# Patient Record
Sex: FEMALE | Race: WHITE | NOT HISPANIC OR LATINO | Employment: OTHER | URBAN - METROPOLITAN AREA
[De-identification: names, ages, dates, MRNs, and addresses within clinical notes are randomized per-mention and may not be internally consistent; named-entity substitution may affect disease eponyms.]

---

## 2017-02-16 RX ORDER — RANOLAZINE 500 MG/1
TABLET, FILM COATED, EXTENDED RELEASE ORAL
Qty: 180 TABLET | Refills: 3 | Status: SHIPPED | OUTPATIENT
Start: 2017-02-16 | End: 2017-07-12

## 2017-04-25 ENCOUNTER — APPOINTMENT (OUTPATIENT)
Dept: WOMENS IMAGING | Facility: HOSPITAL | Age: 77
End: 2017-04-25

## 2017-04-25 PROCEDURE — 77063 BREAST TOMOSYNTHESIS BI: CPT | Performed by: RADIOLOGY

## 2017-04-25 PROCEDURE — G0202 SCR MAMMO BI INCL CAD: HCPCS | Performed by: RADIOLOGY

## 2017-07-03 ENCOUNTER — HOSPITAL ENCOUNTER (EMERGENCY)
Facility: HOSPITAL | Age: 77
Discharge: HOME | End: 2017-07-03
Attending: EMERGENCY MEDICINE | Admitting: EMERGENCY MEDICINE

## 2017-07-03 VITALS
WEIGHT: 192 LBS | TEMPERATURE: 98.7 F | HEART RATE: 77 BPM | HEIGHT: 65 IN | SYSTOLIC BLOOD PRESSURE: 115 MMHG | OXYGEN SATURATION: 93 % | BODY MASS INDEX: 31.99 KG/M2 | DIASTOLIC BLOOD PRESSURE: 95 MMHG | RESPIRATION RATE: 16 BRPM

## 2017-07-03 DIAGNOSIS — K11.20 SIALOADENITIS: Primary | ICD-10-CM

## 2017-07-03 LAB
ALBUMIN SERPL-MCNC: 4 G/DL (ref 3.5–5.2)
ALBUMIN/GLOB SERPL: 1.2 G/DL
ALP SERPL-CCNC: 51 U/L (ref 39–117)
ALT SERPL W P-5'-P-CCNC: 25 U/L (ref 1–33)
ANION GAP SERPL CALCULATED.3IONS-SCNC: 14 MMOL/L
AST SERPL-CCNC: 22 U/L (ref 1–32)
BASOPHILS # BLD AUTO: 0.01 10*3/MM3 (ref 0–0.2)
BASOPHILS NFR BLD AUTO: 0.1 % (ref 0–1.5)
BILIRUB SERPL-MCNC: 0.7 MG/DL (ref 0.1–1.2)
BUN BLD-MCNC: 8 MG/DL (ref 8–23)
BUN/CREAT SERPL: 10.7 (ref 7–25)
CALCIUM SPEC-SCNC: 9.8 MG/DL (ref 8.6–10.5)
CHLORIDE SERPL-SCNC: 101 MMOL/L (ref 98–107)
CO2 SERPL-SCNC: 25 MMOL/L (ref 22–29)
CREAT BLD-MCNC: 0.75 MG/DL (ref 0.57–1)
DEPRECATED RDW RBC AUTO: 51.8 FL (ref 37–54)
EOSINOPHIL # BLD AUTO: 0.03 10*3/MM3 (ref 0–0.7)
EOSINOPHIL NFR BLD AUTO: 0.4 % (ref 0.3–6.2)
ERYTHROCYTE [DISTWIDTH] IN BLOOD BY AUTOMATED COUNT: 14.5 % (ref 11.7–13)
GFR SERPL CREATININE-BSD FRML MDRD: 75 ML/MIN/1.73
GLOBULIN UR ELPH-MCNC: 3.4 GM/DL
GLUCOSE BLD-MCNC: 100 MG/DL (ref 65–99)
HCT VFR BLD AUTO: 41.6 % (ref 35.6–45.5)
HGB BLD-MCNC: 14.1 G/DL (ref 11.9–15.5)
IMM GRANULOCYTES # BLD: 0 10*3/MM3 (ref 0–0.03)
IMM GRANULOCYTES NFR BLD: 0 % (ref 0–0.5)
LYMPHOCYTES # BLD AUTO: 2.5 10*3/MM3 (ref 0.9–4.8)
LYMPHOCYTES NFR BLD AUTO: 34.2 % (ref 19.6–45.3)
MCH RBC QN AUTO: 33 PG (ref 26.9–32)
MCHC RBC AUTO-ENTMCNC: 33.9 G/DL (ref 32.4–36.3)
MCV RBC AUTO: 97.4 FL (ref 80.5–98.2)
MONOCYTES # BLD AUTO: 0.97 10*3/MM3 (ref 0.2–1.2)
MONOCYTES NFR BLD AUTO: 13.3 % (ref 5–12)
NEUTROPHILS # BLD AUTO: 3.8 10*3/MM3 (ref 1.9–8.1)
NEUTROPHILS NFR BLD AUTO: 52 % (ref 42.7–76)
PLATELET # BLD AUTO: 167 10*3/MM3 (ref 140–500)
PMV BLD AUTO: 10.4 FL (ref 6–12)
POTASSIUM BLD-SCNC: 4.3 MMOL/L (ref 3.5–5.2)
PROT SERPL-MCNC: 7.4 G/DL (ref 6–8.5)
RBC # BLD AUTO: 4.27 10*6/MM3 (ref 3.9–5.2)
SODIUM BLD-SCNC: 140 MMOL/L (ref 136–145)
WBC NRBC COR # BLD: 7.31 10*3/MM3 (ref 4.5–10.7)

## 2017-07-03 PROCEDURE — 99283 EMERGENCY DEPT VISIT LOW MDM: CPT

## 2017-07-03 PROCEDURE — 80053 COMPREHEN METABOLIC PANEL: CPT | Performed by: PHYSICIAN ASSISTANT

## 2017-07-03 PROCEDURE — 96365 THER/PROPH/DIAG IV INF INIT: CPT

## 2017-07-03 PROCEDURE — 85025 COMPLETE CBC W/AUTO DIFF WBC: CPT | Performed by: PHYSICIAN ASSISTANT

## 2017-07-03 RX ORDER — CLINDAMYCIN HYDROCHLORIDE 300 MG/1
300 CAPSULE ORAL 4 TIMES DAILY
Qty: 28 CAPSULE | Refills: 0 | Status: SHIPPED | OUTPATIENT
Start: 2017-07-03 | End: 2017-07-12

## 2017-07-03 RX ORDER — CLINDAMYCIN PHOSPHATE 600 MG/50ML
600 INJECTION INTRAVENOUS ONCE
Status: COMPLETED | OUTPATIENT
Start: 2017-07-03 | End: 2017-07-03

## 2017-07-03 RX ORDER — SODIUM CHLORIDE 0.9 % (FLUSH) 0.9 %
10 SYRINGE (ML) INJECTION AS NEEDED
Status: DISCONTINUED | OUTPATIENT
Start: 2017-07-03 | End: 2017-07-03 | Stop reason: HOSPADM

## 2017-07-03 RX ADMIN — CLINDAMYCIN PHOSPHATE 600 MG: 12 INJECTION, SOLUTION INTRAMUSCULAR; INTRAVENOUS at 15:14

## 2017-07-03 NOTE — ED PROVIDER NOTES
Pt presents complaining of swelling to right side of the jaw. Pt previously had a swollen salivary gland. On exam, the pt has swelling of the right face anterior to the right ear, no facial erythema, neck supple, heart is RRR, and lungs are clear. Plan to discharge the pt on antibiotics and follow up with ENT doctor as scheduled.    I supervised care provided by the midlevel provider.    We have discussed this patient's history, physical exam, and treatment plan.   I have reviewed the note and personally saw and examined the patient and agree with the plan of care.    Documentation assistance provided by kellie Jose for Dr. Tran.  Information recorded by the scribe was done at my direction and has been verified and validated by me.       Danny Jose  07/03/17 1612       Murphy Tran MD  07/03/17 3817

## 2017-07-03 NOTE — ED NOTES
Pt reports swelling and tenderness on her right neck and jaw line since Friday. Pt reports being seen at  PCP today about this and was recommended to come to the ER. Pt reports could not get in with her ENT MD (Dr. Rodas).NAD noted. No trouble breathing or swallowing. Afebrile.      Leela Chinchilla RN  07/03/17 0888

## 2017-07-03 NOTE — ED PROVIDER NOTES
EMERGENCY DEPARTMENT ENCOUNTER    CHIEF COMPLAINT  Chief Complaint: facial sweling  History given by: patient, family  History limited by: nothing  Room Number: 08/08  PMD: Tho Mar MD  ENT - Dr. Garcia    HPI:  Pt is a 76 y.o. female who presents complaining of swelling to R side of jaw that radiates to R ear onset 3 days ago that has improved since onset. She spoke w/ her ENT office but was unable to get an appointment until Friday. Pt also c/o low-grade fever, warmth to R side of jaw but denies chills, dental pain. Pt has hx of blocked salivary gland on R side for which she has previously seen ENT.    Duration:  3 days  Onset: gradual  Timing: constant  Location: R side of jaw  Radiation: R ear  Quality: swelling, warmth  Intensity/Severity: moderate  Progression: improved  Associated Symptoms: fever, warmth to R side of aw  Aggravating Factors: opening mouth  Alleviating Factors: none  Previous Episodes: hx of blocked salivary gland on R  Treatment before arrival: none    PAST MEDICAL HISTORY  Active Ambulatory Problems     Diagnosis Date Noted   • No Active Ambulatory Problems     Resolved Ambulatory Problems     Diagnosis Date Noted   • No Resolved Ambulatory Problems     Past Medical History:   Diagnosis Date   • CAD (coronary artery disease)    • Hyperlipidemia    • Hypertension    • Myocardial infarction    • Obesity    • Sinus bradycardia        PAST SURGICAL HISTORY  Past Surgical History:   Procedure Laterality Date   • CARDIAC CATHETERIZATION     • CORONARY ANGIOPLASTY WITH STENT PLACEMENT         FAMILY HISTORY  History reviewed. No pertinent family history.    SOCIAL HISTORY  Social History     Social History   • Marital status:      Spouse name: N/A   • Number of children: N/A   • Years of education: N/A     Occupational History   • Not on file.     Social History Main Topics   • Smoking status: Never Smoker   • Smokeless tobacco: Not on file   • Alcohol use No   • Drug use: Not on  file   • Sexual activity: Not on file     Other Topics Concern   • Not on file     Social History Narrative       ALLERGIES  Ambien [zolpidem tartrate]; Penicillins; and Tylenol with codeine #3 [acetaminophen-codeine]    REVIEW OF SYSTEMS  Review of Systems   Constitutional: Positive for fever. Negative for chills.   HENT: Positive for facial swelling (R side of jaw w/ warmth). Negative for congestion and sore throat.    Eyes: Negative.    Respiratory: Negative for cough and shortness of breath.    Cardiovascular: Negative for chest pain and leg swelling.   Gastrointestinal: Negative for abdominal pain, diarrhea and vomiting.   Genitourinary: Negative for difficulty urinating and dysuria.   Musculoskeletal: Negative for back pain and neck pain.   Skin: Negative for rash and wound.   Allergic/Immunologic: Negative.    Neurological: Negative for dizziness, weakness, numbness and headaches.   Psychiatric/Behavioral: Negative.    All other systems reviewed and are negative.      PHYSICAL EXAM  ED Triage Vitals   Temp Heart Rate Resp BP SpO2   07/03/17 1219 07/03/17 1219 07/03/17 1219 07/03/17 1232 07/03/17 1219   98 °F (36.7 °C) 85 18 138/91 93 %      Temp src Heart Rate Source Patient Position BP Location FiO2 (%)   07/03/17 1219 07/03/17 1219 -- -- --   Tympanic Monitor          Physical Exam   Constitutional: She is oriented to person, place, and time and well-developed, well-nourished, and in no distress. No distress.   HENT:   Head: Normocephalic and atraumatic.   No dental caries. Ears normal. R side of face has swollen mass to R submandibular-parotid area that is tender.   Eyes: EOM are normal. Pupils are equal, round, and reactive to light.   Neck: Normal range of motion. Neck supple.   Cardiovascular: Normal rate, regular rhythm and normal heart sounds.    Pulmonary/Chest: Effort normal and breath sounds normal. No respiratory distress.   Abdominal: Soft. There is no tenderness. There is no rebound and no  guarding.   Musculoskeletal: Normal range of motion. She exhibits no edema.   Neurological: She is alert and oriented to person, place, and time. She has normal sensation and normal strength.   Skin: Skin is warm and dry. No rash noted.   Psychiatric: Mood and affect normal.   Nursing note and vitals reviewed.      LAB RESULTS  Lab Results (last 24 hours)     Procedure Component Value Units Date/Time    CBC & Differential [[id number]] Collected:  07/03/17 1512    Specimen:  Blood Updated:  07/03/17 1542    Narrative:       The following orders were created for panel order CBC & Differential.  Procedure                               Abnormality         Status                     ---------                               -----------         ------                     CBC Auto Differential[[id number]]        Abnormal            Final result                 Please view results for these tests on the individual orders.    Comprehensive Metabolic Panel [[id number]]  (Abnormal) Collected:  07/03/17 1512    Specimen:  Blood Updated:  07/03/17 1551     Glucose 100 (H) mg/dL      BUN 8 mg/dL      Creatinine 0.75 mg/dL      Sodium 140 mmol/L      Potassium 4.3 mmol/L      Chloride 101 mmol/L      CO2 25.0 mmol/L      Calcium 9.8 mg/dL      Total Protein 7.4 g/dL      Albumin 4.00 g/dL      ALT (SGPT) 25 U/L      AST (SGOT) 22 U/L      Alkaline Phosphatase 51 U/L      Total Bilirubin 0.7 mg/dL      eGFR Non African Amer 75 mL/min/1.73      Globulin 3.4 gm/dL      A/G Ratio 1.2 g/dL      BUN/Creatinine Ratio 10.7     Anion Gap 14.0 mmol/L     Narrative:       The MDRD GFR formula is only valid for adults with stable renal function between ages 18 and 70.    CBC Auto Differential [[id number]]  (Abnormal) Collected:  07/03/17 1512    Specimen:  Blood Updated:  07/03/17 1542     WBC 7.31 10*3/mm3      RBC 4.27 10*6/mm3      Hemoglobin 14.1 g/dL      Hematocrit 41.6 %      MCV 97.4 fL      MCH 33.0 (H) pg      MCHC 33.9 g/dL      RDW 14.5  (H) %      RDW-SD 51.8 fl      MPV 10.4 fL      Platelets 167 10*3/mm3      Neutrophil % 52.0 %      Lymphocyte % 34.2 %      Monocyte % 13.3 (H) %      Eosinophil % 0.4 %      Basophil % 0.1 %      Immature Grans % 0.0 %      Neutrophils, Absolute 3.80 10*3/mm3      Lymphocytes, Absolute 2.50 10*3/mm3      Monocytes, Absolute 0.97 10*3/mm3      Eosinophils, Absolute 0.03 10*3/mm3      Basophils, Absolute 0.01 10*3/mm3      Immature Grans, Absolute 0.00 10*3/mm3         I ordered the above labs and reviewed the results      RADIOLOGY  No orders to display     I ordered the above noted radiological studies. Interpreted by radiologist. Reviewed by me in PACS.       PROCEDURES  Procedures      PROGRESS AND CONSULTS  ED Course     1448  Ordered blood work for further evaluation. Ordered clindamycin for infection.    1544  Rechecked pt who is resting comfortably. D/w pt and family plan to d/c pt w/ ENT f/u. Recommended sucking on lemon drops and gave RTER warnings. Pt and family understand and agree with the plan. All questions answered.    1555  Discussed pt w/ Dr. Tran who, after bedside evaluation, agrees w/ course of care.      MEDICAL DECISION MAKING  Results were reviewed/discussed with the patient and they were also made aware of online access. Pt also made aware that some labs, such as cultures, will not be resulted during ER visit and follow up with PMD is necessary.     MDM  Number of Diagnoses or Management Options  Sialoadenitis:   Diagnosis management comments: No evidence of sepsis.  HPI and exam consistent with prior sialoadenitis.         Amount and/or Complexity of Data Reviewed  Clinical lab tests: ordered and reviewed (Lab work is unremarkable.)  Decide to obtain previous medical records or to obtain history from someone other than the patient: yes  Obtain history from someone other than the patient: yes  Review and summarize past medical records: yes (6/17/14 pt had CT face that showed  Sialoadenitis with possible stone.)    Patient Progress  Patient progress: stable         DIAGNOSIS  Final diagnoses:   Sialoadenitis       DISPOSITION  DISCHARGE    Patient discharged in stable condition.    Reviewed implications of results, diagnosis, meds, responsibility to follow up, warning signs and symptoms of possible worsening, potential complications and reasons to return to ER, including worsening facial swelling.    Patient/Family voiced understanding of above instructions.    Discussed plan for discharge, as there is no emergent indication for admission.  Pt/family is agreeable and understands need for follow up and repeat testing.  Pt is aware that discharge does not mean that nothing is wrong but it indicates no emergency is present that requires admission and they must continue care with follow-up as given below or physician of their choice.     FOLLOW-UP  Andrea Garcia MD  [address] [address]  [phone]    Go in 4 days  As scheduled, For follow-up         Medication List      Notice     No changes were made to your prescriptions during this visit.        Latest Documented Vital Signs:  As of 3:58 PM  BP- 161/98 HR- 77 Temp- 98.4 °F (36.9 °C) (Tympanic) O2 sat- 93%    --  Documentation assistance provided by kellie Nguyễn for MARQUITA Werner.  Information recorded by the scribe was done at my direction and has been verified and validated by me.     Shyam Nguyễn  07/03/17 7619       MARQUITA Moise  07/03/17 0567

## 2017-07-12 ENCOUNTER — OFFICE VISIT (OUTPATIENT)
Dept: CARDIOLOGY | Facility: CLINIC | Age: 77
End: 2017-07-12

## 2017-07-12 VITALS
OXYGEN SATURATION: 100 % | BODY MASS INDEX: 32.32 KG/M2 | HEART RATE: 78 BPM | SYSTOLIC BLOOD PRESSURE: 120 MMHG | WEIGHT: 194 LBS | HEIGHT: 65 IN | DIASTOLIC BLOOD PRESSURE: 86 MMHG

## 2017-07-12 DIAGNOSIS — I25.10 CORONARY ARTERY DISEASE INVOLVING NATIVE CORONARY ARTERY OF NATIVE HEART WITHOUT ANGINA PECTORIS: Primary | ICD-10-CM

## 2017-07-12 DIAGNOSIS — I42.9 CARDIOMYOPATHY (HCC): ICD-10-CM

## 2017-07-12 DIAGNOSIS — Z95.5 HISTORY OF CORONARY ARTERY STENT PLACEMENT: ICD-10-CM

## 2017-07-12 DIAGNOSIS — I34.0 MITRAL VALVE INSUFFICIENCY, UNSPECIFIED ETIOLOGY: ICD-10-CM

## 2017-07-12 PROCEDURE — 93000 ELECTROCARDIOGRAM COMPLETE: CPT | Performed by: PHYSICIAN ASSISTANT

## 2017-07-12 PROCEDURE — 99214 OFFICE O/P EST MOD 30 MIN: CPT | Performed by: PHYSICIAN ASSISTANT

## 2017-07-12 RX ORDER — GUAIFENESIN 600 MG/1
1200 TABLET, EXTENDED RELEASE ORAL 2 TIMES DAILY
COMMUNITY
End: 2019-07-19 | Stop reason: ALTCHOICE

## 2017-07-12 NOTE — PROGRESS NOTES
Date of Office Visit: 2017  Encounter Provider: MARQUITA Bethea  Place of Service: [facility] CARDIOLOGY  Patient Name: Alice Mabry  :1940    Chief Complaint   Patient presents with   • Coronary Artery Disease     1 year follow up   :     HPI: Alice Mabry is a 76 y.o. female, new to me, who presents today for follow-up.  Old records have been obtained and reviewed by me.  She is a patient of Dr. Horner's with a past medical history significant for hypertension, hyperlipidemia, sinus bradycardia, and coronary artery disease.  Her last cardiac catheterization was in 2014.  This showed a 10% luminal irregularities in the left main, widely patent proximal LAD stent, 2 small diagonals with 80% ostial lesions that were covered by the stent and not amenable to PCI, 20% in-stent restenosis in the circumflex stent, and a dominant RCA with a 90% ostial lesion and 30% mid disease.  There was a distal stent that was widely patent.  She underwent successful angioplasty and drug-eluting stent placement to the ostial RCA lesion.  She was also noted to have ischemic cardiomyopathy with an overall ejection fraction of around 30% at the time of the cardiac catheterization.  She did have an echocardiogram in 2014 which showed improvement of her EF to 57%, moderately dilated left atrium, moderate mitral regurgitation, and normal LV function.  Her last nuclear stress test was on 2015 and showed an EF of about 45%, inferior wall hypokinesis, and a moderate area of infarct in the inferior wall with no reperfusable ischemia.  She was last in our office to see Dr. Horner on 2016.  At that visit, he felt that she was stable.  He mentioned that we were treating her as if she had chronic stable angina, however mention that it was difficult to determine the etiology of some of her symptoms.  She is here today for yearly follow-up.   In 2016 she was  diagnosed and hospitalized with double pneumonia.  She was treated with antibiotics and subsequently developed C. difficile.  During that hospitalization, she did have an echocardiogram performed on 2/27/2016.  This showed normal LV function with an EF of 60%, moderate mitral regurgitation, and normal RVSP at 28 mmHg.  she states that now she is finally starting to recover and feel better.  It was a really rough December and January for her.  She has palpitations at times and is constantly short of breath on exertion, however this is unchanged for her.  She also has some lightheadedness and fatigue.  Despite these symptoms, she states that she feels the best that she has this whole year.  She is not having any chest pain.  Miraculously, her daughter who had stage IV lung cancer with metastasis to the brain last year is still alive.  She actually is in remission, which is truly remarkable.      Past Medical History:   Diagnosis Date   • CAD (coronary artery disease)    • Dizziness    • Hyperlipidemia    • Hypertension    • Myocardial infarction    • Obesity    • Pneumonia    • Sinus bradycardia    • Syncope        Past Surgical History:   Procedure Laterality Date   • CARDIAC CATHETERIZATION     • CORONARY ANGIOPLASTY WITH STENT PLACEMENT         Social History     Social History   • Marital status:      Spouse name: N/A   • Number of children: N/A   • Years of education: N/A     Occupational History   • Not on file.     Social History Main Topics   • Smoking status: Never Smoker   • Smokeless tobacco: Not on file   • Alcohol use No   • Drug use: No   • Sexual activity: Defer     Other Topics Concern   • Not on file     Social History Narrative       Family History   Problem Relation Age of Onset   • No Known Problems Mother    • No Known Problems Father    • No Known Problems Maternal Grandmother    • No Known Problems Maternal Grandfather    • No Known Problems Paternal Grandmother    • No Known Problems  Paternal Grandfather        Review of Systems   Constitution: Negative for chills, fever, malaise/fatigue, weight gain and weight loss.   HENT: Negative for ear pain, headaches, hearing loss, nosebleeds and sore throat.    Eyes: Negative for double vision, pain and visual disturbance.   Cardiovascular: Positive for dyspnea on exertion and palpitations. Negative for chest pain, irregular heartbeat, leg swelling, near-syncope, orthopnea, paroxysmal nocturnal dyspnea and syncope.   Respiratory: Negative for cough, shortness of breath, sleep disturbances due to breathing, snoring and wheezing.    Endocrine: Negative for cold intolerance, heat intolerance and polyuria.   Skin: Negative for itching and rash.   Musculoskeletal: Negative for joint pain, joint swelling and myalgias.   Gastrointestinal: Negative for abdominal pain, diarrhea, melena, nausea and vomiting.   Genitourinary: Negative for frequency, hematuria and hesitancy.   Neurological: Positive for light-headedness. Negative for excessive daytime sleepiness, numbness, paresthesias and seizures.   Psychiatric/Behavioral: Negative for altered mental status and depression.   Allergic/Immunologic: Negative.    All other systems reviewed and are negative.      Allergies   Allergen Reactions   • Ambien [Zolpidem Tartrate]    • Penicillins    • Tylenol With Codeine #3 [Acetaminophen-Codeine]    • Zolpidem          Current Outpatient Prescriptions:   •  aspirin 81 MG chewable tablet, Chew 81 mg daily., Disp: , Rfl:   •  atorvastatin (LIPITOR) 80 MG tablet, TAKE 1 TABLET DAILY, Disp: 90 tablet, Rfl: 3  •  FLUoxetine (PROzac) 20 MG capsule, Take 20 mg by mouth daily., Disp: , Rfl:   •  guaiFENesin (MUCINEX) 600 MG 12 hr tablet, Take 1,200 mg by mouth 2 (Two) Times a Day., Disp: , Rfl:   •  isosorbide mononitrate (IMDUR) 30 MG 24 hr tablet, Take 90 mg by mouth Daily. 3 tabs=90mg, Disp: , Rfl:   •  LORazepam (ATIVAN) 1 MG tablet, Take 1 mg by mouth every 8 (eight) hours as  "needed for anxiety., Disp: , Rfl:   •  nitroglycerin (NITROSTAT) 0.4 MG SL tablet, Place 1 tablet under the tongue every 5 (five) minutes as needed for chest pain. Take no more than 3 doses in 15 minutes., Disp: 100 tablet, Rfl: 3  •  Probiotic Product (PROBIOTIC ADVANCED PO), Take 1 tablet by mouth Daily., Disp: , Rfl:   •  RANEXA 500 MG 12 hr tablet, TAKE 1 TABLET EVERY 12     HOURS, Disp: 180 tablet, Rfl: 3  •  vitamin D (ERGOCALCIFEROL) 47803 UNITS capsule capsule, Take 50,000 Units by mouth 1 (one) time per week., Disp: , Rfl:      Objective:     Vitals:    07/12/17 1101 07/12/17 1126   BP: 126/92 120/86   BP Location: Right arm Left arm   Pulse: 78    SpO2: 100%    Weight: 194 lb (88 kg)    Height: 65\" (165.1 cm)      Body mass index is 32.28 kg/(m^2).    PHYSICAL EXAM:    Physical Exam   Constitutional: She is oriented to person, place, and time. She appears well-developed and well-nourished. No distress.   HENT:   Head: Normocephalic and atraumatic.   Eyes: Pupils are equal, round, and reactive to light.   Neck: No JVD present. No thyromegaly present.   Cardiovascular: Normal rate, regular rhythm, normal heart sounds and intact distal pulses.    No murmur heard.  Pulmonary/Chest: Effort normal and breath sounds normal. No respiratory distress.   Abdominal: Soft. Bowel sounds are normal. She exhibits no distension. There is no splenomegaly or hepatomegaly. There is no tenderness.   Musculoskeletal: Normal range of motion. She exhibits no edema.   Neurological: She is alert and oriented to person, place, and time.   Skin: Skin is warm and dry. She is not diaphoretic. No erythema.   Psychiatric: She has a normal mood and affect. Her behavior is normal. Judgment normal.         ECG 12 Lead  Date/Time: 7/12/2017 11:41 AM  Performed by: REMBERTO CROOKS.  Authorized by: REMBERTO CROOKS.   Comparison: compared with previous ECG from 7/11/2016  Similar to previous ECG  Rhythm: sinus rhythm  BPM: 78  Q waves: III and " V1  Clinical impression: abnormal ECG  Comments: Indication: Coronary artery disease, status post stent placement.              Assessment:       Diagnosis Plan   1. Coronary artery disease involving native coronary artery of native heart without angina pectoris  ECG 12 Lead   2. History of coronary artery stent placement  ECG 12 Lead   3. Cardiomyopathy     4. Mitral valve insufficiency, unspecified etiology       Orders Placed This Encounter   Procedures   • ECG 12 Lead     This order was created via procedure documentation          Plan:       1.  Coronary Artery Disease  Assessment  • The patient has no angina    Subjective - Objective  • There is a history of past MI  • There has been a previous stent procedure using RAJ  • Current antiplatelet therapy includes aspirin 81 mg  • Overall she is doing well.  She has no complaints of angina or heart failure at this time.  We did give her some samples of Ranexa today.  I'm not going to make any changes to her medical regimen today.    2.  History of cardiomyopathy.  This was ischemic at the time of her coronary disease and subsequent stent placement.  Her last echocardiogram in December 2016 showed a normal EF and normal LV function.  Continue current medical regimen.    3.  Mitral regurgitation.  Her last echocardiogram about 6 months ago showed no worsening of her mitral valve.  Continue current medical regimen.  Next    She will follow-up with Dr. Horner in 1 year or sooner if needed.    As always, it has been a pleasure to participate in your patient's care.      Sincerely,         Brandie Altamirano PA-C

## 2017-08-28 ENCOUNTER — OFFICE VISIT (OUTPATIENT)
Dept: SURGERY | Facility: CLINIC | Age: 77
End: 2017-08-28

## 2017-08-28 VITALS — HEIGHT: 65 IN | OXYGEN SATURATION: 95 % | HEART RATE: 94 BPM | WEIGHT: 195.4 LBS | BODY MASS INDEX: 32.55 KG/M2

## 2017-08-28 DIAGNOSIS — L72.3 SEBACEOUS CYST: Primary | ICD-10-CM

## 2017-08-28 PROCEDURE — 99202 OFFICE O/P NEW SF 15 MIN: CPT | Performed by: SURGERY

## 2017-08-28 RX ORDER — LORAZEPAM 1 MG/1
1 TABLET ORAL 2 TIMES DAILY PRN
COMMUNITY
Start: 2017-08-14 | End: 2017-08-28 | Stop reason: SDUPTHER

## 2017-08-28 RX ORDER — ESOMEPRAZOLE MAGNESIUM 40 MG/1
40 CAPSULE, DELAYED RELEASE ORAL
COMMUNITY
End: 2022-10-20

## 2017-08-28 RX ORDER — HYDROCODONE BITARTRATE AND ACETAMINOPHEN 5; 325 MG/1; MG/1
1 TABLET ORAL EVERY 6 HOURS PRN
COMMUNITY
Start: 2017-08-14 | End: 2018-07-17 | Stop reason: ALTCHOICE

## 2017-08-28 RX ORDER — CLINDAMYCIN HYDROCHLORIDE 300 MG/1
300 CAPSULE ORAL 3 TIMES DAILY
COMMUNITY
Start: 2017-08-14 | End: 2017-08-28

## 2017-08-28 RX ORDER — HYDROXYZINE 50 MG/1
50 TABLET, FILM COATED ORAL DAILY PRN
COMMUNITY
Start: 2017-08-15 | End: 2018-07-17 | Stop reason: ALTCHOICE

## 2017-08-28 NOTE — PROGRESS NOTES
Chief complaint: Subcutaneous lesion between the breasts    History presenting illness:  This is a nice 76-year-old lady who says that around a month ago she noticed a small knot, between her breasts.  There is an overlying pore but there is been no drainage.  It got fairly large and she was placed on some antibiotics and it improves somewhat, but is still present and is uncomfortable.    Review of systems:  Constitutional: Negative for change in weight or appetite, no fever  Cardiovascular: Patient denies chest pain and leg swelling or palpitations at this time    Physical exam:  Gen.: No acute distress, alert and oriented ×3  Gastrointestinal: Abdomen is soft and benign, no hernia or hepatosplenomegaly  Skin exam: Between the breasts at the inferior aspect just above the brow line there is an approximately 1-1/2 cm subcutaneous skin lesion with an overlying pore.  There is no drainage.  There is no significant erythema.  It is mildly tender.  This likely represents a sebaceous cyst.    Assessment and plan:  Symptomatic inflamed sebaceous cyst on the anterior chest wall between the breasts.  I recommended the patient finish her course of antibiotics and then we plan for excision of this subcutaneous lesion.  The risks and benefits were discussed with the patient and she agrees to proceed.    Don Woo MD  General and Endoscopic Surgery  [facility]    [address], [address]  [place], [place], [address]  P: [phone]  F: [phone]

## 2017-09-11 ENCOUNTER — PROCEDURE VISIT (OUTPATIENT)
Dept: SURGERY | Facility: CLINIC | Age: 77
End: 2017-09-11

## 2017-09-11 VITALS — HEART RATE: 80 BPM | OXYGEN SATURATION: 99 %

## 2017-09-11 DIAGNOSIS — L72.3 SEBACEOUS CYST: Primary | ICD-10-CM

## 2017-09-11 PROCEDURE — 11402 EXC TR-EXT B9+MARG 1.1-2 CM: CPT | Performed by: SURGERY

## 2017-09-11 PROCEDURE — 88304 TISSUE EXAM BY PATHOLOGIST: CPT | Performed by: SURGERY

## 2017-09-13 LAB
LAB AP CASE REPORT: NORMAL
LAB AP CLINICAL INFORMATION: NORMAL
Lab: NORMAL
PATH REPORT.FINAL DX SPEC: NORMAL
PATH REPORT.GROSS SPEC: NORMAL

## 2017-09-13 NOTE — PROGRESS NOTES
Procedure note:    Pre op diagnosis: inflamed sebaceous cyst of the mid chest    Post op diagnosis: same    Procedure: excision of mid chest sebaceous cyst approx 1x1 cm    Surgeon:  Don Woo MD    Anesthesia: Local    Description of procedure:  Skin overlying cyst was prepped and draped.  Infiltrated with mixture of lidicaine and marcaine.  Vertical 1cm incision through skin.  Cyst dissected out and removed completety.  No pus noted.  Wound washed out and closed with deep 3-0 vicryl and interupted skin layer of 4-0 nylon.    Don Woo MD  General and Endoscopic Surgery  [facility]    [address], [address]  [place], [place], [address]  P: [phone]  F: [phone]

## 2017-09-25 ENCOUNTER — OFFICE VISIT (OUTPATIENT)
Dept: SURGERY | Facility: CLINIC | Age: 77
End: 2017-09-25

## 2017-09-25 DIAGNOSIS — L72.3 SEBACEOUS CYST: Primary | ICD-10-CM

## 2017-09-25 PROCEDURE — 99212 OFFICE O/P EST SF 10 MIN: CPT | Performed by: SURGERY

## 2017-09-25 NOTE — PROGRESS NOTES
Follow-up excision of sebaceous cyst from mid chest    Subjective:  No complaints today.  Incisions have healed nicely.  No skip and pain.    Objective:  Sutures are intact just below the breasts in the midline.  There is no drainage or cellulitis noted.    Assessment and plan:  Pathology reviewed with patient and demonstrates evidence of ruptured sebaceous cyst.  Incisions healed nicely and I removed her sutures today.  She may follow-up as needed.    Don Woo MD  General and Endoscopic Surgery  [facility]    [address], [address]  [place], [place], [address]  P: [phone]  F: [phone]

## 2017-12-10 ENCOUNTER — APPOINTMENT (OUTPATIENT)
Dept: GENERAL RADIOLOGY | Facility: HOSPITAL | Age: 77
End: 2017-12-10

## 2017-12-10 ENCOUNTER — HOSPITAL ENCOUNTER (EMERGENCY)
Facility: HOSPITAL | Age: 77
Discharge: HOME | End: 2017-12-10
Attending: EMERGENCY MEDICINE | Admitting: EMERGENCY MEDICINE

## 2017-12-10 VITALS
BODY MASS INDEX: 32.49 KG/M2 | WEIGHT: 195 LBS | DIASTOLIC BLOOD PRESSURE: 81 MMHG | TEMPERATURE: 99 F | OXYGEN SATURATION: 92 % | HEART RATE: 87 BPM | HEIGHT: 65 IN | RESPIRATION RATE: 16 BRPM | SYSTOLIC BLOOD PRESSURE: 114 MMHG

## 2017-12-10 DIAGNOSIS — J01.90 ACUTE SINUSITIS, RECURRENCE NOT SPECIFIED, UNSPECIFIED LOCATION: ICD-10-CM

## 2017-12-10 DIAGNOSIS — R50.9 FEVER IN ADULT: Primary | ICD-10-CM

## 2017-12-10 LAB
ALBUMIN SERPL-MCNC: 3.3 G/DL (ref 3.5–5.2)
ALBUMIN/GLOB SERPL: 0.8 G/DL
ALP SERPL-CCNC: 57 U/L (ref 39–117)
ALT SERPL W P-5'-P-CCNC: 23 U/L (ref 1–33)
ANION GAP SERPL CALCULATED.3IONS-SCNC: 12.3 MMOL/L
AST SERPL-CCNC: 24 U/L (ref 1–32)
BACTERIA UR QL AUTO: ABNORMAL /HPF
BASOPHILS # BLD AUTO: 0.01 10*3/MM3 (ref 0–0.2)
BASOPHILS NFR BLD AUTO: 0.1 % (ref 0–1.5)
BILIRUB SERPL-MCNC: 0.6 MG/DL (ref 0.1–1.2)
BILIRUB UR QL STRIP: NEGATIVE
BUN BLD-MCNC: 13 MG/DL (ref 8–23)
BUN/CREAT SERPL: 15.3 (ref 7–25)
CALCIUM SPEC-SCNC: 9.3 MG/DL (ref 8.6–10.5)
CHLORIDE SERPL-SCNC: 101 MMOL/L (ref 98–107)
CLARITY UR: CLEAR
CO2 SERPL-SCNC: 26.7 MMOL/L (ref 22–29)
COLOR UR: ABNORMAL
CREAT BLD-MCNC: 0.85 MG/DL (ref 0.57–1)
DEPRECATED RDW RBC AUTO: 53.8 FL (ref 37–54)
EOSINOPHIL # BLD AUTO: 0.25 10*3/MM3 (ref 0–0.7)
EOSINOPHIL NFR BLD AUTO: 3.4 % (ref 0.3–6.2)
ERYTHROCYTE [DISTWIDTH] IN BLOOD BY AUTOMATED COUNT: 14.5 % (ref 11.7–13)
FLUAV AG NPH QL: NEGATIVE
FLUBV AG NPH QL IA: NEGATIVE
GFR SERPL CREATININE-BSD FRML MDRD: 65 ML/MIN/1.73
GLOBULIN UR ELPH-MCNC: 3.9 GM/DL
GLUCOSE BLD-MCNC: 106 MG/DL (ref 65–99)
GLUCOSE UR STRIP-MCNC: NEGATIVE MG/DL
HCT VFR BLD AUTO: 45.4 % (ref 35.6–45.5)
HGB BLD-MCNC: 14.7 G/DL (ref 11.9–15.5)
HGB UR QL STRIP.AUTO: NEGATIVE
HOLD SPECIMEN: NORMAL
HOLD SPECIMEN: NORMAL
HYALINE CASTS UR QL AUTO: ABNORMAL /LPF
IMM GRANULOCYTES # BLD: 0.02 10*3/MM3 (ref 0–0.03)
IMM GRANULOCYTES NFR BLD: 0.3 % (ref 0–0.5)
KETONES UR QL STRIP: ABNORMAL
LEUKOCYTE ESTERASE UR QL STRIP.AUTO: ABNORMAL
LYMPHOCYTES # BLD AUTO: 1.59 10*3/MM3 (ref 0.9–4.8)
LYMPHOCYTES NFR BLD AUTO: 21.5 % (ref 19.6–45.3)
MAGNESIUM SERPL-MCNC: 2.1 MG/DL (ref 1.6–2.4)
MCH RBC QN AUTO: 32.8 PG (ref 26.9–32)
MCHC RBC AUTO-ENTMCNC: 32.4 G/DL (ref 32.4–36.3)
MCV RBC AUTO: 101.3 FL (ref 80.5–98.2)
MONOCYTES # BLD AUTO: 1.17 10*3/MM3 (ref 0.2–1.2)
MONOCYTES NFR BLD AUTO: 15.8 % (ref 5–12)
NEUTROPHILS # BLD AUTO: 4.35 10*3/MM3 (ref 1.9–8.1)
NEUTROPHILS NFR BLD AUTO: 58.9 % (ref 42.7–76)
NITRITE UR QL STRIP: NEGATIVE
PH UR STRIP.AUTO: 6 [PH] (ref 5–8)
PLATELET # BLD AUTO: 215 10*3/MM3 (ref 140–500)
PMV BLD AUTO: 9.8 FL (ref 6–12)
POTASSIUM BLD-SCNC: 5.1 MMOL/L (ref 3.5–5.2)
PROT SERPL-MCNC: 7.2 G/DL (ref 6–8.5)
PROT UR QL STRIP: NEGATIVE
RBC # BLD AUTO: 4.48 10*6/MM3 (ref 3.9–5.2)
RBC # UR: ABNORMAL /HPF
REF LAB TEST METHOD: ABNORMAL
SODIUM BLD-SCNC: 140 MMOL/L (ref 136–145)
SP GR UR STRIP: 1.02 (ref 1–1.03)
SQUAMOUS #/AREA URNS HPF: ABNORMAL /HPF
TROPONIN T SERPL-MCNC: <0.01 NG/ML (ref 0–0.03)
UROBILINOGEN UR QL STRIP: ABNORMAL
WBC NRBC COR # BLD: 7.39 10*3/MM3 (ref 4.5–10.7)
WBC UR QL AUTO: ABNORMAL /HPF
WHOLE BLOOD HOLD SPECIMEN: NORMAL
WHOLE BLOOD HOLD SPECIMEN: NORMAL

## 2017-12-10 PROCEDURE — 83735 ASSAY OF MAGNESIUM: CPT | Performed by: EMERGENCY MEDICINE

## 2017-12-10 PROCEDURE — 80053 COMPREHEN METABOLIC PANEL: CPT | Performed by: EMERGENCY MEDICINE

## 2017-12-10 PROCEDURE — 71020 HC CHEST PA AND LATERAL: CPT

## 2017-12-10 PROCEDURE — 87040 BLOOD CULTURE FOR BACTERIA: CPT | Performed by: EMERGENCY MEDICINE

## 2017-12-10 PROCEDURE — 99284 EMERGENCY DEPT VISIT MOD MDM: CPT

## 2017-12-10 PROCEDURE — 81001 URINALYSIS AUTO W/SCOPE: CPT | Performed by: EMERGENCY MEDICINE

## 2017-12-10 PROCEDURE — 87804 INFLUENZA ASSAY W/OPTIC: CPT | Performed by: EMERGENCY MEDICINE

## 2017-12-10 PROCEDURE — 36415 COLL VENOUS BLD VENIPUNCTURE: CPT | Performed by: EMERGENCY MEDICINE

## 2017-12-10 PROCEDURE — 84484 ASSAY OF TROPONIN QUANT: CPT | Performed by: EMERGENCY MEDICINE

## 2017-12-10 PROCEDURE — 85025 COMPLETE CBC W/AUTO DIFF WBC: CPT | Performed by: EMERGENCY MEDICINE

## 2017-12-10 RX ORDER — IBUPROFEN 400 MG/1
600 TABLET ORAL ONCE
Status: COMPLETED | OUTPATIENT
Start: 2017-12-10 | End: 2017-12-10

## 2017-12-10 RX ORDER — PROMETHAZINE HYDROCHLORIDE AND CODEINE PHOSPHATE 6.25; 1 MG/5ML; MG/5ML
5 SYRUP ORAL EVERY 4 HOURS PRN
COMMUNITY
End: 2019-07-19

## 2017-12-10 RX ORDER — DOXYCYCLINE HYCLATE 100 MG/1
100 CAPSULE ORAL 2 TIMES DAILY
Qty: 20 CAPSULE | Refills: 0 | Status: SHIPPED | OUTPATIENT
Start: 2017-12-10 | End: 2017-12-20

## 2017-12-10 RX ORDER — LEVOFLOXACIN 500 MG/1
500 TABLET, FILM COATED ORAL DAILY
COMMUNITY
End: 2019-07-19

## 2017-12-10 RX ORDER — SODIUM CHLORIDE 0.9 % (FLUSH) 0.9 %
10 SYRINGE (ML) INJECTION AS NEEDED
Status: DISCONTINUED | OUTPATIENT
Start: 2017-12-10 | End: 2017-12-10 | Stop reason: HOSPADM

## 2017-12-10 RX ADMIN — IBUPROFEN 600 MG: 400 TABLET ORAL at 14:40

## 2017-12-10 NOTE — ED PROVIDER NOTES
EMERGENCY DEPARTMENT ENCOUNTER    CHIEF COMPLAINT  Chief Complaint: fever  History given by: patient, family  History limited by: nothing  Room Number: 29/29  PMD: Tho Mar MD  Cardiologist: Dr. Horner    HPI:  Pt is a 77 y.o. female who presents complaining of fever, Tmax 102.5 at home today. Pt states she was seen by her PCP 11 days ago with c/o non productive cough, sinus congestion, fatigue and generalized body aches and was treated with steroids, Levaquin and cough medicine without relief. Pt denies shortness of breath, N/V/D, dysuria, urinary urgency, leg swelling, rash, recent travel.     Duration:  2 weeks  Onset: gradual  Timing: constant  Intensity/Severity: moderate  Progression: unchanged  Associated Symptoms: cough, fatigue, generalized body aches, congestion  Aggravating Factors: none  Alleviating Factors: none  Previous Episodes: Pt states hx of same x2 weeks  Treatment before arrival: steroids, Levaquin, cough medicine    PAST MEDICAL HISTORY  Active Ambulatory Problems     Diagnosis Date Noted   • CAD (coronary artery disease) 07/12/2017   • History of coronary artery stent placement 07/12/2017   • Cardiomyopathy 07/12/2017   • Mitral valve insufficiency 07/12/2017     Resolved Ambulatory Problems     Diagnosis Date Noted   • No Resolved Ambulatory Problems     Past Medical History:   Diagnosis Date   • Anxiety    • Arthritis    • CAD (coronary artery disease)    • Cervical cancer    • Depression    • Dizziness    • Fatty liver    • GERD (gastroesophageal reflux disease)    • H/O blood clots    • H/O Clostridium difficile infection    • Hyperlipidemia    • Hypertension    • IBS (irritable bowel syndrome)    • Myocardial infarction    • Obesity    • Peptic ulcer    • Pneumonia    • Sinus bradycardia    • Syncope    • Vitamin D deficiency        PAST SURGICAL HISTORY  Past Surgical History:   Procedure Laterality Date   • APPENDECTOMY  1960   • BACK SURGERY     • CARDIAC CATHETERIZATION   06/10/2014    Dr. Murphy Horner   • CARDIAC CATHETERIZATION  11/13/2007    Dr. Murphy Horner   • CARDIAC CATHETERIZATION N/A 10/19/2004    Dr. Murphy Horner   • CARDIAC CATHETERIZATION N/A 07/15/2004    Dr. Gregorio Gonzales   • CARDIAC CATHETERIZATION  10/2003    Dr. Murphy Horner   • CHOLECYSTECTOMY  1998   • COLONOSCOPY N/A 07/2001    negative   • COLONOSCOPY N/A 02/28/2012    negative   • CORONARY ANGIOPLASTY WITH STENT PLACEMENT N/A 07/15/2004    Dr. Murphy Horner   • CORONARY ANGIOPLASTY WITH STENT PLACEMENT  03/2002    to RCA   • HYSTERECTOMY  1974   • UPPER GASTROINTESTINAL ENDOSCOPY N/A 04/20/2015    Mild Schatzki ring, hiatus hernia, non-bleeding erosive gastropathy, erythematous mucosa in the antrum, normal duodenum-Dr. Alex Gill       FAMILY HISTORY  Family History   Problem Relation Age of Onset   • Heart disease Mother    • No Known Problems Father    • No Known Problems Maternal Grandmother    • No Known Problems Maternal Grandfather    • No Known Problems Paternal Grandmother    • No Known Problems Paternal Grandfather    • Heart disease Sister    • Heart disease Brother        SOCIAL HISTORY  Social History     Social History   • Marital status:      Spouse name: N/A   • Number of children: N/A   • Years of education: N/A     Occupational History   • Not on file.     Social History Main Topics   • Smoking status: Former Smoker     Quit date: 1999   • Smokeless tobacco: Never Used   • Alcohol use No   • Drug use: No   • Sexual activity: Defer     Other Topics Concern   • Not on file     Social History Narrative       ALLERGIES  Ambien [zolpidem tartrate]; Penicillins; and Tylenol with codeine #3 [acetaminophen-codeine]    REVIEW OF SYSTEMS  Review of Systems   Constitutional: Positive for fatigue and fever. Negative for chills.   HENT: Positive for congestion (sinus). Negative for rhinorrhea and sore throat.    Eyes: Negative for visual disturbance.   Respiratory: Positive for cough.  Negative for shortness of breath.    Cardiovascular: Negative for chest pain, palpitations and leg swelling.   Gastrointestinal: Negative for abdominal pain, diarrhea and vomiting.   Endocrine: Negative.    Genitourinary: Negative for decreased urine volume, dysuria and frequency.   Musculoskeletal: Positive for myalgias (generalized). Negative for neck pain.   Skin: Negative for rash.   Neurological: Negative for syncope and headaches.   Psychiatric/Behavioral: Negative.    All other systems reviewed and are negative.      PHYSICAL EXAM  ED Triage Vitals   Temp Heart Rate Resp BP SpO2   12/10/17 1429 12/10/17 1429 12/10/17 1429 12/10/17 1431 12/10/17 1429   101.3 °F (38.5 °C) 92 20 103/63 97 %      Temp src Heart Rate Source Patient Position BP Location FiO2 (%)   12/10/17 1429 12/10/17 1429 12/10/17 1431 12/10/17 1431 --   Tympanic Monitor Sitting Left arm        Physical Exam   Constitutional: She is oriented to person, place, and time.  Non-toxic appearance. She appears distressed (mild).   HENT:   Head: Normocephalic and atraumatic.   Right Ear: No mastoid tenderness.   Left Ear: No mastoid tenderness.   Nose: Mucosal edema present. Right sinus exhibits maxillary sinus tenderness. Left sinus exhibits maxillary sinus tenderness.   Eyes: EOM are normal. Pupils are equal, round, and reactive to light.   Neck: Normal range of motion. Neck supple.   Cardiovascular: Normal rate, regular rhythm, normal heart sounds and intact distal pulses.    No murmur heard.  Pulses:       Posterior tibial pulses are 2+ on the right side, and 2+ on the left side.   Pulmonary/Chest: Effort normal and breath sounds normal. No respiratory distress.   Abdominal: Soft. Bowel sounds are normal. There is no tenderness. There is no rebound and no guarding.   Musculoskeletal: Normal range of motion. She exhibits no edema.   Neurological: She is alert and oriented to person, place, and time.   Skin: Skin is warm and dry.   Psychiatric: Affect  normal.   Nursing note and vitals reviewed.      LAB RESULTS  Lab Results (last 24 hours)     Procedure Component Value Units Date/Time    Influenza Antigen, Rapid - Swab, Nasopharynx [[id number]]  (Normal) Collected:  12/10/17 1435    Specimen:  Swab from Nasopharynx Updated:  12/10/17 1516     Influenza A Ag, EIA Negative     Influenza B Ag, EIA Negative    CBC & Differential [[id number]] Collected:  12/10/17 1439    Specimen:  Blood Updated:  12/10/17 1452    Narrative:       The following orders were created for panel order CBC & Differential.  Procedure                               Abnormality         Status                     ---------                               -----------         ------                     CBC Auto Differential[[id number]]        Abnormal            Final result                 Please view results for these tests on the individual orders.    Comprehensive Metabolic Panel [[id number]]  (Abnormal) Collected:  12/10/17 1439    Specimen:  Blood Updated:  12/10/17 1512     Glucose 106 (H) mg/dL      BUN 13 mg/dL      Creatinine 0.85 mg/dL      Sodium 140 mmol/L      Potassium 5.1 mmol/L      Chloride 101 mmol/L      CO2 26.7 mmol/L      Calcium 9.3 mg/dL      Total Protein 7.2 g/dL      Albumin 3.30 (L) g/dL      ALT (SGPT) 23 U/L      AST (SGOT) 24 U/L      Alkaline Phosphatase 57 U/L      Total Bilirubin 0.6 mg/dL      eGFR Non African Amer 65 mL/min/1.73      Globulin 3.9 gm/dL      A/G Ratio 0.8 g/dL      BUN/Creatinine Ratio 15.3     Anion Gap 12.3 mmol/L     Narrative:       The MDRD GFR formula is only valid for adults with stable renal function between ages 18 and 70.    Troponin [[id number]]  (Normal) Collected:  12/10/17 1439    Specimen:  Blood Updated:  12/10/17 1512     Troponin T <0.010 ng/mL     Narrative:       Troponin T Reference Ranges:  Less than 0.03 ng/mL:    Negative for AMI  0.03 to 0.09 ng/mL:      Indeterminant for AMI  Greater than 0.09 ng/mL: Positive for AMI     Magnesium [[id number]]  (Normal) Collected:  12/10/17 1439    Specimen:  Blood Updated:  12/10/17 1512     Magnesium 2.1 mg/dL     CBC Auto Differential [[id number]]  (Abnormal) Collected:  12/10/17 1439    Specimen:  Blood Updated:  12/10/17 1452     WBC 7.39 10*3/mm3      RBC 4.48 10*6/mm3      Hemoglobin 14.7 g/dL      Hematocrit 45.4 %      .3 (H) fL      MCH 32.8 (H) pg      MCHC 32.4 g/dL      RDW 14.5 (H) %      RDW-SD 53.8 fl      MPV 9.8 fL      Platelets 215 10*3/mm3      Neutrophil % 58.9 %      Lymphocyte % 21.5 %      Monocyte % 15.8 (H) %      Eosinophil % 3.4 %      Basophil % 0.1 %      Immature Grans % 0.3 %      Neutrophils, Absolute 4.35 10*3/mm3      Lymphocytes, Absolute 1.59 10*3/mm3      Monocytes, Absolute 1.17 10*3/mm3      Eosinophils, Absolute 0.25 10*3/mm3      Basophils, Absolute 0.01 10*3/mm3      Immature Grans, Absolute 0.02 10*3/mm3     Urinalysis With / Culture If Indicated - Urine, Clean Catch [[id number]]  (Abnormal) Collected:  12/10/17 1711    Specimen:  Urine from Urine, Clean Catch Updated:  12/10/17 1727     Color, UA Dark Yellow (A)     Appearance, UA Clear     pH, UA 6.0     Specific Gravity, UA 1.023     Glucose, UA Negative     Ketones, UA Trace (A)     Bilirubin, UA Negative     Blood, UA Negative     Protein, UA Negative     Leuk Esterase, UA Small (1+) (A)     Nitrite, UA Negative     Urobilinogen, UA 1.0 E.U./dL    Urinalysis, Microscopic Only - Urine, Clean Catch [[id number]]  (Abnormal) Collected:  12/10/17 1711    Specimen:  Urine from Urine, Clean Catch Updated:  12/10/17 1750     RBC, UA None Seen /HPF      WBC, UA 0-2 /HPF      Bacteria, UA 1+ (A) /HPF      Squamous Epithelial Cells, UA 0-2 /HPF      Hyaline Casts, UA 3-6 /LPF      Methodology Manual Light Microscopy    Blood Culture - Blood, [[id number]] Collected:  12/10/17 1829    Specimen:  Blood from Arm, Left Updated:  12/10/17 1834    Blood Culture - Blood, [[id number]] Collected:  12/10/17 1829     Specimen:  Blood from Arm, Left Updated:  12/10/17 1834          I ordered the above labs and reviewed the results    RADIOLOGY  XR Chest 2 View   Final Result    There is mild cardiomegaly with some minimal vascular  congestion that is similar to the study of 03/15/2015. There is no focal  infiltrate or pleural effusion. A coronary artery stent is in place.     This report was finalized on 12/10/2017 3:53 PM by Dr. Ashwin Villaseñor MD.        I ordered the above noted radiological studies. Interpreted by radiologist.  Reviewed by me in PACS.       PROCEDURES  Procedures      PROGRESS AND CONSULTS  ED Course     4:55 PM  Pt states she is feeling much better now than on arrival after temperature has come down. Discussed plan to check UA for further evaluation. Advised further at home treatment with tylenol and ibuprofen.     6:06 PM  Ordered blood cultures to be followed as an outpatient.     6:58 PM  Pt's work up is unremarkable at this time and there is no obvious source of infection other than sinus pressure worse with palpation and worse with bending over. Pt will be discharged.       MEDICAL DECISION MAKING  Results were reviewed/discussed with the patient and they were also made aware of online access. Pt also made aware that some labs, such as cultures, will not be resulted during ER visit and follow up with PMD is necessary.     MDM  Number of Diagnoses or Management Options     Amount and/or Complexity of Data Reviewed  Clinical lab tests: ordered and reviewed (WBC: 7.39, UA: 1+ bacteria, influenza: negative )  Tests in the radiology section of CPT®: ordered and reviewed (CXR:  There is mild cardiomegaly with some minimal vascular congestion that is similar to the study of 03/15/2015. There is no focal infiltrate or pleural effusion. A coronary artery stent is in place.     )  Independent visualization of images, tracings, or specimens: yes    Patient Progress  Patient progress: stable          DIAGNOSIS  Final diagnoses:   Fever in adult   Acute sinusitis, recurrence not specified, unspecified location       DISPOSITION  Disposition: Discharged.    Patient discharged in stable condition.    Reviewed implications of results, diagnosis, meds, responsibility to follow up, warning signs and symptoms of possible worsening, potential complications and reasons to return to ER.    Patient/Family voiced understanding of above instructions.    Discussed plan for discharge, as there is no emergent indication for admission.  Pt/family is agreeable and understands need for follow up and repeat testing.  Pt is aware that discharge does not mean that nothing is wrong but it indicates no emergency is present and they must continue care with follow-up as given below or physician of their choice.     FOLLOW-UP  Tho Mar MD  [id number] [id number] [phone]    Schedule an appointment as soon as possible for a visit in 2 days  EVEN IF WELL         Medication List      New Prescriptions          doxycycline 100 MG capsule   Commonly known as:  VIBRAMYCIN   Take 1 capsule by mouth 2 (Two) Times a Day for 10 days.         Stop          levoFLOXacin 500 MG tablet   Commonly known as:  LEVAQUIN             Latest Documented Vital Signs:  As of 7:00 PM  BP- 114/81 HR- 83 Temp- 100.5 °F (38.1 °C) O2 sat- 92%    --  Documentation assistance provided by kellie Elliott for Dr. Harrell.  Information recorded by the scribe was done at my direction and has been verified and validated by me.         Kaye Elliott  12/10/17 1909       Meli Harrell MD  12/11/17 1412

## 2017-12-11 NOTE — DISCHARGE INSTRUCTIONS
You are advised to follow closely with Dr Mar in 2-3 days for recheck, blood cultur eresults, final results of lab work and imaging testing, and further testing/treatment as needed.    Drink at least 8 glasses of fluids daily;  Alternate tylenol and ibuprofen for fever control  Take probiotics during and after antibiotic regimen    Please return to the emergency department immediately with chest pain different than usual for you, shortness of air, abdominal pain, persistent vomiting/fever, blood in emesis or stool, lightheadedness/fainting, problems with speech, one sided weakness/numbness, new incontinence, problems with vision, altered mental status  or for worsening of symptoms or other concerns.

## 2017-12-15 LAB
BACTERIA SPEC AEROBE CULT: NORMAL
BACTERIA SPEC AEROBE CULT: NORMAL

## 2017-12-20 RX ORDER — ATORVASTATIN CALCIUM 80 MG/1
TABLET, FILM COATED ORAL
Qty: 90 TABLET | Refills: 3 | Status: SHIPPED | OUTPATIENT
Start: 2017-12-20 | End: 2018-12-15 | Stop reason: SDUPTHER

## 2018-03-16 RX ORDER — RANOLAZINE 500 MG/1
TABLET, FILM COATED, EXTENDED RELEASE ORAL
Qty: 180 TABLET | Refills: 3 | Status: SHIPPED | OUTPATIENT
Start: 2018-03-16 | End: 2019-02-26 | Stop reason: SDUPTHER

## 2018-05-07 ENCOUNTER — APPOINTMENT (OUTPATIENT)
Dept: WOMENS IMAGING | Facility: HOSPITAL | Age: 78
End: 2018-05-07

## 2018-05-07 PROCEDURE — 77067 SCR MAMMO BI INCL CAD: CPT | Performed by: RADIOLOGY

## 2018-05-07 PROCEDURE — 77063 BREAST TOMOSYNTHESIS BI: CPT | Performed by: RADIOLOGY

## 2018-07-17 ENCOUNTER — OFFICE VISIT (OUTPATIENT)
Dept: CARDIOLOGY | Facility: CLINIC | Age: 78
End: 2018-07-17

## 2018-07-17 VITALS
HEART RATE: 74 BPM | HEIGHT: 65 IN | SYSTOLIC BLOOD PRESSURE: 130 MMHG | BODY MASS INDEX: 32.49 KG/M2 | DIASTOLIC BLOOD PRESSURE: 82 MMHG | WEIGHT: 195 LBS

## 2018-07-17 DIAGNOSIS — I25.10 CORONARY ARTERY DISEASE INVOLVING NATIVE CORONARY ARTERY OF NATIVE HEART WITHOUT ANGINA PECTORIS: Primary | ICD-10-CM

## 2018-07-17 PROCEDURE — 93000 ELECTROCARDIOGRAM COMPLETE: CPT | Performed by: INTERNAL MEDICINE

## 2018-07-17 PROCEDURE — 99214 OFFICE O/P EST MOD 30 MIN: CPT | Performed by: INTERNAL MEDICINE

## 2018-07-17 NOTE — PROGRESS NOTES
Date of Office Visit: 2018  Encounter Provider: Murphy Horner MD  Place of Service: [facility] CARDIOLOGY  Patient Name: Alice Mabry  :1940  [id number]    Chief Complaint   Patient presents with   • Follow-up     1 year   • Coronary Artery Disease   :     HPI: Alice Mabry is a 77 y.o. female  she had an inferior MI in  that was treated with stenting 2017 restenosis at the origin of the RCA we stented that she's had significant disease and 3 diagonals that we've not intervened on with felt was too small OM and managing her medically.  She has not really noticed much change.  She has occasional chest heaviness, which has been that way for years.  She has no paroxysmal nocturnal dyspnea, orthopnea, or edema.  There is a lot of stress in her family.  It is ongoing.  In general, she says, “my heart is doing pretty well”.        Past Medical History:   Diagnosis Date   • Anxiety    • Arthritis    • CAD (coronary artery disease)    • Cervical cancer (CMS/HCC)    • Depression    • Dizziness    • Fatty liver    • GERD (gastroesophageal reflux disease)    • H/O blood clots    • H/O Clostridium difficile infection    • Hyperlipidemia    • Hypertension    • IBS (irritable bowel syndrome)    • Myocardial infarction    • Obesity    • Peptic ulcer    • Pneumonia    • Sinus bradycardia    • Syncope    • Vitamin D deficiency        Past Surgical History:   Procedure Laterality Date   • APPENDECTOMY  1960   • BACK SURGERY     • CARDIAC CATHETERIZATION  06/10/2014    Dr. Murphy Horner   • CARDIAC CATHETERIZATION  2007    Dr. Murphy Horner   • CARDIAC CATHETERIZATION N/A 10/19/2004    Dr. Murphy Horner   • CARDIAC CATHETERIZATION N/A 07/15/2004    Dr. Gregorio Gonzales   • CARDIAC CATHETERIZATION  10/2003    Dr. Murphy Horner   • CHOLECYSTECTOMY     • COLONOSCOPY N/A 2001    negative   • COLONOSCOPY N/A 2012    negative   • CORONARY ANGIOPLASTY WITH STENT  PLACEMENT N/A 07/15/2004    Dr. Murphy Horner   • CORONARY ANGIOPLASTY WITH STENT PLACEMENT  03/2002    to RCA   • HYSTERECTOMY  1974   • UPPER GASTROINTESTINAL ENDOSCOPY N/A 04/20/2015    Mild Schatzki ring, hiatus hernia, non-bleeding erosive gastropathy, erythematous mucosa in the antrum, normal duodenum-Dr. Alex Gill       Social History     Social History   • Marital status:      Spouse name: N/A   • Number of children: N/A   • Years of education: N/A     Occupational History   • Not on file.     Social History Main Topics   • Smoking status: Former Smoker     Quit date: 1999   • Smokeless tobacco: Never Used   • Alcohol use No   • Drug use: No   • Sexual activity: Defer     Other Topics Concern   • Not on file     Social History Narrative   • No narrative on file       Family History   Problem Relation Age of Onset   • Heart disease Mother    • No Known Problems Father    • No Known Problems Maternal Grandmother    • No Known Problems Maternal Grandfather    • No Known Problems Paternal Grandmother    • No Known Problems Paternal Grandfather    • Heart disease Sister    • Heart disease Brother        Review of Systems   Constitution: Negative for decreased appetite, fever, malaise/fatigue and weight loss.   HENT: Negative for nosebleeds.    Eyes: Negative for double vision.   Cardiovascular: Negative for chest pain, claudication, cyanosis, dyspnea on exertion, irregular heartbeat, leg swelling, near-syncope, orthopnea, palpitations, paroxysmal nocturnal dyspnea and syncope.   Respiratory: Negative for cough, hemoptysis and shortness of breath.    Hematologic/Lymphatic: Negative for bleeding problem.   Skin: Negative for rash.   Musculoskeletal: Negative for falls and myalgias.   Gastrointestinal: Negative for hematochezia, jaundice, melena, nausea and vomiting.   Genitourinary: Negative for hematuria.   Neurological: Negative for dizziness and seizures.   Psychiatric/Behavioral: Negative for  "altered mental status and memory loss.       Allergies   Allergen Reactions   • Ambien [Zolpidem Tartrate] Rash   • Penicillins Rash   • Tylenol With Codeine #3 [Acetaminophen-Codeine] Hives         Current Outpatient Prescriptions:   •  aspirin 81 MG chewable tablet, Chew 81 mg daily., Disp: , Rfl:   •  atorvastatin (LIPITOR) 80 MG tablet, TAKE 1 TABLET DAILY, Disp: 90 tablet, Rfl: 3  •  esomeprazole (nexIUM) 20 MG capsule, Take 20 mg by mouth Every Morning Before Breakfast., Disp: , Rfl:   •  FLUoxetine (PROzac) 20 MG capsule, Take 20 mg by mouth daily., Disp: , Rfl:   •  guaiFENesin (MUCINEX) 600 MG 12 hr tablet, Take 1,200 mg by mouth 2 (Two) Times a Day., Disp: , Rfl:   •  isosorbide mononitrate (IMDUR) 30 MG 24 hr tablet, Take 90 mg by mouth Daily. 3 tabs=90mg, Disp: , Rfl:   •  levoFLOXacin (LEVAQUIN) 500 MG tablet, Take 500 mg by mouth Daily., Disp: , Rfl:   •  LORazepam (ATIVAN) 1 MG tablet, Take 1 mg by mouth every 8 (eight) hours as needed for anxiety., Disp: , Rfl:   •  nitroglycerin (NITROSTAT) 0.4 MG SL tablet, Place 1 tablet under the tongue every 5 (five) minutes as needed for chest pain. Take no more than 3 doses in 15 minutes., Disp: 100 tablet, Rfl: 3  •  Probiotic Product (PROBIOTIC ADVANCED PO), Take 1 tablet by mouth Daily., Disp: , Rfl:   •  promethazine-codeine (PHENERGAN with CODEINE) 6.25-10 MG/5ML syrup, Take 5 mL by mouth Every 4 (Four) Hours As Needed for Cough., Disp: , Rfl:   •  RANEXA 500 MG 12 hr tablet, TAKE 1 TABLET EVERY 12     HOURS, Disp: 180 tablet, Rfl: 3  •  RANEXA 500 MG 12 hr tablet, TAKE 1 TABLET TWICE A DAY, Disp: 180 tablet, Rfl: 3  •  vitamin D (ERGOCALCIFEROL) 88276 UNITS capsule capsule, Take 50,000 Units by mouth 1 (one) time per week., Disp: , Rfl:       Objective:     Vitals:    07/17/18 1149   BP: 130/82   Pulse: 74   Weight: 88.5 kg (195 lb)   Height: 165.1 cm (65\")     Body mass index is 32.45 kg/m².    Physical Exam   Constitutional: She is oriented to person, " place, and time. She appears well-developed and well-nourished.   HENT:   Head: Normocephalic.   Eyes: No scleral icterus.   Neck: No JVD present. No thyromegaly present.   Cardiovascular: Normal rate, regular rhythm and normal heart sounds.  Exam reveals no gallop and no friction rub.    No murmur heard.  Pulmonary/Chest: Effort normal and breath sounds normal. She has no wheezes. She has no rales.   Abdominal: Soft. There is no hepatosplenomegaly. There is no tenderness.   Musculoskeletal: Normal range of motion. She exhibits no edema.   Lymphadenopathy:     She has no cervical adenopathy.   Neurological: She is alert and oriented to person, place, and time.   Skin: Skin is warm and dry. No rash noted.   Psychiatric: She has a normal mood and affect.         ECG 12 Lead  Date/Time: 7/17/2018 12:10 PM  Performed by: CHANEL BUTLER  Authorized by: CHANEL BUTLER   Comparison: compared with previous ECG   Similar to previous ECG  Rhythm: sinus rhythm  Clinical impression: abnormal ECG  Comments: ns ST-T wave abnormality                 Assessment:       Diagnosis Plan   1. Coronary artery disease involving native coronary artery of native heart without angina pectoris            Plan:       She has small-vessel disease of her diagonals.  She is asymptomatic and is unchanged in her symptoms.  I do not think I would change anything.  Her electrocardiogram is unchanged and her examination is good.  I am going to have her come back and see Brandie in a year and see me in two years.  She is on good medical therapy.    Coronary Artery Disease  Assessment  • The patient has CCS class I - angina only during strenuous or prolonged physical activity    Plan  • Lifestyle modifications discussed include adhering to a heart healthy diet, maintenance of a healthy weight, medication compliance, regular exercise and regular monitoring of cholesterol and blood pressure    Subjective - Objective  • There is a history of past MI  •  There has been a previous stent procedure using RAJ  • Current antiplatelet therapy includes aspirin 81 mg        As always, it has been a pleasure to participate in your patient's care.      Sincerely,       Murphy Horner MD

## 2018-12-17 RX ORDER — ATORVASTATIN CALCIUM 80 MG/1
TABLET, FILM COATED ORAL
Qty: 90 TABLET | Refills: 3 | Status: SHIPPED | OUTPATIENT
Start: 2018-12-17 | End: 2019-12-01 | Stop reason: SDUPTHER

## 2019-02-26 RX ORDER — RANOLAZINE 500 MG/1
500 TABLET, EXTENDED RELEASE ORAL 2 TIMES DAILY
Qty: 180 TABLET | Refills: 2 | Status: SHIPPED | OUTPATIENT
Start: 2019-02-26 | End: 2019-07-19 | Stop reason: SDUPTHER

## 2019-03-11 RX ORDER — RANOLAZINE 500 MG/1
500 TABLET, EXTENDED RELEASE ORAL EVERY 12 HOURS
Qty: 180 TABLET | Refills: 3 | Status: SHIPPED | OUTPATIENT
Start: 2019-03-11 | End: 2020-06-15

## 2019-06-04 ENCOUNTER — APPOINTMENT (OUTPATIENT)
Dept: WOMENS IMAGING | Facility: HOSPITAL | Age: 79
End: 2019-06-04

## 2019-06-04 PROCEDURE — 77063 BREAST TOMOSYNTHESIS BI: CPT | Performed by: RADIOLOGY

## 2019-06-04 PROCEDURE — 77067 SCR MAMMO BI INCL CAD: CPT | Performed by: RADIOLOGY

## 2019-07-19 ENCOUNTER — OFFICE VISIT (OUTPATIENT)
Dept: CARDIOLOGY | Facility: CLINIC | Age: 79
End: 2019-07-19

## 2019-07-19 VITALS
BODY MASS INDEX: 31.52 KG/M2 | DIASTOLIC BLOOD PRESSURE: 92 MMHG | HEIGHT: 65 IN | OXYGEN SATURATION: 98 % | HEART RATE: 87 BPM | SYSTOLIC BLOOD PRESSURE: 142 MMHG | WEIGHT: 189.2 LBS

## 2019-07-19 DIAGNOSIS — I50.32 CHRONIC DIASTOLIC (CONGESTIVE) HEART FAILURE (HCC): ICD-10-CM

## 2019-07-19 DIAGNOSIS — I25.10 CORONARY ARTERY DISEASE INVOLVING NATIVE CORONARY ARTERY OF NATIVE HEART WITHOUT ANGINA PECTORIS: Primary | ICD-10-CM

## 2019-07-19 PROCEDURE — 99214 OFFICE O/P EST MOD 30 MIN: CPT | Performed by: NURSE PRACTITIONER

## 2019-07-19 PROCEDURE — 93000 ELECTROCARDIOGRAM COMPLETE: CPT | Performed by: NURSE PRACTITIONER

## 2019-07-19 RX ORDER — ASCORBIC ACID 500 MG
500 TABLET ORAL DAILY
COMMUNITY

## 2019-07-19 RX ORDER — FUROSEMIDE 20 MG/1
20 TABLET ORAL AS NEEDED
COMMUNITY
Start: 2019-07-02 | End: 2021-07-06

## 2019-07-19 NOTE — PROGRESS NOTES
Date of Office Visit: 2019  Encounter Provider: JOHNNIE Carrasco  Place of Service: [facility] CARDIOLOGY  Patient Name: Alice Mabry  :1940    Chief Complaint   Patient presents with   • Coronary Artery Disease     1 yr f/u   :     HPI: Alice Mabry is a 78 y.o. female, new to me, who presents today for follow-up.  Old records have been obtained and reviewed by me.  She is a patient of Dr. Horner's with a past cardiac history significant for hypertension, hyperlipidemia, sinus bradycardia, and coronary artery disease.  Her last cardiac catheterization was in 2014.  This showed a 10% luminal irregularities in the left main, widely patent proximal LAD stent, 2 small diagonals with 80% ostial lesions that were covered by the stent and not amenable to PCI, 20% in-stent restenosis in the circumflex stent, and a dominant RCA with a 90% ostial lesion and 30% mid disease.  There is also a distal stent that was widely patent.  At that time, she underwent successful angioplasty and drug-eluting stent placement to the ostial RCA lesion.  She was also noted to have ischemic cardiomyopathy with an ejection fraction of around 30%.  Echocardiogram in 2014 showed an improvement of her EF to 57%, moderately dilated left atrium, moderate mitral regurgitation, and normal LV function.  She had a nuclear stress test in 2015 which showed an EF of about 45%, inferior wall hypokinesis, and a moderate area of infarct in the inferior wall with no reperfusable ischemia.  She was last seen in the office on 2018 at which time she was overall doing well.  She had occasional chest heaviness which she had reportedly been having for years.  No changes were made to her medical regimen and she was advised to follow-up in 1 year.  She was admitted at [facility] on 2019 for fever and shortness of air.  She was found to have a UTI and pneumonia.  She was diuresed  and infectious disease and pulmonary were both consulted.  She was given IV antibiotics and supplemental O2.  She was discharged home on supplemental oxygen and Levaquin which she completed on 7/7/2019.  She had an echocardiogram on 6/29/2019 which revealed normal LV systolic function with an EF of 58%, mild to moderate LVH, moderate mitral regurgitation, aortic stenosis with a mean gradient of 14 mmHg and an aortic valve area of 1.3 cm².  Global left ventricular wall motion was within normal limits.   Over the past year she has overall been doing well from a cardiac standpoint.  She denies any chest pain, edema, dizziness, or syncope.  She remains on 3 L of supplemental oxygen ever since discharge.  She reports that she has always been short of breath but that it is not any worse.  She denies any PND orthopnea.  She has occasional palpitations.  Her activity is pretty minimal.  She tries to be mindful of her diet and sodium intake.    Past Medical History:   Diagnosis Date   • Anxiety    • Arthritis    • CAD (coronary artery disease)    • Cervical cancer (CMS/HCC)    • Depression    • Dizziness    • Fatty liver    • GERD (gastroesophageal reflux disease)    • H/O blood clots    • H/O Clostridium difficile infection    • Hyperlipidemia    • Hypertension    • IBS (irritable bowel syndrome)    • Myocardial infarction (CMS/HCC)    • Obesity    • Peptic ulcer    • Pneumonia    • Sinus bradycardia    • Syncope    • Vitamin D deficiency        Past Surgical History:   Procedure Laterality Date   • APPENDECTOMY  1960   • BACK SURGERY     • CARDIAC CATHETERIZATION  06/10/2014    Dr. Murphy Horner   • CARDIAC CATHETERIZATION  11/13/2007    Dr. Murphy Horner   • CARDIAC CATHETERIZATION N/A 10/19/2004    Dr. Murphy Horner   • CARDIAC CATHETERIZATION N/A 07/15/2004    Dr. Gregorio Gonzales   • CARDIAC CATHETERIZATION  10/2003    Dr. Murphy Horner   • CHOLECYSTECTOMY  1998   • COLONOSCOPY N/A 07/2001    negative   • COLONOSCOPY  N/A 2012    negative   • CORONARY ANGIOPLASTY WITH STENT PLACEMENT N/A 07/15/2004    Dr. Murphy Horner   • CORONARY ANGIOPLASTY WITH STENT PLACEMENT  2002    to RCA   • HYSTERECTOMY  1974   • UPPER GASTROINTESTINAL ENDOSCOPY N/A 2015    Mild Schatzki ring, hiatus hernia, non-bleeding erosive gastropathy, erythematous mucosa in the antrum, normal duodenum-Dr. Alex Gill       Social History     Socioeconomic History   • Marital status:      Spouse name: Not on file   • Number of children: Not on file   • Years of education: Not on file   • Highest education level: Not on file   Tobacco Use   • Smoking status: Former Smoker     Last attempt to quit:      Years since quittin.5   • Smokeless tobacco: Never Used   Substance and Sexual Activity   • Alcohol use: No   • Drug use: No   • Sexual activity: Defer       Family History   Problem Relation Age of Onset   • Heart disease Mother    • No Known Problems Father    • No Known Problems Maternal Grandmother    • No Known Problems Maternal Grandfather    • No Known Problems Paternal Grandmother    • No Known Problems Paternal Grandfather    • Heart disease Sister    • Heart disease Brother        Review of Systems   Constitution: Positive for malaise/fatigue. Negative for chills and fever.   Cardiovascular: Positive for dyspnea on exertion and palpitations. Negative for chest pain, leg swelling, near-syncope, orthopnea, paroxysmal nocturnal dyspnea and syncope.   Respiratory: Negative for cough and shortness of breath.    Musculoskeletal: Negative for joint pain, joint swelling and myalgias.   Gastrointestinal: Negative for abdominal pain, diarrhea, melena, nausea and vomiting.   Genitourinary: Negative for frequency and hematuria.   Neurological: Negative for light-headedness, numbness, paresthesias and seizures.   Allergic/Immunologic: Negative.    All other systems reviewed and are negative.      Allergies   Allergen Reactions   •  "Ambien [Zolpidem Tartrate] Rash   • Penicillins Rash   • Tylenol With Codeine #3 [Acetaminophen-Codeine] Hives         Current Outpatient Medications:   •  aspirin 81 MG EC tablet, Take 81 mg by mouth Daily., Disp: , Rfl:   •  atorvastatin (LIPITOR) 80 MG tablet, TAKE 1 TABLET DAILY, Disp: 90 tablet, Rfl: 3  •  esomeprazole (nexIUM) 40 MG capsule, Take 40 mg by mouth Every Morning Before Breakfast., Disp: , Rfl:   •  FLUoxetine (PROzac) 20 MG capsule, Take 20 mg by mouth daily., Disp: , Rfl:   •  furosemide (LASIX) 20 MG tablet, Take 20 mg by mouth As Needed (only take if 3lb weight gain overnight)., Disp: , Rfl:   •  isosorbide mononitrate (IMDUR) 30 MG 24 hr tablet, Take 90 mg by mouth Daily. 3 tabs=90mg, Disp: , Rfl:   •  LORazepam (ATIVAN) 1 MG tablet, Take 1 mg by mouth every 8 (eight) hours as needed for anxiety., Disp: , Rfl:   •  nitroglycerin (NITROSTAT) 0.4 MG SL tablet, Place 1 tablet under the tongue every 5 (five) minutes as needed for chest pain. Take no more than 3 doses in 15 minutes., Disp: 100 tablet, Rfl: 3  •  O2 (OXYGEN), Inhale 3 L/min Continuous., Disp: , Rfl:   •  Probiotic Product (PROBIOTIC ADVANCED PO), Take 1 tablet by mouth Daily., Disp: , Rfl:   •  ranolazine (RANEXA) 500 MG 12 hr tablet, Take 1 tablet by mouth Every 12 (Twelve) Hours., Disp: 180 tablet, Rfl: 3  •  vitamin C (ASCORBIC ACID) 500 MG tablet, Take 500 mg by mouth Daily., Disp: , Rfl:   •  Vitamin D, Ergocalciferol, 2000 units capsule, Take 2,000 Units by mouth Daily., Disp: , Rfl:       Objective:     Vitals:    07/19/19 1113 07/19/19 1114   BP: 146/90 142/92   BP Location: Right arm Left arm   Pulse: 87    SpO2: 98%    Weight: 85.8 kg (189 lb 3.2 oz)    Height: 165.1 cm (65\")      Body mass index is 31.48 kg/m².    PHYSICAL EXAM:    Physical Exam   Constitutional: She is oriented to person, place, and time. She appears well-developed and well-nourished. No distress.   HENT:   Head: Normocephalic and atraumatic.   Eyes: " Pupils are equal, round, and reactive to light.   Neck: No JVD present. No thyromegaly present.   Cardiovascular: Normal rate, regular rhythm, normal heart sounds and intact distal pulses.   No murmur heard.  Pulmonary/Chest: Effort normal and breath sounds normal. No respiratory distress.   supplemental oxygen   Abdominal: Soft. Bowel sounds are normal. She exhibits no distension. There is no splenomegaly or hepatomegaly. There is no tenderness.   Musculoskeletal: Normal range of motion. She exhibits no edema.   Neurological: She is alert and oriented to person, place, and time.   Skin: Skin is warm and dry. She is not diaphoretic. No erythema.   Psychiatric: She has a normal mood and affect. Her behavior is normal. Judgment normal.         ECG 12 Lead  Date/Time: 7/19/2019 11:19 AM  Performed by: Pat Paul APRN  Authorized by: Pat Paul APRN   Comparison: compared with previous ECG from 7/17/2018  Similar to previous ECG  Rhythm: sinus rhythm  Rate: normal  BPM: 84  Q waves: III and aVF      Clinical impression: normal ECG  Comments: Indication: CAD  New Q waves in leads III, aVF. Discussed with Dr. Horner              Assessment:       Diagnosis Plan   1. Coronary artery disease involving native coronary artery of native heart without angina pectoris  ECG 12 Lead   2. Chronic diastolic (congestive) heart failure (CMS/HCC)       Orders Placed This Encounter   Procedures   • ECG 12 Lead     This order was created via procedure documentation          Plan:       1. Coronary Artery Disease  Assessment  • The patient has no angina    Plan  • Lifestyle modifications discussed include medication compliance and regular monitoring of cholesterol and blood pressure    Subjective - Objective  • There has been a previous stent procedure using RAJ  • Current antiplatelet therapy includes aspirin 81 mg  • She denies any angina.        2. Heart Failure  Assessment  • Beta blocker not prescribed for patient  reasons  • Diuretics prescribed  • The most recent ejection fraction is 58%  • Left ventricular function is normal by qualitative assessment  • The left ventricle was last assessed on 6/29/2019    Plan  • The patient has received heart failure education on the following topics: dietary sodium restriction and weight monitoring  • The heart failure care plan was discussed with the patient today including: continuing the current program    Subjective/Objective    • Physical exam findings negative for elevated JVP and hepatomegaly.      Overall I think she is stable from a cardiac standpoint.  She denies any symptoms of angina or heart failure.  She does have some new q waves in the inferior leads; however, she had an echo at the end of June which revealed no wall motion abnormalities and she is asymptomatic.  I discussed the plan of care with Dr. Horner and he is in agreement.  I'm not going to make any changes to her medical regimen and she will follow-up with Dr. Horner in 1 year or sooner if needed.      As always, it has been a pleasure to participate in your patient's care.      Sincerely,         JOHNNIE Zuniga

## 2019-12-02 RX ORDER — ATORVASTATIN CALCIUM 80 MG/1
TABLET, FILM COATED ORAL
Qty: 90 TABLET | Refills: 3 | Status: SHIPPED | OUTPATIENT
Start: 2019-12-02 | End: 2020-11-13

## 2020-01-08 ENCOUNTER — TELEPHONE (OUTPATIENT)
Dept: CARDIOLOGY | Facility: CLINIC | Age: 80
End: 2020-01-08

## 2020-01-10 NOTE — TELEPHONE ENCOUNTER
I spoke with the patient.  She denies any angina or symptoms of heart failure.  I also discussed with Dr. Costello.  I think she is at an acceptable risk for the upcoming procedure.  Please send clearance.   
Surgical Clearance for a Superficial Parotidectomy w/ Facial Dissection and Anesthesia by Dr. Andrea Quinteros on 1/16/20 faxed to [phone]. Confirmation Received.    MARBELLA Tavarez  
Surgical Clearance printed and placed in your inbox to sign.    MARBELLA Tavarez  
Vickie from Dr. Andrea Rodas's Office L/M re: needing surgical clearance for pt to have Superficial Parotidectomy w/ Facial Nerve Dissection and Anesthesia on Thursday, 1/16/20 w/ Dr. Rodas.  Please advise of any recommendations.    MARBELLA Tavarez  
[phone]

## 2020-06-15 RX ORDER — RANOLAZINE 500 MG/1
TABLET, EXTENDED RELEASE ORAL
Qty: 180 TABLET | Refills: 0 | Status: SHIPPED | OUTPATIENT
Start: 2020-06-15 | End: 2020-09-02

## 2020-07-09 ENCOUNTER — APPOINTMENT (OUTPATIENT)
Dept: WOMENS IMAGING | Facility: HOSPITAL | Age: 80
End: 2020-07-09

## 2020-07-09 PROCEDURE — 77067 SCR MAMMO BI INCL CAD: CPT | Performed by: RADIOLOGY

## 2020-07-09 PROCEDURE — 77063 BREAST TOMOSYNTHESIS BI: CPT | Performed by: RADIOLOGY

## 2020-07-24 ENCOUNTER — OFFICE VISIT (OUTPATIENT)
Dept: CARDIOLOGY | Facility: CLINIC | Age: 80
End: 2020-07-24

## 2020-07-24 VITALS
WEIGHT: 193 LBS | HEART RATE: 90 BPM | HEIGHT: 65 IN | DIASTOLIC BLOOD PRESSURE: 80 MMHG | SYSTOLIC BLOOD PRESSURE: 126 MMHG | BODY MASS INDEX: 32.15 KG/M2

## 2020-07-24 DIAGNOSIS — I25.10 CORONARY ARTERY DISEASE INVOLVING NATIVE CORONARY ARTERY OF NATIVE HEART WITHOUT ANGINA PECTORIS: Primary | ICD-10-CM

## 2020-07-24 PROCEDURE — 93000 ELECTROCARDIOGRAM COMPLETE: CPT | Performed by: INTERNAL MEDICINE

## 2020-07-24 PROCEDURE — 99214 OFFICE O/P EST MOD 30 MIN: CPT | Performed by: INTERNAL MEDICINE

## 2020-07-24 NOTE — PROGRESS NOTES
Date of Office Visit: 20  Encounter Provider: Murphy Horner MD  Place of Service: [facility] CARDIOLOGY  Patient Name: Alice Mabry  :1940  [id number]    Chief Complaint   Patient presents with   • Follow-up     6 month   • Coronary Artery Disease   :     HPI: Alice Mabry is a 79 y.o. female  she had an inferior MI in  that was treated with stenting 2017 restenosis at the origin of the RCA we stented that she's had significant disease and 3 diagonals that we've not intervened on with felt was too small OM and managing her medically.  Last time we looked at her LV function it was normal.  She has occasional chest pain that is unchanged also occasional dyspnea on exertion that is also not changed    Past Medical History:   Diagnosis Date   • Anxiety    • Arthritis    • CAD (coronary artery disease)    • Cervical cancer (CMS/HCC)    • Depression    • Dizziness    • Fatty liver    • GERD (gastroesophageal reflux disease)    • H/O blood clots    • H/O Clostridium difficile infection    • Hyperlipidemia    • Hypertension    • IBS (irritable bowel syndrome)    • Myocardial infarction (CMS/HCC)    • Obesity    • Peptic ulcer    • Pneumonia    • Sinus bradycardia    • Syncope    • Vitamin D deficiency        Past Surgical History:   Procedure Laterality Date   • APPENDECTOMY  1960   • BACK SURGERY     • CARDIAC CATHETERIZATION  06/10/2014    Dr. Murphy Horner   • CARDIAC CATHETERIZATION  2007    Dr. Murphy Horner   • CARDIAC CATHETERIZATION N/A 10/19/2004    Dr. Murphy Horner   • CARDIAC CATHETERIZATION N/A 07/15/2004    Dr. Gregorio Gonzales   • CARDIAC CATHETERIZATION  10/2003    Dr. Murphy Horner   • CHOLECYSTECTOMY     • COLONOSCOPY N/A 2001    negative   • COLONOSCOPY N/A 2012    negative   • CORONARY ANGIOPLASTY WITH STENT PLACEMENT N/A 07/15/2004    Dr. Murphy Horner   • CORONARY ANGIOPLASTY WITH STENT PLACEMENT  2002    to RCA   •  HYSTERECTOMY  1974   • UPPER GASTROINTESTINAL ENDOSCOPY N/A 2015    Mild Schatzki ring, hiatus hernia, non-bleeding erosive gastropathy, erythematous mucosa in the antrum, normal duodenum-Dr. Alex Gill       Social History     Socioeconomic History   • Marital status:      Spouse name: Not on file   • Number of children: Not on file   • Years of education: Not on file   • Highest education level: Not on file   Tobacco Use   • Smoking status: Former Smoker     Last attempt to quit:      Years since quittin.5   • Smokeless tobacco: Never Used   Substance and Sexual Activity   • Alcohol use: No   • Drug use: No   • Sexual activity: Defer       Family History   Problem Relation Age of Onset   • Heart disease Mother    • No Known Problems Father    • No Known Problems Maternal Grandmother    • No Known Problems Maternal Grandfather    • No Known Problems Paternal Grandmother    • No Known Problems Paternal Grandfather    • Heart disease Sister    • Heart disease Brother        Review of Systems   Constitution: Negative for decreased appetite, fever, malaise/fatigue and weight loss.   HENT: Negative for nosebleeds.    Eyes: Negative for double vision.   Cardiovascular: Negative for chest pain, claudication, cyanosis, dyspnea on exertion, irregular heartbeat, leg swelling, near-syncope, orthopnea, palpitations, paroxysmal nocturnal dyspnea and syncope.   Respiratory: Negative for cough, hemoptysis and shortness of breath.    Hematologic/Lymphatic: Negative for bleeding problem.   Skin: Negative for rash.   Musculoskeletal: Negative for falls and myalgias.   Gastrointestinal: Negative for hematochezia, jaundice, melena, nausea and vomiting.   Genitourinary: Negative for hematuria.   Neurological: Negative for dizziness and seizures.   Psychiatric/Behavioral: Negative for altered mental status and memory loss.       Allergies   Allergen Reactions   • Ambien [Zolpidem Tartrate] Rash   • Penicillins  "Rash   • Tylenol With Codeine #3 [Acetaminophen-Codeine] Hives         Current Outpatient Medications:   •  aspirin 81 MG EC tablet, Take 81 mg by mouth Daily., Disp: , Rfl:   •  atorvastatin (LIPITOR) 80 MG tablet, TAKE 1 TABLET DAILY, Disp: 90 tablet, Rfl: 3  •  esomeprazole (nexIUM) 40 MG capsule, Take 40 mg by mouth Every Morning Before Breakfast., Disp: , Rfl:   •  FLUoxetine (PROzac) 20 MG capsule, Take 20 mg by mouth daily., Disp: , Rfl:   •  furosemide (LASIX) 20 MG tablet, Take 20 mg by mouth As Needed (only take if 3lb weight gain overnight)., Disp: , Rfl:   •  isosorbide mononitrate (IMDUR) 30 MG 24 hr tablet, Take 90 mg by mouth Daily. 3 tabs=90mg, Disp: , Rfl:   •  LORazepam (ATIVAN) 1 MG tablet, Take 1 mg by mouth every 8 (eight) hours as needed for anxiety., Disp: , Rfl:   •  nitroglycerin (NITROSTAT) 0.4 MG SL tablet, Place 1 tablet under the tongue every 5 (five) minutes as needed for chest pain. Take no more than 3 doses in 15 minutes., Disp: 100 tablet, Rfl: 3  •  O2 (OXYGEN), Inhale 3 L/min Continuous., Disp: , Rfl:   •  Probiotic Product (PROBIOTIC ADVANCED PO), Take 1 tablet by mouth Daily., Disp: , Rfl:   •  ranolazine (RANEXA) 500 MG 12 hr tablet, TAKE 1 TABLET EVERY 12     HOURS, Disp: 180 tablet, Rfl: 0  •  vitamin C (ASCORBIC ACID) 500 MG tablet, Take 500 mg by mouth Daily., Disp: , Rfl:   •  Vitamin D, Ergocalciferol, 2000 units capsule, Take 2,000 Units by mouth Daily., Disp: , Rfl:       Objective:     Vitals:    07/24/20 1100   BP: 126/80   Pulse: 90   Weight: 87.5 kg (193 lb)   Height: 165.1 cm (65\")     Body mass index is 32.12 kg/m².    Physical Exam   Constitutional: She is oriented to person, place, and time. She appears well-developed and well-nourished.   HENT:   Head: Normocephalic.   Eyes: No scleral icterus.   Neck: No JVD present. No thyromegaly present.   Cardiovascular: Normal rate, regular rhythm and normal heart sounds. Exam reveals no gallop and no friction rub.   No " murmur heard.  Pulmonary/Chest: Effort normal and breath sounds normal. She has no wheezes. She has no rales.   Abdominal: Soft. There is no hepatosplenomegaly. There is no tenderness.   Musculoskeletal: Normal range of motion. She exhibits no edema.   Lymphadenopathy:     She has no cervical adenopathy.   Neurological: She is alert and oriented to person, place, and time.   Skin: Skin is warm and dry. No rash noted.   Psychiatric: She has a normal mood and affect.         ECG 12 Lead  Date/Time: 7/24/2020 12:01 PM  Performed by: Murphy Horner MD  Authorized by: Murphy Horner MD   Comparison: compared with previous ECG   Similar to previous ECG  Rhythm: sinus rhythm  Other findings: non-specific ST-T wave changes    Clinical impression: abnormal EKG             Assessment:       Diagnosis Plan   1. Coronary artery disease involving native coronary artery of native heart without angina pectoris            Plan:       I think she is stable and is doing well I would not make any changes right now will really continue her on her current medications if she starts having more symptoms we could either increase her nitrates or double her Ranexa I will have her come back and see Pat or Gisele in a year and see me in 2 years    As always, it has been a pleasure to participate in your patient's care.      Sincerely,       Murphy Horner MD

## 2020-09-02 RX ORDER — RANOLAZINE 500 MG/1
TABLET, EXTENDED RELEASE ORAL
Qty: 180 TABLET | Refills: 2 | Status: SHIPPED | OUTPATIENT
Start: 2020-09-02 | End: 2021-05-28

## 2020-11-13 RX ORDER — ATORVASTATIN CALCIUM 80 MG/1
TABLET, FILM COATED ORAL
Qty: 90 TABLET | Refills: 3 | Status: SHIPPED | OUTPATIENT
Start: 2020-11-13 | End: 2021-07-20

## 2021-03-09 DIAGNOSIS — Z23 IMMUNIZATION DUE: ICD-10-CM

## 2021-05-10 ENCOUNTER — OFFICE VISIT (OUTPATIENT)
Dept: ORTHOPEDIC SURGERY | Facility: CLINIC | Age: 81
End: 2021-05-10

## 2021-05-10 VITALS — BODY MASS INDEX: 32.15 KG/M2 | TEMPERATURE: 96 F | WEIGHT: 193 LBS | HEIGHT: 65 IN

## 2021-05-10 DIAGNOSIS — M17.11 PRIMARY OSTEOARTHRITIS OF RIGHT KNEE: ICD-10-CM

## 2021-05-10 DIAGNOSIS — R52 PAIN: Primary | ICD-10-CM

## 2021-05-10 PROCEDURE — 73562 X-RAY EXAM OF KNEE 3: CPT | Performed by: NURSE PRACTITIONER

## 2021-05-10 PROCEDURE — 20610 DRAIN/INJ JOINT/BURSA W/O US: CPT | Performed by: NURSE PRACTITIONER

## 2021-05-10 PROCEDURE — 99213 OFFICE O/P EST LOW 20 MIN: CPT | Performed by: NURSE PRACTITIONER

## 2021-05-10 RX ORDER — HYDROCODONE BITARTRATE AND ACETAMINOPHEN 5; 325 MG/1; MG/1
1 TABLET ORAL
COMMUNITY
Start: 2021-02-26 | End: 2021-07-06

## 2021-05-10 RX ORDER — METHYLPREDNISOLONE ACETATE 80 MG/ML
80 INJECTION, SUSPENSION INTRA-ARTICULAR; INTRALESIONAL; INTRAMUSCULAR; SOFT TISSUE
Status: COMPLETED | OUTPATIENT
Start: 2021-05-10 | End: 2021-05-10

## 2021-05-10 RX ADMIN — METHYLPREDNISOLONE ACETATE 80 MG: 80 INJECTION, SUSPENSION INTRA-ARTICULAR; INTRALESIONAL; INTRAMUSCULAR; SOFT TISSUE at 10:57

## 2021-05-10 NOTE — PROGRESS NOTES
Patient: Alice Mabry  YOB: 1940 80 y.o. female  Medical Record Number: [id number]    Chief Complaints:   Chief Complaint   Patient presents with   • Right Knee - Initial Evaluation, Pain, Edema       History of Present Illness:Alice Mabry is a 80 y.o. female who presents as a new patient with myself as well as to the practice with complaints of right knee pain.  Patient apparently twisted her right knee a month ago had acute onset of pain and swelling, saw her primary care physician is sent her for x-rays and MRI.  The MRI did show significant arthritic changes, effusion, and torn meniscus.  Patient reports it does feel a little bit better than it did a few weeks ago, she describes it as a moderate constant ache worse with walking better with ice and rest    Allergies:   Allergies   Allergen Reactions   • Ambien [Zolpidem Tartrate] Rash   • Penicillins Rash   • Tylenol With Codeine #3 [Acetaminophen-Codeine] Hives       Medications:   Current Outpatient Medications   Medication Sig Dispense Refill   • aspirin 81 MG EC tablet Take 81 mg by mouth Daily.     • atorvastatin (LIPITOR) 80 MG tablet TAKE 1 TABLET DAILY 90 tablet 3   • esomeprazole (nexIUM) 40 MG capsule Take 40 mg by mouth Every Morning Before Breakfast.     • FLUoxetine (PROzac) 20 MG capsule Take 20 mg by mouth daily.     • furosemide (LASIX) 20 MG tablet Take 20 mg by mouth As Needed (only take if 3lb weight gain overnight).     • HYDROcodone-acetaminophen (NORCO) 5-325 MG per tablet Take 1 tablet by mouth.     • isosorbide mononitrate (IMDUR) 30 MG 24 hr tablet Take 90 mg by mouth Daily. 3 tabs=90mg     • LORazepam (ATIVAN) 1 MG tablet Take 1 mg by mouth every 8 (eight) hours as needed for anxiety.     • nitroglycerin (NITROSTAT) 0.4 MG SL tablet Place 1 tablet under the tongue every 5 (five) minutes as needed for chest pain. Take no more than 3 doses in 15 minutes. 100 tablet 3   • O2 (OXYGEN) Inhale 3 L/min Continuous.     •  "Probiotic Product (PROBIOTIC ADVANCED PO) Take 1 tablet by mouth Daily.     • ranolazine (RANEXA) 500 MG 12 hr tablet TAKE 1 TABLET EVERY 12     HOURS 180 tablet 2   • vitamin C (ASCORBIC ACID) 500 MG tablet Take 500 mg by mouth Daily.     • Vitamin D, Ergocalciferol, 2000 units capsule Take 2,000 Units by mouth Daily.       No current facility-administered medications for this visit.         The following portions of the patient's history were reviewed and updated as appropriate: allergies, current medications, past family history, past medical history, past social history, past surgical history and problem list.    Review of Systems:   A 14 point review of systems was performed. All systems negative except pertinent positives/negative listed in HPI above    Physical Exam:   Vitals:    05/10/21 0944   Temp: 96 °F (35.6 °C)   TempSrc: Temporal   Weight: 87.5 kg (193 lb)   Height: 165.1 cm (65\")       General: A and O x 3, ASA, NAD    SCLERA:    Normal    DENTITION:   Normal  Skin clear no unusual lesions noted  Right knee patient does have 1+ effusion noted with 110 degrees flexion neutral extension with a positive Fletcher negative Lockman calf soft and nontender       Radiology:  Xrays 3views (ap,lateral, sunrise) right knee ordered and reviewed today secondary to pain and show significant arthritic changes.  No compared to views available    Assessment/Plan: Osteoarthritis right knee with effusion secondary to recent injury    Patient I discussed options, given the amount of arthritis that she has best option at this point would be knee aspiration, injection, physical therapy, Tylenol as needed, and I will see her back as needed.  Patient agreeable and would like to proceed    Large Joint Arthrocentesis: R knee  Date/Time: 5/10/2021 10:57 AM  Consent given by: patient  Site marked: site marked  Timeout: Immediately prior to procedure a time out was called to verify the correct patient, procedure, equipment, " support staff and site/side marked as required   Supporting Documentation  Indications: pain and joint swelling   Procedure Details  Location: knee - R knee  Preparation: Patient was prepped and draped in the usual sterile fashion  Needle size: 22 G  Approach: anterolateral  Medications administered: 80 mg methylPREDNISolone acetate 80 MG/ML; 2 mL lidocaine (cardiac)  Aspirate amount: 50 mL  Aspirate: clear  Patient tolerance: patient tolerated the procedure well with no immediate complications            Celi Laird, APRN  5/10/2021

## 2021-05-28 RX ORDER — RANOLAZINE 500 MG/1
TABLET, EXTENDED RELEASE ORAL
Qty: 180 TABLET | Refills: 2 | Status: SHIPPED | OUTPATIENT
Start: 2021-05-28 | End: 2022-03-04

## 2021-06-21 ENCOUNTER — TELEPHONE (OUTPATIENT)
Dept: ORTHOPEDIC SURGERY | Facility: CLINIC | Age: 81
End: 2021-06-21

## 2021-06-21 NOTE — TELEPHONE ENCOUNTER
Caller: YULIANAMARIANNE GOODMAN     Relationship: SELF     Best call back number: [phone]    What is the best time to reach you: ANY     Who are you requesting to speak with (clinical staff, provider,  specific staff member): DESIRE PINO    Do you know the name of the person who called: YES     What was the call regarding: PT CALLED AND STATED SHE LAST SEEN GINGER PINO ON 05/10/2021 FOR RIGHT KNEE PAIN AND SWELLING AND WAS GIVEN A DEPO-MEDROL INJECTION, PT STATES THAT WHILE THE PAIN HAS NOT FULLY COME BACK THE KNEE IS VERY SWOLLEN AGAIN-PT WOULD LIKE TO RECEIVE A CALL BACK ON HOW GINGER PINO WOULD LIKE TO PROCEED. OFFICE PLEASE ADVISE     Do you require a callback: YES

## 2021-07-01 ENCOUNTER — OFFICE VISIT (OUTPATIENT)
Dept: ORTHOPEDIC SURGERY | Facility: CLINIC | Age: 81
End: 2021-07-01

## 2021-07-01 VITALS — TEMPERATURE: 95.9 F | HEIGHT: 65 IN | WEIGHT: 191 LBS | BODY MASS INDEX: 31.82 KG/M2

## 2021-07-01 DIAGNOSIS — M17.11 PRIMARY OSTEOARTHRITIS OF RIGHT KNEE: Primary | ICD-10-CM

## 2021-07-01 PROCEDURE — 99213 OFFICE O/P EST LOW 20 MIN: CPT | Performed by: NURSE PRACTITIONER

## 2021-07-01 NOTE — PROGRESS NOTES
Patient: Alice Mabry  YOB: 1940 80 y.o. female  Medical Record Number: [id number]    Chief Complaints:   Chief Complaint   Patient presents with   • Right Knee - Follow-up, Pain       History of Present Illness:Alice Mabry is a 80 y.o. female who presents with complaints of some mild right knee swelling.  She was seen about 6 weeks ago and at that time we performed aspiration, cortisone injection.  And she did feel much better, still does feel much better with regards to pain, however she does have some swelling in the right knee that both she and her family were concerned about.  She describes the pain as a mild ache worse with standing walking, better with rest    Allergies:   Allergies   Allergen Reactions   • Ambien [Zolpidem Tartrate] Rash   • Penicillins Rash   • Tylenol With Codeine #3 [Acetaminophen-Codeine] Hives       Medications:   Current Outpatient Medications   Medication Sig Dispense Refill   • aspirin 81 MG EC tablet Take 81 mg by mouth Daily.     • atorvastatin (LIPITOR) 80 MG tablet TAKE 1 TABLET DAILY 90 tablet 3   • esomeprazole (nexIUM) 40 MG capsule Take 40 mg by mouth Every Morning Before Breakfast.     • FLUoxetine (PROzac) 20 MG capsule Take 20 mg by mouth daily.     • furosemide (LASIX) 20 MG tablet Take 20 mg by mouth As Needed (only take if 3lb weight gain overnight).     • HYDROcodone-acetaminophen (NORCO) 5-325 MG per tablet Take 1 tablet by mouth.     • isosorbide mononitrate (IMDUR) 30 MG 24 hr tablet Take 90 mg by mouth Daily. 3 tabs=90mg     • LORazepam (ATIVAN) 1 MG tablet Take 1 mg by mouth every 8 (eight) hours as needed for anxiety.     • nitroglycerin (NITROSTAT) 0.4 MG SL tablet Place 1 tablet under the tongue every 5 (five) minutes as needed for chest pain. Take no more than 3 doses in 15 minutes. 100 tablet 3   • O2 (OXYGEN) Inhale 3 L/min Continuous.     • Probiotic Product (PROBIOTIC ADVANCED PO) Take 1 tablet by mouth Daily.     • ranolazine  "(RANEXA) 500 MG 12 hr tablet TAKE 1 TABLET EVERY 12     HOURS 180 tablet 2   • vitamin C (ASCORBIC ACID) 500 MG tablet Take 500 mg by mouth Daily.     • Vitamin D, Ergocalciferol, 2000 units capsule Take 2,000 Units by mouth Daily.       No current facility-administered medications for this visit.         The following portions of the patient's history were reviewed and updated as appropriate: allergies, current medications, past family history, past medical history, past social history, past surgical history and problem list.    Review of Systems:   A 14 point review of systems was performed. All systems negative except pertinent positives/negative listed in HPI above    Physical Exam:   Vitals:    07/01/21 0754   Temp: 95.9 °F (35.5 °C)   Weight: 86.6 kg (191 lb)   Height: 165.1 cm (65\")   PainSc:   3       General: A and O x 3, ASA, NAD    SCLERA:    Normal    DENTITION:   Normal  Skin clear no unusual lesions noted  Right knee patient does have trace amount of effusion noted with 120 degrees flexion neutral extension with a positive Fletcher negative Lockman calf soft and nontender       Radiology:  Xrays previous x-rays of the right knee were reviewed and show significant arthritic changes    Assessment/Plan: Osteoarthritis right knee    Unfortunately is too soon for another injection, the patient I discussed options and overall her pain is doing better, she will try compression sleeve, scheduled Tylenol, ice, and will follow up with me in 6 weeks      Celi Laird, APRN  7/1/2021  "

## 2021-07-06 ENCOUNTER — OFFICE VISIT (OUTPATIENT)
Dept: CARDIOLOGY | Facility: CLINIC | Age: 81
End: 2021-07-06

## 2021-07-06 VITALS — HEIGHT: 65 IN | OXYGEN SATURATION: 96 % | WEIGHT: 191 LBS | BODY MASS INDEX: 31.82 KG/M2

## 2021-07-06 DIAGNOSIS — Z95.5 HISTORY OF CORONARY ARTERY STENT PLACEMENT: Primary | ICD-10-CM

## 2021-07-06 DIAGNOSIS — I34.0 MITRAL VALVE INSUFFICIENCY, UNSPECIFIED ETIOLOGY: ICD-10-CM

## 2021-07-06 DIAGNOSIS — R94.31 ABNORMAL ELECTROCARDIOGRAM (ECG) (EKG): ICD-10-CM

## 2021-07-06 DIAGNOSIS — I48.91 ATRIAL FIBRILLATION WITH RVR (HCC): ICD-10-CM

## 2021-07-06 DIAGNOSIS — I25.10 CORONARY ARTERY DISEASE INVOLVING NATIVE CORONARY ARTERY OF NATIVE HEART WITHOUT ANGINA PECTORIS: ICD-10-CM

## 2021-07-06 DIAGNOSIS — I50.32 CHRONIC DIASTOLIC (CONGESTIVE) HEART FAILURE (HCC): ICD-10-CM

## 2021-07-06 PROBLEM — I20.89 STABLE ANGINA PECTORIS: Status: ACTIVE | Noted: 2021-07-06

## 2021-07-06 PROBLEM — I20.8 STABLE ANGINA PECTORIS: Status: ACTIVE | Noted: 2021-07-06

## 2021-07-06 PROCEDURE — 99214 OFFICE O/P EST MOD 30 MIN: CPT | Performed by: NURSE PRACTITIONER

## 2021-07-06 PROCEDURE — 93000 ELECTROCARDIOGRAM COMPLETE: CPT | Performed by: NURSE PRACTITIONER

## 2021-07-06 RX ORDER — NITROGLYCERIN 0.4 MG/1
0.4 TABLET SUBLINGUAL
Qty: 100 TABLET | Refills: 3 | Status: SHIPPED | OUTPATIENT
Start: 2021-07-06

## 2021-07-06 NOTE — PROGRESS NOTES
Date of Office Visit: 2021  Encounter Provider: JOHNNIE Laureano  Place of Service: [facility] CARDIOLOGY  Patient Name: Alice Mabry  :1940    Chief Complaint   Patient presents with   • Rapid Heart Rate   • Atrial Fibrillation   :     HPI:Alice Mabry is an 80-year-old female who is a patient of Dr. Horner and is new to me today.  She has a history of an inferior wall MI in  and received a stent.  In  she had a restenosis at the origin of the RCA that was also stented and she also had significant disease in 3 diagonals that were not intervened on with felt to be too small.  Her circumflex had a previously placed stent that was large with a 20% in-stent restenosis.  She occasionally has some dyspnea with exertion and occasional chest discomfort but overall she has been stable with a normal LV function.  She presents for 1 year follow-up.    She has been complaining of shortness of breath and heart racing.  This morning she also had a heavy sensation on her chest.  This is been getting worse.  She is also been taking nitroglycerin to see if it helped.  It seems when her heart rate goes up her chest gets tight.    Previous testing and notes have been reviewed by me.   Past Medical History:   Diagnosis Date   • Anxiety    • Arthritis    • CAD (coronary artery disease)    • Cervical cancer (CMS/HCC)    • Current tear of meniscus    • Depression    • Dizziness    • Fatty liver    • GERD (gastroesophageal reflux disease)    • H/O blood clots    • H/O Clostridium difficile infection    • Hyperlipidemia    • Hypertension    • IBS (irritable bowel syndrome)    • Myocardial infarction (CMS/HCC)    • Obesity    • Peptic ulcer    • Pneumonia    • Sinus bradycardia    • Syncope    • Vitamin D deficiency        Past Surgical History:   Procedure Laterality Date   • APPENDECTOMY     • BACK SURGERY     • CARDIAC CATHETERIZATION  06/10/2014    Dr. Murphy Horner    • CARDIAC CATHETERIZATION  2007    Dr. Murphy Horner   • CARDIAC CATHETERIZATION N/A 10/19/2004    Dr. Murphy Horner   • CARDIAC CATHETERIZATION N/A 07/15/2004    Dr. Gregorio Gonzales   • CARDIAC CATHETERIZATION  10/2003    Dr. Murphy Horner   • CHOLECYSTECTOMY     • COLONOSCOPY N/A 2001    negative   • COLONOSCOPY N/A 2012    negative   • CORONARY ANGIOPLASTY WITH STENT PLACEMENT N/A 07/15/2004    Dr. Murphy Horner   • CORONARY ANGIOPLASTY WITH STENT PLACEMENT  2002    to RCA   • HYSTERECTOMY  1974   • UPPER GASTROINTESTINAL ENDOSCOPY N/A 2015    Mild Schatzki ring, hiatus hernia, non-bleeding erosive gastropathy, erythematous mucosa in the antrum, normal duodenum-Dr. Alex Gill       Social History     Socioeconomic History   • Marital status:      Spouse name: Not on file   • Number of children: Not on file   • Years of education: Not on file   • Highest education level: Not on file   Tobacco Use   • Smoking status: Former Smoker     Quit date:      Years since quittin.5   • Smokeless tobacco: Never Used   Substance and Sexual Activity   • Alcohol use: No   • Drug use: No   • Sexual activity: Defer       Family History   Problem Relation Age of Onset   • Heart disease Mother    • No Known Problems Father    • No Known Problems Maternal Grandmother    • No Known Problems Maternal Grandfather    • No Known Problems Paternal Grandmother    • No Known Problems Paternal Grandfather    • Heart disease Sister    • Heart disease Brother        Review of Systems   Constitutional: Negative for diaphoresis and malaise/fatigue.   Cardiovascular: Positive for chest pain, dyspnea on exertion and palpitations. Negative for claudication, irregular heartbeat, leg swelling, near-syncope, orthopnea, paroxysmal nocturnal dyspnea and syncope.   Respiratory: Negative for cough, shortness of breath and sleep disturbances due to breathing.    Musculoskeletal: Negative for falls.  "  Neurological: Positive for numbness and paresthesias. Negative for dizziness and weakness.   Psychiatric/Behavioral: Negative for altered mental status and substance abuse.       Allergies   Allergen Reactions   • Ambien [Zolpidem Tartrate] Rash   • Penicillins Rash   • Tylenol With Codeine #3 [Acetaminophen-Codeine] Hives         Current Outpatient Medications:   •  aspirin 81 MG EC tablet, Take 81 mg by mouth Daily., Disp: , Rfl:   •  atorvastatin (LIPITOR) 80 MG tablet, TAKE 1 TABLET DAILY, Disp: 90 tablet, Rfl: 3  •  Cyanocobalamin (VITAMIN B-12 CR PO), Take  by mouth., Disp: , Rfl:   •  esomeprazole (nexIUM) 40 MG capsule, Take 40 mg by mouth Every Morning Before Breakfast., Disp: , Rfl:   •  FLUoxetine (PROzac) 20 MG capsule, Take 20 mg by mouth daily., Disp: , Rfl:   •  isosorbide mononitrate (IMDUR) 30 MG 24 hr tablet, Take 90 mg by mouth Daily. 3 tabs=90mg, Disp: , Rfl:   •  LORazepam (ATIVAN) 1 MG tablet, Take 1 mg by mouth every 8 (eight) hours as needed for anxiety., Disp: , Rfl:   •  nitroglycerin (NITROSTAT) 0.4 MG SL tablet, Place 1 tablet under the tongue every 5 (five) minutes as needed for chest pain. Take no more than 3 doses in 15 minutes., Disp: 100 tablet, Rfl: 3  •  ranolazine (RANEXA) 500 MG 12 hr tablet, TAKE 1 TABLET EVERY 12     HOURS, Disp: 180 tablet, Rfl: 2  •  vitamin C (ASCORBIC ACID) 500 MG tablet, Take 500 mg by mouth Daily., Disp: , Rfl:   •  Vitamin D, Ergocalciferol, 2000 units capsule, Take 2,000 Units by mouth Daily., Disp: , Rfl:   •  furosemide (LASIX) 20 MG tablet, Take 20 mg by mouth As Needed (only take if 3lb weight gain overnight)., Disp: , Rfl:       Objective:     Vitals:    07/06/21 1414 07/06/21 1430 07/06/21 1431   SpO2: 96% 94% 96%   Weight: 86.6 kg (191 lb)     Height: 165.1 cm (65\")       Body mass index is 31.78 kg/m².    PHYSICAL EXAM:    Constitutional:       General: Not in acute distress.     Appearance: Normal appearance. Well-developed.   Eyes:      " Pupils: Pupils are equal, round, and reactive to light.   HENT:      Head: Normocephalic.   Neck:      Vascular: No carotid bruit or JVD.   Pulmonary:      Effort: Pulmonary effort is normal. No tachypnea.      Breath sounds: Normal breath sounds. No wheezing. No rales.   Cardiovascular:      Normal rate. Irregularly irregular rhythm.      No gallop.   Pulses:     Intact distal pulses.   Abdominal:      General: Bowel sounds are normal.      Palpations: Abdomen is soft.      Tenderness: There is no abdominal tenderness.   Musculoskeletal: Normal range of motion.      Cervical back: Normal range of motion and neck supple. No edema. Skin:     General: Skin is warm and dry.   Neurological:      Mental Status: Alert and oriented to person, place, and time.           ECG 12 Lead    Date/Time: 7/6/2021 2:36 PM  Performed by: Gisele Helm APRN  Authorized by: Gisele Helm APRN   Comparison: compared with previous ECG from 7/24/2020  Similar to previous ECG  Rhythm: atrial fibrillation  ST Depression: II, III, aVF, V4, V5 and V6  Other findings: non-specific ST-T wave changes    Clinical impression: abnormal EKG              Assessment:       Diagnosis Plan   1. History of coronary artery stent placement     2. Mitral valve insufficiency, unspecified etiology     3. Coronary artery disease involving native coronary artery of native heart without angina pectoris     4. Chronic diastolic (congestive) heart failure (CMS/HCC)       Orders Placed This Encounter   Procedures   • ECG 12 Lead     This order was created via procedure documentation     Order Specific Question:   Release to patient     Answer:   Immediate          Plan:       She is in A. fib with RVR and her EKG today.  Her rates are in the 1 teens to 120s.  Her blood pressure is also elevated.  I am going to start her on metoprolol tartrate 25 mg twice a day.  I am also getting put her on Eliquis as she has a [id number] score of 4.  Will start 5 mg  twice a day.  I told her to monitor her heart rate over the next couple days and if it is not going down to give me a call and we can increase the medication.  I am also getting get her scheduled for echo and a stress test.         Your medication list          Accurate as of July 6, 2021  2:48 PM. If you have any questions, ask your nurse or doctor.            CONTINUE taking these medications      Instructions Last Dose Given Next Dose Due   aspirin 81 MG EC tablet      Take 81 mg by mouth Daily.       atorvastatin 80 MG tablet  Commonly known as: LIPITOR      TAKE 1 TABLET DAILY       esomeprazole 40 MG capsule  Commonly known as: nexIUM      Take 40 mg by mouth Every Morning Before Breakfast.       FLUoxetine 20 MG capsule  Commonly known as: PROzac      Take 20 mg by mouth daily.       furosemide 20 MG tablet  Commonly known as: LASIX      Take 20 mg by mouth As Needed (only take if 3lb weight gain overnight).       isosorbide mononitrate 30 MG 24 hr tablet  Commonly known as: IMDUR      Take 90 mg by mouth Daily. 3 tabs=90mg       LORazepam 1 MG tablet  Commonly known as: ATIVAN      Take 1 mg by mouth every 8 (eight) hours as needed for anxiety.       nitroglycerin 0.4 MG SL tablet  Commonly known as: NITROSTAT      Place 1 tablet under the tongue every 5 (five) minutes as needed for chest pain. Take no more than 3 doses in 15 minutes.       ranolazine 500 MG 12 hr tablet  Commonly known as: RANEXA      TAKE 1 TABLET EVERY 12     HOURS       VITAMIN B-12 CR PO      Take  by mouth.       vitamin C 500 MG tablet  Commonly known as: ASCORBIC ACID      Take 500 mg by mouth Daily.       Vitamin D (Ergocalciferol) 50 MCG (2000 UT) capsule      Take 2,000 Units by mouth Daily.          STOP taking these medications    HYDROcodone-acetaminophen 5-325 MG per tablet  Commonly known as: NORCO  Stopped by: JOHNNIE Laureano        O2  Commonly known as: OXYGEN  Stopped by: JOHNNIE Laureano        PROBIOTIC  ADVANCED PO  Stopped by: JOHNNIE Laureano                 As always, it has been a pleasure to participate in your patient's care.      Sincerely,     Gisele BLAIR

## 2021-07-07 ENCOUNTER — TELEPHONE (OUTPATIENT)
Dept: CARDIOLOGY | Facility: CLINIC | Age: 81
End: 2021-07-07

## 2021-07-07 NOTE — TELEPHONE ENCOUNTER
Charlette,    After seeing Gisele Helm yesterday, this patient needs a two week follow up with Dr. Horner. There are not any available openings in that time frame.     Is there any way we could put her in on 7/21 at 10:40? If not, please advise on where to schedule.    Thanks!   Shaylee

## 2021-07-08 ENCOUNTER — TELEPHONE (OUTPATIENT)
Dept: CARDIOLOGY | Facility: CLINIC | Age: 81
End: 2021-07-08

## 2021-07-08 ENCOUNTER — HOSPITAL ENCOUNTER (INPATIENT)
Facility: HOSPITAL | Age: 81
LOS: 3 days | Discharge: HOME | End: 2021-07-12
Attending: EMERGENCY MEDICINE | Admitting: INTERNAL MEDICINE

## 2021-07-08 ENCOUNTER — APPOINTMENT (OUTPATIENT)
Dept: CARDIOLOGY | Facility: HOSPITAL | Age: 81
End: 2021-07-08

## 2021-07-08 ENCOUNTER — APPOINTMENT (OUTPATIENT)
Dept: GENERAL RADIOLOGY | Facility: HOSPITAL | Age: 81
End: 2021-07-08

## 2021-07-08 DIAGNOSIS — Z95.5 HISTORY OF CORONARY ARTERY STENT PLACEMENT: ICD-10-CM

## 2021-07-08 DIAGNOSIS — I48.91 ATRIAL FIBRILLATION, UNSPECIFIED TYPE (HCC): ICD-10-CM

## 2021-07-08 DIAGNOSIS — I50.31 ACUTE DIASTOLIC CONGESTIVE HEART FAILURE (HCC): Primary | ICD-10-CM

## 2021-07-08 DIAGNOSIS — I34.0 MITRAL VALVE INSUFFICIENCY, UNSPECIFIED ETIOLOGY: ICD-10-CM

## 2021-07-08 DIAGNOSIS — I25.10 CORONARY ARTERY DISEASE INVOLVING NATIVE CORONARY ARTERY OF NATIVE HEART WITHOUT ANGINA PECTORIS: ICD-10-CM

## 2021-07-08 DIAGNOSIS — I50.32 CHRONIC DIASTOLIC (CONGESTIVE) HEART FAILURE (HCC): ICD-10-CM

## 2021-07-08 DIAGNOSIS — I25.5 ISCHEMIC CARDIOMYOPATHY: ICD-10-CM

## 2021-07-08 DIAGNOSIS — I48.91 ATRIAL FIBRILLATION WITH RVR (HCC): ICD-10-CM

## 2021-07-08 DIAGNOSIS — I20.8 STABLE ANGINA PECTORIS (HCC): ICD-10-CM

## 2021-07-08 LAB
ALBUMIN SERPL-MCNC: 4.1 G/DL (ref 3.5–5.2)
ALBUMIN/GLOB SERPL: 1.4 G/DL
ALP SERPL-CCNC: 60 U/L (ref 39–117)
ALT SERPL W P-5'-P-CCNC: 36 U/L (ref 1–33)
ANION GAP SERPL CALCULATED.3IONS-SCNC: 11.2 MMOL/L (ref 5–15)
AORTIC DIMENSIONLESS INDEX: 0.5 (DI)
ASCENDING AORTA: 3.7 CM
AST SERPL-CCNC: 39 U/L (ref 1–32)
BASOPHILS # BLD AUTO: 0.01 10*3/MM3 (ref 0–0.2)
BASOPHILS NFR BLD AUTO: 0.2 % (ref 0–1.5)
BH CV ECHO MEAS - ACS: 1.5 CM
BH CV ECHO MEAS - AO MAX PG (FULL): 15.6 MMHG
BH CV ECHO MEAS - AO MAX PG: 18.8 MMHG
BH CV ECHO MEAS - AO MEAN PG (FULL): 8 MMHG
BH CV ECHO MEAS - AO MEAN PG: 10 MMHG
BH CV ECHO MEAS - AO ROOT AREA (BSA CORRECTED): 1.7
BH CV ECHO MEAS - AO ROOT AREA: 8.6 CM^2
BH CV ECHO MEAS - AO ROOT DIAM: 3.3 CM
BH CV ECHO MEAS - AO V2 MAX: 217 CM/SEC
BH CV ECHO MEAS - AO V2 MEAN: 149 CM/SEC
BH CV ECHO MEAS - AO V2 VTI: 33.5 CM
BH CV ECHO MEAS - ASC AORTA: 3.7 CM
BH CV ECHO MEAS - AVA(I,A): 1.5 CM^2
BH CV ECHO MEAS - AVA(I,D): 1.5 CM^2
BH CV ECHO MEAS - AVA(V,A): 1.3 CM^2
BH CV ECHO MEAS - AVA(V,D): 1.3 CM^2
BH CV ECHO MEAS - BSA(HAYCOCK): 2 M^2
BH CV ECHO MEAS - BSA: 1.9 M^2
BH CV ECHO MEAS - BZI_BMI: 31 KILOGRAMS/M^2
BH CV ECHO MEAS - BZI_METRIC_HEIGHT: 165.1 CM
BH CV ECHO MEAS - BZI_METRIC_WEIGHT: 84.4 KG
BH CV ECHO MEAS - CONTRAST EF 4CH: 31 CM2
BH CV ECHO MEAS - EDV(CUBED): 216 ML
BH CV ECHO MEAS - EDV(MOD-SP2): 117 ML
BH CV ECHO MEAS - EDV(MOD-SP4): 98 ML
BH CV ECHO MEAS - EDV(TEICH): 180 ML
BH CV ECHO MEAS - EF(CUBED): 31.1 %
BH CV ECHO MEAS - EF(MOD-BP): 31.8 %
BH CV ECHO MEAS - EF(MOD-SP2): 41.9 %
BH CV ECHO MEAS - EF(MOD-SP4): 29.6 %
BH CV ECHO MEAS - EF(TEICH): 24.8 %
BH CV ECHO MEAS - ESV(CUBED): 148.9 ML
BH CV ECHO MEAS - ESV(MOD-SP2): 68 ML
BH CV ECHO MEAS - ESV(MOD-SP4): 69 ML
BH CV ECHO MEAS - ESV(TEICH): 135.3 ML
BH CV ECHO MEAS - FS: 11.7 %
BH CV ECHO MEAS - IVS/LVPW: 3
BH CV ECHO MEAS - IVSD: 1.5 CM
BH CV ECHO MEAS - LV DIASTOLIC VOL/BSA (35-75): 51.1 ML/M^2
BH CV ECHO MEAS - LV MASS(C)D: 246.9 GRAMS
BH CV ECHO MEAS - LV MASS(C)DI: 128.7 GRAMS/M^2
BH CV ECHO MEAS - LV MAX PG: 3.2 MMHG
BH CV ECHO MEAS - LV MEAN PG: 2 MMHG
BH CV ECHO MEAS - LV SYSTOLIC VOL/BSA (12-30): 36 ML/M^2
BH CV ECHO MEAS - LV V1 MAX: 90.1 CM/SEC
BH CV ECHO MEAS - LV V1 MEAN: 58.9 CM/SEC
BH CV ECHO MEAS - LV V1 VTI: 15.8 CM
BH CV ECHO MEAS - LVIDD: 6 CM
BH CV ECHO MEAS - LVIDS: 5.3 CM
BH CV ECHO MEAS - LVLD AP2: 7.6 CM
BH CV ECHO MEAS - LVLD AP4: 7.3 CM
BH CV ECHO MEAS - LVLS AP2: 7.8 CM
BH CV ECHO MEAS - LVLS AP4: 6.6 CM
BH CV ECHO MEAS - LVOT AREA (M): 3.1 CM^2
BH CV ECHO MEAS - LVOT AREA: 3.1 CM^2
BH CV ECHO MEAS - LVOT DIAM: 2 CM
BH CV ECHO MEAS - LVPWD: 0.5 CM
BH CV ECHO MEAS - MED PEAK E' VEL: 5.3 CM/SEC
BH CV ECHO MEAS - MR MAX PG: 121.9 MMHG
BH CV ECHO MEAS - MR MAX VEL: 552 CM/SEC
BH CV ECHO MEAS - MR MEAN PG: 75 MMHG
BH CV ECHO MEAS - MR MEAN VEL: 401 CM/SEC
BH CV ECHO MEAS - MR VTI: 143 CM
BH CV ECHO MEAS - MV DEC SLOPE: 602 CM/SEC^2
BH CV ECHO MEAS - MV MAX PG: 8.3 MMHG
BH CV ECHO MEAS - MV MEAN PG: 3 MMHG
BH CV ECHO MEAS - MV P1/2T MAX VEL: 155 CM/SEC
BH CV ECHO MEAS - MV P1/2T: 75.4 MSEC
BH CV ECHO MEAS - MV V2 MAX: 144 CM/SEC
BH CV ECHO MEAS - MV V2 MEAN: 80.8 CM/SEC
BH CV ECHO MEAS - MV V2 VTI: 25.1 CM
BH CV ECHO MEAS - MVA P1/2T LCG: 1.4 CM^2
BH CV ECHO MEAS - MVA(P1/2T): 2.9 CM^2
BH CV ECHO MEAS - MVA(VTI): 2 CM^2
BH CV ECHO MEAS - PA ACC TIME: 0.09 SEC
BH CV ECHO MEAS - PA MAX PG (FULL): 0.71 MMHG
BH CV ECHO MEAS - PA MAX PG: 2.1 MMHG
BH CV ECHO MEAS - PA PR(ACCEL): 40.8 MMHG
BH CV ECHO MEAS - PA V2 MAX: 72.9 CM/SEC
BH CV ECHO MEAS - RAP SYSTOLE: 3 MMHG
BH CV ECHO MEAS - RV MAX PG: 1.4 MMHG
BH CV ECHO MEAS - RV MEAN PG: 1 MMHG
BH CV ECHO MEAS - RV V1 MAX: 59.4 CM/SEC
BH CV ECHO MEAS - RV V1 MEAN: 34.1 CM/SEC
BH CV ECHO MEAS - RV V1 VTI: 8.3 CM
BH CV ECHO MEAS - RVSP: 25.5 MMHG
BH CV ECHO MEAS - SI(AO): 149.4 ML/M^2
BH CV ECHO MEAS - SI(CUBED): 35 ML/M^2
BH CV ECHO MEAS - SI(LVOT): 25.9 ML/M^2
BH CV ECHO MEAS - SI(MOD-SP2): 25.5 ML/M^2
BH CV ECHO MEAS - SI(MOD-SP4): 15.1 ML/M^2
BH CV ECHO MEAS - SI(TEICH): 23.3 ML/M^2
BH CV ECHO MEAS - SV(AO): 286.5 ML
BH CV ECHO MEAS - SV(CUBED): 67.1 ML
BH CV ECHO MEAS - SV(LVOT): 49.6 ML
BH CV ECHO MEAS - SV(MOD-SP2): 49 ML
BH CV ECHO MEAS - SV(MOD-SP4): 29 ML
BH CV ECHO MEAS - SV(TEICH): 44.7 ML
BH CV ECHO MEAS - TAPSE (>1.6): 1.2 CM
BH CV ECHO MEAS - TR MAX VEL: 237 CM/SEC
BH CV VAS BP RIGHT ARM: NORMAL MMHG
BH CV XLRA - RV BASE: 3 CM
BH CV XLRA - RV LENGTH: 5.3 CM
BH CV XLRA - TDI S': 9.4 CM/SEC
BILIRUB SERPL-MCNC: 1.1 MG/DL (ref 0–1.2)
BUN SERPL-MCNC: 13 MG/DL (ref 8–23)
BUN/CREAT SERPL: 18.3 (ref 7–25)
CALCIUM SPEC-SCNC: 8.9 MG/DL (ref 8.6–10.5)
CHLORIDE SERPL-SCNC: 103 MMOL/L (ref 98–107)
CO2 SERPL-SCNC: 24.8 MMOL/L (ref 22–29)
CREAT SERPL-MCNC: 0.71 MG/DL (ref 0.57–1)
DEPRECATED RDW RBC AUTO: 48.8 FL (ref 37–54)
EOSINOPHIL # BLD AUTO: 0.05 10*3/MM3 (ref 0–0.4)
EOSINOPHIL NFR BLD AUTO: 0.8 % (ref 0.3–6.2)
ERYTHROCYTE [DISTWIDTH] IN BLOOD BY AUTOMATED COUNT: 13 % (ref 12.3–15.4)
GFR SERPL CREATININE-BSD FRML MDRD: 79 ML/MIN/1.73
GLOBULIN UR ELPH-MCNC: 3 GM/DL
GLUCOSE SERPL-MCNC: 163 MG/DL (ref 65–99)
HCT VFR BLD AUTO: 44.1 % (ref 34–46.6)
HGB BLD-MCNC: 14.6 G/DL (ref 12–15.9)
HOLD SPECIMEN: NORMAL
HOLD SPECIMEN: NORMAL
IMM GRANULOCYTES # BLD AUTO: 0.04 10*3/MM3 (ref 0–0.05)
IMM GRANULOCYTES NFR BLD AUTO: 0.6 % (ref 0–0.5)
INR PPP: 1.35 (ref 0.9–1.1)
LEFT ATRIUM VOLUME INDEX: 44 ML/M2
LYMPHOCYTES # BLD AUTO: 1.86 10*3/MM3 (ref 0.7–3.1)
LYMPHOCYTES NFR BLD AUTO: 28.4 % (ref 19.6–45.3)
MAXIMAL PREDICTED HEART RATE: 139 BPM
MCH RBC QN AUTO: 33.4 PG (ref 26.6–33)
MCHC RBC AUTO-ENTMCNC: 33.1 G/DL (ref 31.5–35.7)
MCV RBC AUTO: 100.9 FL (ref 79–97)
MONOCYTES # BLD AUTO: 0.69 10*3/MM3 (ref 0.1–0.9)
MONOCYTES NFR BLD AUTO: 10.6 % (ref 5–12)
MR PISA EROA: 0.11 CM2
NEUTROPHILS NFR BLD AUTO: 3.89 10*3/MM3 (ref 1.7–7)
NEUTROPHILS NFR BLD AUTO: 59.4 % (ref 42.7–76)
NRBC BLD AUTO-RTO: 0 /100 WBC (ref 0–0.2)
NT-PROBNP SERPL-MCNC: 1404 PG/ML (ref 0–1800)
PISA ALIASING VEL: 5.8 M/S
PISA RADIUS: 0.4 CM
PLATELET # BLD AUTO: 185 10*3/MM3 (ref 140–450)
PMV BLD AUTO: 10 FL (ref 6–12)
POTASSIUM SERPL-SCNC: 4.8 MMOL/L (ref 3.5–5.2)
PROT SERPL-MCNC: 7.1 G/DL (ref 6–8.5)
PROTHROMBIN TIME: 16.4 SECONDS (ref 11.7–14.2)
QT INTERVAL: 383 MS
RBC # BLD AUTO: 4.37 10*6/MM3 (ref 3.77–5.28)
SARS-COV-2 RNA RESP QL NAA+PROBE: NOT DETECTED
SINUS: 2.5 CM
SODIUM SERPL-SCNC: 139 MMOL/L (ref 136–145)
STJ: 2.9 CM
STRESS TARGET HR: 118 BPM
TROPONIN T SERPL-MCNC: 0.01 NG/ML (ref 0–0.03)
WBC # BLD AUTO: 6.54 10*3/MM3 (ref 3.4–10.8)
WHOLE BLOOD HOLD SPECIMEN: NORMAL

## 2021-07-08 PROCEDURE — 25010000002 DIGOXIN PER 500 MCG: Performed by: INTERNAL MEDICINE

## 2021-07-08 PROCEDURE — 85610 PROTHROMBIN TIME: CPT

## 2021-07-08 PROCEDURE — 93005 ELECTROCARDIOGRAM TRACING: CPT | Performed by: EMERGENCY MEDICINE

## 2021-07-08 PROCEDURE — 80053 COMPREHEN METABOLIC PANEL: CPT

## 2021-07-08 PROCEDURE — 84484 ASSAY OF TROPONIN QUANT: CPT

## 2021-07-08 PROCEDURE — 99284 EMERGENCY DEPT VISIT MOD MDM: CPT

## 2021-07-08 PROCEDURE — 25010000002 PERFLUTREN (DEFINITY) 8.476 MG IN SODIUM CHLORIDE (PF) 0.9 % 10 ML INJECTION: Performed by: INTERNAL MEDICINE

## 2021-07-08 PROCEDURE — G0378 HOSPITAL OBSERVATION PER HR: HCPCS

## 2021-07-08 PROCEDURE — 71045 X-RAY EXAM CHEST 1 VIEW: CPT

## 2021-07-08 PROCEDURE — 93005 ELECTROCARDIOGRAM TRACING: CPT

## 2021-07-08 PROCEDURE — 93306 TTE W/DOPPLER COMPLETE: CPT | Performed by: INTERNAL MEDICINE

## 2021-07-08 PROCEDURE — U0003 INFECTIOUS AGENT DETECTION BY NUCLEIC ACID (DNA OR RNA); SEVERE ACUTE RESPIRATORY SYNDROME CORONAVIRUS 2 (SARS-COV-2) (CORONAVIRUS DISEASE [COVID-19]), AMPLIFIED PROBE TECHNIQUE, MAKING USE OF HIGH THROUGHPUT TECHNOLOGIES AS DESCRIBED BY CMS-2020-01-R: HCPCS | Performed by: EMERGENCY MEDICINE

## 2021-07-08 PROCEDURE — 83880 ASSAY OF NATRIURETIC PEPTIDE: CPT

## 2021-07-08 PROCEDURE — 85025 COMPLETE CBC W/AUTO DIFF WBC: CPT

## 2021-07-08 PROCEDURE — 93010 ELECTROCARDIOGRAM REPORT: CPT | Performed by: INTERNAL MEDICINE

## 2021-07-08 PROCEDURE — 25010000002 FUROSEMIDE PER 20 MG: Performed by: EMERGENCY MEDICINE

## 2021-07-08 PROCEDURE — 99220 PR INITIAL OBSERVATION CARE/DAY 70 MINUTES: CPT | Performed by: INTERNAL MEDICINE

## 2021-07-08 PROCEDURE — 93306 TTE W/DOPPLER COMPLETE: CPT

## 2021-07-08 PROCEDURE — U0005 INFEC AGEN DETEC AMPLI PROBE: HCPCS | Performed by: EMERGENCY MEDICINE

## 2021-07-08 RX ORDER — FUROSEMIDE 10 MG/ML
80 INJECTION INTRAMUSCULAR; INTRAVENOUS ONCE
Status: COMPLETED | OUTPATIENT
Start: 2021-07-08 | End: 2021-07-08

## 2021-07-08 RX ORDER — DIGOXIN 0.25 MG/ML
250 INJECTION INTRAMUSCULAR; INTRAVENOUS EVERY 6 HOURS
Status: COMPLETED | OUTPATIENT
Start: 2021-07-08 | End: 2021-07-09

## 2021-07-08 RX ORDER — RANOLAZINE 500 MG/1
500 TABLET, EXTENDED RELEASE ORAL EVERY 12 HOURS SCHEDULED
Status: DISCONTINUED | OUTPATIENT
Start: 2021-07-08 | End: 2021-07-12 | Stop reason: HOSPADM

## 2021-07-08 RX ORDER — ASCORBIC ACID 500 MG
500 TABLET ORAL DAILY
Status: DISCONTINUED | OUTPATIENT
Start: 2021-07-09 | End: 2021-07-12 | Stop reason: HOSPADM

## 2021-07-08 RX ORDER — PANTOPRAZOLE SODIUM 40 MG/1
40 TABLET, DELAYED RELEASE ORAL EVERY MORNING
Status: DISCONTINUED | OUTPATIENT
Start: 2021-07-09 | End: 2021-07-12 | Stop reason: HOSPADM

## 2021-07-08 RX ORDER — POTASSIUM CHLORIDE 1.5 G/1.77G
40 POWDER, FOR SOLUTION ORAL AS NEEDED
Status: DISCONTINUED | OUTPATIENT
Start: 2021-07-08 | End: 2021-07-12 | Stop reason: HOSPADM

## 2021-07-08 RX ORDER — ACETAMINOPHEN 325 MG/1
650 TABLET ORAL EVERY 4 HOURS PRN
Status: DISCONTINUED | OUTPATIENT
Start: 2021-07-08 | End: 2021-07-12 | Stop reason: HOSPADM

## 2021-07-08 RX ORDER — SODIUM CHLORIDE 0.9 % (FLUSH) 0.9 %
10 SYRINGE (ML) INJECTION AS NEEDED
Status: DISCONTINUED | OUTPATIENT
Start: 2021-07-08 | End: 2021-07-12 | Stop reason: HOSPADM

## 2021-07-08 RX ORDER — ATORVASTATIN CALCIUM 80 MG/1
80 TABLET, FILM COATED ORAL NIGHTLY
Status: DISCONTINUED | OUTPATIENT
Start: 2021-07-08 | End: 2021-07-12 | Stop reason: HOSPADM

## 2021-07-08 RX ORDER — SODIUM CHLORIDE 0.9 % (FLUSH) 0.9 %
10 SYRINGE (ML) INJECTION EVERY 12 HOURS SCHEDULED
Status: DISCONTINUED | OUTPATIENT
Start: 2021-07-08 | End: 2021-07-12 | Stop reason: HOSPADM

## 2021-07-08 RX ORDER — FLUOXETINE HYDROCHLORIDE 20 MG/1
20 CAPSULE ORAL DAILY
Status: DISCONTINUED | OUTPATIENT
Start: 2021-07-09 | End: 2021-07-12 | Stop reason: HOSPADM

## 2021-07-08 RX ORDER — LORAZEPAM 1 MG/1
1 TABLET ORAL EVERY 8 HOURS PRN
Status: DISCONTINUED | OUTPATIENT
Start: 2021-07-08 | End: 2021-07-12 | Stop reason: HOSPADM

## 2021-07-08 RX ORDER — DIGOXIN 0.25 MG/ML
500 INJECTION INTRAMUSCULAR; INTRAVENOUS ONCE
Status: COMPLETED | OUTPATIENT
Start: 2021-07-08 | End: 2021-07-08

## 2021-07-08 RX ORDER — POTASSIUM CHLORIDE 750 MG/1
40 TABLET, FILM COATED, EXTENDED RELEASE ORAL AS NEEDED
Status: DISCONTINUED | OUTPATIENT
Start: 2021-07-08 | End: 2021-07-12 | Stop reason: HOSPADM

## 2021-07-08 RX ORDER — NITROGLYCERIN 0.4 MG/1
0.4 TABLET SUBLINGUAL
Status: DISCONTINUED | OUTPATIENT
Start: 2021-07-08 | End: 2021-07-12 | Stop reason: HOSPADM

## 2021-07-08 RX ORDER — FUROSEMIDE 10 MG/ML
40 INJECTION INTRAMUSCULAR; INTRAVENOUS EVERY 12 HOURS
Status: DISCONTINUED | OUTPATIENT
Start: 2021-07-08 | End: 2021-07-09

## 2021-07-08 RX ADMIN — PERFLUTREN 1 ML: 6.52 INJECTION, SUSPENSION INTRAVENOUS at 16:57

## 2021-07-08 RX ADMIN — ATORVASTATIN CALCIUM 80 MG: 80 TABLET, FILM COATED ORAL at 20:59

## 2021-07-08 RX ADMIN — LORAZEPAM 1 MG: 1 TABLET ORAL at 21:00

## 2021-07-08 RX ADMIN — SODIUM CHLORIDE, PRESERVATIVE FREE 10 ML: 5 INJECTION INTRAVENOUS at 20:59

## 2021-07-08 RX ADMIN — FUROSEMIDE 80 MG: 10 INJECTION, SOLUTION INTRAMUSCULAR; INTRAVENOUS at 12:51

## 2021-07-08 RX ADMIN — SODIUM CHLORIDE, PRESERVATIVE FREE 10 ML: 5 INJECTION INTRAVENOUS at 17:07

## 2021-07-08 RX ADMIN — DIGOXIN 250 MCG: 250 INJECTION, SOLUTION INTRAMUSCULAR; INTRAVENOUS; PARENTERAL at 21:00

## 2021-07-08 RX ADMIN — RANOLAZINE 500 MG: 500 TABLET, FILM COATED, EXTENDED RELEASE ORAL at 20:59

## 2021-07-08 RX ADMIN — DIGOXIN 500 MCG: 250 INJECTION, SOLUTION INTRAMUSCULAR; INTRAVENOUS; PARENTERAL at 16:58

## 2021-07-08 RX ADMIN — METOPROLOL TARTRATE 25 MG: 25 TABLET, FILM COATED ORAL at 21:00

## 2021-07-08 RX ADMIN — APIXABAN 5 MG: 5 TABLET, FILM COATED ORAL at 20:59

## 2021-07-08 NOTE — PLAN OF CARE
Goal Outcome Evaluation:  Plan of Care Reviewed With: patient           Outcome Summary: admitted from ER; diuresing well; no soa at rest; purewick in place

## 2021-07-08 NOTE — ED PROVIDER NOTES
EMERGENCY DEPARTMENT ENCOUNTER    Room Number:  18/18  Date seen:  7/8/2021  PCP: Tho Mar MD  Historian: Patient      HPI:  Chief Complaint: Shortness of breath  A complete HPI/ROS/PMH/PSH/SH/FH are unobtainable due to: Nothing  Context: Alice Mabry is a 80 y.o. female who presents to the ED c/o shortness of breath that has been ongoing for the last 2 weeks, worse for the last 3 days.  She has a history of coronary artery disease and diastolic heart failure.  She recently saw her cardiologist nurse practitioner and the discover that she was in atrial fibrillation, prescribed her metoprolol and also a blood thinner.  She reports that she has been short of breath constantly but it is worse with minimal exertion.  She is unable to ambulate across her home to the bathroom without getting really short of breath.  She does not typically wear home oxygen.  O2 saturations here on arrival were 88% on room air.  She denies fever or chills.  She has had a cough.  She has had both Covid vaccinations.  She denies chest pain.  She denies lower extremity swelling.            PAST MEDICAL HISTORY  Active Ambulatory Problems     Diagnosis Date Noted   • CAD (coronary artery disease) 07/12/2017   • History of coronary artery stent placement 07/12/2017   • Cardiomyopathy (CMS/[facility]) 07/12/2017   • Mitral valve insufficiency 07/12/2017   • Chronic diastolic (congestive) heart failure (CMS/HCC) 07/19/2019   • Atrial fibrillation with RVR (CMS/[facility]) 07/06/2021   • Stable angina pectoris (CMS/[facility]) 07/06/2021     Resolved Ambulatory Problems     Diagnosis Date Noted   • No Resolved Ambulatory Problems     Past Medical History:   Diagnosis Date   • Anxiety    • Arthritis    • Cervical cancer (CMS/HCC)    • Current tear of meniscus    • Depression    • Dizziness    • Fatty liver    • GERD (gastroesophageal reflux disease)    • H/O blood clots    • H/O Clostridium difficile infection    • Hyperlipidemia    • Hypertension    •  IBS (irritable bowel syndrome)    • Myocardial infarction (CMS/HCC)    • Obesity    • Peptic ulcer    • Pneumonia    • Sinus bradycardia    • Syncope    • Vitamin D deficiency          PAST SURGICAL HISTORY  Past Surgical History:   Procedure Laterality Date   • APPENDECTOMY  1960   • BACK SURGERY     • CARDIAC CATHETERIZATION  06/10/2014    Dr. Murphy oHrner   • CARDIAC CATHETERIZATION  2007    Dr. Murphy Horner   • CARDIAC CATHETERIZATION N/A 10/19/2004    Dr. Murphy Horner   • CARDIAC CATHETERIZATION N/A 07/15/2004    Dr. Gregorio Gonzales   • CARDIAC CATHETERIZATION  10/2003    Dr. Murphy Horner   • CHOLECYSTECTOMY     • COLONOSCOPY N/A 2001    negative   • COLONOSCOPY N/A 2012    negative   • CORONARY ANGIOPLASTY WITH STENT PLACEMENT N/A 07/15/2004    Dr. Murphy Horner   • CORONARY ANGIOPLASTY WITH STENT PLACEMENT  2002    to RCA   • HYSTERECTOMY  1974   • UPPER GASTROINTESTINAL ENDOSCOPY N/A 2015    Mild Schatzki ring, hiatus hernia, non-bleeding erosive gastropathy, erythematous mucosa in the antrum, normal duodenum-Dr. Alex Gill         FAMILY HISTORY  Family History   Problem Relation Age of Onset   • Heart disease Mother    • No Known Problems Father    • No Known Problems Maternal Grandmother    • No Known Problems Maternal Grandfather    • No Known Problems Paternal Grandmother    • No Known Problems Paternal Grandfather    • Heart disease Sister    • Heart disease Brother          SOCIAL HISTORY  Social History     Socioeconomic History   • Marital status:      Spouse name: Not on file   • Number of children: Not on file   • Years of education: Not on file   • Highest education level: Not on file   Tobacco Use   • Smoking status: Former Smoker     Quit date:      Years since quittin.5   • Smokeless tobacco: Never Used   Substance and Sexual Activity   • Alcohol use: No   • Drug use: No   • Sexual activity: Defer         ALLERGIES  Codeine, Penicillins,  and Ambien [zolpidem tartrate]        REVIEW OF SYSTEMS  Review of Systems   Review of all 14 systems is negative other than stated in the HPI above.      PHYSICAL EXAM  ED Triage Vitals   Temp Heart Rate Resp BP SpO2   07/08/21 1014 07/08/21 1014 07/08/21 1014 07/08/21 1045 07/08/21 1014   97.2 °F (36.2 °C) 104 22 (!) 142/104 93 %      Temp src Heart Rate Source Patient Position BP Location FiO2 (%)   07/08/21 1014 -- -- -- --   Tympanic             GENERAL: Awake and alert, no acute distress  HENT: nares patent  EYES: no scleral icterus  CV: irregular rhythm, normal rate  RESPIRATORY: normal effort, minimal crackles at lung bases bilaterally  ABDOMEN: soft, nondistended, nontender throughout  MUSCULOSKELETAL: no deformity, no calf tenderness bilaterally, no lower extremity edema  NEURO: alert, moves all extremities, follows commands  PSYCH:  calm, cooperative  SKIN: warm, dry    Vital signs and nursing notes reviewed.          LAB RESULTS  Recent Results (from the past 24 hour(s))   ECG 12 Lead    Collection Time: 07/08/21 10:21 AM   Result Value Ref Range    QT Interval 383 ms   Lavender Top    Collection Time: 07/08/21 10:56 AM   Result Value Ref Range    Extra Tube hold for add-on    CBC Auto Differential    Collection Time: 07/08/21 10:56 AM    Specimen: Arm, Left; Blood   Result Value Ref Range    WBC 6.54 3.40 - 10.80 10*3/mm3    RBC 4.37 3.77 - 5.28 10*6/mm3    Hemoglobin 14.6 12.0 - 15.9 g/dL    Hematocrit 44.1 34.0 - 46.6 %    .9 (H) 79.0 - 97.0 fL    MCH 33.4 (H) 26.6 - 33.0 pg    MCHC 33.1 31.5 - 35.7 g/dL    RDW 13.0 12.3 - 15.4 %    RDW-SD 48.8 37.0 - 54.0 fl    MPV 10.0 6.0 - 12.0 fL    Platelets 185 140 - 450 10*3/mm3    Neutrophil % 59.4 42.7 - 76.0 %    Lymphocyte % 28.4 19.6 - 45.3 %    Monocyte % 10.6 5.0 - 12.0 %    Eosinophil % 0.8 0.3 - 6.2 %    Basophil % 0.2 0.0 - 1.5 %    Immature Grans % 0.6 (H) 0.0 - 0.5 %    Neutrophils, Absolute 3.89 1.70 - 7.00 10*3/mm3    Lymphocytes, Absolute  1.86 0.70 - 3.10 10*3/mm3    Monocytes, Absolute 0.69 0.10 - 0.90 10*3/mm3    Eosinophils, Absolute 0.05 0.00 - 0.40 10*3/mm3    Basophils, Absolute 0.01 0.00 - 0.20 10*3/mm3    Immature Grans, Absolute 0.04 0.00 - 0.05 10*3/mm3    nRBC 0.0 0.0 - 0.2 /100 WBC   Comprehensive Metabolic Panel    Collection Time: 07/08/21 10:57 AM    Specimen: Arm, Left; Blood   Result Value Ref Range    Glucose 163 (H) 65 - 99 mg/dL    BUN 13 8 - 23 mg/dL    Creatinine 0.71 0.57 - 1.00 mg/dL    Sodium 139 136 - 145 mmol/L    Potassium 4.8 3.5 - 5.2 mmol/L    Chloride 103 98 - 107 mmol/L    CO2 24.8 22.0 - 29.0 mmol/L    Calcium 8.9 8.6 - 10.5 mg/dL    Total Protein 7.1 6.0 - 8.5 g/dL    Albumin 4.10 3.50 - 5.20 g/dL    ALT (SGPT) 36 (H) 1 - 33 U/L    AST (SGOT) 39 (H) 1 - 32 U/L    Alkaline Phosphatase 60 39 - 117 U/L    Total Bilirubin 1.1 0.0 - 1.2 mg/dL    eGFR Non African Amer 79 >60 mL/min/1.73    Globulin 3.0 gm/dL    A/G Ratio 1.4 g/dL    BUN/Creatinine Ratio 18.3 7.0 - 25.0    Anion Gap 11.2 5.0 - 15.0 mmol/L   BNP    Collection Time: 07/08/21 10:57 AM    Specimen: Arm, Left; Blood   Result Value Ref Range    proBNP 1,404.0 0.0-1,800.0 pg/mL   Troponin    Collection Time: 07/08/21 10:57 AM    Specimen: Arm, Left; Blood   Result Value Ref Range    Troponin T 0.013 0.000 - 0.030 ng/mL   Protime-INR    Collection Time: 07/08/21 10:57 AM    Specimen: Arm, Left; Blood   Result Value Ref Range    Protime 16.4 (H) 11.7 - 14.2 Seconds    INR 1.35 (H) 0.90 - 1.10   Green Top (Gel)    Collection Time: 07/08/21 10:57 AM   Result Value Ref Range    Extra Tube Hold for add-ons.    Gold Top - SST    Collection Time: 07/08/21 10:57 AM   Result Value Ref Range    Extra Tube Hold for add-ons.        Ordered the above labs and reviewed the results.        RADIOLOGY  XR Chest 1 View    Result Date: 7/8/2021  PORTABLE CHEST 07/20/2021 AT 1120 HOURS  CLINICAL HISTORY: Dyspnea  Compared to the previous chest dated 12/10/2017.  The lungs are  somewhat poorly inflated. There is moderate pulmonary vascular congestion and mild interstitial pulmonary edema. The heart is moderately enlarged and has increased in size somewhat since the preceding chest x-ray. No significant pleural effusion is evident.  IMPRESSIONS: Mild CHF.  This report was finalized on 7/8/2021 11:51 AM by Dr. Stan Hunter M.D.        Ordered the above noted radiological studies. Reviewed by me in PACS.            PROCEDURES  Procedures              MEDICATIONS GIVEN IN ER  Medications   sodium chloride 0.9 % flush 10 mL (has no administration in time range)   furosemide (LASIX) injection 80 mg (80 mg Intravenous Given 7/8/21 1251)                   MEDICAL DECISION MAKING, PROGRESS, and CONSULTS    All labs have been independently reviewed by me.  All radiology studies have been reviewed by me and discussed with radiologist dictating the report.   EKG's independently viewed and interpreted by me.  Discussion below represents my analysis of pertinent findings related to patient's condition, differential diagnosis, treatment plan and final disposition.      Differential diagnosis includes but is not limited to:  Acute bronchitis  Pneumonia  Acute CHF  Pulmonary embolus  Pleural effusion    ED Course as of Jul 08 1334   Thu Jul 08, 2021   1207 EKG          EKG time: 1021  Rhythm/Rate: A. fib, 98  P waves and PA: N/A  QRS, axis: Normal axis  ST and T waves: No acute ischemic changes    Interpreted Contemporaneously by me, independently viewed  Similar compared to prior 3/16/2015 however A. fib is new today          [JR]   1209 Medical record review: I reviewed echo from 9/4/2014 that showed EF 57%, grade 1 diastolic dysfunction, moderate mitral regurgitation.    [JR]   1209 Discussed with Dr. Horner, cardiology, who agrees to admit.    [JR]   1333 Creatinine: 0.71 [JR]   1333 Sodium: 139 [JR]   1333 Potassium: 4.8 [JR]   1333 Glucose(!): 163 [JR]   1333 INR(!): 1.35 [JR]   1333 proBNP:  1,404.0 [JR]   1333 Troponin T: 0.013 [JR]   1333 WBC: 6.54 [JR]   1333 Hemoglobin: 14.6 [JR]      ED Course User Index  [JR] Nikolai Price MD     Patient presents with 2 weeks of progressive shortness of breath at rest and dyspnea on exertion.  O2 saturations 88% on arrival, patient placed on 2 L nasal cannula with improvement of oxygenation and also improvement of symptoms.  Her chest x-ray demonstrates vascular congestion and mild pulmonary edema.  EKG with atrial fibrillation but rate controlled.  She was given 80 mg IV Lasix.  She will be admitted for further evaluation by cardiology given her hypoxia and dyspnea on exertion that is limiting her ADLs.         I wore an N95 mask, face shield, and gloves during this patient encounter.  Patient also wearing a surgical mask.  Hand hygeine performed before and after seeing the patient.    DIAGNOSIS  Final diagnoses:   Acute diastolic congestive heart failure (CMS/HCC)   Atrial fibrillation, unspecified type (CMS/HCC)         DISPOSITION  ADMIT            Latest Documented Vital Signs:  As of 13:34 EDT  BP- 119/96 HR- 99 Temp- 97.2 °F (36.2 °C) (Tympanic) O2 sat- 93%        --    Please note that portions of this were completed with a voice recognition program.          Nikolai Price MD  07/08/21 8005

## 2021-07-08 NOTE — TELEPHONE ENCOUNTER
Gisele    Pt called this morning. She said she was to update you on her HR and it is not better today. She is very SOA and her sats are dropping in the the 70s when she walks around her home. I asked her to come in to the ER. She verbalized understanding.    Thank you,    Margo Strauss RN  Triage [facility]

## 2021-07-08 NOTE — ED NOTES
Pt states she was recently diagnosed with A fib ( Dr Horner).  Pt states she comes to ER today due to shortness of breath.  Mask placed on patient in triage.  Triage RN wearing mask throughout encounter.       Paco England, RN  07/08/21 1016

## 2021-07-08 NOTE — H&P
Date of Hospital Visit: 21  Encounter Provider: Murphy Horner MD  Place of Service: [facility] CARDIOLOGY  Patient Name: Alice Mabry  :1940  [id number]  Referral Provider: Murphy Horner MD    Chief complaint: Acute diastolic heart failure    History of Present Illness:    Ms Mabry is an 80 year old patient of mine with a history of coronary artery disease (s/p inferior MI in  and stent placement to RCA and LCX and in  RAJ was placed in RCA for restenosis), hypertension, hyperlipidemia, and syncope    On her last catheterization she had residual coronary disease in her 3 diagonals that were felt to be too small to intervene on.     She was seen by Gisele Helm on 2021.  She was complaining of dyspnea and palpitations with chest pressure. She was found to be in atrial fibrillation with RVR on EKG and was placed on metoprolol 25 mg BID and Eliquis.  She was to monitor her HR over the next few days and call back in it remained elevated.  She called the office this morning due to continued elevation in her HR and severe SOA.  Her saturations were low in the 70's with exertion and she was sent to the ED.     He presented to the ED with SOA.  Oxygen saturations were 88% but improved with 2 LNC. EKG showed atrial fibrillation with controlled rate of 90's.  CXR showed mild CHF.  She received lasix 80 IV.  Labs included elevated BNP at 1404, troponin 0.013.     She is being admitted for further treatment.     ECHO has been ordered.    I have reviewed the above HPI and agree with it.  She probably has had A. fib for a couple months did not really reach out until here recently.  She has occasional palpitations she was just seen in the office a couple days ago started on metoprolol and anticoagulation an initial evaluation was recommended.  But now she has gotten to the point where she cannot hardly do anything without being short of breath it does not sound like  she has PND orthopnea edema or has had a weight gain she has occasional cough.  She does not have any change in her angina but she has chronic angina no bleeding difficulty no diabetes she does have hypertension she has never had a stroke she has never had heart failure before    Previous Cardiac Testing  ECHO 9/4/2014      Stress Test 3/16/2015  IMPRESSION:  1.  Prior inferolateral myocardial infarction extending from the mid to basal  portions.  2.  No definitive myocardial ischemia.  3.  Calculated ejection fraction of 42%.     CONCLUSION: Abnormal Cardiolite stress test suggestive of a prior basal to mid  inferolateral myocardial infarction.          Cardiac Catheterization 6/10/2014  FINDINGS:  The LV gram was 110/14. There is calcium noted in the coronary arteries. There  was inferior basilar akinesis, anterior hypokinesis. EF overall is about 30%.  There is no mitral insufficiency or gradient across the aortic valve on  pullback.     The left main has 10% luminal irregularities in it.      In the LAD, there is a stent in the proximal and the midportion.  It is widely  patent and looks good. There are 2 small diagonals, first and second, that have  80% origin lesions that are covered by the stent and not amenable to PCI.     There is a large stent in the proximal circumflex with 20% in-stent restenosis  in 1 area, but otherwise looks good.      The RCA is a dominant vessel with a lot of calcium in it. The origin has a 90%  lesion and 30% mid disease. There is a stent distally that is widely patent and  looks good.      IMPRESSION:  Class III to IV angina on good medical therapy, ischemic  cardiomyopathy.  Prior stents are patent.  Diagonal disease is unchanged.  What  is new is she has developed a new 90% origin right coronary artery lesion. We  are going to proceed on with intervention of that.  I have discussed this with  her and her family beforehand.      Plavix 600 mg was given and heparin 7000 units was  given. We exchanged a sheath  in the right radial artery and put a 6-Serbian sheath in. We then brought a  6-JR-4 guide up, but it would not fit into the right coronary artery, and we  switched to a 6-AR-1 guide. A BMW was passed easily into the distal RCA. We  brought a 3.5 x 15 NC trek balloon up and inflated it at 14 atmospheres in the  origin of the RCA.  Then I brought a 4.0 x 15 Xience Xpedition drug-eluting  stent down, and I deployed that at 14 atmospheres. We postdilated that with a  4.0 x 12 NC trek at 20 atmospheres. There was 0% residual and ALIS-3 flow, and  at that point, balloons, wires, and guides were removed. The ACT was 301  seconds. The sheath was removed on the table and an R band was applied.      IMPRESSION:  Successful angioplasty drug-eluting stenting to the origin of the  right coronary artery.      RECOMMENDATIONS:  1.  Percutaneous coronary intervention care.   2.  Continue risk modification.   3.  Home in the morning if she is okay.  4.  Reassess her left ventricular function in 3 months.            Past Medical History:   Diagnosis Date   • Anxiety    • Arthritis    • CAD (coronary artery disease)    • Cervical cancer (CMS/HCC)    • Current tear of meniscus    • Depression    • Dizziness    • Fatty liver    • GERD (gastroesophageal reflux disease)    • H/O blood clots    • H/O Clostridium difficile infection    • Hyperlipidemia    • Hypertension    • IBS (irritable bowel syndrome)    • Myocardial infarction (CMS/HCC)    • Obesity    • Peptic ulcer    • Pneumonia    • Sinus bradycardia    • Syncope    • Vitamin D deficiency        Past Surgical History:   Procedure Laterality Date   • APPENDECTOMY  1960   • BACK SURGERY     • CARDIAC CATHETERIZATION  06/10/2014    Dr. Murphy Horner   • CARDIAC CATHETERIZATION  11/13/2007    Dr. Murphy Horner   • CARDIAC CATHETERIZATION N/A 10/19/2004    Dr. Murphy Horner   • CARDIAC CATHETERIZATION N/A 07/15/2004    Dr. Gregorio Gonzales   • CARDIAC  CATHETERIZATION  10/2003    Dr. Murphy Horner   • CHOLECYSTECTOMY     • COLONOSCOPY N/A 2001    negative   • COLONOSCOPY N/A 2012    negative   • CORONARY ANGIOPLASTY WITH STENT PLACEMENT N/A 07/15/2004    Dr. Murphy Horner   • CORONARY ANGIOPLASTY WITH STENT PLACEMENT  2002    to RCA   • HYSTERECTOMY  1974   • UPPER GASTROINTESTINAL ENDOSCOPY N/A 2015    Mild Schatzki ring, hiatus hernia, non-bleeding erosive gastropathy, erythematous mucosa in the antrum, normal duodenum-Dr. Alex Gill       (Not in a hospital admission)      Current Meds  Scheduled Meds:   Continuous Infusions:   PRN Meds:.•  sodium chloride    Allergies as of 2021 - Reviewed 2021   Allergen Reaction Noted   • Codeine Other (See Comments) 2021   • Penicillins Hives 2016   • Ambien [zolpidem tartrate] Confusion 2016       Social History     Socioeconomic History   • Marital status:      Spouse name: Not on file   • Number of children: Not on file   • Years of education: Not on file   • Highest education level: Not on file   Tobacco Use   • Smoking status: Former Smoker     Quit date:      Years since quittin.5   • Smokeless tobacco: Never Used   Substance and Sexual Activity   • Alcohol use: No   • Drug use: No   • Sexual activity: Defer       Family History   Problem Relation Age of Onset   • Heart disease Mother    • No Known Problems Father    • No Known Problems Maternal Grandmother    • No Known Problems Maternal Grandfather    • No Known Problems Paternal Grandmother    • No Known Problems Paternal Grandfather    • Heart disease Sister    • Heart disease Brother        REVIEW OF SYSTEMS:   ROS was performed and is negative except as outlined in HPI     REVIEW OF SYSTEMS:   CONSTITUTIONAL: No weight loss, fever, chills, weakness or fatigue.   HEENT: Eyes: No visual loss, blurred vision, double vision or yellow sclerae. Ears, Nose, Throat: No hearing loss, sneezing,  "congestion, runny nose or sore throat.   SKIN: No rash or itching.     RESPIRATORY: No shortness of breath, hemoptysis, cough or sputum.   GASTROINTESTINAL: No anorexia, nausea, vomiting or diarrhea. No abdominal pain, bright red blood per rectum or melena.  GENITOURINARY: No burning on urination, hematuria or increased frequency.  NEUROLOGICAL: No headache, dizziness, syncope, paralysis, ataxia, numbness or tingling in the extremities. No change in bowel or bladder control.   MUSCULOSKELETAL: No muscle, back pain, joint pain or stiffness.   HEMATOLOGIC: No anemia, bleeding or bruising.   LYMPHATICS: No enlarged nodes. No history of splenectomy.   PSYCHIATRIC: No history of depression, anxiety, hallucinations.   ENDOCRINOLOGIC: No reports of sweating, cold or heat intolerance. No polyuria or polydipsia.        Objective:   Temp:  [97.2 °F (36.2 °C)] 97.2 °F (36.2 °C)  Heart Rate:  [] 99  Resp:  [22] 22  BP: (100-142)/() 100/95  Body mass index is 31.78 kg/m².  Flowsheet Rows      First Filed Value   Admission Height  165.1 cm (65\") Documented at 07/08/2021 1053   Admission Weight  86.6 kg (191 lb) Documented at 07/08/2021 1053        Vitals:    07/08/21 1350   BP: 100/95   Pulse: 99   Resp:    Temp:    SpO2: 95%       Head:    Normocephalic, without obvious abnormality, atraumatic   Eyes:            Lids and lashes normal, conjunctivae and sclerae normal, no   icterus, no pallor   Ears:    Ears appear intact with no abnormalities noted   Throat:   No oral lesions, dentition good   Neck:   No adenopathy, supple, trachea midline, no thyromegaly, no   carotid bruit, no JVD   Lungs:     Breath sounds are equal and clear to auscultation    Heart:    Normal S1 and S2, iRRR, No M/G/R   Abdomen:    Normal bowel sounds, no masses, no organomegaly, soft, nontender,       nondistended, no guarding   Extremities:   Moves all extremities well, no edema, no cyanosis, no redness   Pulses:   Pulses palpable and equal " bilaterally.    Skin:  Psychiatric:   No bleeding, bruising or rash    Awake, alert and oriented x 3, normal mood and affect             CXR  The lungs are somewhat poorly inflated. There is moderate pulmonary  vascular congestion and mild interstitial pulmonary edema. The heart is  moderately enlarged and has increased in size somewhat since the  preceding chest x-ray. No significant pleural effusion is evident.     IMPRESSIONS: Mild CHF.    Telemetry      EKG this admission      EKG 7/6/2021      EKG baseline          I personally viewed and interpreted the patient's EKG/Telemetry data    Assessment:  Active Hospital Problems    Diagnosis  POA   • Acute diastolic congestive heart failure (CMS/HCC) [I50.31]  Yes      Resolved Hospital Problems   No resolved problems to display.       Plan: She has probably recent A. fib although it may have been as long as 2 months ago I thought possible that she has a tachycardia mediated cardiomyopathy but I think she is in heart failure now we have to get her A. fib under better control I think we need to diurese her her chest x-ray looks like heart failure proBNP is not that high but she is acting like it is heart failure we will continue with the diuresis we may need to give her digoxin to get her rate under control.  She will probably end up eating here in the hospital for a couple days.  I did speak with her about a CODE STATUS she is going to be a full code but if it looks like she is not going to do well she does not want to be kept alive on machines I did discuss this in the presence of her family    Murphy Horner MD  07/08/21  14:31 EDT.

## 2021-07-09 LAB
ANION GAP SERPL CALCULATED.3IONS-SCNC: 14.3 MMOL/L (ref 5–15)
BUN SERPL-MCNC: 13 MG/DL (ref 8–23)
BUN/CREAT SERPL: 18.1 (ref 7–25)
CALCIUM SPEC-SCNC: 8.9 MG/DL (ref 8.6–10.5)
CHLORIDE SERPL-SCNC: 100 MMOL/L (ref 98–107)
CO2 SERPL-SCNC: 27.7 MMOL/L (ref 22–29)
CREAT SERPL-MCNC: 0.72 MG/DL (ref 0.57–1)
GFR SERPL CREATININE-BSD FRML MDRD: 78 ML/MIN/1.73
GLUCOSE SERPL-MCNC: 101 MG/DL (ref 65–99)
POTASSIUM SERPL-SCNC: 4 MMOL/L (ref 3.5–5.2)
QT INTERVAL: 354 MS
SODIUM SERPL-SCNC: 142 MMOL/L (ref 136–145)

## 2021-07-09 PROCEDURE — 99226 PR SBSQ OBSERVATION CARE/DAY 35 MINUTES: CPT | Performed by: INTERNAL MEDICINE

## 2021-07-09 PROCEDURE — 25010000002 DIGOXIN PER 500 MCG: Performed by: INTERNAL MEDICINE

## 2021-07-09 PROCEDURE — 36415 COLL VENOUS BLD VENIPUNCTURE: CPT | Performed by: INTERNAL MEDICINE

## 2021-07-09 PROCEDURE — 80048 BASIC METABOLIC PNL TOTAL CA: CPT | Performed by: INTERNAL MEDICINE

## 2021-07-09 PROCEDURE — 25010000002 FUROSEMIDE PER 20 MG: Performed by: INTERNAL MEDICINE

## 2021-07-09 PROCEDURE — 93005 ELECTROCARDIOGRAM TRACING: CPT | Performed by: INTERNAL MEDICINE

## 2021-07-09 PROCEDURE — 25010000002 ONDANSETRON PER 1 MG: Performed by: INTERNAL MEDICINE

## 2021-07-09 PROCEDURE — 93010 ELECTROCARDIOGRAM REPORT: CPT | Performed by: INTERNAL MEDICINE

## 2021-07-09 RX ORDER — FUROSEMIDE 40 MG/1
40 TABLET ORAL
Status: DISCONTINUED | OUTPATIENT
Start: 2021-07-09 | End: 2021-07-12

## 2021-07-09 RX ORDER — CARVEDILOL 12.5 MG/1
12.5 TABLET ORAL EVERY 12 HOURS SCHEDULED
Status: DISCONTINUED | OUTPATIENT
Start: 2021-07-09 | End: 2021-07-12 | Stop reason: HOSPADM

## 2021-07-09 RX ORDER — LOSARTAN POTASSIUM 25 MG/1
12.5 TABLET ORAL DAILY
Status: DISCONTINUED | OUTPATIENT
Start: 2021-07-09 | End: 2021-07-10

## 2021-07-09 RX ORDER — ONDANSETRON 2 MG/ML
4 INJECTION INTRAMUSCULAR; INTRAVENOUS EVERY 6 HOURS PRN
Status: DISCONTINUED | OUTPATIENT
Start: 2021-07-09 | End: 2021-07-12 | Stop reason: HOSPADM

## 2021-07-09 RX ADMIN — RANOLAZINE 500 MG: 500 TABLET, FILM COATED, EXTENDED RELEASE ORAL at 20:00

## 2021-07-09 RX ADMIN — CARVEDILOL 12.5 MG: 12.5 TABLET, FILM COATED ORAL at 20:00

## 2021-07-09 RX ADMIN — APIXABAN 5 MG: 5 TABLET, FILM COATED ORAL at 20:00

## 2021-07-09 RX ADMIN — ISOSORBIDE MONONITRATE 90 MG: 60 TABLET ORAL at 09:58

## 2021-07-09 RX ADMIN — LOSARTAN POTASSIUM 12.5 MG: 25 TABLET, FILM COATED ORAL at 10:02

## 2021-07-09 RX ADMIN — ACETAMINOPHEN 650 MG: 325 TABLET, FILM COATED ORAL at 22:25

## 2021-07-09 RX ADMIN — ONDANSETRON 4 MG: 2 INJECTION INTRAMUSCULAR; INTRAVENOUS at 12:31

## 2021-07-09 RX ADMIN — PANTOPRAZOLE SODIUM 40 MG: 40 TABLET, DELAYED RELEASE ORAL at 09:58

## 2021-07-09 RX ADMIN — SODIUM CHLORIDE, PRESERVATIVE FREE 10 ML: 5 INJECTION INTRAVENOUS at 20:00

## 2021-07-09 RX ADMIN — SODIUM CHLORIDE, PRESERVATIVE FREE 10 ML: 5 INJECTION INTRAVENOUS at 10:00

## 2021-07-09 RX ADMIN — ATORVASTATIN CALCIUM 80 MG: 80 TABLET, FILM COATED ORAL at 20:00

## 2021-07-09 RX ADMIN — FUROSEMIDE 40 MG: 40 TABLET ORAL at 09:59

## 2021-07-09 RX ADMIN — RANOLAZINE 500 MG: 500 TABLET, FILM COATED, EXTENDED RELEASE ORAL at 09:58

## 2021-07-09 RX ADMIN — FLUOXETINE HYDROCHLORIDE 20 MG: 20 CAPSULE ORAL at 09:58

## 2021-07-09 RX ADMIN — DIGOXIN 250 MCG: 250 INJECTION, SOLUTION INTRAMUSCULAR; INTRAVENOUS; PARENTERAL at 03:05

## 2021-07-09 RX ADMIN — APIXABAN 5 MG: 5 TABLET, FILM COATED ORAL at 09:58

## 2021-07-09 RX ADMIN — CARVEDILOL 12.5 MG: 12.5 TABLET, FILM COATED ORAL at 09:59

## 2021-07-09 RX ADMIN — OXYCODONE HYDROCHLORIDE AND ACETAMINOPHEN 500 MG: 500 TABLET ORAL at 09:58

## 2021-07-09 RX ADMIN — FUROSEMIDE 40 MG: 10 INJECTION, SOLUTION INTRAMUSCULAR; INTRAVENOUS at 01:41

## 2021-07-09 RX ADMIN — FUROSEMIDE 40 MG: 40 TABLET ORAL at 17:49

## 2021-07-09 NOTE — PLAN OF CARE
Goal Outcome Evaluation:  Plan of Care Reviewed With: patient        Progress: improving  Outcome Summary: NAD NOTED. VSS. A/O X4. MMM. RR E/U. ON NASAL CANNULA @ 2LPM AND TOLERATING WELL. SKIN PWD. IV LASIX ADMIN AS ORDERED, SEE MAR. STILL IN AFIB BUT RATE HAS BEEN CONSISTENTLY BELOW 100 FOR THE MAJORITY OF MY SHIFT. WILL CTM. DENIES PAIN.

## 2021-07-09 NOTE — PLAN OF CARE
Problem: Adult Inpatient Plan of Care  Goal: Plan of Care Review  Outcome: Ongoing, Progressing  Flowsheets (Taken 7/9/2021 1513)  Progress: improving  Plan of Care Reviewed With: patient  Outcome Summary: pt. is A&Ox4 with VSS on monitor and on 2L NC. Pt. c/o nausea and vomiting this morning. PRN medication order recieved and administered with positive results. pt. Remains in A. Fib but rate has been controlled. Pt. denies any pain thus far this shift. Will continue to monitor.  Goal: Patient-Specific Goal (Individualized)  Outcome: Ongoing, Progressing  Goal: Absence of Hospital-Acquired Illness or Injury  Outcome: Ongoing, Progressing  Intervention: Identify and Manage Fall Risk  Recent Flowsheet Documentation  Taken 7/9/2021 1400 by Scotty Hollingsworth, RN  Safety Promotion/Fall Prevention:   assistive device/personal items within reach   clutter free environment maintained   fall prevention program maintained   lighting adjusted   mobility aid in reach   nonskid shoes/slippers when out of bed   room organization consistent   safety round/check completed  Taken 7/9/2021 1200 by Scotty Hollingsworth, RN  Safety Promotion/Fall Prevention:   assistive device/personal items within reach   clutter free environment maintained   fall prevention program maintained   lighting adjusted   mobility aid in reach   nonskid shoes/slippers when out of bed   room organization consistent   safety round/check completed  Taken 7/9/2021 1000 by Scotty Hollingsworth, RN  Safety Promotion/Fall Prevention:   assistive device/personal items within reach   clutter free environment maintained   fall prevention program maintained   lighting adjusted   mobility aid in reach   nonskid shoes/slippers when out of bed   room organization consistent   safety round/check completed  Taken 7/9/2021 0800 by Scotty Hollingsworth, RN  Safety Promotion/Fall Prevention:   assistive device/personal items within reach   clutter free environment maintained   fall prevention  program maintained   lighting adjusted   mobility aid in reach   nonskid shoes/slippers when out of bed   room organization consistent   safety round/check completed  Intervention: Prevent Infection  Recent Flowsheet Documentation  Taken 7/9/2021 1400 by Scotty Hollingsworth RN  Infection Prevention:   cohorting utilized   environmental surveillance performed   personal protective equipment utilized   hand hygiene promoted   rest/sleep promoted   single patient room provided  Taken 7/9/2021 1200 by Scotty Hollingsworth RN  Infection Prevention:   environmental surveillance performed   equipment surfaces disinfected   personal protective equipment utilized   single patient room provided   visitors restricted/screened  Taken 7/9/2021 1000 by Scotty Hollingsworth RN  Infection Prevention:   cohorting utilized   environmental surveillance performed   hand hygiene promoted   personal protective equipment utilized   rest/sleep promoted   single patient room provided  Taken 7/9/2021 0800 by Scotty Hollingsworth RN  Infection Prevention:   cohorting utilized   environmental surveillance performed   hand hygiene promoted   personal protective equipment utilized   rest/sleep promoted   single patient room provided  Goal: Optimal Comfort and Wellbeing  Outcome: Ongoing, Progressing  Goal: Readiness for Transition of Care  Outcome: Ongoing, Progressing     Problem: Fall Injury Risk  Goal: Absence of Fall and Fall-Related Injury  Outcome: Ongoing, Progressing  Intervention: Identify and Manage Contributors to Fall Injury Risk  Recent Flowsheet Documentation  Taken 7/9/2021 1400 by Scotty Hollingsworth RN  Medication Review/Management: medications reviewed  Taken 7/9/2021 1200 by Scotty Hollingsworth RN  Medication Review/Management: medications reviewed  Taken 7/9/2021 1000 by Scotty Hollingsworth RN  Medication Review/Management: medications reviewed  Taken 7/9/2021 0800 by Scotty Hollingsworth RN  Medication Review/Management: medications reviewed  Intervention: Promote  Injury-Free Environment  Recent Flowsheet Documentation  Taken 7/9/2021 1400 by Scotty Hollingsworth, MEGHA  Safety Promotion/Fall Prevention:   assistive device/personal items within reach   clutter free environment maintained   fall prevention program maintained   lighting adjusted   mobility aid in reach   nonskid shoes/slippers when out of bed   room organization consistent   safety round/check completed  Taken 7/9/2021 1200 by Scotty Hollingsworth, MEGHA  Safety Promotion/Fall Prevention:   assistive device/personal items within reach   clutter free environment maintained   fall prevention program maintained   lighting adjusted   mobility aid in reach   nonskid shoes/slippers when out of bed   room organization consistent   safety round/check completed  Taken 7/9/2021 1000 by Scotty Hollingsworth, MEGHA  Safety Promotion/Fall Prevention:   assistive device/personal items within reach   clutter free environment maintained   fall prevention program maintained   lighting adjusted   mobility aid in reach   nonskid shoes/slippers when out of bed   room organization consistent   safety round/check completed  Taken 7/9/2021 0800 by Scotty Hollingsworth, MEGHA  Safety Promotion/Fall Prevention:   assistive device/personal items within reach   clutter free environment maintained   fall prevention program maintained   lighting adjusted   mobility aid in reach   nonskid shoes/slippers when out of bed   room organization consistent   safety round/check completed     Problem: Adjustment to Illness (Heart Failure)  Goal: Optimal Coping  Outcome: Ongoing, Progressing     Problem: Arrhythmia/Dysrhythmia (Heart Failure)  Goal: Stable Heart Rate and Rhythm  Outcome: Ongoing, Progressing     Problem: Cardiac Output Decreased (Heart Failure)  Goal: Optimal Cardiac Output  Outcome: Ongoing, Progressing     Problem: Fluid Imbalance (Heart Failure)  Goal: Fluid Balance  Outcome: Ongoing, Progressing     Problem: Functional Ability Impaired (Heart Failure)  Goal: Optimal  Functional Ability  Outcome: Ongoing, Progressing     Problem: Oral Intake Inadequate (Heart Failure)  Goal: Optimal Nutrition Intake  Outcome: Ongoing, Progressing     Problem: Respiratory Compromise (Heart Failure)  Goal: Effective Oxygenation and Ventilation  Outcome: Ongoing, Progressing  Intervention: Promote Airway Secretion Clearance  Recent Flowsheet Documentation  Taken 7/9/2021 0975 by Scotty Hollingsworth, RN  Cough And Deep Breathing: done independently per patient     Problem: Sleep Disordered Breathing (Heart Failure)  Goal: Effective Breathing Pattern During Sleep  Outcome: Ongoing, Progressing   Goal Outcome Evaluation:  Plan of Care Reviewed With: patient        Progress: improving  Outcome Summary: pt. is A&Ox4 with VSS on monitor and on 2L NC. Pt. c/o nausea and vomiting this morning. PRN medication order recieved and administered with positive results. pt. Remains in A. Fib but rate has been controlled. Pt. denies any pain thus far this shift. Will continue to monitor.

## 2021-07-09 NOTE — PROGRESS NOTES
"Alice Mabry  1940 80 y.o.  [id number]      Patient Care Team:  Tho Mar MD as PCP - General (Internal Medicine)    CC: Acute systolic heart failure, atrial fibrillation, history of coronary disease, hypertension    Interval History: She had trouble sleeping last night but otherwise has been comfortable      Objective   Vital Signs  Temp:  [97.2 °F (36.2 °C)-99 °F (37.2 °C)] 98.1 °F (36.7 °C)  Heart Rate:  [] 72  Resp:  [16-22] 16  BP: (119-142)/() 134/74    Intake/Output Summary (Last 24 hours) at 7/9/2021 0808  Last data filed at 7/9/2021 0714  Gross per 24 hour   Intake --   Output 1750 ml   Net -1750 ml     Flowsheet Rows      First Filed Value   Admission Height  165.1 cm (65\") Documented at 07/08/2021 1053   Admission Weight  86.6 kg (191 lb) Documented at 07/08/2021 1053          Physical Exam:   General Appearance:    Alert,oriented, in no acute distress   Lungs:     Clear to auscultation,BS are equal    Heart:    Normal S1 and S2, iRRR without murmur, gallop or rub   HEENT:    Sclerae are clear, no JVD or adenopathy   Abdomen:     Normal bowel sounds, soft nontender, nondistended, no HSM   Extremities:   Moves all extremities well, no edema, no cyanosis, no             Redness, no rash     Medication Review:      apixaban, 5 mg, Oral, Q12H  ascorbic acid, 500 mg, Oral, Daily  atorvastatin, 80 mg, Oral, Nightly  carvedilol, 12.5 mg, Oral, Q12H  FLUoxetine, 20 mg, Oral, Daily  furosemide, 40 mg, Oral, BID  isosorbide mononitrate, 90 mg, Oral, Daily  losartan, 12.5 mg, Oral, Daily  pantoprazole, 40 mg, Oral, QAM  ranolazine, 500 mg, Oral, Q12H  sodium chloride, 10 mL, Intravenous, Q12H             I reviewed the patient's new clinical results.  I personally viewed and interpreted the patient's EKG/Telemetry data    Assessment/Plan  Active Hospital Problems    Diagnosis  POA   • Acute diastolic congestive heart failure (CMS/HCC) [I50.31]  Yes      Resolved Hospital Problems   No " resolved problems to display.       This is a lady who came in I think late with ARMIN jones with RVR I think it possibly has been going on for a couple of months.  Now she comes in with overt congestive heart failure but the main issue is the lack of power not so much congestion.  I started her on digoxin yesterday for rate control and that has improved things overnight.  Echo late yesterday came back with severely reduced LV function EF is about 30% moderate aortic stenosis which I cannot really hear on exam and severe MR also I do not hear on exam.  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  So the focus is going to be rate control and anticoagulate.  Heart on guideline directed medical therapy I am switching her over to carvedilol hopefully will be able to control her heart rate with that as well as low-dose losartan.  I do not think that the congestion is the big issue here so I am switching her off of IV diuretics to p.o. diuretics.  Step would be to add Aldactone and then Jardiance.  Probably will have to be here for a couple more days    Murphy Horner MD  07/09/21  08:08 EDT

## 2021-07-09 NOTE — PROGRESS NOTES
Discharge Planning Assessment  [facility]     Patient Name: Alice Mabry  MRN: [id number]  Today's Date: 7/9/2021    Admit Date: 7/8/2021    Discharge Needs Assessment     Row Name 07/09/21 1243       Living Environment    Lives With  spouse    Name(s) of Who Lives With Patient  Miguel Mabry spouse [phone]    Current Living Arrangements  home/apartment/condo    Family Caregiver if Needed  spouse;grandchild(melchor), adult    Family Caregiver Names  daughter Cheryl Bocanegra [phone] and spouse Miguel Mabry [phone]    Quality of Family Relationships  involved;supportive;helpful    Able to Return to Prior Arrangements  yes       Transition Planning    Patient/Family Anticipates Transition to  home with family    Transportation Anticipated  family or friend will provide       Discharge Needs Assessment    Equipment Currently Used at Home  none        Discharge Plan     Row Name 07/09/21 3828       Plan    Plan  Return home with spouse ; if home health is needed she would use [facility], if she need home oxygen she would use [facility]    Plan Comments  Spoke with patient and daughter Cheryl Bocanegra [phone] at bedside, face sheet verified. Patient lives in a single level home with 3 steps to enter. Patient stated she does not use any medical equipment at home. Patient stated she is independent with ADL's. Daughter Cheryl lives close by and can help as needed. Patient stated if home oxygen is needed she would use [facility] and if she need home health she would use [facility]. Cheryl Ly, RN        Continued Care and Services - Admitted Since 7/8/2021    Coordination has not been started for this encounter.         Demographic Summary     Row Name 07/09/21 1242       General Information    Admission Type  inpatient    Arrived From  emergency department    Required Notices Provided  Important Message from Medicare    Referral Source  admission list    Reason for Consult   discharge planning    Preferred Language  English     Used During This Interaction  no        Functional Status     Row Name 07/09/21 1248       Functional Status    Usual Activity Tolerance  good    Current Activity Tolerance  good       Functional Status, IADL    Medications  independent    Meal Preparation  independent    Housekeeping  independent    Laundry  independent    Shopping  independent        Psychosocial    No documentation.       Abuse/Neglect    No documentation.       Legal    No documentation.       Substance Abuse    No documentation.       Patient Forms    No documentation.           Cheryl Ly RN

## 2021-07-10 LAB
ANION GAP SERPL CALCULATED.3IONS-SCNC: 9 MMOL/L (ref 5–15)
BUN SERPL-MCNC: 14 MG/DL (ref 8–23)
BUN/CREAT SERPL: 17.1 (ref 7–25)
CALCIUM SPEC-SCNC: 8.9 MG/DL (ref 8.6–10.5)
CHLORIDE SERPL-SCNC: 101 MMOL/L (ref 98–107)
CO2 SERPL-SCNC: 29 MMOL/L (ref 22–29)
CREAT SERPL-MCNC: 0.82 MG/DL (ref 0.57–1)
GFR SERPL CREATININE-BSD FRML MDRD: 67 ML/MIN/1.73
GLUCOSE SERPL-MCNC: 116 MG/DL (ref 65–99)
POTASSIUM SERPL-SCNC: 4 MMOL/L (ref 3.5–5.2)
SODIUM SERPL-SCNC: 139 MMOL/L (ref 136–145)

## 2021-07-10 PROCEDURE — 97110 THERAPEUTIC EXERCISES: CPT | Performed by: PHYSICAL THERAPIST

## 2021-07-10 PROCEDURE — 97162 PT EVAL MOD COMPLEX 30 MIN: CPT | Performed by: PHYSICAL THERAPIST

## 2021-07-10 PROCEDURE — 99232 SBSQ HOSP IP/OBS MODERATE 35: CPT | Performed by: INTERNAL MEDICINE

## 2021-07-10 PROCEDURE — 80048 BASIC METABOLIC PNL TOTAL CA: CPT | Performed by: INTERNAL MEDICINE

## 2021-07-10 RX ORDER — LOSARTAN POTASSIUM 25 MG/1
25 TABLET ORAL DAILY
Status: DISCONTINUED | OUTPATIENT
Start: 2021-07-10 | End: 2021-07-12 | Stop reason: HOSPADM

## 2021-07-10 RX ORDER — SPIRONOLACTONE 25 MG/1
12.5 TABLET ORAL DAILY
Status: DISCONTINUED | OUTPATIENT
Start: 2021-07-10 | End: 2021-07-12 | Stop reason: HOSPADM

## 2021-07-10 RX ORDER — ISOSORBIDE MONONITRATE 60 MG/1
60 TABLET, EXTENDED RELEASE ORAL DAILY
Status: DISCONTINUED | OUTPATIENT
Start: 2021-07-11 | End: 2021-07-12 | Stop reason: HOSPADM

## 2021-07-10 RX ORDER — LOSARTAN POTASSIUM 25 MG/1
25 TABLET ORAL DAILY
Status: DISCONTINUED | OUTPATIENT
Start: 2021-07-11 | End: 2021-07-10

## 2021-07-10 RX ADMIN — LORAZEPAM 1 MG: 1 TABLET ORAL at 23:27

## 2021-07-10 RX ADMIN — PANTOPRAZOLE SODIUM 40 MG: 40 TABLET, DELAYED RELEASE ORAL at 09:32

## 2021-07-10 RX ADMIN — APIXABAN 5 MG: 5 TABLET, FILM COATED ORAL at 20:02

## 2021-07-10 RX ADMIN — CARVEDILOL 12.5 MG: 12.5 TABLET, FILM COATED ORAL at 09:29

## 2021-07-10 RX ADMIN — FUROSEMIDE 40 MG: 40 TABLET ORAL at 09:29

## 2021-07-10 RX ADMIN — ATORVASTATIN CALCIUM 80 MG: 80 TABLET, FILM COATED ORAL at 20:02

## 2021-07-10 RX ADMIN — SODIUM CHLORIDE, PRESERVATIVE FREE 10 ML: 5 INJECTION INTRAVENOUS at 09:34

## 2021-07-10 RX ADMIN — SODIUM CHLORIDE, PRESERVATIVE FREE 10 ML: 5 INJECTION INTRAVENOUS at 20:02

## 2021-07-10 RX ADMIN — ACETAMINOPHEN 650 MG: 325 TABLET, FILM COATED ORAL at 19:08

## 2021-07-10 RX ADMIN — FLUOXETINE HYDROCHLORIDE 20 MG: 20 CAPSULE ORAL at 09:29

## 2021-07-10 RX ADMIN — RANOLAZINE 500 MG: 500 TABLET, FILM COATED, EXTENDED RELEASE ORAL at 09:32

## 2021-07-10 RX ADMIN — FUROSEMIDE 40 MG: 40 TABLET ORAL at 17:51

## 2021-07-10 RX ADMIN — SPIRONOLACTONE 12.5 MG: 25 TABLET ORAL at 11:31

## 2021-07-10 RX ADMIN — LOSARTAN POTASSIUM 12.5 MG: 25 TABLET, FILM COATED ORAL at 09:32

## 2021-07-10 RX ADMIN — LOSARTAN POTASSIUM 25 MG: 25 TABLET, FILM COATED ORAL at 11:32

## 2021-07-10 RX ADMIN — OXYCODONE HYDROCHLORIDE AND ACETAMINOPHEN 500 MG: 500 TABLET ORAL at 09:29

## 2021-07-10 RX ADMIN — APIXABAN 5 MG: 5 TABLET, FILM COATED ORAL at 09:29

## 2021-07-10 RX ADMIN — CARVEDILOL 12.5 MG: 12.5 TABLET, FILM COATED ORAL at 20:02

## 2021-07-10 RX ADMIN — RANOLAZINE 500 MG: 500 TABLET, FILM COATED, EXTENDED RELEASE ORAL at 20:02

## 2021-07-10 NOTE — PLAN OF CARE
Goal Outcome Evaluation:      VSS. O2 @2L via NC. A-fib on monitor. Stand by assist for transfers. Purwick in place. No C/O pain or distress.

## 2021-07-10 NOTE — PLAN OF CARE
Goal Outcome Evaluation:  Plan of Care Reviewed With: patient           Outcome Summary: Pt admitted with SOA and CHF. Pt was independently mobile prior to admission. She appears to be functioning at or near baseline and is safe to d/c home when medically stable. Pt ambulated 120 ft with SBA on RA. O2 was 88% upon return to bed and she quickly recovered on 2 L.

## 2021-07-10 NOTE — PLAN OF CARE
Goal Outcome Evaluation:  Plan of Care Reviewed With: patient        Progress: improving  Outcome Summary: Patient sitting up in chair earlier. Family at bedside earlier today. Makes needs known. No c/o nausea or vomiting.  A & O x 4. No s/s of distress noted.

## 2021-07-10 NOTE — THERAPY EVALUATION
Patient Name: Alice Mabry  : 1940    MRN: [id number]                              Today's Date: 7/10/2021       Admit Date: 2021    Visit Dx:     ICD-10-CM ICD-9-CM   1. Acute diastolic congestive heart failure (CMS/HCC)  I50.31 428.31     428.0   2. Atrial fibrillation, unspecified type (CMS/HCC)  I48.91 427.31     Patient Active Problem List   Diagnosis   • CAD (coronary artery disease)   • History of coronary artery stent placement   • Cardiomyopathy (CMS/HCC)   • Mitral valve insufficiency   • Chronic diastolic (congestive) heart failure (CMS/HCC)   • Atrial fibrillation with RVR (CMS/HCC)   • Stable angina pectoris (CMS/HCC)   • Acute diastolic congestive heart failure (CMS/HCC)     Past Medical History:   Diagnosis Date   • Anxiety    • Arthritis    • Atrial fibrillation (CMS/HCC) 2021   • CAD (coronary artery disease)    • Cervical cancer (CMS/HCC)    • Current tear of meniscus    • Depression    • Dizziness    • Fatty liver    • GERD (gastroesophageal reflux disease)    • H/O blood clots    • H/O Clostridium difficile infection    • Hyperlipidemia    • Hypertension    • IBS (irritable bowel syndrome)    • Myocardial infarction (CMS/HCC)    • Obesity    • Peptic ulcer    • Pneumonia    • Sinus bradycardia    • Syncope    • Vitamin D deficiency      Past Surgical History:   Procedure Laterality Date   • APPENDECTOMY  1960   • BACK SURGERY     • CARDIAC CATHETERIZATION  06/10/2014    Dr. Murphy Horner   • CARDIAC CATHETERIZATION  2007    Dr. Murphy Horner   • CARDIAC CATHETERIZATION N/A 10/19/2004    Dr. Murphy Horner   • CARDIAC CATHETERIZATION N/A 07/15/2004    Dr. Gregorio Gonzales   • CARDIAC CATHETERIZATION  10/2003    Dr. Murphy Horner   • CHOLECYSTECTOMY     • COLONOSCOPY N/A 2001    negative   • COLONOSCOPY N/A 2012    negative   • CORONARY ANGIOPLASTY WITH STENT PLACEMENT N/A 07/15/2004    Dr. Murphy Horner   • CORONARY ANGIOPLASTY WITH STENT PLACEMENT   03/2002    to RCA   • HYSTERECTOMY  1974   • UPPER GASTROINTESTINAL ENDOSCOPY N/A 04/20/2015    Mild Schatzki ring, hiatus hernia, non-bleeding erosive gastropathy, erythematous mucosa in the antrum, normal duodenum-Dr. Alex Gill     General Information     Row Name 07/10/21 1642          Physical Therapy Time and Intention    Document Type  evaluation  -     Mode of Treatment  individual therapy;physical therapy  -     Row Name 07/10/21 1642          General Information    Prior Level of Function  independent:  -     Existing Precautions/Restrictions  fall  -     Barriers to Rehab  none identified  -     Row Name 07/10/21 1642          Living Environment    Lives With  spouse  -     Row Name 07/10/21 1642          Home Main Entrance    Number of Stairs, Main Entrance  one  -     Row Name 07/10/21 1642          Cognition    Orientation Status (Cognition)  oriented x 4  -       User Key  (r) = Recorded By, (t) = Taken By, (c) = Cosigned By    Initials Name Provider Type    Shandra Au, AHMET Physical Therapist        Mobility     Row Name 07/10/21 1644          Bed Mobility    Bed Mobility  bed mobility (all) activities  -     All Activities, [place] (Bed Mobility)  independent  -     Row Name 07/10/21 1644          Sit-Stand Transfer    Sit-Stand [place] (Transfers)  standby assist  -[facility] Name 07/10/21 1644          Gait/Stairs (Locomotion)    [place] Level (Gait)  standby assist  -     Distance in Feet (Gait)  120  -     Deviations/Abnormal Patterns (Gait)  gait speed decreased  -       User Key  (r) = Recorded By, (t) = Taken By, (c) = Cosigned By    Initials Name Provider Type    Shandra Au PT Physical Therapist        Obj/Interventions     Row Name 07/10/21 1644          Range of Motion Comprehensive    General Range of Motion  no range of motion deficits identified  -[facility] Name 07/10/21 1644          Strength Comprehensive  (MMT)    General Manual Muscle Testing (MMT) Assessment  no strength deficits identified  -[facility] Name 07/10/21 1644          Balance    Balance Assessment  sitting static balance;standing static balance;sitting dynamic balance;standing dynamic balance  -     Static Sitting Balance  WNL  -     Dynamic Sitting Balance  WNL  -     Static Standing Balance  WFL  -KH     Dynamic Standing Balance  WFL  -KH       User Key  (r) = Recorded By, (t) = Taken By, (c) = Cosigned By    Initials Name Provider Type    Shandra Au, PT Physical Therapist        Goals/Plan    No documentation.       Clinical Impression     Row Name 07/10/21 1645          Pain    Additional Documentation  Pain Scale: Numbers Pre/Post-Treatment (Group)  -KH     Row Name 07/10/21 1645          Pain Scale: Numbers Pre/Post-Treatment    Pretreatment Pain Rating  0/10 - no pain  -     Posttreatment Pain Rating  0/10 - no pain  -KH     Row Name 07/10/21 1645          Plan of Care Review    Plan of Care Reviewed With  patient  -     Outcome Summary  Pt admitted with SOA and CHF. Pt was independently mobile prior to admission. She appears to be functioning at or near baseline and is safe to d/c home when medically stable. Pt ambulated 120 ft with SBA on RA. O2 was 88% upon return to bed and she quickly recovered on 2 L.  -[facility] 07/10/21 1645          Therapy Assessment/Plan (PT)    Patient/Family Therapy Goals Statement (PT)  return home with spouse  -     Criteria for Skilled Interventions Met (PT)  no problems identified which require skilled intervention  -KH     Row Name 07/10/21 1645          Positioning and Restraints    Pre-Treatment Position  in bed  -     Post Treatment Position  bed  -     In Bed  fowlers;call light within reach;encouraged to call for assist;exit alarm on  -       User Key  (r) = Recorded By, (t) = Taken By, (c) = Cosigned By    Initials Name Provider Type    Shandra Au,  PT Physical Therapist        Outcome Measures     Row Name 07/10/21 1648          How much help from another person do you currently need...    Turning from your back to your side while in flat bed without using bedrails?  4  -KH     Moving from lying on back to sitting on the side of a flat bed without bedrails?  4  -KH     Moving to and from a bed to a chair (including a wheelchair)?  4  -KH     Standing up from a chair using your arms (e.g., wheelchair, bedside chair)?  4  -KH     Climbing 3-5 steps with a railing?  3  -KH     To walk in hospital room?  4  -KH     AM-PAC 6 Clicks Score (PT)  23  -KH     Row Name 07/10/21 1648          Functional Assessment    Outcome Measure Options  AM-PAC 6 Clicks Basic Mobility (PT)  -       User Key  (r) = Recorded By, (t) = Taken By, (c) = Cosigned By    Initials Name Provider Type    Shandra Au PT Physical Therapist          PT Recommendation and Plan     Plan of Care Reviewed With: patient  Outcome Summary: Pt admitted with SOA and CHF. Pt was independently mobile prior to admission. She appears to be functioning at or near baseline and is safe to d/c home when medically stable. Pt ambulated 120 ft with SBA on RA. O2 was 88% upon return to bed and she quickly recovered on 2 L.     Time Calculation:   PT Charges     Row Name 07/10/21 1648             Time Calculation    Start Time  1505  -KH      Stop Time  1526  -KH      Time Calculation (min)  21 min  -KH         Timed Charges    38833 - PT Therapeutic Exercise Minutes  8  -KH         Untimed Charges    PT Eval/Re-eval Minutes  13  -KH         Total Minutes    Timed Charges Total Minutes  8  -KH      Untimed Charges Total Minutes  13  -KH       Total Minutes  21  -KH        User Key  (r) = Recorded By, (t) = Taken By, (c) = Cosigned By    Initials Name Provider Type    Shandra Au PT Physical Therapist        Therapy Charges for Today     Code Description Service Date Service Provider  Modifiers Qty    45232959169 HC PT THER PROC EA 15 MIN 7/10/2021 Shandra Dougherty, PT GP 1    13855510928 HC PT EVAL MOD COMPLEXITY 1 7/10/2021 Shandra Dougherty, PT GP 1          PT G-Codes  Outcome Measure Options: AM-PAC 6 Clicks Basic Mobility (PT)  AM-PAC 6 Clicks Score (PT): 23    Shandra Dougherty, PT  7/10/2021

## 2021-07-10 NOTE — PROGRESS NOTES
[facility] Follow Up    Chief Complaint: Follow up CHF, atrial fibrillation, CAD    Interval History: Breathing is stable.  No chest pain.  Overall she is feeling better.  Rate control is improved.    Objective:     Objective:  Temp:  [97.9 °F (36.6 °C)-99 °F (37.2 °C)] 98.2 °F (36.8 °C)  Heart Rate:  [79-93] 84  Resp:  [16] 16  BP: (108-116)/(77-86) 116/86     Intake/Output Summary (Last 24 hours) at 7/10/2021 0835  Last data filed at 7/9/2021 2153  Gross per 24 hour   Intake 1000 ml   Output 800 ml   Net 200 ml     Body mass index is 30.67 kg/m².      07/08/21  1656 07/09/21  0349 07/10/21  0550   Weight: 84.4 kg (186 lb) 84 kg (185 lb 3.2 oz) 83.6 kg (184 lb 4.8 oz)     Weight change: -3.039 kg (-6 lb 11.2 oz)      Physical Exam:   General : Alert, cooperative, in no acute distress.  Neuro: Alert,cooperative and oriented.  Lungs: CTAB. Normal respiratory effort and rate.  CV: Irregularly, irregular, normal S1 and S2, no murmurs, gallops or rubs.  ABD: Soft, nontender, nondistended. Positive bowel sounds.  Extr: No edema or cyanosis, moves all extremities.    Lab Review:   Results from last 7 days   Lab Units 07/10/21  0547 07/09/21  0535 07/08/21  1057   SODIUM mmol/L 139 142 139   POTASSIUM mmol/L 4.0 4.0 4.8   CHLORIDE mmol/L 101 100 103   CO2 mmol/L 29.0 27.7 24.8   BUN mg/dL 14 13 13   CREATININE mg/dL 0.82 0.72 0.71   GLUCOSE mg/dL 116* 101* 163*   CALCIUM mg/dL 8.9 8.9 8.9   AST (SGOT) U/L  --   --  39*   ALT (SGPT) U/L  --   --  36*     Results from last 7 days   Lab Units 07/08/21  1057   TROPONIN T ng/mL 0.013     Results from last 7 days   Lab Units 07/08/21  1056   WBC 10*3/mm3 6.54   HEMOGLOBIN g/dL 14.6   HEMATOCRIT % 44.1   PLATELETS 10*3/mm3 185     Results from last 7 days   Lab Units 07/08/21  1057   INR  1.35*               Invalid input(s): LDLCALC  Results from last 7 days   Lab Units 07/08/21  1057   PROBNP pg/mL 1,404.0         I reviewed the patient's new clinical  results.  I personally viewed and interpreted the patient's EKG  Current Medications:   Scheduled Meds:apixaban, 5 mg, Oral, Q12H  ascorbic acid, 500 mg, Oral, Daily  atorvastatin, 80 mg, Oral, Nightly  carvedilol, 12.5 mg, Oral, Q12H  FLUoxetine, 20 mg, Oral, Daily  furosemide, 40 mg, Oral, BID  isosorbide mononitrate, 90 mg, Oral, Daily  losartan, 12.5 mg, Oral, Daily  pantoprazole, 40 mg, Oral, QAM  ranolazine, 500 mg, Oral, Q12H  sodium chloride, 10 mL, Intravenous, Q12H      Continuous Infusions:     Allergies:  Allergies   Allergen Reactions   • Codeine Other (See Comments)     Chest pain   • Penicillins Hives   • Ambien [Zolpidem Tartrate] Confusion       Assessment/Plan:     1. Acute systolic congestive heart failure.  No evidence of significant volume overload.  On oral furosemide.  2. Cardiomyopathy. EF of 30%.  Felt to be tachycardia induced.  She was started on carvedilol and low-dose losartan yesterday.  Plan is to eventually started on spironolactone and Jardiance.  3. Aortic stenosis.  Moderate.   4. Severe mitral regurgitation  5. Coronary artery disease.  History of prior stents.  No evidence of ACS.  6.  Persistent atrial fibrillation.  Rate controlled with carvedilol.  On anticoagulation with apixaban.    -We will increase losartan to 25 mg daily.  -Decrease isosorbide to 60 mg daily in order to increase blood pressure room for guideline directed management of her cardiomyopathy.  -Start low-dose spironolactone 12.5 mg daily.  -Have patient ambulate today.  -If she remains stable with the current medication changes will possibly plan for discharge tomorrow.  Further medication adjustments and possible initiation of Jardiance as an outpatient.    Lisa Melchor MD  07/10/21  08:35 EDT

## 2021-07-11 LAB
ANION GAP SERPL CALCULATED.3IONS-SCNC: 12.2 MMOL/L (ref 5–15)
BUN SERPL-MCNC: 17 MG/DL (ref 8–23)
BUN/CREAT SERPL: 21 (ref 7–25)
CALCIUM SPEC-SCNC: 9.1 MG/DL (ref 8.6–10.5)
CHLORIDE SERPL-SCNC: 101 MMOL/L (ref 98–107)
CO2 SERPL-SCNC: 26.8 MMOL/L (ref 22–29)
CREAT SERPL-MCNC: 0.81 MG/DL (ref 0.57–1)
GFR SERPL CREATININE-BSD FRML MDRD: 68 ML/MIN/1.73
GLUCOSE SERPL-MCNC: 127 MG/DL (ref 65–99)
POTASSIUM SERPL-SCNC: 4.1 MMOL/L (ref 3.5–5.2)
SODIUM SERPL-SCNC: 140 MMOL/L (ref 136–145)

## 2021-07-11 PROCEDURE — 99232 SBSQ HOSP IP/OBS MODERATE 35: CPT | Performed by: INTERNAL MEDICINE

## 2021-07-11 PROCEDURE — 25010000002 ONDANSETRON PER 1 MG: Performed by: INTERNAL MEDICINE

## 2021-07-11 PROCEDURE — 80048 BASIC METABOLIC PNL TOTAL CA: CPT | Performed by: INTERNAL MEDICINE

## 2021-07-11 RX ADMIN — OXYCODONE HYDROCHLORIDE AND ACETAMINOPHEN 500 MG: 500 TABLET ORAL at 08:46

## 2021-07-11 RX ADMIN — RANOLAZINE 500 MG: 500 TABLET, FILM COATED, EXTENDED RELEASE ORAL at 20:34

## 2021-07-11 RX ADMIN — ONDANSETRON 4 MG: 2 INJECTION INTRAMUSCULAR; INTRAVENOUS at 10:41

## 2021-07-11 RX ADMIN — APIXABAN 5 MG: 5 TABLET, FILM COATED ORAL at 08:46

## 2021-07-11 RX ADMIN — LORAZEPAM 1 MG: 1 TABLET ORAL at 22:05

## 2021-07-11 RX ADMIN — CARVEDILOL 12.5 MG: 12.5 TABLET, FILM COATED ORAL at 08:46

## 2021-07-11 RX ADMIN — SODIUM CHLORIDE, PRESERVATIVE FREE 10 ML: 5 INJECTION INTRAVENOUS at 20:33

## 2021-07-11 RX ADMIN — SPIRONOLACTONE 12.5 MG: 25 TABLET ORAL at 08:46

## 2021-07-11 RX ADMIN — CARVEDILOL 12.5 MG: 12.5 TABLET, FILM COATED ORAL at 20:34

## 2021-07-11 RX ADMIN — ATORVASTATIN CALCIUM 80 MG: 80 TABLET, FILM COATED ORAL at 20:34

## 2021-07-11 RX ADMIN — FUROSEMIDE 40 MG: 40 TABLET ORAL at 08:46

## 2021-07-11 RX ADMIN — FLUOXETINE HYDROCHLORIDE 20 MG: 20 CAPSULE ORAL at 08:46

## 2021-07-11 RX ADMIN — APIXABAN 5 MG: 5 TABLET, FILM COATED ORAL at 20:34

## 2021-07-11 RX ADMIN — SODIUM CHLORIDE, PRESERVATIVE FREE 10 ML: 5 INJECTION INTRAVENOUS at 08:47

## 2021-07-11 RX ADMIN — ISOSORBIDE MONONITRATE 60 MG: 60 TABLET ORAL at 08:46

## 2021-07-11 RX ADMIN — PANTOPRAZOLE SODIUM 40 MG: 40 TABLET, DELAYED RELEASE ORAL at 08:46

## 2021-07-11 RX ADMIN — RANOLAZINE 500 MG: 500 TABLET, FILM COATED, EXTENDED RELEASE ORAL at 08:46

## 2021-07-11 RX ADMIN — FUROSEMIDE 40 MG: 40 TABLET ORAL at 17:13

## 2021-07-11 RX ADMIN — LOSARTAN POTASSIUM 25 MG: 25 TABLET, FILM COATED ORAL at 08:46

## 2021-07-11 NOTE — PROGRESS NOTES
[facility] Follow Up    Chief Complaint: Follow up CHF, atrial fibrillation    Interval History: She denies any shortness of breath or chest pain.  When I went to see the patient she was about to use the bathroom but got up quickly and came back into the room and ordered to visit with me.  She became acutely lightheaded and her heart rates dropped into the 30s to 40s.  This was associated with a relatively low blood pressure of 97/65.  I had the patient lay down in the bed and her heart rates did improve but continued to dip down into the 40s to 50s for periods of time.  After she laid back down she had no further issues with lightheadedness.  This is the first time she has been bradycardic.    Objective:     Objective:  Temp:  [97.6 °F (36.4 °C)-98.1 °F (36.7 °C)] 97.6 °F (36.4 °C)  Heart Rate:  [72-88] 72  Resp:  [18] 18  BP: (116-125)/(66-73) 125/73     Intake/Output Summary (Last 24 hours) at 7/11/2021 0748  Last data filed at 7/10/2021 2000  Gross per 24 hour   Intake 120 ml   Output 300 ml   Net -180 ml     Body mass index is 30.49 kg/m².      07/09/21  0349 07/10/21  0550 07/11/21  0536   Weight: 84 kg (185 lb 3.2 oz) 83.6 kg (184 lb 4.8 oz) 83.1 kg (183 lb 3.2 oz)     Weight change: -0.499 kg (-1 lb 1.6 oz)      Physical Exam:   General : Alert, cooperative, in no acute distress.  Neuro: Alert,cooperative and oriented.  Lungs: CTAB. Normal respiratory effort and rate.  CV: Irregularly, irregular, normal S1 and S2, no murmurs, gallops or rubs.  ABD: Soft, nontender, nondistended. Positive bowel sounds.  Extr: No edema or cyanosis, moves all extremities.    Lab Review:   Results from last 7 days   Lab Units 07/10/21  0547 07/09/21  0535 07/08/21  1057   SODIUM mmol/L 139 142 139   POTASSIUM mmol/L 4.0 4.0 4.8   CHLORIDE mmol/L 101 100 103   CO2 mmol/L 29.0 27.7 24.8   BUN mg/dL 14 13 13   CREATININE mg/dL 0.82 0.72 0.71   GLUCOSE mg/dL 116* 101* 163*   CALCIUM mg/dL 8.9 8.9 8.9   AST  (SGOT) U/L  --   --  39*   ALT (SGPT) U/L  --   --  36*     Results from last 7 days   Lab Units 07/08/21  1057   TROPONIN T ng/mL 0.013     Results from last 7 days   Lab Units 07/08/21  1056   WBC 10*3/mm3 6.54   HEMOGLOBIN g/dL 14.6   HEMATOCRIT % 44.1   PLATELETS 10*3/mm3 185     Results from last 7 days   Lab Units 07/08/21  1057   INR  1.35*               Invalid input(s): LDLCALC  Results from last 7 days   Lab Units 07/08/21  1057   PROBNP pg/mL 1,404.0         I reviewed the patient's new clinical results.  I personally viewed and interpreted the patient's EKG  Current Medications:   Scheduled Meds:apixaban, 5 mg, Oral, Q12H  ascorbic acid, 500 mg, Oral, Daily  atorvastatin, 80 mg, Oral, Nightly  carvedilol, 12.5 mg, Oral, Q12H  FLUoxetine, 20 mg, Oral, Daily  furosemide, 40 mg, Oral, BID  isosorbide mononitrate, 60 mg, Oral, Daily  losartan, 25 mg, Oral, Daily  pantoprazole, 40 mg, Oral, QAM  ranolazine, 500 mg, Oral, Q12H  sodium chloride, 10 mL, Intravenous, Q12H  spironolactone, 12.5 mg, Oral, Daily      Continuous Infusions:     Allergies:  Allergies   Allergen Reactions   • Codeine Other (See Comments)     Chest pain   • Penicillins Hives   • Ambien [Zolpidem Tartrate] Confusion       Assessment/Plan:     1. Acute systolic congestive heart failure.  No evidence of significant volume overload.  On oral furosemide.  2. Cardiomyopathy. EF of 30%.  Felt to be tachycardia induced.    She is tolerating carvedilol and losartan along with low-dose spironolactone.   3. Aortic stenosis.  Moderate.   4. Severe mitral regurgitation  5. Coronary artery disease.  History of prior stents.  No evidence of ACS.  6.  Persistent atrial fibrillation.  Rate controlled with carvedilol.    Had some low heart rates this morning.  On anticoagulation with apixaban.    -Due to her episodes of bradycardia this morning did like to watch her another day to ensure that this is not a recurrent issue.  If it does continue may need to  back off on her carvedilol dosage.  -Otherwise continue current cardiac management.  -She remains stable hopefully can be discharged tomorrow.    Lisa Melchor MD  07/11/21  07:48 EDT

## 2021-07-11 NOTE — PLAN OF CARE
Goal Outcome Evaluation:  Plan of Care Reviewed With: patient      VSS. A-fib on monitor. Continues 2L via N/C. C/O headache at beginning of shift. PRN tylenol given and effective. Also had trouble sleeping and restless. PRN ativan given and effective. Up ad nicole in room. Denies any further pain or distress. Will continue to monitor.

## 2021-07-11 NOTE — PLAN OF CARE
Goal Outcome Evaluation:  Plan of Care Reviewed With: patient        Progress: improving  Outcome Summary: Patient c/o feeling ligtheaded earlier today. Patient's heartrate dropped to 37-40 and then back up to 90 a few times earlier today. Physician in room earlier. Patient's blood pressure checked as well. Patient encouraged to use BSC and call for help with toileting due to low blood pressure and bradycardia.  Patient's family at bedside now.  No further c/o lightheadedness. No c/o pain. No s/s of distress noted.

## 2021-07-12 VITALS
TEMPERATURE: 97.9 F | OXYGEN SATURATION: 95 % | SYSTOLIC BLOOD PRESSURE: 119 MMHG | HEART RATE: 84 BPM | DIASTOLIC BLOOD PRESSURE: 71 MMHG | WEIGHT: 184.2 LBS | RESPIRATION RATE: 16 BRPM | HEIGHT: 65 IN | BODY MASS INDEX: 30.69 KG/M2

## 2021-07-12 LAB
ANION GAP SERPL CALCULATED.3IONS-SCNC: 10.3 MMOL/L (ref 5–15)
BUN SERPL-MCNC: 19 MG/DL (ref 8–23)
BUN/CREAT SERPL: 23.5 (ref 7–25)
CALCIUM SPEC-SCNC: 9.1 MG/DL (ref 8.6–10.5)
CHLORIDE SERPL-SCNC: 99 MMOL/L (ref 98–107)
CO2 SERPL-SCNC: 29.7 MMOL/L (ref 22–29)
CREAT SERPL-MCNC: 0.81 MG/DL (ref 0.57–1)
GFR SERPL CREATININE-BSD FRML MDRD: 68 ML/MIN/1.73
GLUCOSE SERPL-MCNC: 108 MG/DL (ref 65–99)
POTASSIUM SERPL-SCNC: 3.8 MMOL/L (ref 3.5–5.2)
SODIUM SERPL-SCNC: 139 MMOL/L (ref 136–145)

## 2021-07-12 PROCEDURE — 80048 BASIC METABOLIC PNL TOTAL CA: CPT | Performed by: INTERNAL MEDICINE

## 2021-07-12 PROCEDURE — 25010000002 DIGOXIN PER 500 MCG: Performed by: INTERNAL MEDICINE

## 2021-07-12 PROCEDURE — 99238 HOSP IP/OBS DSCHRG MGMT 30/<: CPT | Performed by: INTERNAL MEDICINE

## 2021-07-12 RX ORDER — DIGOXIN 0.25 MG/ML
125 INJECTION INTRAMUSCULAR; INTRAVENOUS ONCE
Status: COMPLETED | OUTPATIENT
Start: 2021-07-12 | End: 2021-07-12

## 2021-07-12 RX ORDER — FUROSEMIDE 40 MG/1
40 TABLET ORAL DAILY
Qty: 30 TABLET | Refills: 11 | Status: CANCELLED | OUTPATIENT
Start: 2021-07-13

## 2021-07-12 RX ORDER — POTASSIUM CHLORIDE 20 MEQ/1
40 TABLET, EXTENDED RELEASE ORAL DAILY
Qty: 30 TABLET | Refills: 11 | Status: CANCELLED | OUTPATIENT
Start: 2021-07-12

## 2021-07-12 RX ORDER — LOSARTAN POTASSIUM 25 MG/1
25 TABLET ORAL DAILY
Qty: 30 TABLET | Refills: 11 | Status: SHIPPED | OUTPATIENT
Start: 2021-07-13 | End: 2021-07-20 | Stop reason: ALTCHOICE

## 2021-07-12 RX ORDER — DIGOXIN 125 MCG
125 TABLET ORAL 3 TIMES WEEKLY
Status: DISCONTINUED | OUTPATIENT
Start: 2021-07-12 | End: 2021-07-12 | Stop reason: HOSPADM

## 2021-07-12 RX ORDER — CARVEDILOL 12.5 MG/1
12.5 TABLET ORAL EVERY 12 HOURS SCHEDULED
Qty: 30 TABLET | Refills: 11 | Status: SHIPPED | OUTPATIENT
Start: 2021-07-12 | End: 2021-07-20 | Stop reason: ALTCHOICE

## 2021-07-12 RX ORDER — ISOSORBIDE MONONITRATE 60 MG/1
60 TABLET, EXTENDED RELEASE ORAL DAILY
Qty: 30 TABLET | Refills: 11 | Status: SHIPPED | OUTPATIENT
Start: 2021-07-13 | End: 2021-11-10

## 2021-07-12 RX ORDER — FUROSEMIDE 40 MG/1
40 TABLET ORAL DAILY
Status: DISCONTINUED | OUTPATIENT
Start: 2021-07-13 | End: 2021-07-12 | Stop reason: HOSPADM

## 2021-07-12 RX ORDER — FUROSEMIDE 40 MG/1
40 TABLET ORAL 2 TIMES DAILY
Qty: 30 TABLET | Refills: 11 | Status: CANCELLED | OUTPATIENT
Start: 2021-07-12

## 2021-07-12 RX ORDER — SPIRONOLACTONE 25 MG/1
12.5 TABLET ORAL DAILY
Qty: 30 TABLET | Refills: 11 | Status: SHIPPED | OUTPATIENT
Start: 2021-07-13 | End: 2022-04-08 | Stop reason: HOSPADM

## 2021-07-12 RX ORDER — FUROSEMIDE 40 MG/1
40 TABLET ORAL DAILY
Qty: 30 TABLET | Refills: 11 | Status: SHIPPED | OUTPATIENT
Start: 2021-07-13 | End: 2021-07-20 | Stop reason: ALTCHOICE

## 2021-07-12 RX ORDER — DIGOXIN 125 MCG
125 TABLET ORAL 3 TIMES WEEKLY
Qty: 30 TABLET | Refills: 11 | Status: SHIPPED | OUTPATIENT
Start: 2021-07-12 | End: 2021-08-30 | Stop reason: SDUPTHER

## 2021-07-12 RX ADMIN — OXYCODONE HYDROCHLORIDE AND ACETAMINOPHEN 500 MG: 500 TABLET ORAL at 09:16

## 2021-07-12 RX ADMIN — CARVEDILOL 12.5 MG: 12.5 TABLET, FILM COATED ORAL at 09:16

## 2021-07-12 RX ADMIN — APIXABAN 5 MG: 5 TABLET, FILM COATED ORAL at 09:16

## 2021-07-12 RX ADMIN — ISOSORBIDE MONONITRATE 60 MG: 60 TABLET ORAL at 09:16

## 2021-07-12 RX ADMIN — LORAZEPAM 1 MG: 1 TABLET ORAL at 15:17

## 2021-07-12 RX ADMIN — FLUOXETINE HYDROCHLORIDE 20 MG: 20 CAPSULE ORAL at 09:16

## 2021-07-12 RX ADMIN — RANOLAZINE 500 MG: 500 TABLET, FILM COATED, EXTENDED RELEASE ORAL at 09:15

## 2021-07-12 RX ADMIN — PANTOPRAZOLE SODIUM 40 MG: 40 TABLET, DELAYED RELEASE ORAL at 09:16

## 2021-07-12 RX ADMIN — SPIRONOLACTONE 12.5 MG: 25 TABLET ORAL at 09:15

## 2021-07-12 RX ADMIN — DIGOXIN 125 MCG: 250 INJECTION, SOLUTION INTRAMUSCULAR; INTRAVENOUS; PARENTERAL at 11:24

## 2021-07-12 RX ADMIN — SODIUM CHLORIDE, PRESERVATIVE FREE 10 ML: 5 INJECTION INTRAVENOUS at 09:16

## 2021-07-12 RX ADMIN — LOSARTAN POTASSIUM 25 MG: 25 TABLET, FILM COATED ORAL at 09:16

## 2021-07-12 RX ADMIN — FUROSEMIDE 40 MG: 40 TABLET ORAL at 09:16

## 2021-07-12 RX ADMIN — DIGOXIN 125 MCG: 125 TABLET ORAL at 14:03

## 2021-07-12 NOTE — PLAN OF CARE
Problem: Adult Inpatient Plan of Care  Goal: Plan of Care Review  Flowsheets (Taken 7/12/2021 9893)  Outcome Summary: VSS< AOX4, Leaving this evening, Assist X1 to BSC   Goal Outcome Evaluation:              Outcome Summary: VSS< AOX4, Leaving this evening, Assist X1 to BSC

## 2021-07-12 NOTE — PLAN OF CARE
Goal Outcome Evaluation:  Plan of Care Reviewed With: patient        Progress: improving  Outcome Summary: Pt A&Ox4, daughter at bedside, weaning O2 w/o complications, standby assist for bedside commode, reg CC HH diet, no reports of pain/nausea/soa, vitals as charted

## 2021-07-12 NOTE — DISCHARGE SUMMARY
Alice Mabry  [id number]    Date of Admit: 7/8/2021  Date of Discharge:  7/12/2021    Discharge Diagnosis:  Active Hospital Problems    Diagnosis  POA   • Acute diastolic congestive heart failure (CMS/HCC) [I50.31]  Yes      Resolved Hospital Problems   No resolved problems to display.       Hospital Course:   This is a 80 year old female, patient of mine with a history of CAD (s/p inferior MI in 2004 and stent placement to RCA and LCX and in 2014 RAJ was placed in RCA for restenosis), HTN, HLD, aortic stenosis, mitral valve stenosis, and syncope. On her last catheterization she had residual coronary disease in her 3 diagonals that were felt to be too small to intervene on. She was admitted on 7/8/21 for dyspnea & palpitations where she was in atrial fibrillation RVR & acute systolic CHF exacerbation. Her EF was 30% and this was felt to be tachycardia induced. She was started on a regimen of coreg, losartan, low dose spironolactone, digoxin three times a week (M, W, F), and eliquis. She is now stable for discharge to home and she will need to follow up with the heart failure clinic- [facility] next week and she will need to see me in 1 month.     My feeling is likely she has been in A. 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 Left ventricular systolic function is moderately decreased. Normal left ventricular cavity size noted. Left ventricular wall thickness is consistent  with moderate septal asymmetric hypertrophy. There is left ventricular global hypokinesis noted. Left ventricular diastolic function was indeterminate.  · The right ventricular cavity is moderately dilated. Mildly reduced right ventricular systolic function noted.  · There is moderate calcification of the aortic valve. Moderate aortic valve regurgitation is present. Mild to moderate aortic valve stenosis is present. Peak velocity of the flow distal to the aortic valve is 217 cm/s.  · There is calcification of the mitral valve. Severe mitral valve regurgitation is present with a centrally-directed jet noted. Pulmonary vein flow reversal was not evaluated. Mild mitral valve stenosis is present.            Consults     Date and Time Order Name Status Description    7/8/2021 12:36 PM [facility] (on-call MD unless specified) Completed           Discharge Medications     Your medication list      START taking these medications      Instructions Last Dose Given Next Dose Due   carvedilol 12.5 MG tablet  Commonly known as: COREG      Take 1 tablet by mouth Every 12 (Twelve) Hours.       digoxin 125 MCG tablet  Commonly known as: LANOXIN      Take 1 tablet by mouth 3 (Three) Times a Week.       losartan 25 MG tablet  Commonly known as: COZAAR  Start taking on: July 13, 2021      Take 1 tablet by mouth Daily.       spironolactone 25 MG tablet  Commonly known as: ALDACTONE  Start taking on: July 13, 2021      Take 0.5 tablets by mouth Daily.          CHANGE how you take these medications      Instructions Last Dose Given Next Dose Due   atorvastatin 80 MG tablet  Commonly known as: LIPITOR  What changed:   · how much to take  · how to take this  · when to take this      TAKE 1 TABLET DAILY       isosorbide mononitrate 60 MG 24 hr tablet  Commonly known as: IMDUR  Start taking on: July 13, 2021  What changed:   · medication strength  · how much to take  · additional instructions      Take 1 tablet by mouth Daily.          CONTINUE  taking these medications      Instructions Last Dose Given Next Dose Due   apixaban 5 MG tablet tablet  Commonly known as: ELIQUIS      Take 1 tablet by mouth Every 12 (Twelve) Hours.       esomeprazole 40 MG capsule  Commonly known as: nexIUM      Take 40 mg by mouth Every Morning Before Breakfast.       FLUoxetine 20 MG capsule  Commonly known as: PROzac      Take 20 mg by mouth daily.       LORazepam 1 MG tablet  Commonly known as: ATIVAN      Take 1 mg by mouth every 8 (eight) hours as needed for anxiety.       nitroglycerin 0.4 MG SL tablet  Commonly known as: NITROSTAT      Place 1 tablet under the tongue Every 5 (Five) Minutes As Needed for Chest Pain. Take no more than 3 doses in 15 minutes.       ranolazine 500 MG 12 hr tablet  Commonly known as: RANEXA      TAKE 1 TABLET EVERY 12     HOURS       VITAMIN B-12 CR PO      Take  by mouth Daily.       vitamin C 500 MG tablet  Commonly known as: ASCORBIC ACID      Take 500 mg by mouth Daily.       Vitamin D (Ergocalciferol) 50 MCG (2000 UT) capsule      Take 2,000 Units by mouth Daily.          STOP taking these medications    metoprolol tartrate 25 MG tablet  Commonly known as: LOPRESSOR              Where to Get Your Medications      These medications were sent to [facility] [place] [id number]    Hours: 7:00 AM-6:00 PM Mon-Fri, 8:00 AM-4:30 PM Sat-Sun (Closed 12-12:30PM) Phone: [phone]   · carvedilol 12.5 MG tablet  · digoxin 125 MCG tablet  · isosorbide mononitrate 60 MG 24 hr tablet  · losartan 25 MG tablet  · spironolactone 25 MG tablet         Discharge Diet: healthy heart    Activity at Discharge: as tolerated    Discharge disposition: home    Condition on Discharge: stable    Follow-up Appointments  Future Appointments   Date Time Provider Department Center   7/15/2021  7:45 AM [facility] ECHO/[facility] E[facility]   7/15/2021  8:15 AM [facility]   7/21/2021 10:40 AM Murphy Horner MD [facility]  [facility]   8/24/2021  8:40 AM Celi Laird, APRN [facility]     Additional Instructions for the Follow-ups that You Need to Schedule     Discharge Follow-up with Specialty: Follow up with heart failure clinic- [facility] in 1 week & follow up with Dr. Horner in 1 month.   As directed      Specialty: Follow up with heart failure clinic- [facility] in 1 week & follow up with Dr. Horner in 1 month.               Test Results Pending at Discharge       Murphy Horner MD  07/12/21  10:48 EDT

## 2021-07-13 ENCOUNTER — TELEPHONE (OUTPATIENT)
Dept: CARDIOLOGY | Facility: HOSPITAL | Age: 81
End: 2021-07-13

## 2021-07-13 ENCOUNTER — READMISSION MANAGEMENT (OUTPATIENT)
Dept: CALL CENTER | Facility: HOSPITAL | Age: 81
End: 2021-07-13

## 2021-07-13 ENCOUNTER — TELEPHONE (OUTPATIENT)
Dept: CARDIOLOGY | Facility: CLINIC | Age: 81
End: 2021-07-13

## 2021-07-13 NOTE — OUTREACH NOTE
Prep Survey      Responses   [facility] facility patient discharged from?  [place]   Is LACE score < 7 ?  No   Emergency Room discharge w/ pulse ox?  No   Eligibility  Readm Mgmt   Discharge diagnosis  Acute diastolic congestive heart failure    Does the patient have one of the following disease processes/diagnoses(primary or secondary)?  CHF   Does the patient have Home health ordered?  No   Is there a DME ordered?  No   Prep survey completed?  Yes          Nica Jacobo RN

## 2021-07-13 NOTE — TELEPHONE ENCOUNTER
Called Pt to schedule appt.  No answer. Left VM message stating reason for call and call back number.  Will await Pt call back.      Tawnya Altamirano RN  [facility]      Addendum: Pt returned call.  Appt scheduled on 7-20-21  10:00 AM.  Provided Pt with directions to the clinic.  All questions and concerns addressed at this time

## 2021-07-13 NOTE — TELEPHONE ENCOUNTER
Patient has an appt on 7/21/21 which needs to be cancelled for Dr. Horner  Patient needs 1 week follow up with NP  And a 1 month follow up with Dr. Horner  Per Discharge instructions  She already had Echo in the hosp.  So that appt. For Echo needs cancelled if not already done.    Patient can be reached at [phone]

## 2021-07-13 NOTE — PROGRESS NOTES
Case Management Discharge Note      Final Note: Patient DC'd home with spouse         Selected Continued Care - Discharged on 7/12/2021 Admission date: 7/8/2021 - Discharge disposition: Home or Self Care    Destination    No services have been selected for the patient.              Durable Medical Equipment    No services have been selected for the patient.              Dialysis/Infusion    No services have been selected for the patient.              Home Medical Care    No services have been selected for the patient.              Therapy    No services have been selected for the patient.              Community Resources    No services have been selected for the patient.              Community & DME    No services have been selected for the patient.                  Transportation Services  Private: Car    Final Discharge Disposition Code: 01 - home or self-care

## 2021-07-14 ENCOUNTER — TELEPHONE (OUTPATIENT)
Dept: CARDIOLOGY | Facility: CLINIC | Age: 81
End: 2021-07-14

## 2021-07-14 NOTE — TELEPHONE ENCOUNTER
Patient called stated she was just in the hosp and had an Echo done while in the hosp. She canceled her follow up with G[facility]le  And stress test and made a follow up with you for aug. 17th. She wanted you to know and make sure this was correct.    [phone]

## 2021-07-15 ENCOUNTER — READMISSION MANAGEMENT (OUTPATIENT)
Dept: CALL CENTER | Facility: HOSPITAL | Age: 81
End: 2021-07-15

## 2021-07-15 NOTE — OUTREACH NOTE
CHF Week 1 Survey      Responses   [facility] patient discharged from?  [place]   Does the patient have one of the following disease processes/diagnoses(primary or secondary)?  CHF   CHF Week 1 attempt successful?  Yes   Call start time  1204   Call end time  1210   Discharge diagnosis  Acute diastolic congestive heart failure    Meds reviewed with patient/caregiver?  Yes   Is the patient having any side effects they believe may be caused by any medication additions or changes?  No   Does the patient have all medications ordered at discharge?  Yes   Is the patient taking all medications as directed (includes completed medication regime)?  Yes   Does the patient have a primary care provider?   Yes   Does the patient have an appointment with their PCP within 7 days of discharge?  No   What is preventing the patient from scheduling follow up appointments within 7 days of discharge?  Haven't had time [Will see [facility] and may not see PCP but she states he gets info from hospitalization]   Nursing Interventions  Educated patient on importance of making appointment   Has the patient kept scheduled appointments due by today?  N/A   Comments  Tues--Heart Failure Clinic   Has home health visited the patient within 72 hours of discharge?  N/A   Psychosocial issues?  No   Did the patient receive a copy of their discharge instructions?  Yes   Nursing interventions  Reviewed instructions with patient   What is the patient's perception of their health status since discharge?  Improving   Nursing interventions  Nurse provided patient education   Is the patient weighing daily?  Yes   Does the patient have scales?  Yes   Daily weight interventions  Education provided on importance of daily weight   Is the patient able to teach back Heart Failure diet management?  Yes   Is the patient able to teach back Heart Failure Zones?  Yes [Green zone]   Is the patient able to teach back signs and symptoms of worsening condition? (i.e.  weight gain, shortness of air, etc.)  Yes   If the patient is a current smoker, are they able to teach back resources for cessation?  Not a smoker   Is the patient/caregiver able to teach back the hierarchy of who to call/visit for symptoms/problems? PCP, Specialist, Home health nurse, Urgent Care, ED, 911  Yes   Additional teach back comments  Pt feeling better, continuing with daily wts and recording these    CHF Week 1 call completed?  Yes          Maddie Hester RN

## 2021-07-20 ENCOUNTER — TELEPHONE (OUTPATIENT)
Dept: CARDIOLOGY | Facility: CLINIC | Age: 81
End: 2021-07-20

## 2021-07-20 ENCOUNTER — HOSPITAL ENCOUNTER (OUTPATIENT)
Dept: CARDIOLOGY | Facility: HOSPITAL | Age: 81
Discharge: HOME | End: 2021-07-20
Admitting: NURSE PRACTITIONER

## 2021-07-20 VITALS
HEIGHT: 65 IN | RESPIRATION RATE: 20 BRPM | WEIGHT: 188 LBS | SYSTOLIC BLOOD PRESSURE: 126 MMHG | OXYGEN SATURATION: 94 % | HEART RATE: 88 BPM | BODY MASS INDEX: 31.32 KG/M2 | DIASTOLIC BLOOD PRESSURE: 78 MMHG

## 2021-07-20 DIAGNOSIS — I21.A9 OTHER MYOCARDIAL INFARCTION TYPE (HCC): ICD-10-CM

## 2021-07-20 DIAGNOSIS — I50.31 ACUTE DIASTOLIC CONGESTIVE HEART FAILURE (HCC): Primary | ICD-10-CM

## 2021-07-20 DIAGNOSIS — I48.0 PAROXYSMAL ATRIAL FIBRILLATION (HCC): ICD-10-CM

## 2021-07-20 DIAGNOSIS — I48.91 ATRIAL FIBRILLATION WITH RVR (HCC): ICD-10-CM

## 2021-07-20 DIAGNOSIS — I25.10 CORONARY ARTERY DISEASE INVOLVING NATIVE CORONARY ARTERY OF NATIVE HEART WITHOUT ANGINA PECTORIS: ICD-10-CM

## 2021-07-20 DIAGNOSIS — I50.32 CHRONIC DIASTOLIC (CONGESTIVE) HEART FAILURE (HCC): ICD-10-CM

## 2021-07-20 DIAGNOSIS — I25.812 CORONARY ARTERY DISEASE INVOLVING BYPASS GRAFT OF TRANSPLANTED HEART, ANGINA PRESENCE UNSPECIFIED: ICD-10-CM

## 2021-07-20 DIAGNOSIS — I25.5 ISCHEMIC CARDIOMYOPATHY: ICD-10-CM

## 2021-07-20 LAB
ANION GAP SERPL CALCULATED.3IONS-SCNC: 10.9 MMOL/L (ref 5–15)
BUN SERPL-MCNC: 9 MG/DL (ref 8–23)
BUN/CREAT SERPL: 10.8 (ref 7–25)
CALCIUM SPEC-SCNC: 9.3 MG/DL (ref 8.6–10.5)
CHLORIDE SERPL-SCNC: 103 MMOL/L (ref 98–107)
CHOLEST SERPL-MCNC: 105 MG/DL (ref 0–200)
CO2 SERPL-SCNC: 27.1 MMOL/L (ref 22–29)
CREAT SERPL-MCNC: 0.83 MG/DL (ref 0.57–1)
DIGOXIN SERPL-MCNC: 0.6 NG/ML (ref 0.6–1.2)
GFR SERPL CREATININE-BSD FRML MDRD: 66 ML/MIN/1.73
GLUCOSE SERPL-MCNC: 124 MG/DL (ref 65–99)
HBA1C MFR BLD: 5.9 % (ref 4.8–5.6)
HDLC SERPL-MCNC: 31 MG/DL (ref 40–60)
LDLC SERPL CALC-MCNC: 50 MG/DL (ref 0–100)
LDLC/HDLC SERPL: 1.52 {RATIO}
MAGNESIUM SERPL-MCNC: 2 MG/DL (ref 1.6–2.4)
NT-PROBNP SERPL-MCNC: 1255 PG/ML (ref 0–1800)
POTASSIUM SERPL-SCNC: 4.7 MMOL/L (ref 3.5–5.2)
SODIUM SERPL-SCNC: 141 MMOL/L (ref 136–145)
TRIGL SERPL-MCNC: 134 MG/DL (ref 0–150)
TSH SERPL DL<=0.05 MIU/L-ACNC: 2.44 UIU/ML (ref 0.27–4.2)
VLDLC SERPL-MCNC: 24 MG/DL (ref 5–40)

## 2021-07-20 PROCEDURE — 83036 HEMOGLOBIN GLYCOSYLATED A1C: CPT

## 2021-07-20 PROCEDURE — 83735 ASSAY OF MAGNESIUM: CPT

## 2021-07-20 PROCEDURE — 84443 ASSAY THYROID STIM HORMONE: CPT

## 2021-07-20 PROCEDURE — 83880 ASSAY OF NATRIURETIC PEPTIDE: CPT

## 2021-07-20 PROCEDURE — G0463 HOSPITAL OUTPT CLINIC VISIT: HCPCS

## 2021-07-20 PROCEDURE — 80061 LIPID PANEL: CPT

## 2021-07-20 PROCEDURE — 99215 OFFICE O/P EST HI 40 MIN: CPT | Performed by: NURSE PRACTITIONER

## 2021-07-20 PROCEDURE — 80048 BASIC METABOLIC PNL TOTAL CA: CPT

## 2021-07-20 PROCEDURE — 80162 ASSAY OF DIGOXIN TOTAL: CPT

## 2021-07-20 RX ORDER — ATORVASTATIN CALCIUM 80 MG/1
80 TABLET, FILM COATED ORAL NIGHTLY
COMMUNITY
End: 2021-12-17

## 2021-07-20 RX ORDER — SACUBITRIL AND VALSARTAN 24; 26 MG/1; MG/1
1 TABLET, FILM COATED ORAL 2 TIMES DAILY
Qty: 60 TABLET | Refills: 0 | Status: SHIPPED | OUTPATIENT
Start: 2021-07-20 | End: 2021-08-17

## 2021-07-20 RX ORDER — MULTIPLE VITAMINS W/ MINERALS TAB 9MG-400MCG
1 TAB ORAL DAILY
COMMUNITY
End: 2022-10-20

## 2021-07-20 RX ORDER — METOPROLOL SUCCINATE 50 MG/1
50 TABLET, EXTENDED RELEASE ORAL DAILY
Qty: 30 TABLET | Refills: 1 | Status: SHIPPED | OUTPATIENT
Start: 2021-07-20 | End: 2021-07-27

## 2021-07-20 RX ORDER — BUMETANIDE 1 MG/1
1 TABLET ORAL DAILY
Qty: 30 TABLET | Refills: 1 | Status: SHIPPED | OUTPATIENT
Start: 2021-07-20 | End: 2021-09-27 | Stop reason: SDUPTHER

## 2021-07-20 NOTE — PROGRESS NOTES
[facility] HEART FAILURE      Patient Name: Alice Mabry  :1940  Age: 80 y.o.  Sex: female  Referring Provider: Murphy Horner MD   Primary Cardiologist: Murphy Horner MD  Encounter Provider:  JOHNNIE Mccray      Chief Complaint:   Chief Complaint   Patient presents with   • Congestive Heart Failure         History of Present Illness this 80-year-old female, new to this provider, comes today for further evaluation regarding her chronic systolic heart failure.  Current diagnoses to include paroxysmal atrial fibrillation, coronary artery disease, history of cervical cancer, depression, fatty liver, GERD, hyperlipidemia, hypertension, IBS, prior myocardial infarction, obesity, and sinus bradycardia.    6/10/2014 she underwent cardiac catheterization with PCI of RCA, RAJ placement noted at that time.  LVEF prior to cardiac cath noted to be 30%.  Post PCI her echocardiogram repeated 90 days later showed improvement to 50%.    Stress testing in 2015 revealed an EF of 45% with no reversible ischemia noted.  Repeat echocardiogram 2016 showed further improvement of her LVEF to 60%.    In  she underwent repeat cardiac catheterization and it was found that she had in-stent restenosis of her RCA, this was stented again; it was also noted that she had 3 diagonals with significant disease that were too small for intervention.    Echocardiogram 2019 revealed normal LVEF of 58% with moderate MR and aortic stenosis with a mean gradient of 14 mmHg and PILAR of 1.3 cm².     she was seen by JOHNNIE Lord and at that time had, per chart review, complains of shortness of breath with racing palpitations.  She also had complained at that visit of a heavy chest sensation that was worsening.  She was found to be in atrial fibrillation with RVR on ECG in office that day, rate as high as 120s with hypertension noted.  Metoprolol tartrate was started at that point  as well as apixaban.  On July 8, 2021 she was noted to be very short of breath via telephone call to primary cardiologist and she was referred to the emergency department.  She was admitted from July 8 to July 12, 2021 with acute exacerbation of her diastolic heart failure.  At that time Dr. Horner felt she had likely been in atrial fibrillation with RVR, on noted for likely a month or 2, and this is what led to a tachycardia mediated cardiomyopathy now with LV C dilation.  Her MR was noted to have worsened and is now severe.  Repeat echocardiogram done during admission yielded an LVEF of 31 to 35%.  Repeat ischemic work-up is still pending.    She is currently up 4 pounds since discharge and complaining of fatigue, shortness of breath on exertion, intermittent palpitations.      The following portions of the patient's history were reviewed and updated as appropriate: allergies, current medications, past family history, past medical history, past social history, past surgical history and problem list.    Current Outpatient Medications   Medication Sig Dispense Refill   • apixaban (ELIQUIS) 5 MG tablet tablet Take 1 tablet by mouth Every 12 (Twelve) Hours. 60 tablet 11   • atorvastatin (LIPITOR) 80 MG tablet Take 80 mg by mouth Every Night.     • Cyanocobalamin (VITAMIN B-12 CR PO) Take  by mouth Daily.     • digoxin (LANOXIN) 125 MCG tablet Take 1 tablet by mouth 3 (Three) Times a Week. 30 tablet 11   • esomeprazole (nexIUM) 40 MG capsule Take 40 mg by mouth Every Morning Before Breakfast.     • FLUoxetine (PROzac) 20 MG capsule Take 20 mg by mouth daily.     • isosorbide mononitrate (IMDUR) 60 MG 24 hr tablet Take 1 tablet by mouth Daily. 30 tablet 11   • LORazepam (ATIVAN) 1 MG tablet Take 1 mg by mouth every 8 (eight) hours as needed for anxiety.     • multivitamin with minerals (Alive Once Daily Womens 50+) tablet tablet Take 1 tablet by mouth Daily.     • nitroglycerin (NITROSTAT) 0.4 MG SL tablet Place 1  tablet under the tongue Every 5 (Five) Minutes As Needed for Chest Pain. Take no more than 3 doses in 15 minutes. 100 tablet 3   • ranolazine (RANEXA) 500 MG 12 hr tablet TAKE 1 TABLET EVERY 12     HOURS 180 tablet 2   • spironolactone (ALDACTONE) 25 MG tablet Take 0.5 tablets by mouth Daily. 30 tablet 11   • vitamin C (ASCORBIC ACID) 500 MG tablet Take 500 mg by mouth Daily.     • Vitamin D, Ergocalciferol, 2000 units capsule Take 2,000 Units by mouth Daily.     • bumetanide (BUMEX) 1 MG tablet Take 1 tablet by mouth Daily. 30 tablet 1   • metoprolol succinate XL (TOPROL-XL) 50 MG 24 hr tablet Take 1 tablet by mouth Daily. 30 tablet 1   • sacubitril-valsartan (Entresto) 24-26 MG tablet Take 1 tablet by mouth 2 (Two) Times a Day. 60 tablet 0     No current facility-administered medications for this encounter.       Past Medical History:   Diagnosis Date   • Acute diastolic (congestive) heart failure (CMS/HCC)    • Anxiety    • Arthritis    • Atrial fibrillation (CMS/HCC) 07/06/2021   • CAD (coronary artery disease)    • Cervical cancer (CMS/HCC)    • Current tear of meniscus    • Depression    • Dizziness    • Fatty liver    • GERD (gastroesophageal reflux disease)    • H/O blood clots    • H/O Clostridium difficile infection    • Hyperlipidemia    • Hypertension    • IBS (irritable bowel syndrome)    • Myocardial infarction (CMS/HCC)    • Obesity    • Peptic ulcer    • Pneumonia    • Sinus bradycardia    • Syncope    • Vitamin D deficiency        Past Surgical History:   Procedure Laterality Date   • APPENDECTOMY  1960   • BACK SURGERY     • CARDIAC CATHETERIZATION  06/10/2014    Dr. Murphy Horner   • CARDIAC CATHETERIZATION  11/13/2007    Dr. Murphy Horner   • CARDIAC CATHETERIZATION N/A 10/19/2004    Dr. Murphy Horner   • CARDIAC CATHETERIZATION N/A 07/15/2004    Dr. Gregorio Gonzales   • CARDIAC CATHETERIZATION  10/2003    Dr. Murphy Hroner   • CHOLECYSTECTOMY  1998   • COLONOSCOPY N/A 07/2001    negative   •  COLONOSCOPY N/A 02/28/2012    negative   • CORONARY ANGIOPLASTY WITH STENT PLACEMENT N/A 07/15/2004    Dr. Murphy Horner   • CORONARY ANGIOPLASTY WITH STENT PLACEMENT  03/2002    to RCA   • HYSTERECTOMY  1974   • UPPER GASTROINTESTINAL ENDOSCOPY N/A 04/20/2015    Mild Schatzki ring, hiatus hernia, non-bleeding erosive gastropathy, erythematous mucosa in the antrum, normal duodenum-Dr. Alex Gill       Physical Exam  Vitals and nursing note reviewed.   Constitutional:       Appearance: She is well-developed. She is obese.   HENT:      Head: Normocephalic and atraumatic.   Eyes:      Conjunctiva/sclera: Conjunctivae normal.      Pupils: Pupils are equal, round, and reactive to light.   Neck:      Vascular: No JVD.   Cardiovascular:      Rate and Rhythm: Normal rate. Rhythm irregular.      Heart sounds: Normal heart sounds. No murmur heard.   No friction rub. No gallop.    Pulmonary:      Effort: Pulmonary effort is normal. No respiratory distress.      Breath sounds: Normal breath sounds.   Abdominal:      General: Bowel sounds are normal. There is no distension.      Palpations: Abdomen is soft.   Musculoskeletal:         General: No swelling or deformity.   Skin:     General: Skin is warm and dry.      Capillary Refill: Capillary refill takes less than 2 seconds.   Neurological:      Mental Status: She is alert and oriented to person, place, and time. Mental status is at baseline.   Psychiatric:         Mood and Affect: Mood normal.         Behavior: Behavior normal.          Review of Systems   Constitutional: Positive for fatigue and unexpected weight change.   HENT: Negative for congestion and nosebleeds.    Eyes: Negative for photophobia and visual disturbance.   Respiratory: Positive for shortness of breath. Negative for cough and chest tightness.    Cardiovascular: Positive for palpitations. Negative for chest pain and leg swelling.   Gastrointestinal: Negative for abdominal distention and blood in  "stool.   Endocrine: Negative for polyphagia and polyuria.   Genitourinary: Positive for frequency. Negative for urgency.   Musculoskeletal: Negative for joint swelling and myalgias.   Skin: Negative for pallor and rash.   Neurological: Negative for dizziness, syncope, weakness, light-headedness, numbness and headaches.   Hematological: Does not bruise/bleed easily.   Psychiatric/Behavioral: Negative for confusion and sleep disturbance.        OBJECTIVE:  /78 (BP Location: Right arm, Patient Position: Sitting)   Pulse 88   Resp 20   Ht 165.1 cm (65\")   Wt 85.3 kg (188 lb)   SpO2 94%   BMI 31.28 kg/m²      Body mass index is 31.28 kg/m².  Wt Readings from Last 1 Encounters:   07/20/21 85.3 kg (188 lb)       Lab Review:  Renal Function: Estimated Creatinine Clearance: 57.3 mL/min (by C-G formula based on SCr of 0.83 mg/dL).    Lab Results   Component Value Date    PROBNP 1,255.0 07/20/2021       Results for orders placed during the hospital encounter of 07/08/21    Adult Transthoracic Echo Complete W/ Cont if Necessary Per Protocol    Interpretation Summary  · Left ventricular ejection fraction appears to be 31 - 35%. Left ventricular systolic function is moderately decreased. Normal left ventricular cavity size noted. Left ventricular wall thickness is consistent with moderate septal asymmetric hypertrophy. There is left ventricular global hypokinesis noted. Left ventricular diastolic function was indeterminate.  · The right ventricular cavity is moderately dilated. Mildly reduced right ventricular systolic function noted.  · There is moderate calcification of the aortic valve. Moderate aortic valve regurgitation is present. Mild to moderate aortic valve stenosis is present. Peak velocity of the flow distal to the aortic valve is 217 cm/s.  · There is calcification of the mitral valve. Severe mitral valve regurgitation is present with a centrally-directed jet noted. Pulmonary vein flow reversal was not " evaluated. Mild mitral valve stenosis is present.      Procedures      6 MINUTE WALK                      Cardiac Procedures:  1.  6/10/2014 Cardiac catheterization  IMPRESSION:  Class III to IV angina on good medical therapy, ischemic  cardiomyopathy.  Prior stents are patent.  Diagonal disease is unchanged.  What  is new is she has developed a new 90% origin right coronary artery lesion. We  are going to proceed on with intervention of that.  I have discussed this with  her and her family beforehand.      Plavix 600 mg was given and heparin 7000 units was given. We exchanged a sheath  in the right radial artery and put a 6-[place]an sheath in. We then brought a  6-JR-4 guide up, but it would not fit into the right coronary artery, and we  switched to a 6-AR-1 guide. A BMW was passed easily into the distal RCA. We  brought a 3.5 x 15 NC trek balloon up and inflated it at 14 atmospheres in the  origin of the RCA.  Then I brought a 4.0 x 15 Xience Xpedition drug-eluting  stent down, and I deployed that at 14 atmospheres. We postdilated that with a  4.0 x 12 NC trek at 20 atmospheres. There was 0% residual and ALIS-3 flow, and  at that point, balloons, wires, and guides were removed. The ACT was 301  seconds. The sheath was removed on the table and an R band was applied.      IMPRESSION:  Successful angioplasty drug-eluting stenting to the origin of the  right coronary artery.      RECOMMENDATIONS:  1.  Percutaneous coronary intervention care.   2.  Continue risk modification.   3.  Home in the morning if she is okay.  4.  Reassess her left ventricular function in 3 months.        Previously trialed diuretics  Lasix      Previously trialed GDMT    Losartan  Carvedilol  Spironolactone      ASSESSMENT:     Diagnosis Plan   1. Acute diastolic congestive heart failure (CMS/HCC)  Hemoglobin A1c    Magnesium   2. Atrial fibrillation with RVR (CMS/HCC)  TSH    Digoxin Level   3. Coronary artery disease involving native coronary  artery of native heart without angina pectoris     4. Other myocardial infarction type (CMS/[facility])   Hemoglobin A1c   5. Chronic diastolic (congestive) heart failure (CMS/[facility])  Basic Metabolic Panel    BNP    Magnesium   6. 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 Ischemic cardiomyopathy     8. Paroxysmal atrial fibrillation (CMS/[facility])           PLAN OF CARE:  1.  HFrEF-NYHA class II-III.  Current GDMT to include losartan, Entresto, spironolactone, diuresed on Lasix.    We had a long discussion regarding her disease process.  I advised that we stop losartan and start Entresto 24-26 mg twice daily, stop carvedilol and start metoprolol succinate 50 mg daily, and stop Lasix and start Bumex 1 mg daily.  I am stopping her carvedilol and transitioning to metoprolol succinate as her heart rate remains at the upper end of normal and I think she will ultimately feel better with a bit better rate control.  She also has had trouble with orthostatic hypotension in the past, and I think she will tolerate her Entresto better from a blood pressure standpoint with a beta 1 selective beta-blocker.    She does appear a bit volume overloaded on exam today, her shortness of breath is a little worse and she does have some lower extremity edema that is mild  Her weight is up 4 pounds since discharge, so I will transition her to Bumex from Lasix.  I would like to see her back next week.  Today I will check a BMP, BNP, and for risk stratification I would like to check a lipid panel, hemoglobin A1c, and given her recent trouble with atrial fibrillation with RVR I will check a digoxin level and TSH as well as a mag.    I spent a great deal of time with her on education regarding her disease process and the medications.  I did not want to overwhelm her today by taking too deep into lifestyle modification just yet.  If she is able to tolerate these medications well, when  she comes back next week I will start her on an SGLT2 inhibitor and then we will work on optimizing her dosages.  Given her fatigue I think cardiac rehabilitation would be great for her once she has been stable for 6 weeks.  We loosely discussed this.  She does currently have an advance directive filled out, I asked that she please bring this to her next visit.    Directions for when to call the clinic reviewed with the patient to include weight gain of 2 to 3 pounds in 24 hours, weight gain of 5 to 10 pounds within 7 days; worsening shortness of breath; worsening lower extremity edema or abdominal distention.    2.  Atrial fibrillation-she does sound on auscultation today to be in atrial fibrillation, rate controlled in the upper 80s.  Currently on digoxin and carvedilol.  Transitioning to metoprolol succinate as above.  Anticoagulated on apixaban.  Managed by primary cardiologist.    3.  Hypertension-stable and controlled.    4.  CAD-stable with no complaints of angina.  Managed by primary cardiologist.            BMP/BNP/lipid panel/hemoglobin A1c/digoxin level/TSH/magnesium level; stop losartan; start Entresto 24-26 mg twice daily; stop carvedilol; start metoprolol succinate 50 mg nightly; stop Lasix; start Bumex 1 mg daily; follow-up in 1 week or sooner if needed    Advance Care Planning   ACP discussion was held with the patient during this visit. Patient has an advance directive (not in EMR), copy requested.      Thank you for allowing me to participate in the care of your patient,    Written directions provided to patient  • Stop Losartan.  • Start Entresto 24-26 mg twice daily.  • Stop carvedilol.  • Start metoprolol succinate 50 mg daily.  Take this at night.   • Stop Lasix (furosemide).  • Start Bumex 1 mg daily.   • Bring your advance directive with you to your next visit in the heart failure clinic.    • Weigh yourself each morning, as soon as you wake up, in the same amount of clothing each time.   Write these weights down, and do not weigh at any other point during the day.  If your weight goes up 2 pounds within 24 hours, or 5 or more pounds within one week or less, please call the heart failure clinic right away.   • If you have worsening shortness of breath, swelling in your legs or abdomen, develop a dry cough or need more pillows in order to sleep at night, please call the heart failure clinic right away.        Time spent in direct contact with patient: 60 minutes  Time spent on chart review prior to seeing patient: 30 minutes    JOHNNIE Mccray  [facility] HEART FAILURE  07/20/21  09:20 EDT      **Fadia Disclaimer:**  Much of this encounter note is an electronic transcription/translation of spoken language to printed text. The electronic translation of spoken language may permit erroneous, or at times, nonsensical words or phrases to be inadvertently transcribed. Although I have reviewed the note for such errors, some may still exist.

## 2021-07-20 NOTE — PROGRESS NOTES
[facility] HEART FAILURE      Patient Name: Alice Mabry  :1940  Age: 80 y.o.  Sex: female  Referring Provider: Murphy Horner MD   Primary Cardiologist: Murphy Horner MD   Primary Care Provider: Tho Mar MD - [place]  Encounter Provider: Valarie Salas, PharmD      Chief Complaint:   Chief Complaint   Patient presents with   • Congestive Heart Failure       HPI:  Pt presents today to [facility] for initial evaluation after recent hospitalization stay on 21 to 21 due to A.fib with RVR and acute decompensation. Patient is up 4 pounds from discharge, with no lower extremity edema but endorses SOB on exertion, fatigue and generalized weakness. ECHO completed on 21 showed an EF of 32%. Pt has a history significant for A.fib, CAD, HLD, HTN, Hx of MI in  and  (w/ stent placement in ) mitral valve stenosis, syncope. Per Dr. Horner concern that HFrEF is a result of ventricular tachycardia.        Current Regimen:  Heart Failure  · Losartan 25 mg -  started   · Carvedilol 12.5 mg BID - started   · spironolactone 25 mg tab (0.5 tab) - started   · Furosemide 40 mg daily - dose increase from 20 mg prn for weight gain > 3 lbs     Other CV Meds  · Digoxin 125 mg MWF    · Atorvastatin 80 mg   · apixaban 5 mg BID    · Ranexa 500 mg Q12H      Medication Use:  Adherence: none; uses medication box at home  Past hx of medication use/intolerance: ramipril ( - )  metoprolol tartrate (alternate therapy)  Affordability: traditional Medicare + supplemental + Part D. Spouse and herself are on fixed SS + pension income ~ 42,000/year. Submitted PAP application for [employer]       Diet: Will defer in-depth conversation for future appointment, however the following is a baseline of patients diet.   Breakfast: special K bar / no appetite first thing in the morning; frequently eats egg and toast w/ butter   Lunch: chicken salad sandwich + low sodium chips   Dinner: chicken  "casserole + peas    Drinks: sparkling water 1 bottle/day and water with eating -> does not consume a lot of water will  on fluid intake at future visit   Patient reports high usage of canned food. Will  on sodium restrictions at future visit     Social History:  Tobacco use: quit smoking in 1999 (after first MI)   EtOH use: none  Illicit drug use: none  Exercise: not enough strength       Immunization Status:  Pneumococcal: PPSV23 (2011) and PCV 13 (2016)   Influenza: obtains annually   COVID-19:  received Pfizer x 2 in 01/2021, 02/2021     OBJECTIVE:    /78 (BP Location: Right arm, Patient Position: Sitting)   Pulse 88   Resp 20   Ht 165.1 cm (65\")   Wt 85.3 kg (188 lb)   SpO2 94%   BMI 31.28 kg/m²     Body mass index is 31.28 kg/m².  Wt Readings from Last 1 Encounters:   07/20/21 85.3 kg (188 lb)       Home BP Readings:  Has a wrist BP cuff does not take daily. Will defer in-depth discussion on monitoring for future visit   HR  : Patient has a pulse ox she uses to measure her HR; recommended patient take manual pulse rate for greater accurary  Daily Weights:  Pt currently weighs herself every morning; encouraged pt to keep a log to bring to next visit     Lab Review:  Renal Function: Estimated Creatinine Clearance: 57.3 mL/min (by C-G formula based on SCr of 0.83 mg/dL).    Lab Results   Component Value Date    PROBNP 1,255.0 07/20/2021           6 MINUTE WALK: defer in depth evaluation for further visit                   ASSESSMENT/PLAN OF CARE:    1. HFrEF (EF ~ 32% - Last ECHO on 7/8/21)    · Patient presents today with complaint of fatigue, SOB on exertion, and general weakness. Patient was started on several new medications over the past few weeks due to new A.fib and reduced EF.   · Spoke to pt and spouse in length about what to expect over the next few visits as we get pt on GDMT and optimize therapy to target doses.      · Start:   · Entresto 24-26 mg BID - counseled on " MOA/dosing/ADRs/Monitoring. Instructed patient to start tomorrow since already took losartan this morning. BP is consistently < 130/80. Provided 30 day free trial coupon card (See end of note for details)   · Bumetanide 1 mg daily - counseled on MOA/dosing/ADRs/Monitoring. Increased bioavailability compared to furosemide. Patient up 4 lbs since 7/12/21 will assess pt's daily weight log at follow up visit   · Metoprolol Succinate 50 mg tab -  counseled on MOA/dosing/ADRs/Monitoring. Patient previously on metoprolol tartrate and carvedilol, explained benefit in HF and goal of HR in 50-60s, due to selectivity hope to provide BP room for Entresto titration  · Stop:   · Carvedilol 12.5 mg BID - Due to A.fb want greater aid in rhythm control and provide room for Entresto titration  · Losartan 25 mg daily - started ARNI ; BP is consistently < 130/80   · Furosemide 40 mg daily - Patient up 4 pounds, symptomatic from volume overload, switch to agent with better oral bioavailability   · Continue:    · Spironolactone 25 mg ( 0.5 tablet) daily   · apixaban 5 mg BID   · Digoxin 125 mcg MWF  -  will assess level today along with K+,Mg+ -> due to correlation of rate control and patient's HF    · Atorvastatin 80 mg daily - last lipid panel was in 1/2020. Repeat ordered for today, pt is indicated for high intensity for secondary prevention    · Plan to initiate SGLT2 inhibitor at future visit to get all  GDMT on board. A1C checked with labs today. No hx of diabetes diagnosis. Will assess at future visit if renal function, BP, and glucose levels can tolerate.   · Completed review of patients home medications and updated chart as indicated  · Reviewed diet recommendations including limiting sodium intake to <2000 mg/day. Discussed reading nutrition labels and reviewed the [place].  · Discussed exercise recommendations including 150 minutes of moderate exercise per week  · Advised patient to call clinic with 2-3 lb weight gain in 24  hrs or >5 lb weight gain in 1 week         Thank you for allowing me to participate in the care of your patient,    30-day ENTRESTO Free Trial Card - Provided to home pharmacy and patient:   ID: [id number]  BIN: [id number]  GRP: [id number]  PCN: [id number] Salas, PharmD   PGY-1 Pharmacy Resident    Phone #: [phone]    [facility] HEART FAILURE  07/20/21  10:11 EDT

## 2021-07-20 NOTE — TELEPHONE ENCOUNTER
Lab results reviewed the patient.  No change to current plan of care.  All questions and concerns addressed at this time.  Understanding and agreement verbalized.    JOHNNIE Mccray

## 2021-07-20 NOTE — PROGRESS NOTES
[facility] HEART FAILURE      Patient Name: Alice Mabry  :1940  Age: 80 y.o.  Sex: female  Referring Provider: Murphy Horner MD   Primary Cardiologist: Murphy Horner MD   Primary Care Provider: Tho Mar MD - [place]  Encounter Provider: Valarie Salas, PharmD      Chief Complaint:   Chief Complaint   Patient presents with   • Congestive Heart Failure       HPI:  Pt presents today to [facility] for initial evaluation after recent hospitalization stay on 21 to 21 due to A.fib with RVR and acute decompensation. Patient is up 4 pounds from discharge, with no lower extremity edema but endorses SOB on exertion, fatigue and generalized weakness. ECHO completed on 21 showed an EF of 32%. Pt has a history significant for A.fib, CAD, HLD, HTN, Hx of MI in  and  (w/ stent placement in ) mitral valve stenosis, syncope. Per Dr. Horner concern that HFrEF is a result of ventricular tachycardia.        Current Regimen:  Heart Failure  · Losartan 25 mg -  started   · Carvedilol 12.5 mg BID - started   · spironolactone 25 mg tab (0.5 tab) - started   · Furosemide 40 mg daily - dose increase from 20 mg prn for weight gain > 3 lbs     Other CV Meds  · Digoxin 125 mg MWF    · Atorvastatin 80 mg   · apixaban 5 mg BID    · Ranexa 500 mg Q12H      Medication Use:  Adherence: none; uses medication box at home  Past hx of medication use/intolerance: ramipril ( - )  metoprolol tartrate (alternate therapy)  Affordability: traditional Medicare + supplemental + Part D. Spouse and herself are on fixed SS + pension income ~ 42,000/year. Submitted PAP application for [employer]       Diet: Will defer in-depth conversation for future appointment, however the following is a baseline of patients diet.   Breakfast: special K bar / no appetite first thing in the morning; frequently eats egg and toast w/ butter   Lunch: chicken salad sandwich + low sodium chips   Dinner: chicken  "casserole + peas    Drinks: sparkling water 1 bottle/day and water with eating -> does not consume a lot of water will  on fluid intake at future visit   Patient reports high usage of canned food. Will  on sodium restrictions at future visit     Social History:  Tobacco use: quit smoking in 1999 (after first MI)   EtOH use: none  Illicit drug use: none  Exercise: not enough strength       Immunization Status:  Pneumococcal: PPSV23 (2011) and PCV 13 (2016)   Influenza: obtains annually   COVID-19:  received Pfizer x 2 in 01/2021, 02/2021     OBJECTIVE:    /78 (BP Location: Right arm, Patient Position: Sitting)   Pulse 88   Resp 20   Ht 165.1 cm (65\")   Wt 85.3 kg (188 lb)   SpO2 94%   BMI 31.28 kg/m²     Body mass index is 31.28 kg/m².  Wt Readings from Last 1 Encounters:   07/20/21 85.3 kg (188 lb)       Home BP Readings:  Has a wrist BP cuff does not take daily. Will defer in-depth discussion on monitoring for future visit   HR  : Patient has a pulse ox she uses to measure her HR; recommended patient take manual pulse rate for greater accurary  Daily Weights:  Pt currently weighs herself every morning; encouraged pt to keep a log to bring to next visit     Lab Review:  Renal Function: Estimated Creatinine Clearance: 58.7 mL/min (by C-G formula based on SCr of 0.81 mg/dL).    Lab Results   Component Value Date    PROBNP 1,404.0 07/08/2021           6 MINUTE WALK: defer in depth evaluation for further visit                   ASSESSMENT/PLAN OF CARE:    1. HFrEF (EF ~ 32% - Last ECHO on 7/8/21)    · Patient presents today with complaint of fatigue, SOB on exertion, and general weakness. Patient was started on several new medications over the past few weeks due to new A.fib and reduced EF.   · Spoke to pt and spouse in length about what to expect over the next few visits as we get pt on GDMT and optimize therapy to target doses.      · Start:   · Entresto 24-26 mg BID - counseled on " MOA/dosing/ADRs/Monitoring. Instructed patient to start tomorrow since already took losartan this morning. BP is consistently < 130/80. Provided 30 day free trial coupon card (See end of note for details)   · Bumetanide 1 mg daily - counseled on MOA/dosing/ADRs/Monitoring. Increased bioavailability compared to furosemide. Patient up 4 lbs since 7/12/21 will assess pt's daily weight log at follow up visit   · Metoprolol Succinate 50 mg tab -  counseled on MOA/dosing/ADRs/Monitoring. Patient previously on metoprolol tartrate and carvedilol, explained benefit in HF and goal of HR in 50-60s, due to selectivity hope to provide BP room for Entresto titration  · Stop:   · Carvedilol 12.5 mg BID - Due to A.fb want greater aid in rhythm control and provide room for Entresto titration  · Losartan 25 mg daily - started ARNI ; BP is consistently < 130/80   · Furosemide 40 mg daily - Patient up 4 pounds, symptomatic from volume overload, switch to agent with better oral bioavailability   · Continue:    · Spironolactone 25 mg ( 0.5 tablet) daily   · apixaban 5 mg BID   · Digoxin 125 mcg MWF  -  will assess level today along with K+,Mg+ -> due to correlation of rate control and patient's HF    · Atorvastatin 80 mg daily - last lipid panel was in 1/2020. Repeat ordered for today, pt is indicated for high intensity for secondary prevention    · Plan to initiate SGLT2 inhibitor at future visit to get all  GDMT on board. A1C checked with labs today. No hx of diabetes diagnosis. Will assess at future visit if renal function, BP, and glucose levels can tolerate.   · Completed review of patients home medications and updated chart as indicated  · Reviewed diet recommendations including limiting sodium intake to <2000 mg/day. Discussed reading nutrition labels and reviewed the [place].  · Discussed exercise recommendations including 150 minutes of moderate exercise per week  · Advised patient to call clinic with 2-3 lb weight gain in 24  hrs or >5 lb weight gain in 1 week         Thank you for allowing me to participate in the care of your patient,         Valarie Salas, PharmD   PGY-1 Pharmacy Resident    Phone #: [phone]    [facility] HEART FAILURE  07/20/21  10:11 EDT

## 2021-07-22 ENCOUNTER — OFFICE VISIT (OUTPATIENT)
Dept: CARDIOLOGY | Facility: CLINIC | Age: 81
End: 2021-07-22

## 2021-07-22 VITALS
OXYGEN SATURATION: 98 % | SYSTOLIC BLOOD PRESSURE: 100 MMHG | HEART RATE: 84 BPM | DIASTOLIC BLOOD PRESSURE: 78 MMHG | WEIGHT: 184 LBS | HEIGHT: 65 IN | BODY MASS INDEX: 30.66 KG/M2

## 2021-07-22 DIAGNOSIS — I25.10 CORONARY ARTERY DISEASE INVOLVING NATIVE CORONARY ARTERY OF NATIVE HEART WITHOUT ANGINA PECTORIS: ICD-10-CM

## 2021-07-22 DIAGNOSIS — I34.0 MITRAL VALVE INSUFFICIENCY, UNSPECIFIED ETIOLOGY: ICD-10-CM

## 2021-07-22 DIAGNOSIS — I50.42 CHRONIC COMBINED SYSTOLIC AND DIASTOLIC CONGESTIVE HEART FAILURE (HCC): Primary | ICD-10-CM

## 2021-07-22 DIAGNOSIS — I48.0 PAROXYSMAL ATRIAL FIBRILLATION (HCC): ICD-10-CM

## 2021-07-22 PROCEDURE — 93000 ELECTROCARDIOGRAM COMPLETE: CPT | Performed by: NURSE PRACTITIONER

## 2021-07-22 PROCEDURE — 99214 OFFICE O/P EST MOD 30 MIN: CPT | Performed by: NURSE PRACTITIONER

## 2021-07-22 NOTE — PROGRESS NOTES
CARDIOLOGY        Patient Name: Alice Mabry  :1940  Age: 80 y.o.  Primary Cardiologist: Murphy Horner MD  Encounter Provider:  JOHNNIE Jasmine    Date of Service: 21          CHIEF COMPLAINT / REASON FOR OFFICE VISIT     Congestive Heart Failure (1 wk f/u ) and Coronary Artery Disease      HISTORY OF PRESENT ILLNESS       HPI  Alice Mabry is a 80 y.o. female who presents today for hospital follow-up.     Pt has a  history significant for CAD, CHF, atrial fibrillation, diastolic heart failure.    Review of hospital medical records reveals that patient was hospitalized -2021.  Patient with history of CAD (SP inferior MI  and stent placement to RCA and left circumflex, and in  RAJ was placed to an RCA for restenosis), hypertension, hyperlipidemia, aortic valve stenosis, mitral valve stenosis and syncope.  On last heart catheterization she had residual coronary disease and 3 diagonals that were felt to be too small to intervene on.  Patient was admitted on  for dyspnea and palpitations where she was found to be in atrial fibrillation with RVR and acute systolic heart failure exacerbation.  LVEF 30% secondary to tachycardia.  Patient was started on carvedilol, losartan, low-dose spironolactone, digoxin 3 times weekly and apixaban.  Patient now stable for discharge.  She will follow-up with JOHNNIE Saez at CHF clinic in 1 week.    Patient was seen in the [facility] and medications were adjusted.  She was transitioned from lasix to Bumex and Losartan to Entresto.  Her metoprolol succinate dose was also increased.     Patient states that she is fairly well.  She does reports that any amount of exertion causes her to feel some shortness of breath as well as episodes of tachycardia.  She states that at times she feels her heart rate is too fast, however she feels that this may have improved since transitioning to metoprolol succinate.  She  "reports that she has had intermittent episodes of lightheadedness.  Reports some mild but stable generalized fatigue.  States that she is currently feeling well on her current regimen of Bumex, Entresto, metoprolol succinate.    The following portions of the patient's history were reviewed and updated as appropriate: allergies, current medications, past family history, past medical history, past social history, past surgical history and problem list.      VITAL SIGNS     Visit Vitals  /78 (BP Location: Left arm, Patient Position: Sitting, Cuff Size: Adult)   Pulse 84   Ht 165.1 cm (65\")   Wt 83.5 kg (184 lb)   SpO2 98%   BMI 30.62 kg/m²         Wt Readings from Last 3 Encounters:   07/22/21 83.5 kg (184 lb)   07/20/21 85.3 kg (188 lb)   07/12/21 83.6 kg (184 lb 3.2 oz)     Body mass index is 30.62 kg/m².      REVIEW OF SYSTEMS   Review of Systems   Constitutional: Positive for malaise/fatigue. Negative for chills, fever, weight gain and weight loss.   Cardiovascular: Positive for dyspnea on exertion and irregular heartbeat. Negative for leg swelling.   Respiratory: Positive for shortness of breath. Negative for cough, snoring and wheezing.    Hematologic/Lymphatic: Negative for bleeding problem. Does not bruise/bleed easily.   Skin: Negative for color change.   Musculoskeletal: Negative for falls, joint pain and myalgias.   Gastrointestinal: Negative for melena.   Genitourinary: Negative for hematuria.   Neurological: Negative for excessive daytime sleepiness.   Psychiatric/Behavioral: Negative for depression. The patient is not nervous/anxious.            PHYSICAL EXAMINATION     Vitals and nursing note reviewed.   Constitutional:       Appearance: Normal appearance. Well-developed.   Eyes:      Conjunctiva/sclera: Conjunctivae normal.   Neck:      Vascular: No carotid bruit.   Pulmonary:      Breath sounds: Normal breath sounds.   Cardiovascular:      Normal rate. Irregularly irregular rhythm. Normal S1 with " normal intensity. Normal S2 with normal intensity.      Murmurs: There is no murmur.      No gallop. No click. No rub.   Musculoskeletal: Normal range of motion. Skin:     General: Skin is warm and dry.   Neurological:      Mental Status: Alert and oriented to person, place, and time.      GCS: GCS eye subscore is 4. GCS verbal subscore is 5. GCS motor subscore is 6.   Psychiatric:         Speech: Speech normal.         Behavior: Behavior normal.         Thought Content: Thought content normal.         Judgment: Judgment normal.           REVIEWED DATA       ECG 12 Lead    Date/Time: 7/22/2021 2:45 PM  Performed by: Shirley Watson APRN  Authorized by: Shirley Watson APRN   Comparison: compared with previous ECG from 7/9/2021  Rhythm: atrial fibrillation  Rate: normal  BPM: 86  Conduction: conduction normal  QRS axis: normal  Other findings: non-specific ST-T wave changes    Clinical impression: abnormal EKG            Cardiac Procedures:  1. Echocardiogram 7/8/2021.  LVEF 31-35%.  Normal LV cavity size noted.  Moderate septal asymmetric hypertrophy.  Global hypokinesis.  Diastolic function was indeterminate.  RV cavity is moderately dilated.  Mildly reduced RV systolic function.  Moderate calcification of aortic valve.  Moderate aortic valve regurgitation.  Mild to moderate aortic valve stenosis.  Calcification of mitral valve.  Severe MAC.  Centrally directed jet noted.    Lipid Panel    Lipid Panel 7/20/21   Total Cholesterol 105   Triglycerides 134   HDL Cholesterol 31 (A)   VLDL Cholesterol 24   LDL Cholesterol  50   LDL/HDL Ratio 1.52   (A) Abnormal value                ASSESSMENT & PLAN      Diagnosis Plan   1. Chronic combined systolic and diastolic congestive heart failure (CMS/HCC)     2. Paroxysmal atrial fibrillation (CMS/HCC)     3. Coronary artery disease involving native coronary artery of native heart without angina pectoris     4. Mitral valve insufficiency, unspecified etiology            SUMMARY/DISCUSSION  1. Combined heart failure.  Patient with recent diagnosis of combined heart failure.  She is currently doing well on her current medication regimen.  She states she will get short of breath and sometimes have tachycardic episodes with mild exertion.  Reports generalized fatigue.  Appears euvolemic on exam today.  Patient is monitoring her weight and reports that it is at baseline.  She also follows the [facility].  She will continue metoprolol succinate 50 mg/day, Entresto 24/26 mg twice per day, Bumex 1 mg/day, spironolactone 12.5 mg/day.  She has a follow-up appointment with JOHNNIE Saez 7/27/2021.  2. PAF.  Heart rate is currently controlled in the 80s.  Patient will continue metoprolol succinate 50 mg/day, digoxin 125 mcg 3 times per week.  She is anticoagulated with apixaban and denies any bleeding complications.  3. CAD.  Denies angina, dyspnea.  Continue GDMT with atorvastatin, beta-blocker, ARNI.  4. Mitral valve insufficiency  5. Keep your previously scheduled appointment with Dr. Horner 8/17/2021.        MEDICATIONS         Discharge Medications          Accurate as of July 22, 2021  2:43 PM. If you have any questions, ask your nurse or doctor.            Continue These Medications      Instructions Start Date   Alive Once Daily Womens 50+ tablet tablet   1 tablet, Oral, Daily      apixaban 5 MG tablet tablet  Commonly known as: ELIQUIS   5 mg, Oral, Every 12 Hours Scheduled      atorvastatin 80 MG tablet  Commonly known as: LIPITOR   80 mg, Oral, Nightly      bumetanide 1 MG tablet  Commonly known as: BUMEX   1 mg, Oral, Daily      digoxin 125 MCG tablet  Commonly known as: LANOXIN   125 mcg, Oral, 3 Times Weekly      Entresto 24-26 MG tablet  Generic drug: sacubitril-valsartan   1 tablet, Oral, 2 Times Daily      esomeprazole 40 MG capsule  Commonly known as: nexIUM   40 mg, Oral, Every Morning Before Breakfast      FLUoxetine 20 MG capsule  Commonly  known as: PROzac   20 mg, Oral, Daily      isosorbide mononitrate 60 MG 24 hr tablet  Commonly known as: IMDUR   60 mg, Oral, Daily      LORazepam 1 MG tablet  Commonly known as: ATIVAN   1 mg, Oral, Every 8 Hours PRN      metoprolol succinate XL 50 MG 24 hr tablet  Commonly known as: TOPROL-XL   50 mg, Oral, Daily      nitroglycerin 0.4 MG SL tablet  Commonly known as: NITROSTAT   0.4 mg, Sublingual, Every 5 Minutes PRN, Take no more than 3 doses in 15 minutes.       ranolazine 500 MG 12 hr tablet  Commonly known as: RANEXA   TAKE 1 TABLET EVERY 12     HOURS      spironolactone 25 MG tablet  Commonly known as: ALDACTONE   12.5 mg, Oral, Daily      VITAMIN B-12 CR PO   Oral, Daily      vitamin C 500 MG tablet  Commonly known as: ASCORBIC ACID   500 mg, Oral, Daily      Vitamin D (Ergocalciferol) 50 MCG (2000 UT) capsule   2,000 Units, Oral, Daily                 **Dragon Disclaimer:   Much of this encounter note is an electronic transcription/translation of spoken language to printed text. The electronic translation of spoken language may permit erroneous, or at times, nonsensical words or phrases to be inadvertently transcribed. Although I have reviewed the note for such errors, some may still exist.

## 2021-07-27 ENCOUNTER — HOSPITAL ENCOUNTER (OUTPATIENT)
Dept: CARDIOLOGY | Facility: HOSPITAL | Age: 81
Discharge: HOME | End: 2021-07-27
Admitting: NURSE PRACTITIONER

## 2021-07-27 ENCOUNTER — TELEPHONE (OUTPATIENT)
Dept: CARDIOLOGY | Facility: CLINIC | Age: 81
End: 2021-07-27

## 2021-07-27 VITALS
SYSTOLIC BLOOD PRESSURE: 128 MMHG | RESPIRATION RATE: 20 BRPM | DIASTOLIC BLOOD PRESSURE: 72 MMHG | OXYGEN SATURATION: 93 % | WEIGHT: 185 LBS | HEIGHT: 65 IN | HEART RATE: 81 BPM | BODY MASS INDEX: 30.82 KG/M2

## 2021-07-27 DIAGNOSIS — I48.0 PAROXYSMAL ATRIAL FIBRILLATION (HCC): ICD-10-CM

## 2021-07-27 DIAGNOSIS — I25.5 ISCHEMIC CARDIOMYOPATHY: ICD-10-CM

## 2021-07-27 DIAGNOSIS — I50.32 CHRONIC DIASTOLIC (CONGESTIVE) HEART FAILURE (HCC): Primary | ICD-10-CM

## 2021-07-27 DIAGNOSIS — I25.10 CORONARY ARTERY DISEASE INVOLVING NATIVE CORONARY ARTERY OF NATIVE HEART WITHOUT ANGINA PECTORIS: ICD-10-CM

## 2021-07-27 DIAGNOSIS — I50.20 HFREF (HEART FAILURE WITH REDUCED EJECTION FRACTION) (HCC): ICD-10-CM

## 2021-07-27 PROBLEM — I20.89 STABLE ANGINA PECTORIS: Status: RESOLVED | Noted: 2021-07-06 | Resolved: 2021-07-27

## 2021-07-27 PROBLEM — I20.8 STABLE ANGINA PECTORIS: Status: RESOLVED | Noted: 2021-07-06 | Resolved: 2021-07-27

## 2021-07-27 PROBLEM — I50.31 ACUTE DIASTOLIC CONGESTIVE HEART FAILURE (HCC): Status: RESOLVED | Noted: 2021-07-08 | Resolved: 2021-07-27

## 2021-07-27 PROBLEM — Z95.5 HISTORY OF CORONARY ARTERY STENT PLACEMENT: Status: RESOLVED | Noted: 2017-07-12 | Resolved: 2021-07-27

## 2021-07-27 LAB
ANION GAP SERPL CALCULATED.3IONS-SCNC: 9.8 MMOL/L (ref 5–15)
BUN SERPL-MCNC: 10 MG/DL (ref 8–23)
BUN/CREAT SERPL: 11.1 (ref 7–25)
CALCIUM SPEC-SCNC: 9.4 MG/DL (ref 8.6–10.5)
CHLORIDE SERPL-SCNC: 102 MMOL/L (ref 98–107)
CO2 SERPL-SCNC: 25.2 MMOL/L (ref 22–29)
CREAT SERPL-MCNC: 0.9 MG/DL (ref 0.57–1)
GFR SERPL CREATININE-BSD FRML MDRD: 60 ML/MIN/1.73
GLUCOSE SERPL-MCNC: 147 MG/DL (ref 65–99)
POTASSIUM SERPL-SCNC: 4.3 MMOL/L (ref 3.5–5.2)
SODIUM SERPL-SCNC: 137 MMOL/L (ref 136–145)

## 2021-07-27 PROCEDURE — 99214 OFFICE O/P EST MOD 30 MIN: CPT | Performed by: NURSE PRACTITIONER

## 2021-07-27 PROCEDURE — 80048 BASIC METABOLIC PNL TOTAL CA: CPT

## 2021-07-27 PROCEDURE — G0463 HOSPITAL OUTPT CLINIC VISIT: HCPCS

## 2021-07-27 RX ORDER — ACETAMINOPHEN/DIPHENHYDRAMINE 500MG-25MG
2 TABLET ORAL NIGHTLY PRN
COMMUNITY
End: 2023-02-22 | Stop reason: ALTCHOICE

## 2021-07-27 RX ORDER — EMPAGLIFLOZIN 10 MG/1
10 TABLET, FILM COATED ORAL DAILY
Qty: 14 TABLET | Refills: 0 | Status: ON HOLD | OUTPATIENT
Start: 2021-07-27 | End: 2022-03-21

## 2021-07-27 RX ORDER — METOPROLOL SUCCINATE 100 MG/1
100 TABLET, EXTENDED RELEASE ORAL NIGHTLY
Qty: 90 TABLET | Refills: 1 | Status: SHIPPED | OUTPATIENT
Start: 2021-07-27 | End: 2021-09-08

## 2021-07-27 RX ORDER — PHENOL 1.4 %
10 AEROSOL, SPRAY (ML) MUCOUS MEMBRANE NIGHTLY
COMMUNITY

## 2021-07-27 NOTE — PROGRESS NOTES
\[facility]\"" HEART FAILURE      Patient Name: Alice Mabry  :1940  Age: 80 y.o.  Sex: female  Referring Provider: Murphy Horner MD   Primary Cardiologist: Murphy Horner MD   Primary Care Provider: Tho Mar MD - [place]  Encounter Provider: Valarie Salas, PharmD      Chief Complaint:   Chief Complaint   Patient presents with   • Congestive Heart Failure       HPI:  Pt presents today with  for follow up appointment.  No new HF symptoms today, main complaint is fatigue. Pt has a history significant for HFrEF (EF 32%) caused by ventricular tachycardia, A.fib, CAD, HLD, HTN, Hx of MI in  and  (w/ stent placement in ) mitral valve stenosis, syncope.  Recent hospitalization stay on 21 to 21 due to A.fib with RVR and acute decompensation. At initial evaluation, pt was up 4 pounds since discharge, today pt presents euvolemic with stable weight. Current goal is to get patient on all GDMT and at target doses as tolerated.     Current Regimen:  Heart Failure  · Entresto 24-26 mg BID   · Bumetanide 1 mg daily   · Metoprolol Succinate 50 mg tab daily     Other CV Meds  · Digoxin 125 mg MWF    · Atorvastatin 80 mg   · apixaban 5 mg BID    · Ranexa 500 mg Q12H      Medication Use:  Adherence: none; uses medication box at home  Past hx of medication use/intolerance: ramipril (confusion - 2012)  metoprolol tartrate (alternate therapy)  furosemide (bioavailability)   Affordability: traditional Medicare + supplemental + Part D. Spouse and herself are on fixed SS + pension income ~ 42,000/year.  PAP application for Entresto approved - 2021  submitted application for Jardiance    Diet: Will defer in-depth conversation for future appointment, however the following is a baseline of patients diet.   Breakfast: special K bar / no appetite first thing in the morning; frequently eats egg and toast w/ butter   Lunch: chicken salad sandwich + low sodium chips   Dinner: chicken  "casserole + peas    Drinks: sparkling water 1 bottle/day and water with eating -> does not consume a lot of water will  on fluid intake at future visit   Patient reports high usage of canned food. Will  on sodium restrictions at future visit     Social History:  Tobacco use: quit smoking in 1999 (after first MI)   EtOH use: none  Illicit drug use: none  Exercise: not enough strength at this time. Will assess at future appointment       Immunization Status:  Pneumococcal: PPSV23 (2011) and PCV 13 (2016)   Influenza: obtains annually   COVID-19:  received Pfizer x 2 in 01/2021, 02/2021     OBJECTIVE:    /72 (BP Location: Right arm, Patient Position: Sitting)   Pulse 81   Resp 20   Ht 165.1 cm (65\")   Wt 83.9 kg (185 lb)   SpO2 93%   BMI 30.79 kg/m²     Body mass index is 30.79 kg/m².  Wt Readings from Last 1 Encounters:   07/27/21 83.9 kg (185 lb)       Home BP Readings:  Got a new BP cuff; Home BP have been stable - SBP around 120s    HR  : Keeping a log currently with HR   Daily Weights:  Pt currently weighs herself every morning; encouraged pt to keep a log to bring to next visit     Lab Review:  Renal Function: Estimated Creatinine Clearance: 56.9 mL/min (by C-G formula based on SCr of 0.83 mg/dL).    Lab Results   Component Value Date    PROBNP 1,255.0 07/20/2021           6 MINUTE WALK: defer in depth evaluation for further visit           ASSESSMENT/PLAN OF CARE:    1. HFrEF (EF ~ 32% - Last ECHO on 7/8/21)    · Patient presents today with complaint of fatigue, suspect related to start of metoprolol. Pt was euvolemic, with no new HF symptoms at this time. Pt also endorsed being able to independently walk to the clinic from the car today, which previously was a limitation   · Spoke to pt and spouse in length about what to expect over the next few visits as we get pt on GDMT and optimize therapy to target doses.      · Continue:   · Entresto 24-26 mg BID - Provided 30 day free trial " coupon card at last visit. Home pharmacy did not accurately bill, pt paid a $35 co-pay. Informed pt she was approved for PAP through [employer], provided pt with samples this visit while awaiting prescription delivery   · Bumetanide 1 mg daily   · Spironolactone 25 mg ( 0.5 tablet) daily   · apixaban 5 mg BID   · Digoxin 125 mcg MWF  -  will assess level today along with K+,Mg+ -> due to correlation of rate control and patient's HF    · Atorvastatin 80 mg daily   · Start:   · Jardiance 10 mg daily - counseled pt on MOA/dosing/administration/ADRs/Monitoring. A1C 5.9, no history of diabetes. Provided pt and pharmacy with 14-day free trial card (See details below), advised patient if there is any cost to call the clinic. Submitted PAP application to  for medication.   · Metoprolol succinate 100 mg Nightly - Pt previously on tartrate, switched to succinate at last visit at 50 mg. Pt's HR in the 80s and pt complains of feeling heart flutter / 'flip flopping'. Due to current complaint of fatigue encouraged pt to take medication at bedtime. Counseled pt that it is common to experience fatigue with medication but it should subside.     · Completed review of patients home medications and updated chart as indicated  · Reviewed diet recommendations including limiting sodium intake to <2000 mg/day.   · Advised patient to call clinic with 2-3 lb weight gain in 24 hrs or >5 lb weight gain in 1 week         Thank you for allowing me to participate in the care of your patient,      Jardiance free trial 14 days    ID: [id number]   BIN: [id number]  GRP: [id number]   PCN: [id number]     Noemí WhitlockD   PGY-1 Pharmacy Resident    Phone #: [phone]    [facility] HEART FAILURE  07/27/21  10:11 EDT

## 2021-07-27 NOTE — PROGRESS NOTES
[facility] HEART FAILURE      Patient Name: Alice Mabry  :1940  Age: 80 y.o.  Sex: female  Referring Provider: Murphy Horner MD   Primary Cardiologist: Murphy Horner MD  Encounter Provider:  JOHNNIE Mccray      Chief Complaint:   Chief Complaint   Patient presents with   • Congestive Heart Failure         History of Present Illness this 80-year-old female, known to this provider, comes today for further evaluation regarding her chronic systolic heart failure.  Current diagnoses to include paroxysmal atrial fibrillation, coronary artery disease, history of cervical cancer, depression, fatty liver, GERD, hyperlipidemia, hypertension, IBS, prior myocardial infarction, obesity, and sinus bradycardia.    6/10/2014 she underwent cardiac catheterization with PCI of RCA, RAJ placement noted at that time.  LVEF prior to cardiac cath noted to be 30%.  Post PCI her echocardiogram repeated 90 days later showed improvement to 50%.    Stress testing in 2015 revealed an EF of 45% with no reversible ischemia noted.  Repeat echocardiogram 2016 showed further improvement of her LVEF to 60%.    In  she underwent repeat cardiac catheterization and it was found that she had in-stent restenosis of her RCA, this was stented again; it was also noted that she had 3 diagonals with significant disease that were too small for intervention.    Echocardiogram 2019 revealed normal LVEF of 58% with moderate MR and aortic stenosis with a mean gradient of 14 mmHg and PILAR of 1.3 cm².     she was seen by JOHNNIE Lord and at that time had, per chart review, complains of shortness of breath with racing palpitations.  She also had complained at that visit of a heavy chest sensation that was worsening.  She was found to be in atrial fibrillation with RVR on ECG in office that day, rate as high as 120s with hypertension noted.  Metoprolol tartrate was started at that  point as well as apixaban.  On July 8, 2021 she was noted to be very short of breath via telephone call to primary cardiologist and she was referred to the emergency department.  She was admitted from July 8 to July 12, 2021 with acute exacerbation of her diastolic heart failure.  At that time Dr. Horner felt she had likely been in atrial fibrillation with RVR, on noted for likely a month or 2, and this is what led to a tachycardia mediated cardiomyopathy now with LV C dilation.  Her MR was noted to have worsened and is now severe.  Repeat echocardiogram done during admission yielded an LVEF of 31 to 35%.  Repeat ischemic work-up is still pending.    On 7/20/2021 losartan, carvedilol, and Lasix were discontinued, Entresto, metoprolol succinate, and Bumex were started.    She is currently feeling a bit better with the med changes.  She still has dyspnea on exertion, but this is now stable per her baseline.  She does state that she feels a bit more energetic and was actually able to walk in here for the first time today.  She does still have intermittent palpitations.      The following portions of the patient's history were reviewed and updated as appropriate: allergies, current medications, past family history, past medical history, past social history, past surgical history and problem list.    Current Outpatient Medications   Medication Sig Dispense Refill   • apixaban (ELIQUIS) 5 MG tablet tablet Take 1 tablet by mouth Every 12 (Twelve) Hours. 60 tablet 11   • atorvastatin (LIPITOR) 80 MG tablet Take 80 mg by mouth Every Night.     • bumetanide (BUMEX) 1 MG tablet Take 1 tablet by mouth Daily. 30 tablet 1   • Cyanocobalamin (VITAMIN B-12 CR PO) Take  by mouth Daily.     • digoxin (LANOXIN) 125 MCG tablet Take 1 tablet by mouth 3 (Three) Times a Week. 30 tablet 11   • esomeprazole (nexIUM) 40 MG capsule Take 40 mg by mouth Every Morning Before Breakfast.     • FLUoxetine (PROzac) 20 MG capsule Take 20 mg by mouth  daily.     • isosorbide mononitrate (IMDUR) 60 MG 24 hr tablet Take 1 tablet by mouth Daily. 30 tablet 11   • LORazepam (ATIVAN) 1 MG tablet Take 1 mg by mouth every 8 (eight) hours as needed for anxiety.     • metoprolol succinate XL (TOPROL-XL) 50 MG 24 hr tablet Take 1 tablet by mouth Daily. 30 tablet 1   • multivitamin with minerals (Alive Once Daily Womens 50+) tablet tablet Take 1 tablet by mouth Daily.     • nitroglycerin (NITROSTAT) 0.4 MG SL tablet Place 1 tablet under the tongue Every 5 (Five) Minutes As Needed for Chest Pain. Take no more than 3 doses in 15 minutes. 100 tablet 3   • ranolazine (RANEXA) 500 MG 12 hr tablet TAKE 1 TABLET EVERY 12     HOURS 180 tablet 2   • sacubitril-valsartan (Entresto) 24-26 MG tablet Take 1 tablet by mouth 2 (Two) Times a Day. 60 tablet 0   • spironolactone (ALDACTONE) 25 MG tablet Take 0.5 tablets by mouth Daily. 30 tablet 11   • vitamin C (ASCORBIC ACID) 500 MG tablet Take 500 mg by mouth Daily.     • Vitamin D, Ergocalciferol, 2000 units capsule Take 2,000 Units by mouth Daily.       No current facility-administered medications for this encounter.       Past Medical History:   Diagnosis Date   • Acute diastolic (congestive) heart failure (CMS/HCC)    • Anxiety    • Arthritis    • Atrial fibrillation (CMS/HCC) 07/06/2021   • CAD (coronary artery disease)    • Cervical cancer (CMS/HCC)    • Current tear of meniscus    • Depression    • Dizziness    • Fatty liver    • GERD (gastroesophageal reflux disease)    • H/O blood clots    • H/O Clostridium difficile infection    • Hyperlipidemia    • Hypertension    • IBS (irritable bowel syndrome)    • Myocardial infarction (CMS/HCC)    • Obesity    • Peptic ulcer    • Pneumonia    • Sinus bradycardia    • Syncope    • Vitamin D deficiency        Past Surgical History:   Procedure Laterality Date   • APPENDECTOMY  1960   • BACK SURGERY     • CARDIAC CATHETERIZATION  06/10/2014    Dr. Murphy Horner   • CARDIAC CATHETERIZATION   11/13/2007    Dr. Murphy Horner   • CARDIAC CATHETERIZATION N/A 10/19/2004    Dr. Murphy Horner   • CARDIAC CATHETERIZATION N/A 07/15/2004    Dr. Gregorio Gonzales   • CARDIAC CATHETERIZATION  10/2003    Dr. Murphy Horner   • CHOLECYSTECTOMY  1998   • COLONOSCOPY N/A 07/2001    negative   • COLONOSCOPY N/A 02/28/2012    negative   • CORONARY ANGIOPLASTY WITH STENT PLACEMENT N/A 07/15/2004    Dr. Murphy Horner   • CORONARY ANGIOPLASTY WITH STENT PLACEMENT  03/2002    to RCA   • HYSTERECTOMY  1974   • UPPER GASTROINTESTINAL ENDOSCOPY N/A 04/20/2015    Mild Schatzki ring, hiatus hernia, non-bleeding erosive gastropathy, erythematous mucosa in the antrum, normal duodenum-Dr. Alex Gill       Physical Exam  Vitals and nursing note reviewed.   Constitutional:       Appearance: She is well-developed. She is obese.   HENT:      Head: Normocephalic and atraumatic.   Eyes:      Conjunctiva/sclera: Conjunctivae normal.      Pupils: Pupils are equal, round, and reactive to light.   Neck:      Vascular: No JVD.   Cardiovascular:      Rate and Rhythm: Normal rate. Rhythm irregular.      Heart sounds: Normal heart sounds. No murmur heard.   No friction rub. No gallop.    Pulmonary:      Effort: Pulmonary effort is normal. No respiratory distress.      Breath sounds: Normal breath sounds.   Abdominal:      General: Bowel sounds are normal. There is no distension.      Palpations: Abdomen is soft.   Musculoskeletal:         General: No swelling or deformity.   Skin:     General: Skin is warm and dry.      Capillary Refill: Capillary refill takes less than 2 seconds.   Neurological:      Mental Status: She is alert and oriented to person, place, and time. Mental status is at baseline.   Psychiatric:         Mood and Affect: Mood normal.         Behavior: Behavior normal.          Review of Systems   Constitutional: Negative for fatigue and unexpected weight change.   HENT: Negative for congestion and nosebleeds.    Eyes:  "Negative for photophobia and visual disturbance.   Respiratory: Positive for shortness of breath. Negative for cough and chest tightness.         Improved   Cardiovascular: Positive for palpitations. Negative for chest pain and leg swelling.   Gastrointestinal: Negative for abdominal distention and blood in stool.   Endocrine: Negative for polyphagia and polyuria.   Genitourinary: Positive for frequency. Negative for urgency.   Musculoskeletal: Negative for joint swelling and myalgias.   Skin: Negative for pallor and rash.   Neurological: Negative for dizziness, syncope, weakness, light-headedness, numbness and headaches.   Hematological: Does not bruise/bleed easily.   Psychiatric/Behavioral: Negative for confusion and sleep disturbance.        OBJECTIVE:  /72 (BP Location: Right arm, Patient Position: Sitting)   Pulse 81   Resp 20   Ht 165.1 cm (65\")   Wt 83.9 kg (185 lb)   SpO2 93%   BMI 30.79 kg/m²      Body mass index is 30.79 kg/m².  Wt Readings from Last 1 Encounters:   07/27/21 83.9 kg (185 lb)       Lab Review:  Renal Function: Estimated Creatinine Clearance: 56.9 mL/min (by C-G formula based on SCr of 0.83 mg/dL).    Lab Results   Component Value Date    PROBNP 1,255.0 07/20/2021       Results for orders placed during the hospital encounter of 07/08/21    Adult Transthoracic Echo Complete W/ Cont if Necessary Per Protocol    Interpretation Summary  · Left ventricular ejection fraction appears to be 31 - 35%. Left ventricular systolic function is moderately decreased. Normal left ventricular cavity size noted. Left ventricular wall thickness is consistent with moderate septal asymmetric hypertrophy. There is left ventricular global hypokinesis noted. Left ventricular diastolic function was indeterminate.  · The right ventricular cavity is moderately dilated. Mildly reduced right ventricular systolic function noted.  · There is moderate calcification of the aortic valve. Moderate aortic valve " regurgitation is present. Mild to moderate aortic valve stenosis is present. Peak velocity of the flow distal to the aortic valve is 217 cm/s.  · There is calcification of the mitral valve. Severe mitral valve regurgitation is present with a centrally-directed jet noted. Pulmonary vein flow reversal was not evaluated. Mild mitral valve stenosis is present.      Procedures      6 MINUTE WALK                      Cardiac Procedures:  1.  6/10/2014 Cardiac catheterization  IMPRESSION:  Class III to IV angina on good medical therapy, ischemic  cardiomyopathy.  Prior stents are patent.  Diagonal disease is unchanged.  What  is new is she has developed a new 90% origin right coronary artery lesion. We  are going to proceed on with intervention of that.  I have discussed this with  her and her family beforehand.      Plavix 600 mg was given and heparin 7000 units was given. We exchanged a sheath  in the right radial artery and put a 6-Upper sorbian sheath in. We then brought a  6-JR-4 guide up, but it would not fit into the right coronary artery, and we  switched to a 6-AR-1 guide. A BMW was passed easily into the distal RCA. We  brought a 3.5 x 15 NC trek balloon up and inflated it at 14 atmospheres in the  origin of the RCA.  Then I brought a 4.0 x 15 Xience Xpedition drug-eluting  stent down, and I deployed that at 14 atmospheres. We postdilated that with a  4.0 x 12 NC trek at 20 atmospheres. There was 0% residual and ALIS-3 flow, and  at that point, balloons, wires, and guides were removed. The ACT was 301  seconds. The sheath was removed on the table and an R band was applied.      IMPRESSION:  Successful angioplasty drug-eluting stenting to the origin of the  right coronary artery.      RECOMMENDATIONS:  1.  Percutaneous coronary intervention care.   2.  Continue risk modification.   3.  Home in the morning if she is okay.  4.  Reassess her left ventricular function in 3 months.        Previously trialed  diuretics  Lasix  Bumex      Previously trialed GDMT    Losartan  Carvedilol  Spironolactone  Metoprolol succinate  Entresto    ASSESSMENT:     Diagnosis Plan   1. Chronic diastolic (congestive) heart failure (CMS/HCC)  Basic Metabolic Panel   2. HFrEF (heart failure with reduced ejection fraction) (CMS/HCC)     3. Paroxysmal atrial fibrillation (CMS/HCC)     4. Ischemic cardiomyopathy     5. Coronary artery disease involving native coronary artery of native heart without angina pectoris           PLAN OF CARE:  1.  HFrEF-NYHA class II-III.  Current GDMT to include metoprolol succinate, Entresto, spironolactone, diuresed on Bumex.    She is doing much better on Bumex.  Her energy level is improved, although she does have some fatigue on the metoprolol.  I would like her to take her metoprolol succinate at night, so that the peak affects her while she is sleeping.  At this point I had like to start Jardiance 10 mg daily and we will work on uptitrating her Entresto at her next appointment.  I will check a BMP and BNP today.    Directions for when to call the clinic reviewed with the patient to include weight gain of 2 to 3 pounds in 24 hours, weight gain of 5 to 10 pounds within 7 days; worsening shortness of breath; worsening lower extremity edema or abdominal distention.    2.  Atrial fibrillation-her rate remains in the upper 80s, but she still has complaints of intermittent palpitations.  She remains on digoxin 3 times weekly, and is tolerating the metoprolol succinate well.  I would like to increase her metoprolol succinate 200 mg nightly, so I will have her take an additional 50 mg tonight as she is already taken her dose this morning.    3.  Hypertension-stable and controlled.    4.  CAD-stable with no complaints of angina.  Managed by primary cardiologist.        BMP/BNP today; increase metoprolol succinate to 100 mg nightly; start Jardiance 10 mg daily; follow-up in 2 weeks or sooner if needed      Advance  Care Planning   ACP discussion was held with the patient during this visit. Patient has an advance directive (not in EMR), copy requested.      Thank you for allowing me to participate in the care of your patient,    Written directions provided to patient  • Increase metoprolol to 100 mg, and take this at night.  You will start the new dose TOMORROW (Wednesday) night.  TONIGHT (Tuesday) take another 50 mg at night.    • Start Jardiance 10 mg daily.   • Weigh yourself each morning, as soon as you wake up, in the same amount of clothing each time.  Write these weights down, and do not weigh at any other point during the day.  If your weight goes up 2 pounds within 24 hours, or 5 or more pounds within one week or less, please call the heart failure clinic right away.   • If you have worsening shortness of breath, swelling in your legs or abdomen, develop a dry cough or need more pillows in order to sleep at night, please call the heart failure clinic right away.     JOHNNIE Mccray  [facility] HEART FAILURE  07/27/21  09:20 EDT      **Fadia Disclaimer:**  Much of this encounter note is an electronic transcription/translation of spoken language to printed text. The electronic translation of spoken language may permit erroneous, or at times, nonsensical words or phrases to be inadvertently transcribed. Although I have reviewed the note for such errors, some may still exist.

## 2021-07-28 ENCOUNTER — TELEPHONE (OUTPATIENT)
Dept: CARDIOLOGY | Facility: HOSPITAL | Age: 81
End: 2021-07-28

## 2021-07-28 NOTE — TELEPHONE ENCOUNTER
Received a fax from  [employer] stating patient's application for Jardiance was approved. Patient will now receive for free from the  through the end of the year. Called patient to relay above information. Patient verbalized understanding and had no further questions at this time.    Johanny Wells, PharmD, BCACP  [facility]  Phone: [phone]

## 2021-08-10 ENCOUNTER — HOSPITAL ENCOUNTER (OUTPATIENT)
Dept: CARDIOLOGY | Facility: HOSPITAL | Age: 81
Discharge: HOME | End: 2021-08-10
Admitting: NURSE PRACTITIONER

## 2021-08-10 VITALS
BODY MASS INDEX: 29.92 KG/M2 | SYSTOLIC BLOOD PRESSURE: 116 MMHG | DIASTOLIC BLOOD PRESSURE: 74 MMHG | RESPIRATION RATE: 20 BRPM | OXYGEN SATURATION: 96 % | WEIGHT: 179.6 LBS | HEART RATE: 91 BPM | HEIGHT: 65 IN

## 2021-08-10 DIAGNOSIS — I25.10 CORONARY ARTERY DISEASE INVOLVING NATIVE CORONARY ARTERY OF NATIVE HEART WITHOUT ANGINA PECTORIS: Primary | ICD-10-CM

## 2021-08-10 DIAGNOSIS — Z91.89 CHRONIC CHEST PAIN WITH HIGH RISK FOR CAD: ICD-10-CM

## 2021-08-10 DIAGNOSIS — R07.9 CHRONIC CHEST PAIN WITH HIGH RISK FOR CAD: ICD-10-CM

## 2021-08-10 DIAGNOSIS — I25.5 ISCHEMIC CARDIOMYOPATHY: ICD-10-CM

## 2021-08-10 DIAGNOSIS — I50.32 CHRONIC DIASTOLIC (CONGESTIVE) HEART FAILURE (HCC): ICD-10-CM

## 2021-08-10 DIAGNOSIS — I48.0 PAROXYSMAL ATRIAL FIBRILLATION (HCC): ICD-10-CM

## 2021-08-10 DIAGNOSIS — G89.29 CHRONIC CHEST PAIN WITH HIGH RISK FOR CAD: ICD-10-CM

## 2021-08-10 DIAGNOSIS — R07.2 PRECORDIAL PAIN: ICD-10-CM

## 2021-08-10 DIAGNOSIS — I50.20 HFREF (HEART FAILURE WITH REDUCED EJECTION FRACTION) (HCC): ICD-10-CM

## 2021-08-10 PROCEDURE — G0463 HOSPITAL OUTPT CLINIC VISIT: HCPCS

## 2021-08-10 PROCEDURE — 93005 ELECTROCARDIOGRAM TRACING: CPT

## 2021-08-10 PROCEDURE — 93010 ELECTROCARDIOGRAM REPORT: CPT | Performed by: INTERNAL MEDICINE

## 2021-08-10 PROCEDURE — 99214 OFFICE O/P EST MOD 30 MIN: CPT | Performed by: NURSE PRACTITIONER

## 2021-08-10 NOTE — PROGRESS NOTES
[facility] HEART FAILURE      Patient Name: Alice Mabry  :1940  Age: 80 y.o.  Sex: female  Referring Provider: Murphy Horner MD   Primary Cardiologist: Murphy Horner MD   Primary Care Provider: Tho Mar MD - [place]  Encounter Provider: Noemí PereiraD      Chief Complaint:   Chief Complaint   Patient presents with   • Congestive Heart Failure       HPI:  Pt presents today to [facility] for initial evaluation after recent hospitalization stay on 21 to 21 due to A.fib with RVR and acute decompensation. Patient is up 4 pounds from discharge, with no lower extremity edema but endorses SOB on exertion, fatigue and generalized weakness. ECHO completed on 21 showed an EF of 32%. Pt has a history significant for A.fib, CAD, HLD, HTN, Hx of MI in  and  (w/ stent placement in ) mitral valve stenosis, syncope. Per Dr. Horner concern that HFrEF is a result of ventricular tachycardia.   Patient presents today with complaints of pain in her left arm. EKG done in clinic, shows patient is in afib.       Current Regimen:  Heart Failure  · Entresto 24-26 BID  · Metoprolol succinate 100 mg QD  · spironolactone 25 mg tab (0.5 tab) QD  · Bumex 1 mg QD  · Jardiance 10 mg daily    Other CV Meds  · Digoxin 125 mg MWF    · Atorvastatin 80 mg   · apixaban 5 mg BID    · Ranexa 500 mg Q12H      Medication Use:  Adherence: none; uses medication box at home  Past hx of medication use/intolerance: ramipril ( - )  metoprolol tartrate (alternate therapy)  Affordability: traditional Medicare + supplemental + Part D. Spouse and herself are on fixed SS + pension income ~ 42,000/year.       Diet: Will defer in-depth conversation for future appointment, however the following is a baseline of patients diet.   Breakfast: special K bar / no appetite first thing in the morning; frequently eats egg and toast w/ butter   Lunch: chicken salad sandwich + low sodium chips   Dinner: chicken  "casserole + peas    Drinks: sparkling water 1 bottle/day and water with eating -> does not consume a lot of water will  on fluid intake at future visit   Patient reports high usage of canned food. Will  on sodium restrictions at future visit     Social History:  Tobacco use: quit smoking in 1999 (after first MI)   EtOH use: none  Illicit drug use: none  Exercise: not enough strength       Immunization Status:  Pneumococcal: PPSV23 (2011) and PCV 13 (2016)   Influenza: obtains annually   COVID-19:  received Pfizer x 2 in 01/2021, 02/2021     OBJECTIVE:    /74 (BP Location: Right arm, Patient Position: Sitting)   Pulse 91   Resp 20   Ht 165.1 cm (65\")   Wt 81.5 kg (179 lb 9.6 oz)   SpO2 96%   BMI 29.89 kg/m²     Body mass index is 29.89 kg/m².  Wt Readings from Last 1 Encounters:   08/10/21 81.5 kg (179 lb 9.6 oz)       Home BP Readings:  Has a wrist BP cuff does not take daily. Will defer in-depth discussion on monitoring for future visit   HR  : Patient has a pulse ox she uses to measure her HR.  Daily Weights:  Pt currently weighs herself every morning; encouraged pt to keep a log to bring to next visit     Lab Review:  Renal Function: Estimated Creatinine Clearance: 51.7 mL/min (by C-G formula based on SCr of 0.9 mg/dL).    Lab Results   Component Value Date    PROBNP 1,255.0 07/20/2021           6 MINUTE WALK: defer in depth evaluation for further visit                   ASSESSMENT/PLAN OF CARE:    1. HFrEF (EF ~ 32% - Last ECHO on 7/8/21)    · Patient presents today with complaint of left arm pain and decrease in appetite.Continuing to work towards having patient on GDMT and optimize therapy to target doses.    · Increase  Metoprolol Succinate to 150 mg tab - Goal HR is 50-60s  · Continue  · Entresto 24-26 mg BID - PAP approved and patient states she has not had any issues getting the medication.   · Jardiance 10 mg QD- PAP approved and patient states she has not had any issues getting " the medication.   · Bumetanide 1 mg daily   · Spironolactone 25 mg ( 0.5 tablet) daily   · Apixaban 5 mg BID   · Digoxin 125 mcg MWF  -  will assess level today along with K+,Mg+ -> due to correlation of rate control and patient's HF    · Atorvastatin 80 mg daily - last lipid panel was in 1/2020. Repeat ordered for today, pt is indicated for high intensity for secondary prevention      · Advised patient to call clinic with 2-3 lb weight gain in 24 hrs or >5 lb weight gain in 1 week   2. Decreased appetite: Patient reports having a decreased appetite since her hospital admission. I reviewed her medication list to see if there are any new medications that stood out that could have an adverse effect of suppressing appetite, but none were noted. Patient denies any nausea associates and states she just isn't hungry. Patient has an appointment with her PCP and I advised her to bring this up during her appointment. I also encouraged her to consider drinking an ensure or boost, in order to get some good nutrition since she is not eating much.      Thank you for allowing me to participate in the care of your patient,       Gema Davis, PharmD   PGY-2 Pharmacy Resident     [facility] HEART FAILURE  08/10/21  10:11 EDT

## 2021-08-10 NOTE — PROGRESS NOTES
[facility] HEART FAILURE      Patient Name: Alice Mabry  :1940  Age: 80 y.o.  Sex: female  Referring Provider: Murphy Horner MD   Primary Cardiologist: Murphy Horner MD  Encounter Provider:  JOHNNIE Mccrya      Chief Complaint:   Chief Complaint   Patient presents with   • Congestive Heart Failure         History of Present Illness this 80-year-old female, known to this provider, comes today for further evaluation regarding her chronic systolic heart failure.  Current diagnoses to include paroxysmal atrial fibrillation, coronary artery disease, history of cervical cancer, depression, fatty liver, GERD, hyperlipidemia, hypertension, IBS, prior myocardial infarction, obesity, and sinus bradycardia.    6/10/2014 she underwent cardiac catheterization with PCI of RCA, RAJ placement noted at that time.  LVEF prior to cardiac cath noted to be 30%.  Post PCI her echocardiogram repeated 90 days later showed improvement to 50%.    Stress testing in 2015 revealed an EF of 45% with no reversible ischemia noted.  Repeat echocardiogram 2016 showed further improvement of her LVEF to 60%.    In  she underwent repeat cardiac catheterization and it was found that she had in-stent restenosis of her RCA, this was stented again; it was also noted that she had 3 diagonals with significant disease that were too small for intervention.    Echocardiogram 2019 revealed normal LVEF of 58% with moderate MR and aortic stenosis with a mean gradient of 14 mmHg and PILAR of 1.3 cm².     she was seen by JOHNNIE Lord and at that time had, per chart review, complaints of shortness of breath with racing palpitations.  She also had complained at that visit of a heavy chest sensation that was worsening.  She was found to be in atrial fibrillation with RVR on ECG in office that day, rate as high as 120s with hypertension noted.  Metoprolol tartrate was started at that  point as well as apixaban.  On July 8, 2021 she was noted to be very short of breath via telephone call to primary cardiologist and she was referred to the emergency department.  She was admitted from July 8 to July 12, 2021 with acute exacerbation of her diastolic heart failure.  At that time Dr. Horner felt she had likely been in atrial fibrillation with RVR, on noted for likely a month or 2, and this is what led to a tachycardia mediated cardiomyopathy now with LV C dilation.  Her MR was noted to have worsened and is now severe.  Repeat echocardiogram done during admission yielded an LVEF of 31 to 35%.      On 7/20/2021 losartan, carvedilol, and Lasix were discontinued, Entresto, metoprolol succinate, and Bumex were started.    July 27, 2021 metoprolol succinate was increased and Jardiance was started.    She is currently complaining of 3 episodes of chest pain radiating down her left arm over the last two weeks that was relieved by nitroglycerin.  She is not actively having these pains at this time.  She does still have fatigue and shortness of breath, which are normal per her baseline.  She also has palpitations with her A. fib which is normal for her.      The following portions of the patient's history were reviewed and updated as appropriate: allergies, current medications, past family history, past medical history, past social history, past surgical history and problem list.    Current Outpatient Medications   Medication Sig Dispense Refill   • apixaban (ELIQUIS) 5 MG tablet tablet Take 1 tablet by mouth Every 12 (Twelve) Hours. 60 tablet 11   • atorvastatin (LIPITOR) 80 MG tablet Take 80 mg by mouth Every Night.     • bumetanide (BUMEX) 1 MG tablet Take 1 tablet by mouth Daily. 30 tablet 1   • Cyanocobalamin (VITAMIN B-12 CR PO) Take  by mouth Daily.     • digoxin (LANOXIN) 125 MCG tablet Take 1 tablet by mouth 3 (Three) Times a Week. 30 tablet 11   • diphenhydrAMINE-acetaminophen (Tylenol PM Extra  Strength)  MG tablet per tablet Take 2 tablets by mouth Every Night.     • esomeprazole (nexIUM) 40 MG capsule Take 40 mg by mouth Every Morning Before Breakfast.     • FLUoxetine (PROzac) 20 MG capsule Take 20 mg by mouth daily.     • isosorbide mononitrate (IMDUR) 60 MG 24 hr tablet Take 1 tablet by mouth Daily. 30 tablet 11   • Jardiance 10 MG tablet tablet Take 1 tablet by mouth Daily. 14 tablet 0   • LORazepam (ATIVAN) 1 MG tablet Take 1 mg by mouth every 8 (eight) hours as needed for anxiety.     • Melatonin 10 MG tablet Take 10 mg by mouth Every Night.     • metoprolol succinate XL (TOPROL-XL) 100 MG 24 hr tablet Take 1 tablet by mouth Every Night. 90 tablet 1   • multivitamin with minerals (Alive Once Daily Womens 50+) tablet tablet Take 1 tablet by mouth Daily.     • nitroglycerin (NITROSTAT) 0.4 MG SL tablet Place 1 tablet under the tongue Every 5 (Five) Minutes As Needed for Chest Pain. Take no more than 3 doses in 15 minutes. 100 tablet 3   • ranolazine (RANEXA) 500 MG 12 hr tablet TAKE 1 TABLET EVERY 12     HOURS 180 tablet 2   • sacubitril-valsartan (Entresto) 24-26 MG tablet Take 1 tablet by mouth 2 (Two) Times a Day. 60 tablet 0   • spironolactone (ALDACTONE) 25 MG tablet Take 0.5 tablets by mouth Daily. 30 tablet 11   • vitamin C (ASCORBIC ACID) 500 MG tablet Take 500 mg by mouth Daily.     • Vitamin D, Ergocalciferol, 2000 units capsule Take 2,000 Units by mouth Daily.       No current facility-administered medications for this encounter.       Past Medical History:   Diagnosis Date   • Acute diastolic (congestive) heart failure (CMS/HCC)    • Anxiety    • Arthritis    • Atrial fibrillation (CMS/HCC) 07/06/2021   • CAD (coronary artery disease)    • Cervical cancer (CMS/HCC)    • Current tear of meniscus    • Depression    • Dizziness    • Fatty liver    • GERD (gastroesophageal reflux disease)    • H/O blood clots    • H/O Clostridium difficile infection    • Hyperlipidemia    • Hypertension     • IBS (irritable bowel syndrome)    • Myocardial infarction (CMS/HCC)    • Obesity    • Peptic ulcer    • Pneumonia    • Sinus bradycardia    • Syncope    • Vitamin D deficiency        Past Surgical History:   Procedure Laterality Date   • APPENDECTOMY  1960   • BACK SURGERY     • CARDIAC CATHETERIZATION  06/10/2014    Dr. Murphy Horner   • CARDIAC CATHETERIZATION  11/13/2007    Dr. Murphy Horner   • CARDIAC CATHETERIZATION N/A 10/19/2004    Dr. Murphy Horner   • CARDIAC CATHETERIZATION N/A 07/15/2004    Dr. Gregorio Gonzales   • CARDIAC CATHETERIZATION  10/2003    Dr. Murphy Horner   • CHOLECYSTECTOMY  1998   • COLONOSCOPY N/A 07/2001    negative   • COLONOSCOPY N/A 02/28/2012    negative   • CORONARY ANGIOPLASTY WITH STENT PLACEMENT N/A 07/15/2004    Dr. Murphy Horner   • CORONARY ANGIOPLASTY WITH STENT PLACEMENT  03/2002    to RCA   • HYSTERECTOMY  1974   • UPPER GASTROINTESTINAL ENDOSCOPY N/A 04/20/2015    Mild Schatzki ring, hiatus hernia, non-bleeding erosive gastropathy, erythematous mucosa in the antrum, normal duodenum-Dr. Alex Gill       Physical Exam  Vitals and nursing note reviewed.   Constitutional:       Appearance: She is well-developed. She is obese.   HENT:      Head: Normocephalic and atraumatic.   Eyes:      Conjunctiva/sclera: Conjunctivae normal.      Pupils: Pupils are equal, round, and reactive to light.   Neck:      Vascular: No JVD.   Cardiovascular:      Rate and Rhythm: Normal rate. Rhythm irregular.      Heart sounds: Normal heart sounds. No murmur heard.   No friction rub. No gallop.    Pulmonary:      Effort: Pulmonary effort is normal. No respiratory distress.      Breath sounds: Normal breath sounds.   Abdominal:      General: Bowel sounds are normal. There is no distension.      Palpations: Abdomen is soft.   Musculoskeletal:         General: No swelling or deformity.   Skin:     General: Skin is warm and dry.      Capillary Refill: Capillary refill takes less than 2  "seconds.   Neurological:      Mental Status: She is alert and oriented to person, place, and time. Mental status is at baseline.   Psychiatric:         Mood and Affect: Mood normal.         Behavior: Behavior normal.          Review of Systems   Constitutional: Negative for fatigue and unexpected weight change.   HENT: Negative for congestion and nosebleeds.    Eyes: Negative for photophobia and visual disturbance.   Respiratory: Positive for chest tightness and shortness of breath. Negative for cough.         Improved   Cardiovascular: Positive for chest pain and palpitations. Negative for leg swelling.   Gastrointestinal: Negative for abdominal distention and blood in stool.   Endocrine: Negative for polyphagia and polyuria.   Genitourinary: Positive for frequency. Negative for urgency.   Musculoskeletal: Negative for joint swelling and myalgias.   Skin: Negative for pallor and rash.   Neurological: Negative for dizziness, syncope, weakness, light-headedness, numbness and headaches.   Hematological: Does not bruise/bleed easily.   Psychiatric/Behavioral: Negative for confusion and sleep disturbance.        OBJECTIVE:  /74 (BP Location: Right arm, Patient Position: Sitting)   Pulse 91   Resp 20   Ht 165.1 cm (65\")   Wt 81.5 kg (179 lb 9.6 oz)   SpO2 96%   BMI 29.89 kg/m²      Body mass index is 29.89 kg/m².  Wt Readings from Last 1 Encounters:   08/10/21 81.5 kg (179 lb 9.6 oz)       Lab Review:  Renal Function: Estimated Creatinine Clearance: 51.7 mL/min (by C-G formula based on SCr of 0.9 mg/dL).    Lab Results   Component Value Date    PROBNP 1,255.0 07/20/2021       Results for orders placed during the hospital encounter of 07/08/21    Adult Transthoracic Echo Complete W/ Cont if Necessary Per Protocol    Interpretation Summary  · Left ventricular ejection fraction appears to be 31 - 35%. Left ventricular systolic function is moderately decreased. Normal left ventricular cavity size noted. Left " ventricular wall thickness is consistent with moderate septal asymmetric hypertrophy. There is left ventricular global hypokinesis noted. Left ventricular diastolic function was indeterminate.  · The right ventricular cavity is moderately dilated. Mildly reduced right ventricular systolic function noted.  · There is moderate calcification of the aortic valve. Moderate aortic valve regurgitation is present. Mild to moderate aortic valve stenosis is present. Peak velocity of the flow distal to the aortic valve is 217 cm/s.  · There is calcification of the mitral valve. Severe mitral valve regurgitation is present with a centrally-directed jet noted. Pulmonary vein flow reversal was not evaluated. Mild mitral valve stenosis is present.        ECG 12 Lead    Date/Time: 8/10/2021 10:47 AM  Performed by: Lima Kahn APRN  Authorized by: Lima Kahn APRN   Comparison: compared with previous ECG from 7/6/2021  Similar to previous ECG  Rhythm: atrial fibrillation  Ectopy: unifocal PVCs  Rate: normal    Clinical impression: abnormal EKG              6 MINUTE WALK                      Cardiac Procedures:  1.  6/10/2014 Cardiac catheterization  IMPRESSION:  Class III to IV angina on good medical therapy, ischemic  cardiomyopathy.  Prior stents are patent.  Diagonal disease is unchanged.  What  is new is she has developed a new 90% origin right coronary artery lesion. We  are going to proceed on with intervention of that.  I have discussed this with  her and her family beforehand.      Plavix 600 mg was given and heparin 7000 units was given. We exchanged a sheath  in the right radial artery and put a 6-[place]istani sheath in. We then brought a  6-JR-4 guide up, but it would not fit into the right coronary artery, and we  switched to a 6-AR-1 guide. A BMW was passed easily into the distal RCA. We  brought a 3.5 x 15 NC trek balloon up and inflated it at 14 atmospheres in the  origin of the RCA.  Then I brought a 4.0 x 15  Xience Xpedition drug-eluting  stent down, and I deployed that at 14 atmospheres. We postdilated that with a  4.0 x 12 NC trek at 20 atmospheres. There was 0% residual and ALIS-3 flow, and  at that point, balloons, wires, and guides were removed. The ACT was 301  seconds. The sheath was removed on the table and an R band was applied.      IMPRESSION:  Successful angioplasty drug-eluting stenting to the origin of the  right coronary artery.      RECOMMENDATIONS:  1.  Percutaneous coronary intervention care.   2.  Continue risk modification.   3.  Home in the morning if she is okay.  4.  Reassess her left ventricular function in 3 months.        Previously trialed diuretics  Lasix  Bumex      Previously trialed GDMT    Losartan  Carvedilol  Spironolactone  Metoprolol succinate  Entresto    ASSESSMENT:     Diagnosis Plan   1. Coronary artery disease involving native coronary artery of native heart without angina pectoris  ECG 12 Lead   2. HFrEF (heart failure with reduced ejection fraction) (CMS/HCC)  ECG 12 Lead   3. Paroxysmal atrial fibrillation (CMS/HCC)     4. Chronic diastolic (congestive) heart failure (CMS/HCC)     5. Ischemic cardiomyopathy           PLAN OF CARE:  1.  HFrEF-NYHA class II-III.  Current GDMT to include metoprolol succinate, Entresto, spironolactone, diuresed on Bumex.    She remains euvolemic on exam.  She is tolerating Jardiance and the increased dose of metoprolol succinate well.  She is actually down 6 pounds since starting the Jardiance, but she also complains of suppression of her appetite.  Her heart rate is now typically between 70 and 90 per her heart rate and blood pressure log from home.  I would like to further increase her metoprolol succinate to try to get her heart rate below 70, is a heart rate above 70 with heart failure is indicative of a poor prognosis.  I asked that she continue her blood pressure log as well as her heart rate log and call us if if her heart rate drops below  50 or her systolic pressure begins running below 100.    If we are able I would like to uptitrate her Entresto at her next appointment, which we will push out to 4 weeks as she is also having intermittent complaints of angina for which she is awaiting scheduling of the stress test and follow-up with Dr. Horner.    Directions for when to call the clinic reviewed with the patient to include weight gain of 2 to 3 pounds in 24 hours, weight gain of 5 to 10 pounds within 7 days; worsening shortness of breath; worsening lower extremity edema or abdominal distention.    2.  Chest pain-intermittent, radiates down left arm, relieved by nitro.  I advised on the use of nitro, and that if after the third dose she is still having symptoms she should go to the emergency department.  Stress test pending.  Not actively having symptoms.  ECG done today, atrial fibrillation, rate 80s, no acute changes.    3.  Atrial fibrillation-she remains in atrial fibrillation per ECG today.  Increase the metoprolol as above.  Anticoagulated.  Managed by primary cardiologist.  Stable.    4.  Hypertension-stable and controlled    5.  CAD-see dictation above.  Managed by primary cardiologist.        Increase metoprolol succinate to 150 mg nightly; continue Jardiance; ECG today; stress test as previously scheduled by primary cardiology team; keep follow-up with Dr. Horner next week; follow-up in [facility] in 4 weeks or sooner if needed    Advance Care Planning   ACP discussion was held with the patient during this visit. Patient has an advance directive (not in EMR), copy requested.      Thank you for allowing me to participate in the care of your patient,    JOHNNIE Mccray  [facility] HEART FAILURE  08/10/21  09:20 EDT      **Fadia Disclaimer:**  Much of this encounter note is an electronic transcription/translation of spoken language to printed text. The electronic translation of spoken language may permit erroneous, or at times, nonsensical  words or phrases to be inadvertently transcribed. Although I have reviewed the note for such errors, some may still exist.

## 2021-08-11 LAB — QT INTERVAL: 373 MS

## 2021-08-12 ENCOUNTER — HOSPITAL ENCOUNTER (OUTPATIENT)
Dept: CARDIOLOGY | Facility: HOSPITAL | Age: 81
Discharge: HOME | End: 2021-08-12

## 2021-08-12 ENCOUNTER — TELEPHONE (OUTPATIENT)
Dept: CARDIOLOGY | Facility: CLINIC | Age: 81
End: 2021-08-12

## 2021-08-12 VITALS
OXYGEN SATURATION: 92 % | HEIGHT: 65 IN | SYSTOLIC BLOOD PRESSURE: 118 MMHG | WEIGHT: 179 LBS | BODY MASS INDEX: 29.82 KG/M2 | HEART RATE: 73 BPM | DIASTOLIC BLOOD PRESSURE: 78 MMHG

## 2021-08-12 DIAGNOSIS — R94.31 ABNORMAL ELECTROCARDIOGRAM (ECG) (EKG): ICD-10-CM

## 2021-08-12 DIAGNOSIS — I25.10 CORONARY ARTERY DISEASE INVOLVING NATIVE CORONARY ARTERY OF NATIVE HEART WITHOUT ANGINA PECTORIS: ICD-10-CM

## 2021-08-12 DIAGNOSIS — I48.91 ATRIAL FIBRILLATION WITH RVR (HCC): ICD-10-CM

## 2021-08-12 LAB
BH CV NUCLEAR PRIOR STUDY: 3
BH CV REST NUCLEAR ISOTOPE DOSE: 39.8 MCI
BH CV STRESS BP STAGE 1: NORMAL
BH CV STRESS COMMENTS STAGE 1: NORMAL
BH CV STRESS DOSE REGADENOSON STAGE 1: 0.4
BH CV STRESS DURATION MIN STAGE 1: 0
BH CV STRESS DURATION SEC STAGE 1: 10
BH CV STRESS HR STAGE 1: 83
BH CV STRESS NUCLEAR ISOTOPE DOSE: 39.8 MCI
BH CV STRESS PROTOCOL 1: NORMAL
BH CV STRESS RECOVERY BP: NORMAL MMHG
BH CV STRESS RECOVERY HR: 78 BPM
BH CV STRESS STAGE 1: 1
LV EF NUC BP: 51 %
MAXIMAL PREDICTED HEART RATE: 139 BPM
PERCENT MAX PREDICTED HR: 59.71 %
STRESS BASELINE BP: NORMAL MMHG
STRESS BASELINE HR: 70 BPM
STRESS PERCENT HR: 70 %
STRESS POST EXERCISE DUR SEC: 10 SEC
STRESS POST PEAK BP: NORMAL MMHG
STRESS POST PEAK HR: 83 BPM
STRESS TARGET HR: 118 BPM

## 2021-08-12 PROCEDURE — 93018 CV STRESS TEST I&R ONLY: CPT | Performed by: INTERNAL MEDICINE

## 2021-08-12 PROCEDURE — 93306 TTE W/DOPPLER COMPLETE: CPT | Performed by: INTERNAL MEDICINE

## 2021-08-12 PROCEDURE — 93306 TTE W/DOPPLER COMPLETE: CPT

## 2021-08-12 PROCEDURE — 78492 MYOCRD IMG PET MLT RST&STRS: CPT

## 2021-08-12 PROCEDURE — 25010000002 AMINOPHYLLINE PER 250 MG: Performed by: NURSE PRACTITIONER

## 2021-08-12 PROCEDURE — 93356 MYOCRD STRAIN IMG SPCKL TRCK: CPT | Performed by: INTERNAL MEDICINE

## 2021-08-12 PROCEDURE — 93356 MYOCRD STRAIN IMG SPCKL TRCK: CPT

## 2021-08-12 PROCEDURE — 93017 CV STRESS TEST TRACING ONLY: CPT

## 2021-08-12 PROCEDURE — 0 RUBIDIUM CHLORIDE: Performed by: NURSE PRACTITIONER

## 2021-08-12 PROCEDURE — 93016 CV STRESS TEST SUPVJ ONLY: CPT | Performed by: INTERNAL MEDICINE

## 2021-08-12 PROCEDURE — 25010000002 PERFLUTREN (DEFINITY) 8.476 MG IN SODIUM CHLORIDE (PF) 0.9 % 10 ML INJECTION: Performed by: NURSE PRACTITIONER

## 2021-08-12 PROCEDURE — 78492 MYOCRD IMG PET MLT RST&STRS: CPT | Performed by: INTERNAL MEDICINE

## 2021-08-12 PROCEDURE — 25010000002 REGADENOSON 0.4 MG/5ML SOLUTION: Performed by: NURSE PRACTITIONER

## 2021-08-12 PROCEDURE — A9555 RB82 RUBIDIUM: HCPCS | Performed by: NURSE PRACTITIONER

## 2021-08-12 RX ORDER — AMINOPHYLLINE DIHYDRATE 25 MG/ML
125 INJECTION, SOLUTION INTRAVENOUS ONCE
Status: COMPLETED | OUTPATIENT
Start: 2021-08-12 | End: 2021-08-12

## 2021-08-12 RX ADMIN — REGADENOSON 0.4 MG: 0.08 INJECTION, SOLUTION INTRAVENOUS at 09:07

## 2021-08-12 RX ADMIN — AMINOPHYLLINE 125 MG: 25 INJECTION, SOLUTION INTRAVENOUS at 09:21

## 2021-08-12 RX ADMIN — PERFLUTREN 1.5 ML: 6.52 INJECTION, SUSPENSION INTRAVENOUS at 08:50

## 2021-08-12 NOTE — TELEPHONE ENCOUNTER
Called patient about echocardiogram results and stress test that I ordered back in July.  Her aortic stenosis appears moderate.  Her EF now appears a little bit better than it did last month and she is on a good medical regimen for heart failure and following at the heart failure clinic.  Her stress test shows no new areas of ischemia but an old inferior infarct which she has had PCI to the right coronary before.  She does tell me that she was laying down and resting in a couple time she has had a pain in her left arm that comes on suddenly.  It does not occur with exertion however it did go away with nitro.  The last time it happened was about 10 days ago.  She is going to monitor herself over the weekend and has an appointment next week with Dr. Horner.

## 2021-08-13 LAB
AORTIC ARCH: 2.3 CM
AORTIC DIMENSIONLESS INDEX: 0.3 (DI)
ASCENDING AORTA: 3.7 CM
BH CV ECHO MEAS - ACS: 1.1 CM
BH CV ECHO MEAS - AI MAX PG: 60.1 MMHG
BH CV ECHO MEAS - AI MAX VEL: 387.7 CM/SEC
BH CV ECHO MEAS - AO ARCH DIAM (PROXIMAL TRANS.): 2.3 CM
BH CV ECHO MEAS - AO MAX PG (FULL): 24.8 MMHG
BH CV ECHO MEAS - AO MAX PG: 27.6 MMHG
BH CV ECHO MEAS - AO MEAN PG (FULL): 14.5 MMHG
BH CV ECHO MEAS - AO MEAN PG: 16.3 MMHG
BH CV ECHO MEAS - AO ROOT AREA (BSA CORRECTED): 1.7
BH CV ECHO MEAS - AO ROOT AREA: 7.8 CM^2
BH CV ECHO MEAS - AO ROOT DIAM: 3.1 CM
BH CV ECHO MEAS - AO V2 MAX: 262.8 CM/SEC
BH CV ECHO MEAS - AO V2 MEAN: 191.3 CM/SEC
BH CV ECHO MEAS - AO V2 VTI: 52 CM
BH CV ECHO MEAS - ASC AORTA: 3.7 CM
BH CV ECHO MEAS - AVA(I,A): 0.92 CM^2
BH CV ECHO MEAS - AVA(I,D): 0.92 CM^2
BH CV ECHO MEAS - AVA(V,A): 1 CM^2
BH CV ECHO MEAS - AVA(V,D): 1 CM^2
BH CV ECHO MEAS - BSA(HAYCOCK): 2 M^2
BH CV ECHO MEAS - BSA: 1.9 M^2
BH CV ECHO MEAS - BZI_BMI: 29.8 KILOGRAMS/M^2
BH CV ECHO MEAS - BZI_METRIC_HEIGHT: 165.1 CM
BH CV ECHO MEAS - BZI_METRIC_WEIGHT: 81.2 KG
BH CV ECHO MEAS - EDV(MOD-SP2): 117 ML
BH CV ECHO MEAS - EDV(MOD-SP4): 108 ML
BH CV ECHO MEAS - EDV(TEICH): 163.9 ML
BH CV ECHO MEAS - EF(CUBED): 26.8 %
BH CV ECHO MEAS - EF(MOD-BP): 43.5 %
BH CV ECHO MEAS - EF(MOD-SP2): 42.7 %
BH CV ECHO MEAS - EF(MOD-SP4): 38 %
BH CV ECHO MEAS - EF(TEICH): 21.3 %
BH CV ECHO MEAS - ESV(MOD-SP2): 67 ML
BH CV ECHO MEAS - ESV(MOD-SP4): 67 ML
BH CV ECHO MEAS - ESV(TEICH): 128.9 ML
BH CV ECHO MEAS - FS: 9.9 %
BH CV ECHO MEAS - IVS/LVPW: 0.97
BH CV ECHO MEAS - IVSD: 0.93 CM
BH CV ECHO MEAS - LAT PEAK E' VEL: 10.3 CM/SEC
BH CV ECHO MEAS - LV DIASTOLIC VOL/BSA (35-75): 57.2 ML/M^2
BH CV ECHO MEAS - LV MASS(C)D: 214.2 GRAMS
BH CV ECHO MEAS - LV MASS(C)DI: 113.5 GRAMS/M^2
BH CV ECHO MEAS - LV MAX PG: 2.8 MMHG
BH CV ECHO MEAS - LV MEAN PG: 1.8 MMHG
BH CV ECHO MEAS - LV SYSTOLIC VOL/BSA (12-30): 35.5 ML/M^2
BH CV ECHO MEAS - LV V1 MAX: 84.4 CM/SEC
BH CV ECHO MEAS - LV V1 MEAN: 63.5 CM/SEC
BH CV ECHO MEAS - LV V1 VTI: 15.4 CM
BH CV ECHO MEAS - LVIDD: 5.8 CM
BH CV ECHO MEAS - LVIDS: 5.2 CM
BH CV ECHO MEAS - LVLD AP2: 8 CM
BH CV ECHO MEAS - LVLD AP4: 6.9 CM
BH CV ECHO MEAS - LVLS AP2: 6.8 CM
BH CV ECHO MEAS - LVLS AP4: 6.6 CM
BH CV ECHO MEAS - LVOT AREA (M): 3.1 CM^2
BH CV ECHO MEAS - LVOT AREA: 3.1 CM^2
BH CV ECHO MEAS - LVOT DIAM: 2 CM
BH CV ECHO MEAS - LVPWD: 0.96 CM
BH CV ECHO MEAS - MED PEAK E' VEL: 7 CM/SEC
BH CV ECHO MEAS - MR MAX PG: 105.1 MMHG
BH CV ECHO MEAS - MR MAX VEL: 512.5 CM/SEC
BH CV ECHO MEAS - MR MEAN PG: 61.8 MMHG
BH CV ECHO MEAS - MR MEAN VEL: 370.9 CM/SEC
BH CV ECHO MEAS - MR VTI: 165.6 CM
BH CV ECHO MEAS - MV DEC SLOPE: 1061 CM/SEC^2
BH CV ECHO MEAS - MV DEC TIME: 0.14 SEC
BH CV ECHO MEAS - MV E MAX VEL: 93.8 CM/SEC
BH CV ECHO MEAS - MV MAX PG: 6.7 MMHG
BH CV ECHO MEAS - MV MEAN PG: 2.2 MMHG
BH CV ECHO MEAS - MV P1/2T MAX VEL: 129.2 CM/SEC
BH CV ECHO MEAS - MV P1/2T: 35.7 MSEC
BH CV ECHO MEAS - MV V2 MAX: 129.2 CM/SEC
BH CV ECHO MEAS - MV V2 MEAN: 66.1 CM/SEC
BH CV ECHO MEAS - MV V2 VTI: 25.7 CM
BH CV ECHO MEAS - MVA P1/2T LCG: 1.7 CM^2
BH CV ECHO MEAS - MVA(P1/2T): 6.2 CM^2
BH CV ECHO MEAS - MVA(VTI): 1.9 CM^2
BH CV ECHO MEAS - PA ACC TIME: 0.11 SEC
BH CV ECHO MEAS - PA MAX PG (FULL): 1.6 MMHG
BH CV ECHO MEAS - PA MAX PG: 2.7 MMHG
BH CV ECHO MEAS - PA PR(ACCEL): 30.3 MMHG
BH CV ECHO MEAS - PA V2 MAX: 81.4 CM/SEC
BH CV ECHO MEAS - PULM DIAS VEL: 45.8 CM/SEC
BH CV ECHO MEAS - PULM S/D: 0.93
BH CV ECHO MEAS - PULM SYS VEL: 42.4 CM/SEC
BH CV ECHO MEAS - PVA(V,A): 2.3 CM^2
BH CV ECHO MEAS - PVA(V,D): 2.3 CM^2
BH CV ECHO MEAS - QP/QS: 0.78
BH CV ECHO MEAS - RAP SYSTOLE: 3 MMHG
BH CV ECHO MEAS - RV MAX PG: 1.1 MMHG
BH CV ECHO MEAS - RV MEAN PG: 0.69 MMHG
BH CV ECHO MEAS - RV V1 MAX: 52.3 CM/SEC
BH CV ECHO MEAS - RV V1 MEAN: 39.9 CM/SEC
BH CV ECHO MEAS - RV V1 VTI: 10.2 CM
BH CV ECHO MEAS - RVOT AREA: 3.7 CM^2
BH CV ECHO MEAS - RVOT DIAM: 2.2 CM
BH CV ECHO MEAS - RVSP: 25 MMHG
BH CV ECHO MEAS - SI(AO): 214 ML/M^2
BH CV ECHO MEAS - SI(CUBED): 27.2 ML/M^2
BH CV ECHO MEAS - SI(LVOT): 25.4 ML/M^2
BH CV ECHO MEAS - SI(MOD-SP2): 26.5 ML/M^2
BH CV ECHO MEAS - SI(MOD-SP4): 21.7 ML/M^2
BH CV ECHO MEAS - SI(TEICH): 18.5 ML/M^2
BH CV ECHO MEAS - SUP REN AO DIAM: 2.2 CM
BH CV ECHO MEAS - SV(AO): 403.8 ML
BH CV ECHO MEAS - SV(CUBED): 51.2 ML
BH CV ECHO MEAS - SV(LVOT): 48 ML
BH CV ECHO MEAS - SV(MOD-SP2): 50 ML
BH CV ECHO MEAS - SV(MOD-SP4): 41 ML
BH CV ECHO MEAS - SV(RVOT): 37.3 ML
BH CV ECHO MEAS - SV(TEICH): 35 ML
BH CV ECHO MEAS - TAPSE (>1.6): 0.98 CM
BH CV ECHO MEAS - TR MAX VEL: 236.7 CM/SEC
BH CV ECHO MEASUREMENTS AVERAGE E/E' RATIO: 10.84
BH CV XLRA - RV BASE: 3.9 CM
BH CV XLRA - RV LENGTH: 5.2 CM
BH CV XLRA - RV MID: 2.1 CM
BH CV XLRA - TDI S': 11 CM/SEC
LEFT ATRIUM VOLUME INDEX: 47 ML/M2
MAXIMAL PREDICTED HEART RATE: 139 BPM
SINUS: 3 CM
STJ: 2.5 CM
STRESS TARGET HR: 118 BPM

## 2021-08-17 ENCOUNTER — OFFICE VISIT (OUTPATIENT)
Dept: CARDIOLOGY | Facility: CLINIC | Age: 81
End: 2021-08-17

## 2021-08-17 VITALS
HEART RATE: 81 BPM | WEIGHT: 178 LBS | HEIGHT: 65 IN | DIASTOLIC BLOOD PRESSURE: 70 MMHG | BODY MASS INDEX: 29.66 KG/M2 | SYSTOLIC BLOOD PRESSURE: 104 MMHG

## 2021-08-17 DIAGNOSIS — I50.20 HFREF (HEART FAILURE WITH REDUCED EJECTION FRACTION) (HCC): ICD-10-CM

## 2021-08-17 DIAGNOSIS — I25.5 ISCHEMIC CARDIOMYOPATHY: ICD-10-CM

## 2021-08-17 DIAGNOSIS — I48.0 AF (PAROXYSMAL ATRIAL FIBRILLATION) (HCC): Primary | ICD-10-CM

## 2021-08-17 PROCEDURE — 99214 OFFICE O/P EST MOD 30 MIN: CPT | Performed by: INTERNAL MEDICINE

## 2021-08-17 PROCEDURE — 93000 ELECTROCARDIOGRAM COMPLETE: CPT | Performed by: INTERNAL MEDICINE

## 2021-08-17 RX ORDER — METOPROLOL SUCCINATE 100 MG/1
150 TABLET, EXTENDED RELEASE ORAL DAILY
COMMUNITY
End: 2021-10-06

## 2021-08-17 NOTE — PROGRESS NOTES
Date of Office Visit: 21  Encounter Provider: Murphy Horner MD  Place of Service: [facility] CARDIOLOGY  Patient Name: Alice Mabry  :1940  [id number]    Chief Complaint   Patient presents with   • Coronary Artery Disease   :     HPI: Alice Mabry is a 80 y.o. female she is here for follow-up and in general feels terribly.  She says since she left the hospital she has been tired she does not have any energy she described a couple of episodes where she had left arm pain that occurred at rest lasted for few minutes she took nitro with it does not seem to really be worse when she moves her arm around but she has not had chest pain per se no PND orthopnea edema she has lost 15 pounds they have been working with her in the heart failure clinic and have really got her medicines tuned up.  She feels like she is better in the morning and then a couple hours after she gets up she starts to feel poorly she did take her blood pressure today and her systolics were in the 80s when she felt badly.  She had some difficulty with A. fib with RVR and any kind of activity she gets short of breath she is on 150 of Toprol as well as digoxin    Past Medical History:   Diagnosis Date   • Acute diastolic (congestive) heart failure (CMS/HCC)    • Anxiety    • Arthritis    • Atrial fibrillation (CMS/HCC) 2021   • CAD (coronary artery disease)    • Cervical cancer (CMS/HCC)    • Current tear of meniscus    • Depression    • Dizziness    • Fatty liver    • GERD (gastroesophageal reflux disease)    • H/O blood clots    • H/O Clostridium difficile infection    • Hyperlipidemia    • Hypertension    • IBS (irritable bowel syndrome)    • Myocardial infarction (CMS/HCC)    • Obesity    • Peptic ulcer    • Pneumonia    • Sinus bradycardia    • Syncope    • Vitamin D deficiency        Past Surgical History:   Procedure Laterality Date   • APPENDECTOMY     • BACK SURGERY     • CARDIAC  CATHETERIZATION  06/10/2014    Dr. Murphy Horner   • CARDIAC CATHETERIZATION  2007    Dr. Murphy Horner   • CARDIAC CATHETERIZATION N/A 10/19/2004    Dr. Murphy Horner   • CARDIAC CATHETERIZATION N/A 07/15/2004    Dr. Gregorio Gonzales   • CARDIAC CATHETERIZATION  10/2003    Dr. Murphy Horner   • CHOLECYSTECTOMY     • COLONOSCOPY N/A 2001    negative   • COLONOSCOPY N/A 2012    negative   • CORONARY ANGIOPLASTY WITH STENT PLACEMENT N/A 07/15/2004    Dr. Murphy Horner   • CORONARY ANGIOPLASTY WITH STENT PLACEMENT  2002    to RCA   • HYSTERECTOMY  1974   • UPPER GASTROINTESTINAL ENDOSCOPY N/A 2015    Mild Schatzki ring, hiatus hernia, non-bleeding erosive gastropathy, erythematous mucosa in the antrum, normal duodenum-Dr. Alex Gill       Social History     Socioeconomic History   • Marital status:      Spouse name: Not on file   • Number of children: Not on file   • Years of education: Not on file   • Highest education level: Not on file   Tobacco Use   • Smoking status: Former Smoker     Quit date:      Years since quittin.6   • Smokeless tobacco: Never Used   Vaping Use   • Vaping Use: Never used   Substance and Sexual Activity   • Alcohol use: No   • Drug use: No   • Sexual activity: Defer       Family History   Problem Relation Age of Onset   • Heart disease Mother    • No Known Problems Father    • No Known Problems Maternal Grandmother    • No Known Problems Maternal Grandfather    • No Known Problems Paternal Grandmother    • No Known Problems Paternal Grandfather    • Heart disease Sister    • Heart disease Brother        Review of Systems   Constitutional: Negative for decreased appetite, fever, malaise/fatigue and weight loss.   HENT: Negative for nosebleeds.    Eyes: Negative for double vision.   Cardiovascular: Negative for chest pain, claudication, cyanosis, dyspnea on exertion, irregular heartbeat, leg swelling, near-syncope, orthopnea, palpitations,  paroxysmal nocturnal dyspnea and syncope.   Respiratory: Negative for cough, hemoptysis and shortness of breath.    Hematologic/Lymphatic: Negative for bleeding problem.   Skin: Negative for rash.   Musculoskeletal: Negative for falls and myalgias.   Gastrointestinal: Negative for hematochezia, jaundice, melena, nausea and vomiting.   Genitourinary: Negative for hematuria.   Neurological: Negative for dizziness and seizures.   Psychiatric/Behavioral: Negative for altered mental status and memory loss.       Allergies   Allergen Reactions   • Codeine Other (See Comments)     Chest pain .     • Penicillins Hives   • Ambien [Zolpidem Tartrate] Confusion         Current Outpatient Medications:   •  apixaban (ELIQUIS) 5 MG tablet tablet, Take 1 tablet by mouth Every 12 (Twelve) Hours., Disp: 60 tablet, Rfl: 11  •  atorvastatin (LIPITOR) 80 MG tablet, Take 80 mg by mouth Every Night., Disp: , Rfl:   •  bumetanide (BUMEX) 1 MG tablet, Take 1 tablet by mouth Daily., Disp: 30 tablet, Rfl: 1  •  Cyanocobalamin (VITAMIN B-12 CR PO), Take  by mouth Daily., Disp: , Rfl:   •  digoxin (LANOXIN) 125 MCG tablet, Take 1 tablet by mouth 3 (Three) Times a Week., Disp: 30 tablet, Rfl: 11  •  diphenhydrAMINE-acetaminophen (Tylenol PM Extra Strength)  MG tablet per tablet, Take 2 tablets by mouth Every Night., Disp: , Rfl:   •  esomeprazole (nexIUM) 40 MG capsule, Take 40 mg by mouth Every Morning Before Breakfast., Disp: , Rfl:   •  FLUoxetine (PROzac) 20 MG capsule, Take 20 mg by mouth daily., Disp: , Rfl:   •  isosorbide mononitrate (IMDUR) 60 MG 24 hr tablet, Take 1 tablet by mouth Daily., Disp: 30 tablet, Rfl: 11  •  Jardiance 10 MG tablet tablet, Take 1 tablet by mouth Daily., Disp: 14 tablet, Rfl: 0  •  LORazepam (ATIVAN) 1 MG tablet, Take 1 mg by mouth every 8 (eight) hours as needed for anxiety., Disp: , Rfl:   •  Melatonin 10 MG tablet, Take 10 mg by mouth Every Night., Disp: , Rfl:   •  metoprolol succinate XL (TOPROL-XL)  "100 MG 24 hr tablet, Take 150 mg by mouth Daily., Disp: , Rfl:   •  multivitamin with minerals (Alive Once Daily Womens 50+) tablet tablet, Take 1 tablet by mouth Daily., Disp: , Rfl:   •  nitroglycerin (NITROSTAT) 0.4 MG SL tablet, Place 1 tablet under the tongue Every 5 (Five) Minutes As Needed for Chest Pain. Take no more than 3 doses in 15 minutes., Disp: 100 tablet, Rfl: 3  •  ranolazine (RANEXA) 500 MG 12 hr tablet, TAKE 1 TABLET EVERY 12     HOURS, Disp: 180 tablet, Rfl: 2  •  sacubitril-valsartan (Entresto) 24-26 MG tablet, Take 1 tablet by mouth 2 (Two) Times a Day., Disp: 60 tablet, Rfl: 0  •  spironolactone (ALDACTONE) 25 MG tablet, Take 0.5 tablets by mouth Daily., Disp: 30 tablet, Rfl: 11  •  vitamin C (ASCORBIC ACID) 500 MG tablet, Take 500 mg by mouth Daily., Disp: , Rfl:   •  Vitamin D, Ergocalciferol, 2000 units capsule, Take 2,000 Units by mouth Daily., Disp: , Rfl:   •  metoprolol succinate XL (TOPROL-XL) 100 MG 24 hr tablet, Take 1 tablet by mouth Every Night., Disp: 90 tablet, Rfl: 1      Objective:     Vitals:    08/17/21 1436   BP: 104/70   Pulse: 81   Weight: 80.7 kg (178 lb)   Height: 165.1 cm (65\")     Body mass index is 29.62 kg/m².    Constitutional:       Appearance: Well-developed.   Eyes:      General: No scleral icterus.  HENT:      Head: Normocephalic.   Neck:      Thyroid: No thyromegaly.      Vascular: No JVD.      Lymphadenopathy: No cervical adenopathy.   Pulmonary:      Effort: Pulmonary effort is normal.      Breath sounds: Normal breath sounds. No wheezing. No rales.   Cardiovascular:      Normal rate. Regular rhythm.      No gallop.   Edema:     Peripheral edema absent.   Abdominal:      Palpations: Abdomen is soft.      Tenderness: There is no abdominal tenderness.   Musculoskeletal: Normal range of motion. Skin:     General: Skin is warm and dry.      Findings: No rash.   Neurological:      Mental Status: Alert and oriented to person, place, and time.           ECG 12 " Lead    Date/Time: 8/17/2021 3:46 PM  Performed by: Murphy Horner MD  Authorized by: Murphy Horner MD   Comparison: compared with previous ECG   Rhythm: atrial fibrillation  Other findings: non-specific ST-T wave changes    Clinical impression: abnormal EKG             Assessment:       Diagnosis Plan   1. AF (paroxysmal atrial fibrillation) (CMS/[facility])  Holter Monitor - 24 Hour          Plan:       So lots of issues going on with her I think that first we need to make sure that her A. fib is rate controlled I am worried about that and so we can have a Holter put on she is on a lot of Toprol and she is on digoxin if it is not controlled we may have to get the arrhythmia team involved for an AV node ablation and pacemaker.  I also think were overmedicated on a blood pressure standpoint her pressure today is somewhat lowish in this 80s after she takes her meds and when to stop her Entresto.  I have asked her to call us back with her pressures in the about 2 weeks and she is going to follow-up with Pat lau in 6 weeks.  I I feel like the arm pain is probably not cardiac she has never had that before I would watch it we will get a be conservative in our met medical therapy of her coronary disease anyway at this point.    As always, it has been a pleasure to participate in your patient's care.      Sincerely,       Murphy Horner MD

## 2021-08-20 LAB
MAXIMAL PREDICTED HEART RATE: 139 BPM
STRESS TARGET HR: 118 BPM

## 2021-08-24 ENCOUNTER — OFFICE VISIT (OUTPATIENT)
Dept: ORTHOPEDIC SURGERY | Facility: CLINIC | Age: 81
End: 2021-08-24

## 2021-08-24 VITALS — BODY MASS INDEX: 30.49 KG/M2 | HEIGHT: 65 IN | WEIGHT: 183 LBS | TEMPERATURE: 97.8 F

## 2021-08-24 DIAGNOSIS — M17.11 PRIMARY OSTEOARTHRITIS OF RIGHT KNEE: Primary | ICD-10-CM

## 2021-08-24 PROCEDURE — 99213 OFFICE O/P EST LOW 20 MIN: CPT | Performed by: NURSE PRACTITIONER

## 2021-08-24 NOTE — PROGRESS NOTES
Patient: Alice Mabry  YOB: 1940 80 y.o. female  Medical Record Number: [id number]    Chief Complaints:   Chief Complaint   Patient presents with   • Right Knee - Follow-up, Pain       History of Present Illness:Alice Mabry is a 80 y.o. female who presents with complaints of intermittent right knee soreness.  She has been seen previously and she does have significant arthritic changes noted.  No she reports her pain has not been as bad the last week or so.  She does still have recurrent effusions though.  She denies any recent injury.    Allergies:   Allergies   Allergen Reactions   • Codeine Other (See Comments)     Chest pain .     • Penicillins Hives   • Ambien [Zolpidem Tartrate] Confusion       Medications:   Current Outpatient Medications   Medication Sig Dispense Refill   • apixaban (ELIQUIS) 5 MG tablet tablet Take 1 tablet by mouth Every 12 (Twelve) Hours. 60 tablet 11   • atorvastatin (LIPITOR) 80 MG tablet Take 80 mg by mouth Every Night.     • bumetanide (BUMEX) 1 MG tablet Take 1 tablet by mouth Daily. 30 tablet 1   • Cyanocobalamin (VITAMIN B-12 CR PO) Take  by mouth Daily.     • digoxin (LANOXIN) 125 MCG tablet Take 1 tablet by mouth 3 (Three) Times a Week. 30 tablet 11   • diphenhydrAMINE-acetaminophen (Tylenol PM Extra Strength)  MG tablet per tablet Take 2 tablets by mouth Every Night.     • esomeprazole (nexIUM) 40 MG capsule Take 40 mg by mouth Every Morning Before Breakfast.     • FLUoxetine (PROzac) 20 MG capsule Take 20 mg by mouth daily.     • isosorbide mononitrate (IMDUR) 60 MG 24 hr tablet Take 1 tablet by mouth Daily. 30 tablet 11   • Jardiance 10 MG tablet tablet Take 1 tablet by mouth Daily. 14 tablet 0   • LORazepam (ATIVAN) 1 MG tablet Take 1 mg by mouth every 8 (eight) hours as needed for anxiety.     • Melatonin 10 MG tablet Take 10 mg by mouth Every Night.     • metoprolol succinate XL (TOPROL-XL) 100 MG 24 hr tablet Take 1 tablet by mouth Every  "Night. 90 tablet 1   • metoprolol succinate XL (TOPROL-XL) 100 MG 24 hr tablet Take 150 mg by mouth Daily.     • multivitamin with minerals (Alive Once Daily Womens 50+) tablet tablet Take 1 tablet by mouth Daily.     • nitroglycerin (NITROSTAT) 0.4 MG SL tablet Place 1 tablet under the tongue Every 5 (Five) Minutes As Needed for Chest Pain. Take no more than 3 doses in 15 minutes. 100 tablet 3   • ranolazine (RANEXA) 500 MG 12 hr tablet TAKE 1 TABLET EVERY 12     HOURS 180 tablet 2   • spironolactone (ALDACTONE) 25 MG tablet Take 0.5 tablets by mouth Daily. 30 tablet 11   • vitamin C (ASCORBIC ACID) 500 MG tablet Take 500 mg by mouth Daily.     • Vitamin D, Ergocalciferol, 2000 units capsule Take 2,000 Units by mouth Daily.       No current facility-administered medications for this visit.         The following portions of the patient's history were reviewed and updated as appropriate: allergies, current medications, past family history, past medical history, past social history, past surgical history and problem list.    Review of Systems:   A 14 point review of systems was performed. All systems negative except pertinent positives/negative listed in HPI above    Physical Exam:   Vitals:    08/24/21 0818   Temp: 97.8 °F (36.6 °C)   Weight: 83 kg (183 lb)   Height: 165.1 cm (65\")   PainSc:   3       General: A and O x 3, ASA, NAD    SCLERA:    Normal    Skin clear no unusual lesions noted  Right knee patient does have 1+ effusion noted with 110 degrees flexion neutral extension with a positive Fletcher negative Lockman calf soft and nontender       Radiology:  Xrays 3views (ap,lateral, sunrise) previous x-rays of the right knee were reviewed and the patient does have significant arthritic changes    Assessment/Plan: Osteoarthritis right knee with recurrent effusions and pain    Patient I discussed options, she would like to hold off on injection at this time, however I did give her samples of Pennsaid gel to use " twice a day she will let me know if that something she would like a prescription for an otherwise I will see her back in 3 months for follow-up      Celi Laird, APRN  8/24/2021

## 2021-08-30 ENCOUNTER — TELEPHONE (OUTPATIENT)
Dept: CARDIOLOGY | Facility: CLINIC | Age: 81
End: 2021-08-30

## 2021-08-30 RX ORDER — DIGOXIN 125 MCG
125 TABLET ORAL 3 TIMES WEEKLY
Qty: 36 TABLET | Refills: 2 | Status: SHIPPED | OUTPATIENT
Start: 2021-08-30 | End: 2022-04-08 | Stop reason: HOSPADM

## 2021-08-30 NOTE — TELEPHONE ENCOUNTER
Patient said you told her to call in with an update on her BP  She states her BP is great. Everything is good.  Metoprolol 150mg qhs

## 2021-09-08 ENCOUNTER — HOSPITAL ENCOUNTER (OUTPATIENT)
Dept: CARDIOLOGY | Facility: HOSPITAL | Age: 81
Discharge: HOME | End: 2021-09-08
Admitting: INTERNAL MEDICINE

## 2021-09-08 VITALS
RESPIRATION RATE: 20 BRPM | BODY MASS INDEX: 30.16 KG/M2 | WEIGHT: 181 LBS | OXYGEN SATURATION: 95 % | HEIGHT: 65 IN | HEART RATE: 64 BPM | SYSTOLIC BLOOD PRESSURE: 112 MMHG | DIASTOLIC BLOOD PRESSURE: 72 MMHG

## 2021-09-08 DIAGNOSIS — I48.0 PAROXYSMAL ATRIAL FIBRILLATION (HCC): ICD-10-CM

## 2021-09-08 DIAGNOSIS — I50.20 HFREF (HEART FAILURE WITH REDUCED EJECTION FRACTION) (HCC): Primary | ICD-10-CM

## 2021-09-08 PROCEDURE — G0463 HOSPITAL OUTPT CLINIC VISIT: HCPCS

## 2021-09-08 PROCEDURE — 99214 OFFICE O/P EST MOD 30 MIN: CPT | Performed by: INTERNAL MEDICINE

## 2021-09-08 NOTE — PROGRESS NOTES
Heart Failure & Pulmonary Arterial Hypertension  Andrea Polanco M.D., Ph.D., [facility]       Murphy Horner MD  [address]  [facility] [address]  [place] [address]    Thank you for asking me to see Alice Mabry for congestive heart failure.    History of Present Illness  This is a 80 y.o. female with:    1. CM    Alice Mabry is a 80 y.o. female who returns to clinic today.  The patient is typically seen by Tho Mar MD.  Patient's primary cardiologist is Dr. Horner.  The patient has a chief complaint of Systolic CHF.    Patient returns in follow-up today.  She recently saw her primary cardiologist.  She has had a history of mildly depressed ejection fraction and symptoms consistent with mild systolic heart failure.  She does have a history of coronary artery disease.  In the summer 2021 she was found to be in atrial fibrillation with rapid ventricular response.  She was then admitted over the summer with exacerbation of heart failure.  This was thought to be tachycardia mediated.  She also was noted to have worsened MR.  Repeat 2D TTE during that admission showed LVEF of 31-35%.  Patient was up-titrated on GDMT.  Her regimen currently includes Jardiance at 10 mg, Toprol- in the morning and 100 mg in the evening, and Aldactone 25 mg. ENTRESTO was discontinued due to hypotension.     Since her last visit she reports that her Entresto was discontinued due to hypotension.  She is been checking her blood pressures at home.  She did not bring her log today but she reports that her systolic blood pressure has been around 105-118.  She has had no episodes of dizziness, lightheadedness or syncope.  No recent episodes of resting, exertional or nocturnal angina.  Otherwise, she is medication compliant.  She has stable exertional dyspnea and exercise intolerance.  No recent episodes of lower extremity edema.         Review of Systems - Review of Systems   Cardiovascular: Positive for dyspnea on exertion.  Negative for chest pain, claudication, cyanosis, irregular heartbeat, leg swelling, near-syncope, orthopnea, palpitations, paroxysmal nocturnal dyspnea and syncope.   Respiratory: Positive for shortness of breath. Negative for cough, hemoptysis, sleep disturbances due to breathing, snoring, sputum production and wheezing.    All other systems reviewed and are negative.       All other systems were reviewed and were negative.    family history includes Heart disease in her brother, mother, and sister; No Known Problems in her father, maternal grandfather, maternal grandmother, paternal grandfather, and paternal grandmother.     reports that she quit smoking about 22 years ago. She has never used smokeless tobacco. She reports that she does not drink alcohol and does not use drugs.    Allergies   Allergen Reactions   • Codeine Other (See Comments)     Chest pain .     • Penicillins Hives   • Ambien [Zolpidem Tartrate] Confusion         Current Outpatient Medications:   •  apixaban (ELIQUIS) 5 MG tablet tablet, Take 1 tablet by mouth Every 12 (Twelve) Hours., Disp: 60 tablet, Rfl: 11  •  atorvastatin (LIPITOR) 80 MG tablet, Take 80 mg by mouth Every Night., Disp: , Rfl:   •  bumetanide (BUMEX) 1 MG tablet, Take 1 tablet by mouth Daily., Disp: 30 tablet, Rfl: 1  •  Cyanocobalamin (VITAMIN B-12 CR PO), Take  by mouth Daily., Disp: , Rfl:   •  digoxin (LANOXIN) 125 MCG tablet, Take 1 tablet by mouth 3 (Three) Times a Week., Disp: 36 tablet, Rfl: 2  •  diphenhydrAMINE-acetaminophen (Tylenol PM Extra Strength)  MG tablet per tablet, Take 2 tablets by mouth Every Night., Disp: , Rfl:   •  esomeprazole (nexIUM) 40 MG capsule, Take 40 mg by mouth Every Morning Before Breakfast., Disp: , Rfl:   •  FLUoxetine (PROzac) 20 MG capsule, Take 20 mg by mouth daily., Disp: , Rfl:   •  isosorbide mononitrate (IMDUR) 60 MG 24 hr tablet, Take 1 tablet by mouth Daily., Disp: 30 tablet, Rfl: 11  •  Jardiance 10 MG tablet tablet, Take  1 tablet by mouth Daily., Disp: 14 tablet, Rfl: 0  •  LORazepam (ATIVAN) 1 MG tablet, Take 1 mg by mouth every 8 (eight) hours as needed for anxiety., Disp: , Rfl:   •  Melatonin 10 MG tablet, Take 10 mg by mouth Every Night., Disp: , Rfl:   •  metoprolol succinate XL (TOPROL-XL) 100 MG 24 hr tablet, Take 1 tablet by mouth Every Night., Disp: 90 tablet, Rfl: 1  •  metoprolol succinate XL (TOPROL-XL) 100 MG 24 hr tablet, Take 150 mg by mouth Daily., Disp: , Rfl:   •  multivitamin with minerals (Alive Once Daily Womens 50+) tablet tablet, Take 1 tablet by mouth Daily., Disp: , Rfl:   •  nitroglycerin (NITROSTAT) 0.4 MG SL tablet, Place 1 tablet under the tongue Every 5 (Five) Minutes As Needed for Chest Pain. Take no more than 3 doses in 15 minutes., Disp: 100 tablet, Rfl: 3  •  ranolazine (RANEXA) 500 MG 12 hr tablet, TAKE 1 TABLET EVERY 12     HOURS, Disp: 180 tablet, Rfl: 2  •  spironolactone (ALDACTONE) 25 MG tablet, Take 0.5 tablets by mouth Daily., Disp: 30 tablet, Rfl: 11  •  vitamin C (ASCORBIC ACID) 500 MG tablet, Take 500 mg by mouth Daily., Disp: , Rfl:   •  Vitamin D, Ergocalciferol, 2000 units capsule, Take 2,000 Units by mouth Daily., Disp: , Rfl:       Physical Exam:  I have reviewed the patient's current vital signs as documented in the patient's EMR.   Vitals:    09/08/21 0953   BP: 112/72   Pulse: 64   Resp: 20   SpO2: 95%     Body mass index is 30.12 kg/m².   Pulse Pressure: within normal limits  Pulse Ox: Normal  on room air  General: alert, appears stated age and cooperative     Body Habitus: overweight    HEENT: Head: Normocephalic, no lesions, without obvious abnormality. No arcus senilis, xanthelasma or xanthomas.  No malar flush, proptosis, xanthomas or malar flush.   Neuro: alert, oriented x3  speech normal in context and clarity  memory intact grossly  Pulses: 2+ and symmetric  JVP: Volume/Pulsation: Normal.  Normal x and y descent.  Waveforms: Normal waveforms with  x, and v waves.    Physiology: Appropriate inspiratory decrease.  No Kussmaul's. No Subhash's.    Pulmonary:Normal     Precordium: Normal impulses. P2 is not palpable.  RV Heave: absent  LV Heave: absent  [place]:  normal size and placement  Palpable S4: absent.  Heart rate: normal    Heart Rhythm: Irregularly irregular.      Heart Sounds: S1: normal intensity  S2: normal intensity  S3: absent   S4: absent  Opening Snap: absent    A2-OS:  no  Pericardial Rub:  Absent: Yes     Ejection click: None      Murmurs:  absent   Extremity: moves all extremities equally.       DATA REVIEWED:       TTE/DALTON:  Results for orders placed during the hospital encounter of 08/12/21    Adult Transthoracic Echo Complete w/ Color, Spectral and Contrast if Necessary Per Protocol    Interpretation Summary  · There is moderate calcification of trileaflet aortic valve with Doppler findings suggesting moderate stenosis: Peak velocity of the flow distal to the aortic valve is 262.8 cm/s. Aortic valve maximum pressure gradient is 27.6 mmHg. Aortic valve mean pressure gradient is 16.3 mmHg. Aortic valve dimensionless index is 0.3 .Aortic valve area is 0.92 cm2.  · Calculated left ventricular EF = 43.5% Estimated left ventricular EF was in agreement with the calculated left ventricular EF. Left ventricular systolic function is mildly decreased with global hypokinesis..Left ventricular wall thickness is consistent with moderate eccentric hypertrophy.  · Left ventricular diastolic dysfunction is noted. Normal left atrial filling pressure.  · Moderate mitral valve regurgitation is present.  · Calculated right ventricular systolic pressure from tricuspid regurgitation is 25 mmHg.  · Patient was in atrial fibrillation during study.  · Slightly improved left ventricular ejection fraction compared to study on 7/8/2021.      My interpretation of the TTE is below.   LVEF is mildly decreased.  LVEF is around 40%.          Laboratory evaluations:    Lab Results   Component  Value Date    GLUCOSE 147 (H) 07/27/2021    BUN 10 07/27/2021    CREATININE 0.90 07/27/2021    EGFRIFNONA 60 (L) 07/27/2021    BCR 11.1 07/27/2021    K 4.3 07/27/2021    CO2 25.2 07/27/2021    CALCIUM 9.4 07/27/2021    ALBUMIN 4.10 07/08/2021    LABIL2 1.5 02/26/2021    AST 39 (H) 07/08/2021    ALT 36 (H) 07/08/2021     Lab Results   Component Value Date    WBC 6.54 07/08/2021    HGB 14.6 07/08/2021    HCT 44.1 07/08/2021    .9 (H) 07/08/2021     07/08/2021     Lab Results   Component Value Date    CHOL 105 07/20/2021    CHLPL 129 01/29/2020    TRIG 134 07/20/2021    HDL 31 (L) 07/20/2021    LDL 50 07/20/2021     Lab Results   Component Value Date    TSH 2.440 07/20/2021     Lab Results   Component Value Date    CKTOTAL 69 12/14/2015    TROPONINI 0.029 06/29/2019    TROPONINT 0.013 07/08/2021     Lab Results   Component Value Date    HGBA1C 5.90 (H) 07/20/2021     No results found for: DDIMER  Lab Results   Component Value Date    ALT 36 (H) 07/08/2021     Lab Results   Component Value Date    HGBA1C 5.90 (H) 07/20/2021     Lab Results   Component Value Date    CREATININE 0.90 07/27/2021     No results found for: IRON, TIBC, FERRITIN  Lab Results   Component Value Date    INR 1.35 (H) 07/08/2021    INR 1.2 06/28/2019    INR 1.1 03/15/2015    PROTIME 16.4 (H) 07/08/2021    PROTIME 14.5 (H) 06/28/2019    PROTIME 11.9 03/15/2015       Assessment/Plan     1. HFrEF (heart failure with reduced ejection fraction) (CMS/[facility]). NYHA stage C; FC-III. Today, euvolemic and perfused. Marginal HDs will not allow up-titration at this time. ENTRESTO was not able to be tolerated. She will continue her Toprol XL, Aldactone, and Jardiance. Clinical trajectory is stable. Call for issues.  I have asked her to bring a log of her blood pressures and heart rates when she comes back next month and we can assess for possible initiation of a low-dose dose ACE inhibitor.    2. PAF.  Today, she is in atrial fibrillation but with a  controlled ventricular rate.  Toprol-XL is currently 150 mg.  We may be able to get this to 200 mg at her next appointment, but I asked her to bring in a blood pressure log and heart rate log at her next appointment.         Return in about 1 month (around 10/8/2021).

## 2021-09-08 NOTE — PROGRESS NOTES
[facility] HEART FAILURE      Patient Name: Alice Mabry  :1940  Age: 80 y.o.  Sex: female  Referring Provider: Murphy Horner MD   Primary Cardiologist: Murphy Horner MD   Encounter Provider: Johanny Wells, PharmD, BCACP      Chief Complaint:   Chief Complaint   Patient presents with   • Congestive Heart Failure       HPI:  Pt presentsfor a follow-up visit in the [facility]. PMH is significant for heart failure with improved EF (recent EF 43%), A.fib, CAD, HLD, HTN, Hx of MI in  and  (w/ stent placement in ) mitral valve stenosis, syncope. Per Dr. Horner concern that HFrEF is a result of ventricular tachycardia. Since last visit, patient's Entresto was discontinued due to systolics being in the 80s. When patient saw Dr. Horner, her metoprolol was increased to 150 mg nightly. She reports things have been stable at home, but she forgot to bring in her log today. She did report her systolics are 105-118.       Current Regimen:  Heart Failure  Metoprolol succinate 150 mg nightly  Spironolactone 25 mg tab (0.5 tab) daily  Bumex 1 mg daily  Jardiance 10 mg daily      Other CV Meds  Digoxin 125 mg MWF    Atorvastatin 80 mg daily  Apixaban 5 mg bid    Ranexa 500 mg bid   Isosorbide mononitrate 60 mg daily       Medication Use:  Adherence: good  uses pillbox at home  Past hx of medication use/intolerance: ramipril (confusion - 2012); metoprolol tartrate (alternate therapy); Entresto (hypotension)  Affordability: traditional Medicare + supplemental + Part D  Jardiance through PAP      Diet:   Breakfast: special K bar / no appetite first thing in the morning; frequently eats egg and toast w/ butter   Lunch: chicken salad sandwich + low sodium chips   Dinner: chicken casserole + peas    Drinks: sparkling water 1 bottle/day and water with eating       Social History:  Tobacco use: quit smoking in  (after first MI)   EtOH use: none  Illicit drug use: none  Exercise: minimal due to comorbid  "conditions      Immunization Status:  Pneumococcal: PPSV23 (2011) and PCV 13 (2016)   Influenza: obtains annually   COVID-19:  received Pfizer x 2 in 01/2021, 02/2021       OBJECTIVE:    /72 (BP Location: Left arm, Patient Position: Sitting)   Pulse 64   Resp 20   Ht 165.1 cm (65\")   Wt 82.1 kg (181 lb)   SpO2 95%   BMI 30.12 kg/m²     Body mass index is 30.12 kg/m².  Wt Readings from Last 1 Encounters:   09/08/21 82.1 kg (181 lb)       Home BP Readings:  Checking daily and things are stable. Forgot to bring in her log to appt today      Lab Review:  Renal Function: CrCl cannot be calculated (Patient's most recent lab result is older than the maximum 30 days allowed.).    Lab Results   Component Value Date    PROBNP 1,255.0 07/20/2021         ASSESSMENT/PLAN OF CARE:    1. HF with improved EF (most recent EF 43%)  - Patient is stable at today's visit. Entresto was stopped due to hypotension but her BP is now stable at home  - Continue metoprolol succinate 150 mg nightly  - Continue spironolactone 25 mg tab (0.5 tab) daily  - Continue bumetanide 1 mg daily  - Continue Jardiance 10 mg daily  - Would look to try low dose ACEi/ARB at future visit to see if patient tolerates. She was unable to tolerate Entresto due to hypotension  - Advised patient to call clinic with 2-3 lb weight gain in 24 hrs or >5 lb weight gain in 1 week         Thank you for allowing me to participate in the care of your patient,       Johanny Wells, PharmD, BCACP  [facility]  Phone: [phone]      "

## 2021-09-27 ENCOUNTER — TELEPHONE (OUTPATIENT)
Dept: CARDIOLOGY | Facility: HOSPITAL | Age: 81
End: 2021-09-27

## 2021-09-27 RX ORDER — BUMETANIDE 1 MG/1
1 TABLET ORAL DAILY
Qty: 30 TABLET | Refills: 1 | Status: SHIPPED | OUTPATIENT
Start: 2021-09-27 | End: 2021-11-12 | Stop reason: SDUPTHER

## 2021-09-27 NOTE — TELEPHONE ENCOUNTER
Pt left message earlier this PM stating she needs a refill of her water pill.  Called Pt to clarify which med she needs. Per Pt she needs a refill of her Bumex.  Advised Pt that I will send in her refill request. Understanding and agreement verbalized.    Tawnya Altamirano RN  [facility]

## 2021-09-30 ENCOUNTER — OFFICE VISIT (OUTPATIENT)
Dept: CARDIOLOGY | Facility: CLINIC | Age: 81
End: 2021-09-30

## 2021-09-30 VITALS
HEART RATE: 73 BPM | DIASTOLIC BLOOD PRESSURE: 85 MMHG | HEIGHT: 65 IN | BODY MASS INDEX: 29.26 KG/M2 | WEIGHT: 175.6 LBS | SYSTOLIC BLOOD PRESSURE: 128 MMHG

## 2021-09-30 DIAGNOSIS — I50.20 HFREF (HEART FAILURE WITH REDUCED EJECTION FRACTION) (HCC): Primary | ICD-10-CM

## 2021-09-30 DIAGNOSIS — I35.0 NONRHEUMATIC AORTIC VALVE STENOSIS: ICD-10-CM

## 2021-09-30 DIAGNOSIS — I25.10 CORONARY ARTERY DISEASE INVOLVING NATIVE CORONARY ARTERY OF NATIVE HEART WITHOUT ANGINA PECTORIS: ICD-10-CM

## 2021-09-30 DIAGNOSIS — I34.0 MITRAL VALVE INSUFFICIENCY, UNSPECIFIED ETIOLOGY: ICD-10-CM

## 2021-09-30 DIAGNOSIS — I48.0 PAROXYSMAL ATRIAL FIBRILLATION (HCC): ICD-10-CM

## 2021-09-30 PROCEDURE — 93000 ELECTROCARDIOGRAM COMPLETE: CPT | Performed by: NURSE PRACTITIONER

## 2021-09-30 PROCEDURE — 99214 OFFICE O/P EST MOD 30 MIN: CPT | Performed by: NURSE PRACTITIONER

## 2021-09-30 NOTE — PROGRESS NOTES
Date of Office Visit: 2021  Encounter Provider: JOHNNIE Sadler  Place of Service: [facility] CARDIOLOGY  Patient Name: Alice Mabry  :1940    Chief Complaint   Patient presents with   • Atrial Fibrillation   :     HPI: Alice Mabry is a 80 y.o. female who presents today for follow-up.  Old records have been obtained and reviewed by me.  She is a patient of Dr. Horner's with a past cardiac history significant for hypertension, hyperlipidemia, sinus bradycardia, and coronary artery disease.  In , she suffered an inferior STEMI for which she underwent angioplasty and drug-eluting stenting of the RCA.  At that time, she was noted to have an ischemic cardiomyopathy with an EF of 30%.  In , she underwent drug-eluting stenting to the LAD.    She did have improvement of her EF several months later to 57%.     In July of this year, she was seen in the office by JOHNNIE Lord at which time she was reporting increased shortness of breath and palpitations.  EKG demonstrated atrial fibrillation RVR, and she was started on metoprolol and Eliquis.  Subsequently, she was scheduled for an echocardiogram and stress test.  However, two days later, she presented to the ED with shortness of breath and was admitted for further treatment.  Echocardiogram demonstrated decreased LV systolic function with an EF of 31 to 35%, global hypokinesis, moderate aortic regurgitation, mild to moderate aortic stenosis, and severe mitral regurgitation.  She was started on carvedilol, digoxin, losartan, and spironolactone.  Additionally, she was referred to the heart failure clinic.    She was last seen in the office by Dr. Horner on 2021 at which time she was reporting feeling poorly later in the day after taking her morning medications.  She was still reporting shortness of breath with activity.  Dr. Horner felt like we were overmedicated from a blood pressure standpoint, and  the Entresto was discontinued.  He ordered a Holter monitor to assess her average heart rate which demonstrated an average heart rate of 77.  On 8/17/2021, stress test demonstrated a large sized infarct in the inferior wall with no significant ischemia.  Repeat echocardiogram demonstrated mildly decreased LV systolic function with an EF of 43% with global hypokinesis.  She is here today for follow-up.   She has not yet noticed a big difference since stopping Entresto.  She is still very fatigued and short of breath with exertion.  She denies any PND orthopnea.  She denies any edema.  She denies any chest pain, palpitations, syncope, bleeding difficulties or melena.  There has been a lot of tragedy in her family over the last year.  She lost 1 son-in-law last year to pancreatic cancer.  Another son-in-law was diagnosed this morning with glioblastoma.    Past Medical History:   Diagnosis Date   • Acute diastolic (congestive) heart failure (CMS/HCC)    • Anxiety    • Arthritis    • Atrial fibrillation (CMS/HCC) 07/06/2021   • CAD (coronary artery disease)    • Cervical cancer (CMS/HCC)    • Current tear of meniscus    • Depression    • Dizziness    • Fatty liver    • GERD (gastroesophageal reflux disease)    • H/O blood clots    • H/O Clostridium difficile infection    • Hyperlipidemia    • Hypertension    • IBS (irritable bowel syndrome)    • Myocardial infarction (CMS/HCC)    • Obesity    • Peptic ulcer    • Pneumonia    • Sinus bradycardia    • Syncope    • Vitamin D deficiency        Past Surgical History:   Procedure Laterality Date   • APPENDECTOMY  1960   • BACK SURGERY     • CARDIAC CATHETERIZATION  06/10/2014    Dr. Murphy Hroner   • CARDIAC CATHETERIZATION  11/13/2007    Dr. Murphy Horner   • CARDIAC CATHETERIZATION N/A 10/19/2004    Dr. Murphy Horner   • CARDIAC CATHETERIZATION N/A 07/15/2004    Dr. Gregorio Gonzales   • CARDIAC CATHETERIZATION  10/2003    Dr. Murphy Horner   • CHOLECYSTECTOMY  1998   •  COLONOSCOPY N/A 2001    negative   • COLONOSCOPY N/A 2012    negative   • CORONARY ANGIOPLASTY WITH STENT PLACEMENT N/A 07/15/2004    Dr. Murphy Horner   • CORONARY ANGIOPLASTY WITH STENT PLACEMENT  2002    to RCA   • HYSTERECTOMY  1974   • UPPER GASTROINTESTINAL ENDOSCOPY N/A 2015    Mild Schatzki ring, hiatus hernia, non-bleeding erosive gastropathy, erythematous mucosa in the antrum, normal duodenum-Dr. Alex Gill       Social History     Socioeconomic History   • Marital status:      Spouse name: Not on file   • Number of children: Not on file   • Years of education: Not on file   • Highest education level: Not on file   Tobacco Use   • Smoking status: Former Smoker     Quit date:      Years since quittin.7   • Smokeless tobacco: Never Used   Vaping Use   • Vaping Use: Never used   Substance and Sexual Activity   • Alcohol use: No   • Drug use: No   • Sexual activity: Defer       Family History   Problem Relation Age of Onset   • Heart disease Mother    • No Known Problems Father    • No Known Problems Maternal Grandmother    • No Known Problems Maternal Grandfather    • No Known Problems Paternal Grandmother    • No Known Problems Paternal Grandfather    • Heart disease Sister    • Heart disease Brother        Review of Systems   Constitutional: Positive for malaise/fatigue.   Cardiovascular: Positive for dyspnea on exertion. Negative for chest pain, leg swelling, orthopnea, paroxysmal nocturnal dyspnea and syncope.   Respiratory: Negative.    Hematologic/Lymphatic: Negative for bleeding problem.   Musculoskeletal: Negative for falls.   Gastrointestinal: Negative for melena.   Neurological: Negative for dizziness and light-headedness.       Allergies   Allergen Reactions   • Codeine Other (See Comments)     Chest pain .     • Penicillins Hives   • Ambien [Zolpidem Tartrate] Confusion         Current Outpatient Medications:   •  apixaban (ELIQUIS) 5 MG tablet tablet, Take  1 tablet by mouth Every 12 (Twelve) Hours., Disp: 60 tablet, Rfl: 11  •  atorvastatin (LIPITOR) 80 MG tablet, Take 80 mg by mouth Every Night., Disp: , Rfl:   •  bumetanide (BUMEX) 1 MG tablet, Take 1 tablet by mouth Daily., Disp: 30 tablet, Rfl: 1  •  Cyanocobalamin (VITAMIN B-12 CR PO), Take  by mouth Daily., Disp: , Rfl:   •  digoxin (LANOXIN) 125 MCG tablet, Take 1 tablet by mouth 3 (Three) Times a Week., Disp: 36 tablet, Rfl: 2  •  diphenhydrAMINE-acetaminophen (Tylenol PM Extra Strength)  MG tablet per tablet, Take 2 tablets by mouth Every Night., Disp: , Rfl:   •  esomeprazole (nexIUM) 40 MG capsule, Take 40 mg by mouth Every Morning Before Breakfast., Disp: , Rfl:   •  FLUoxetine (PROzac) 20 MG capsule, Take 20 mg by mouth daily., Disp: , Rfl:   •  isosorbide mononitrate (IMDUR) 60 MG 24 hr tablet, Take 1 tablet by mouth Daily., Disp: 30 tablet, Rfl: 11  •  Jardiance 10 MG tablet tablet, Take 1 tablet by mouth Daily., Disp: 14 tablet, Rfl: 0  •  LORazepam (ATIVAN) 1 MG tablet, Take 1 mg by mouth every 8 (eight) hours as needed for anxiety., Disp: , Rfl:   •  Melatonin 10 MG tablet, Take 10 mg by mouth Every Night., Disp: , Rfl:   •  metoprolol succinate XL (TOPROL-XL) 100 MG 24 hr tablet, Take 150 mg by mouth Daily., Disp: , Rfl:   •  multivitamin with minerals (Alive Once Daily Womens 50+) tablet tablet, Take 1 tablet by mouth Daily., Disp: , Rfl:   •  nitroglycerin (NITROSTAT) 0.4 MG SL tablet, Place 1 tablet under the tongue Every 5 (Five) Minutes As Needed for Chest Pain. Take no more than 3 doses in 15 minutes., Disp: 100 tablet, Rfl: 3  •  ranolazine (RANEXA) 500 MG 12 hr tablet, TAKE 1 TABLET EVERY 12     HOURS, Disp: 180 tablet, Rfl: 2  •  spironolactone (ALDACTONE) 25 MG tablet, Take 0.5 tablets by mouth Daily., Disp: 30 tablet, Rfl: 11  •  vitamin C (ASCORBIC ACID) 500 MG tablet, Take 500 mg by mouth Daily., Disp: , Rfl:   •  Vitamin D, Ergocalciferol, 2000 units capsule, Take 2,000 Units by  "mouth Daily., Disp: , Rfl:       Objective:     Vitals:    09/30/21 1113   BP: 128/85   Pulse: 73   Weight: 79.7 kg (175 lb 9.6 oz)   Height: 165.1 cm (65\")     Body mass index is 29.22 kg/m².    PHYSICAL EXAM:    Neck:      Vascular: No JVD.   Pulmonary:      Effort: Pulmonary effort is normal.      Breath sounds: Normal breath sounds.   Cardiovascular:      Normal rate. Irregular rhythm.      Murmurs: There is a harsh midsystolic murmur at the URSB, radiating to the neck.      No gallop. No click. No rub.   Pulses:     Intact distal pulses.           ECG 12 Lead    Date/Time: 9/30/2021 11:34 AM  Performed by: Pat Paul APRN  Authorized by: Pat Paul APRN   Comparison: compared with previous ECG from 8/17/2021  Similar to previous ECG  Rhythm: atrial fibrillation  Rate: normal  BPM: 73  Q waves: V1    Other findings: non-specific ST-T wave changes    Clinical impression: abnormal EKG  Comments: Indication: Atrial fibrillation              Assessment:       Diagnosis Plan   1. HFrEF (heart failure with reduced ejection fraction) (CMS/[facility])     2. Paroxysmal atrial fibrillation (CMS/[facility])  ECG 12 Lead   3. Mitral valve insufficiency, unspecified etiology     4. Nonrheumatic aortic valve stenosis     5. Coronary artery disease involving native coronary artery of native heart without angina pectoris       Orders Placed This Encounter   Procedures   • ECG 12 Lead     This order was created via procedure documentation     Order Specific Question:   Release to patient     Answer:   Immediate          Plan:       1.  Ischemic cardiomyopathy.  EF 43 %.  She is on guideline directed medical therapy with Toprol and spironolactone.  Entresto recently discontinued due to low blood pressure.  She is also on Jardiance.  She appears euvolemic on the current dose of Bumex.      2.  Atrial fibrillation.  She is rate controlled with Toprol and anticoagulated on Eliquis.      3.  Mitral insufficiency.  Moderate on " recent echocardiogram.      4.  Aortic stenosis.  Moderate on recent echocardiogram.      5.  Coronary artery disease.  She is on good medical therapy with atorvastatin, isosorbide, and Ranexa.  Certainly the fatigue could be anginal.      Overall I think she is stable.  I would like to discuss her symptoms with Dr. Horner before making any further recommendations.      As always, it has been a pleasure to participate in your patient's care.      Sincerely,         JOHNNIE Zuniga

## 2021-10-06 ENCOUNTER — HOSPITAL ENCOUNTER (OUTPATIENT)
Dept: CARDIOLOGY | Facility: HOSPITAL | Age: 81
Discharge: HOME | End: 2021-10-06
Admitting: INTERNAL MEDICINE

## 2021-10-06 ENCOUNTER — TELEPHONE (OUTPATIENT)
Dept: CARDIOLOGY | Facility: CLINIC | Age: 81
End: 2021-10-06

## 2021-10-06 VITALS
HEART RATE: 81 BPM | SYSTOLIC BLOOD PRESSURE: 112 MMHG | BODY MASS INDEX: 29.59 KG/M2 | WEIGHT: 177.6 LBS | HEIGHT: 65 IN | OXYGEN SATURATION: 97 % | DIASTOLIC BLOOD PRESSURE: 72 MMHG

## 2021-10-06 DIAGNOSIS — I42.8 NONISCHEMIC CARDIOMYOPATHY (HCC): Primary | ICD-10-CM

## 2021-10-06 LAB
ANION GAP SERPL CALCULATED.3IONS-SCNC: 13.3 MMOL/L (ref 5–15)
BUN SERPL-MCNC: 12 MG/DL (ref 8–23)
BUN/CREAT SERPL: 14.5 (ref 7–25)
CALCIUM SPEC-SCNC: 9.7 MG/DL (ref 8.6–10.5)
CHLORIDE SERPL-SCNC: 105 MMOL/L (ref 98–107)
CO2 SERPL-SCNC: 23.7 MMOL/L (ref 22–29)
CREAT SERPL-MCNC: 0.83 MG/DL (ref 0.57–1)
GFR SERPL CREATININE-BSD FRML MDRD: 66 ML/MIN/1.73
GLUCOSE SERPL-MCNC: 146 MG/DL (ref 65–99)
POTASSIUM SERPL-SCNC: 4 MMOL/L (ref 3.5–5.2)
SODIUM SERPL-SCNC: 142 MMOL/L (ref 136–145)

## 2021-10-06 PROCEDURE — 99214 OFFICE O/P EST MOD 30 MIN: CPT | Performed by: INTERNAL MEDICINE

## 2021-10-06 PROCEDURE — 80048 BASIC METABOLIC PNL TOTAL CA: CPT

## 2021-10-06 PROCEDURE — G0463 HOSPITAL OUTPT CLINIC VISIT: HCPCS

## 2021-10-06 RX ORDER — METOPROLOL SUCCINATE 200 MG/1
200 TABLET, EXTENDED RELEASE ORAL DAILY
Qty: 31 TABLET | Refills: 0 | Status: SHIPPED | OUTPATIENT
Start: 2021-10-06 | End: 2021-11-12 | Stop reason: SDUPTHER

## 2021-10-06 NOTE — PROGRESS NOTES
Heart Failure & Pulmonary Arterial Hypertension  Andrea Polanco M.D., Ph.D., [facility]       Murphy Horner MD  [address]  [facility] [address]  [place] [address]    Thank you for asking me to see Alice Mabry for congestive heart failure.    History of Present Illness  This is a 80 y.o. female with:    1. CM    Alice Mabry is a 80 y.o. female who returns to clinic today.  The patient is typically seen by Tho Mar MD.  Patient's primary cardiologist is Dr. Horner.  The patient has a chief complaint of Systolic CHF.    Patient returns in follow-up today.  He has a history of a mildly depressed ejection fraction and symptoms consistent with mild systolic heart failure.  The summer of this year she was found to be in atrial fibrillation with rapid ventricular response.  He was then admitted over the summer with an exacerbation of heart failure.  2D echocardiogram showed that she had worsened mitral regurgitation and LVEF of 31-35%.  This was thought to be secondary to tachycardia from atrial fibrillation.  She was initiated on GDMT.  He is currently on Jardiance at 10 mg, Toprol- a day.  Aldactone is at 25 mg.  Entresto was discontinued secondary to hypotension.  He was asked to monitor her blood pressure and heart rate at home and presents today for possible up-titration/adjustment of her CHF-specific medications.      Review of Systems - Review of Systems   Cardiovascular: Positive for dyspnea on exertion. Negative for chest pain, claudication, cyanosis, irregular heartbeat, leg swelling, near-syncope, orthopnea, palpitations, paroxysmal nocturnal dyspnea and syncope.   Respiratory: Positive for shortness of breath. Negative for cough, hemoptysis, sleep disturbances due to breathing, snoring, sputum production and wheezing.    All other systems reviewed and are negative.       All other systems were reviewed and were negative.    family history includes Heart disease in her brother, mother,  and sister; No Known Problems in her father, maternal grandfather, maternal grandmother, paternal grandfather, and paternal grandmother.     reports that she quit smoking about 22 years ago. She has never used smokeless tobacco. She reports that she does not drink alcohol and does not use drugs.    Allergies   Allergen Reactions   • Codeine Other (See Comments)     Chest pain .     • Penicillins Hives   • Ambien [Zolpidem Tartrate] Confusion         Current Outpatient Medications:   •  apixaban (ELIQUIS) 5 MG tablet tablet, Take 1 tablet by mouth Every 12 (Twelve) Hours., Disp: 60 tablet, Rfl: 11  •  atorvastatin (LIPITOR) 80 MG tablet, Take 80 mg by mouth Every Night., Disp: , Rfl:   •  bumetanide (BUMEX) 1 MG tablet, Take 1 tablet by mouth Daily., Disp: 30 tablet, Rfl: 1  •  Cyanocobalamin (VITAMIN B-12 CR PO), Take  by mouth Daily., Disp: , Rfl:   •  digoxin (LANOXIN) 125 MCG tablet, Take 1 tablet by mouth 3 (Three) Times a Week., Disp: 36 tablet, Rfl: 2  •  diphenhydrAMINE-acetaminophen (Tylenol PM Extra Strength)  MG tablet per tablet, Take 2 tablets by mouth Every Night., Disp: , Rfl:   •  esomeprazole (nexIUM) 40 MG capsule, Take 40 mg by mouth Every Morning Before Breakfast., Disp: , Rfl:   •  FLUoxetine (PROzac) 20 MG capsule, Take 20 mg by mouth daily., Disp: , Rfl:   •  isosorbide mononitrate (IMDUR) 60 MG 24 hr tablet, Take 1 tablet by mouth Daily., Disp: 30 tablet, Rfl: 11  •  Jardiance 10 MG tablet tablet, Take 1 tablet by mouth Daily., Disp: 14 tablet, Rfl: 0  •  LORazepam (ATIVAN) 1 MG tablet, Take 1 mg by mouth every 8 (eight) hours as needed for anxiety., Disp: , Rfl:   •  Melatonin 10 MG tablet, Take 10 mg by mouth Every Night., Disp: , Rfl:   •  metoprolol succinate XL (TOPROL-XL) 100 MG 24 hr tablet, Take 150 mg by mouth Daily., Disp: , Rfl:   •  multivitamin with minerals (Alive Once Daily Womens 50+) tablet tablet, Take 1 tablet by mouth Daily., Disp: , Rfl:   •  nitroglycerin  (NITROSTAT) 0.4 MG SL tablet, Place 1 tablet under the tongue Every 5 (Five) Minutes As Needed for Chest Pain. Take no more than 3 doses in 15 minutes., Disp: 100 tablet, Rfl: 3  •  ranolazine (RANEXA) 500 MG 12 hr tablet, TAKE 1 TABLET EVERY 12     HOURS, Disp: 180 tablet, Rfl: 2  •  spironolactone (ALDACTONE) 25 MG tablet, Take 0.5 tablets by mouth Daily., Disp: 30 tablet, Rfl: 11  •  vitamin C (ASCORBIC ACID) 500 MG tablet, Take 500 mg by mouth Daily., Disp: , Rfl:   •  Vitamin D, Ergocalciferol, 2000 units capsule, Take 2,000 Units by mouth Daily., Disp: , Rfl:       Physical Exam:  I have reviewed the patient's current vital signs as documented in the patient's EMR.   Vitals:    10/06/21 1047   BP: 112/72   Pulse: 81   SpO2: 97%     Body mass index is 29.55 kg/m².   Pulse Pressure: within normal limits  Pulse Ox: Normal  on room air  General: alert, appears stated age and cooperative     Body Habitus: overweight    HEENT: Head: Normocephalic, no lesions, without obvious abnormality. No arcus senilis, xanthelasma or xanthomas.  No malar flush, proptosis, xanthomas or malar flush.   Neuro: alert, oriented x3  speech normal in context and clarity  memory intact grossly  Pulses: 2+ and symmetric  JVP: Volume/Pulsation: Normal.  Normal x and y descent.  Waveforms: Normal waveforms with  x, and v waves.   Physiology: Appropriate inspiratory decrease.  No Kussmaul's. No Subhash's.    Pulmonary:Normal     Precordium: Normal impulses. P2 is not palpable.  RV Heave: absent  LV Heave: absent  Duncan:  normal size and placement  Palpable S4: absent.  Heart rate: normal    Heart Rhythm: Irregularly irregular.      Heart Sounds: S1: normal intensity  S2: normal intensity  S3: absent   S4: absent  Opening Snap: absent    A2-OS:  no  Pericardial Rub:  Absent: Yes     Ejection click: None      Murmurs:  absent   Extremity: moves all extremities equally.       DATA REVIEWED:       TTE/DALTON:  Results for orders placed during the  hospital encounter of 08/12/21    Adult Transthoracic Echo Complete w/ Color, Spectral and Contrast if Necessary Per Protocol    Interpretation Summary  · There is moderate calcification of trileaflet aortic valve with Doppler findings suggesting moderate stenosis: Peak velocity of the flow distal to the aortic valve is 262.8 cm/s. Aortic valve maximum pressure gradient is 27.6 mmHg. Aortic valve mean pressure gradient is 16.3 mmHg. Aortic valve dimensionless index is 0.3 .Aortic valve area is 0.92 cm2.  · Calculated left ventricular EF = 43.5% Estimated left ventricular EF was in agreement with the calculated left ventricular EF. Left ventricular systolic function is mildly decreased with global hypokinesis..Left ventricular wall thickness is consistent with moderate eccentric hypertrophy.  · Left ventricular diastolic dysfunction is noted. Normal left atrial filling pressure.  · Moderate mitral valve regurgitation is present.  · Calculated right ventricular systolic pressure from tricuspid regurgitation is 25 mmHg.  · Patient was in atrial fibrillation during study.  · Slightly improved left ventricular ejection fraction compared to study on 7/8/2021.      My interpretation of the TTE is below.     LVEF is mildly decreased.  LVEF around 40.          Laboratory evaluations:    Lab Results   Component Value Date    GLUCOSE 147 (H) 07/27/2021    BUN 10 07/27/2021    CREATININE 0.90 07/27/2021    EGFRIFNONA 60 (L) 07/27/2021    BCR 11.1 07/27/2021    K 4.3 07/27/2021    CO2 25.2 07/27/2021    CALCIUM 9.4 07/27/2021    ALBUMIN 4.10 07/08/2021    LABIL2 1.5 02/26/2021    AST 39 (H) 07/08/2021    ALT 36 (H) 07/08/2021     Lab Results   Component Value Date    WBC 6.54 07/08/2021    HGB 14.6 07/08/2021    HCT 44.1 07/08/2021    .9 (H) 07/08/2021     07/08/2021     Lab Results   Component Value Date    CHOL 105 07/20/2021    CHLPL 129 01/29/2020    TRIG 134 07/20/2021    HDL 31 (L) 07/20/2021    LDL 50  07/20/2021     Lab Results   Component Value Date    TSH 2.440 07/20/2021     Lab Results   Component Value Date    CKTOTAL 69 12/14/2015    TROPONINI 0.029 06/29/2019    TROPONINT 0.013 07/08/2021     Lab Results   Component Value Date    HGBA1C 5.90 (H) 07/20/2021     No results found for: DDIMER  Lab Results   Component Value Date    ALT 36 (H) 07/08/2021     Lab Results   Component Value Date    HGBA1C 5.90 (H) 07/20/2021     Lab Results   Component Value Date    CREATININE 0.90 07/27/2021     No results found for: IRON, TIBC, FERRITIN  Lab Results   Component Value Date    INR 1.35 (H) 07/08/2021    INR 1.2 06/28/2019    INR 1.1 03/15/2015    PROTIME 16.4 (H) 07/08/2021    PROTIME 14.5 (H) 06/28/2019    PROTIME 11.9 03/15/2015       Assessment/Plan      1. Nonischemic cardiomyopathy (HCC). not acutely decompensated chronic mild systolic heart failure. Left Heart Failure. Etiology: PAF. LVEF 40%.  NYHA stage C;FC-II. NYHA FC change: improved by one grades.     The Cuenca Index was updated today. Most recent score: 6. Score change: 0.     Today, Patient appears euvolemic.and with  Moderate perfusion. The patient's hemodynamics are currently acceptable. HR is: normal and is not at goal. BP/MAP was reviewed and there isroom for medication up-titration.  Clinical trajectory was assessed and hasimproved.     -CHF-specific BB:    Metoprolol Succinate INCREASE to 200mg day.   -ACE/ARB/ARNI:   -CI due to marginal HDs  -MRA:   The patient is FC-NYHA Class II and MRA is indicated.   -Spironolactone 25mg  -SGLT2 inhibitor therapy:  -Jardiance (empagliflozin) 10mg with quarterly assessment of GFR.  -DIURETIC:   ordered  bumetanide (BUMEX) 1 mg every day  -Fluid restriction:   requested  -2000 ml  -Patient has been asked to weigh daily and was provided with a printed diuretic strategy.  -Sodium restriction:   -1,500 mg Na restriction was discussed.  -Nutrition: no nutritional compromise at this time:  Outpatient nutrition  education options provided  Vaccines:  -The patient reports they have not been vaccinated with seasonal influenza. Seasonal influenza vaccination was recommended.  -The patient reports they have been vaccinated with Covid vaccination. COVID vaccination was recommended.   -The patient reports they have been vaccinated with Pneumovax.   Laboratory evaluations:  -Chem-7;           Return in about 2 weeks (around 10/20/2021).

## 2021-10-06 NOTE — PROGRESS NOTES
[facility] HEART FAILURE      Patient Name: Alice Mabry  :1940  Age: 80 y.o.  Sex: female  Referring Provider: Murphy Horner MD   Primary Cardiologist: Murphy Horner MD   Encounter Provider: Johanny Wells, PharmD, BCACP      Chief Complaint:   Chief Complaint   Patient presents with   • Follow-up       HPI:  Pt presents for a follow-up visit in the HF. PMH is significant for heart failure with improved EF (recent EF 43%), A.fib, CAD, HLD, HTN, Hx of MI in  and  (w/ stent placement in ) mitral valve stenosis, syncope. Per Dr. Horner concern that HFrEF is a result of ventricular tachycardia. Patient saw primary cardiology on 21 and it was noted she did not notice a big difference since stopping the Entresto due to hypotension. Patient did bring in a BP log today and her systolics are consistently in the 110s. Her weights are stable between 173 and 175 lbs. Overall, she has been feeling well. She does have a son-in-law who was recently diagnosed with glioblastoma.       Current Regimen:  Heart Failure  Metoprolol succinate 150 mg nightly  Spironolactone 25 mg tab (0.5 tab) daily  Bumex 1 mg daily  Jardiance 10 mg daily      Other CV Meds  Digoxin 125 mg MWF    Atorvastatin 80 mg daily  Apixaban 5 mg bid    Ranexa 500 mg bid   Isosorbide mononitrate 60 mg daily       Medication Use:  Adherence: good  uses pillbox at home  Past hx of medication use/intolerance: ramipril (confusion - 2012); metoprolol tartrate (alternate therapy); Entresto (hypotension)  Affordability: traditional Medicare + supplemental + Part D  Jardiance through PAP      Diet:   Breakfast: special K bar / no appetite first thing in the morning; frequently eats egg and toast w/ butter   Lunch: chicken salad sandwich + low sodium chips   Dinner: chicken casserole + peas    Drinks: sparkling water 1 bottle/day and water with eating       Social History:  Tobacco use: quit smoking in  (after first MI)   EtOH  "use: none  Illicit drug use: none  Exercise: minimal due to comorbid conditions      Immunization Status:  Pneumococcal: PPSV23 (2011) and PCV 13 (2016)   Influenza: obtains annually   COVID-19:  received Pfizer x 2 in 01/2021, 02/2021       OBJECTIVE:    /72 (BP Location: Right arm, Patient Position: Sitting)   Pulse 81   Ht 165.1 cm (65\")   Wt 80.6 kg (177 lb 9.6 oz)   SpO2 97%   BMI 29.55 kg/m²     Body mass index is 29.55 kg/m².  Wt Readings from Last 1 Encounters:   10/06/21 80.6 kg (177 lb 9.6 oz)       Home BP Readings (HR): 105/70 (72), 115/76 (75), 114/71 (65), 115/71 (83), 114/72 (78), 116/71 (68), 114/72 (69), 112/65 (72)       Lab Review:  Renal Function: CrCl cannot be calculated (Patient's most recent lab result is older than the maximum 30 days allowed.).    Lab Results   Component Value Date    PROBNP 1,255.0 07/20/2021         ASSESSMENT/PLAN OF CARE:    1. HF with improved EF (most recent EF 43%)  - Patient is stable at today's visit   - Increase metoprolol succinate to 200 mg nightly. Asked patient to continue checking BP/HR at home  - Continue spironolactone 25 mg tab (0.5 tab) daily  - Continue bumetanide 1 mg daily  - Continue Jardiance 10 mg daily  - Would look to try low dose ACEi/ARB at future visit to see if patient tolerates. She was unable to tolerate Entresto due to hypotension  - Advised patient to call clinic with 2-3 lb weight gain in 24 hrs or >5 lb weight gain in 1 week         Thank you for allowing me to participate in the care of your patient,       Johanny Wells, PharmD, BCACP  [facility]  Phone: [phone]      "

## 2021-10-20 ENCOUNTER — TELEPHONE (OUTPATIENT)
Dept: CARDIOLOGY | Facility: CLINIC | Age: 81
End: 2021-10-20

## 2021-10-20 ENCOUNTER — HOSPITAL ENCOUNTER (OUTPATIENT)
Dept: CARDIOLOGY | Facility: HOSPITAL | Age: 81
Discharge: HOME | End: 2021-10-20

## 2021-10-20 VITALS
HEIGHT: 65 IN | SYSTOLIC BLOOD PRESSURE: 104 MMHG | WEIGHT: 173.2 LBS | HEART RATE: 76 BPM | DIASTOLIC BLOOD PRESSURE: 72 MMHG | OXYGEN SATURATION: 98 % | BODY MASS INDEX: 28.86 KG/M2

## 2021-10-20 DIAGNOSIS — I35.0 NONRHEUMATIC AORTIC VALVE STENOSIS: ICD-10-CM

## 2021-10-20 DIAGNOSIS — I50.32 CHRONIC DIASTOLIC (CONGESTIVE) HEART FAILURE (HCC): ICD-10-CM

## 2021-10-20 DIAGNOSIS — I50.20 HFREF (HEART FAILURE WITH REDUCED EJECTION FRACTION) (HCC): Primary | ICD-10-CM

## 2021-10-20 DIAGNOSIS — I48.0 PAROXYSMAL ATRIAL FIBRILLATION (HCC): ICD-10-CM

## 2021-10-20 DIAGNOSIS — I25.5 ISCHEMIC CARDIOMYOPATHY: ICD-10-CM

## 2021-10-20 DIAGNOSIS — I25.10 CORONARY ARTERY DISEASE INVOLVING NATIVE CORONARY ARTERY OF NATIVE HEART WITHOUT ANGINA PECTORIS: ICD-10-CM

## 2021-10-20 PROCEDURE — 99214 OFFICE O/P EST MOD 30 MIN: CPT | Performed by: NURSE PRACTITIONER

## 2021-10-20 NOTE — PROGRESS NOTES
[facility] HEART FAILURE      Patient Name: Alice Mabry  :1940  Age: 80 y.o.  Sex: female  Referring Provider: Pat Paul AP*   Primary Cardiologist: Murphy Horner MD  Encounter Provider:  JOHNNIE Mccray      Chief Complaint:   No chief complaint on file.  HFrEF      History of Present Illness this 80-year-old female, known to this provider, comes today for further evaluation regarding her chronic systolic heart failure.  Current diagnoses to include paroxysmal atrial fibrillation, coronary artery disease, history of cervical cancer, depression, fatty liver, GERD, hyperlipidemia, hypertension, IBS, prior myocardial infarction, obesity, and sinus bradycardia.    6/10/2014 she underwent cardiac catheterization with PCI of RCA, RAJ placement noted at that time.  LVEF prior to cardiac cath noted to be 30%.  Post PCI her echocardiogram repeated 90 days later showed improvement to 50%.    Stress testing in 2015 revealed an EF of 45% with no reversible ischemia noted.  Repeat echocardiogram 2016 showed further improvement of her LVEF to 60%.    In  she underwent repeat cardiac catheterization and it was found that she had in-stent restenosis of her RCA, this was stented again; it was also noted that she had 3 diagonals with significant disease that were too small for intervention.    Echocardiogram 2019 revealed normal LVEF of 58% with moderate MR and aortic stenosis with a mean gradient of 14 mmHg and PILAR of 1.3 cm².     she was seen by JOHNNIE Lord and at that time had, per chart review, complaints of shortness of breath with racing palpitations.  She also had complained at that visit of a heavy chest sensation that was worsening.  She was found to be in atrial fibrillation with RVR on ECG in office that day, rate as high as 120s with hypertension noted.  Metoprolol tartrate was started at that point as well as apixaban.  On July  8, 2021 she was noted to be very short of breath via telephone call to primary cardiologist and she was referred to the emergency department.  She was admitted from July 8 to July 12, 2021 with acute exacerbation of her diastolic heart failure.  At that time Dr. Horner felt she had likely been in atrial fibrillation with RVR, on noted for likely a month or 2, and this is what led to a tachycardia mediated cardiomyopathy now with LV C dilation.  Her MR was noted to have worsened and is now severe.  Repeat echocardiogram done during admission yielded an LVEF of 31 to 35%.      On 7/20/2021 losartan, carvedilol, and Lasix were discontinued, Entresto, metoprolol succinate, and Bumex were started.    July 27, 2021 metoprolol succinate was increased and Jardiance was started.    Metoprolol was increased to 200 mg on 10/6/2021, her heart rate is now staying in the 60s to 70s at home.  She did have another episode of chest pain that started on the left side of her chest and radiated to her left shoulder blade, relieved by 1 nitro.      The following portions of the patient's history were reviewed and updated as appropriate: allergies, current medications, past family history, past medical history, past social history, past surgical history and problem list.    Current Outpatient Medications   Medication Sig Dispense Refill   • apixaban (ELIQUIS) 5 MG tablet tablet Take 1 tablet by mouth Every 12 (Twelve) Hours. 60 tablet 11   • atorvastatin (LIPITOR) 80 MG tablet Take 80 mg by mouth Every Night.     • bumetanide (BUMEX) 1 MG tablet Take 1 tablet by mouth Daily. 30 tablet 1   • Cyanocobalamin (VITAMIN B-12 CR PO) Take  by mouth Daily.     • digoxin (LANOXIN) 125 MCG tablet Take 1 tablet by mouth 3 (Three) Times a Week. 36 tablet 2   • diphenhydrAMINE-acetaminophen (Tylenol PM Extra Strength)  MG tablet per tablet Take 2 tablets by mouth Every Night.     • esomeprazole (nexIUM) 40 MG capsule Take 40 mg by mouth Every  Morning Before Breakfast.     • FLUoxetine (PROzac) 20 MG capsule Take 20 mg by mouth daily.     • isosorbide mononitrate (IMDUR) 60 MG 24 hr tablet Take 1 tablet by mouth Daily. 30 tablet 11   • Jardiance 10 MG tablet tablet Take 1 tablet by mouth Daily. 14 tablet 0   • LORazepam (ATIVAN) 1 MG tablet Take 1 mg by mouth every 8 (eight) hours as needed for anxiety.     • Melatonin 10 MG tablet Take 10 mg by mouth Every Night.     • metoprolol succinate XL (TOPROL-XL) 200 MG 24 hr tablet Take 1 tablet by mouth Daily. 31 tablet 0   • multivitamin with minerals (Alive Once Daily Womens 50+) tablet tablet Take 1 tablet by mouth Daily.     • nitroglycerin (NITROSTAT) 0.4 MG SL tablet Place 1 tablet under the tongue Every 5 (Five) Minutes As Needed for Chest Pain. Take no more than 3 doses in 15 minutes. 100 tablet 3   • ranolazine (RANEXA) 500 MG 12 hr tablet TAKE 1 TABLET EVERY 12     HOURS 180 tablet 2   • spironolactone (ALDACTONE) 25 MG tablet Take 0.5 tablets by mouth Daily. 30 tablet 11   • vitamin C (ASCORBIC ACID) 500 MG tablet Take 500 mg by mouth Daily.     • Vitamin D, Ergocalciferol, 2000 units capsule Take 2,000 Units by mouth Daily.       No current facility-administered medications for this encounter.       Past Medical History:   Diagnosis Date   • Acute diastolic (congestive) heart failure (HCC)    • Anxiety    • Arthritis    • Atrial fibrillation (HCC) 07/06/2021   • CAD (coronary artery disease)    • Cervical cancer (HCC)    • Current tear of meniscus    • Depression    • Dizziness    • Fatty liver    • GERD (gastroesophageal reflux disease)    • H/O blood clots    • H/O Clostridium difficile infection    • Hyperlipidemia    • Hypertension    • IBS (irritable bowel syndrome)    • Myocardial infarction (HCC)    • Obesity    • Peptic ulcer    • Pneumonia    • Sinus bradycardia    • Syncope    • Vitamin D deficiency        Past Surgical History:   Procedure Laterality Date   • APPENDECTOMY  1960   • BACK  SURGERY     • CARDIAC CATHETERIZATION  06/10/2014    Dr. Murphy Horner   • CARDIAC CATHETERIZATION  11/13/2007    Dr. Murphy Honrer   • CARDIAC CATHETERIZATION N/A 10/19/2004    Dr. Murphy Horner   • CARDIAC CATHETERIZATION N/A 07/15/2004    Dr. Gregorio Gonzales   • CARDIAC CATHETERIZATION  10/2003    Dr. Murphy Horner   • CHOLECYSTECTOMY  1998   • COLONOSCOPY N/A 07/2001    negative   • COLONOSCOPY N/A 02/28/2012    negative   • CORONARY ANGIOPLASTY WITH STENT PLACEMENT N/A 07/15/2004    Dr. Murphy Horner   • CORONARY ANGIOPLASTY WITH STENT PLACEMENT  03/2002    to RCA   • HYSTERECTOMY  1974   • UPPER GASTROINTESTINAL ENDOSCOPY N/A 04/20/2015    Mild Schatzki ring, hiatus hernia, non-bleeding erosive gastropathy, erythematous mucosa in the antrum, normal duodenum-Dr. Alex Gill       Physical Exam  Vitals and nursing note reviewed.   Constitutional:       Appearance: She is well-developed. She is obese.   HENT:      Head: Normocephalic and atraumatic.   Eyes:      Conjunctiva/sclera: Conjunctivae normal.      Pupils: Pupils are equal, round, and reactive to light.   Neck:      Vascular: No JVD.   Cardiovascular:      Rate and Rhythm: Normal rate. Rhythm irregular.      Heart sounds: Normal heart sounds. No murmur heard.  No friction rub. No gallop.    Pulmonary:      Effort: Pulmonary effort is normal. No respiratory distress.      Breath sounds: Normal breath sounds.   Abdominal:      General: Bowel sounds are normal. There is no distension.      Palpations: Abdomen is soft.   Musculoskeletal:         General: No swelling or deformity.   Skin:     General: Skin is warm and dry.      Capillary Refill: Capillary refill takes less than 2 seconds.   Neurological:      Mental Status: She is alert and oriented to person, place, and time. Mental status is at baseline.   Psychiatric:         Mood and Affect: Mood normal.         Behavior: Behavior normal.          Review of Systems   Constitutional: Negative for  "fatigue and unexpected weight change.   HENT: Negative for congestion and nosebleeds.    Eyes: Negative for photophobia and visual disturbance.   Respiratory: Positive for chest tightness and shortness of breath. Negative for cough.         Improved   Cardiovascular: Positive for chest pain. Negative for palpitations and leg swelling.        Radiating to left shoulder blade; relieved by nitro   Gastrointestinal: Negative for abdominal distention and blood in stool.   Endocrine: Negative for polyphagia and polyuria.   Genitourinary: Positive for frequency. Negative for urgency.   Musculoskeletal: Negative for joint swelling and myalgias.   Skin: Negative for pallor and rash.   Neurological: Negative for dizziness, syncope, weakness, light-headedness, numbness and headaches.   Hematological: Does not bruise/bleed easily.   Psychiatric/Behavioral: Negative for confusion and sleep disturbance.        OBJECTIVE:  /72 (BP Location: Right arm, Patient Position: Sitting)   Pulse 76   Ht 165.1 cm (65\")   Wt 78.6 kg (173 lb 3.2 oz)   SpO2 98%   BMI 28.82 kg/m²      Body mass index is 28.82 kg/m².  Wt Readings from Last 1 Encounters:   10/20/21 78.6 kg (173 lb 3.2 oz)       Lab Review:  Renal Function: Estimated Creatinine Clearance: 55.1 mL/min (by C-G formula based on SCr of 0.83 mg/dL).    Lab Results   Component Value Date    PROBNP 1,255.0 07/20/2021       Results for orders placed during the hospital encounter of 08/12/21    Adult Transthoracic Echo Complete w/ Color, Spectral and Contrast if Necessary Per Protocol    Interpretation Summary  · There is moderate calcification of trileaflet aortic valve with Doppler findings suggesting moderate stenosis: Peak velocity of the flow distal to the aortic valve is 262.8 cm/s. Aortic valve maximum pressure gradient is 27.6 mmHg. Aortic valve mean pressure gradient is 16.3 mmHg. Aortic valve dimensionless index is 0.3 .Aortic valve area is 0.92 cm2.  · Calculated left " ventricular EF = 43.5% Estimated left ventricular EF was in agreement with the calculated left ventricular EF. Left ventricular systolic function is mildly decreased with global hypokinesis..Left ventricular wall thickness is consistent with moderate eccentric hypertrophy.  · Left ventricular diastolic dysfunction is noted. Normal left atrial filling pressure.  · Moderate mitral valve regurgitation is present.  · Calculated right ventricular systolic pressure from tricuspid regurgitation is 25 mmHg.  · Patient was in atrial fibrillation during study.  · Slightly improved left ventricular ejection fraction compared to study on 7/8/2021.      Procedures      6 MINUTE WALK                      Cardiac Procedures:  1.  6/10/2014 Cardiac catheterization  IMPRESSION:  Class III to IV angina on good medical therapy, ischemic  cardiomyopathy.  Prior stents are patent.  Diagonal disease is unchanged.  What  is new is she has developed a new 90% origin right coronary artery lesion. We  are going to proceed on with intervention of that.  I have discussed this with  her and her family beforehand.      Plavix 600 mg was given and heparin 7000 units was given. We exchanged a sheath  in the right radial artery and put a 6-Maltese sheath in. We then brought a  6-JR-4 guide up, but it would not fit into the right coronary artery, and we  switched to a 6-AR-1 guide. A BMW was passed easily into the distal RCA. We  brought a 3.5 x 15 NC trek balloon up and inflated it at 14 atmospheres in the  origin of the RCA.  Then I brought a 4.0 x 15 Xience Xpedition drug-eluting  stent down, and I deployed that at 14 atmospheres. We postdilated that with a  4.0 x 12 NC trek at 20 atmospheres. There was 0% residual and ALIS-3 flow, and  at that point, balloons, wires, and guides were removed. The ACT was 301  seconds. The sheath was removed on the table and an R band was applied.      IMPRESSION:  Successful angioplasty drug-eluting stenting to  the origin of the  right coronary artery.      RECOMMENDATIONS:  1.  Percutaneous coronary intervention care.   2.  Continue risk modification.   3.  Home in the morning if she is okay.  4.  Reassess her left ventricular function in 3 months.        Previously trialed diuretics  Lasix  Bumex      Previously trialed GDMT    Losartan  Carvedilol  Spironolactone  Metoprolol succinate  Entresto  Jardiance    ASSESSMENT:     Diagnosis Plan   1. HFrEF (heart failure with reduced ejection fraction) ([facility])     2. Chronic diastolic (congestive) heart failure (HCC)     3. Paroxysmal atrial fibrillation (HCC)     4. Ischemic cardiomyopathy     5. Coronary artery disease involving native coronary artery of native heart without angina pectoris     6. Nonrheumatic aortic valve stenosis           PLAN OF CARE:  1.  HFrEF-NYHA class II.  Current GDMT to include metoprolol succinate, spironolactone, and Jardiance, diuresed on Bumex.    She remains euvolemic on exam.  Metoprolol succinate was increased to target dosing on 10/6, she is tolerating this well with good response based on her heart rate log at home.  She does have some lower blood pressures on her blood pressure log, but it is difficult to tell from the amount of readings that she has brought in how frequently she is having these lower numbers.  I asked that she keep a log for the next 3 weeks, bring it back in with her, and if her blood pressure is averaging 110 or higher we will add a low-dose ACE or ARB.    Directions for when to call the clinic reviewed with the patient to include weight gain of 2 to 3 pounds in 24 hours, weight gain of 5 to 10 pounds within 7 days; worsening shortness of breath; worsening lower extremity edema or abdominal distention.    2.  Chest pain-intermittent, radiates to left shoulder blade.  Relieved by nitro.  I discussed this with Dr. Horner, he would like to optimize her antianginal regimen before taking her to the Cath Lab.  Patient to take  90 of Imdur.  We will reevaluate at her follow-up appointment.    3.  Atrial fibrillation-rate controlled on target dosing of Toprol..  Anticoagulated.  Managed by primary cardiologist.  Stable.    4.  Hypertension-stable and controlled    5.  CAD-see dictation above.  Managed by primary cardiologist.  Increasing Imdur to 90 mg        Increase Imdur to 90 mg; follow-up in 3 weeks or sooner if needed with blood pressure readings    Advance Care Planning   ACP discussion was held with the patient during this visit. Patient has an advance directive (not in EMR), copy requested.      Thank you for allowing me to participate in the care of your patient,    Lima BRAGG JOHNNIE Kahn  [facility] HEART FAILURE  10/20/21  09:20 EDT      **Fadia Disclaimer:**  Much of this encounter note is an electronic transcription/translation of spoken language to printed text. The electronic translation of spoken language may permit erroneous, or at times, nonsensical words or phrases to be inadvertently transcribed. Although I have reviewed the note for such errors, some may still exist.

## 2021-10-20 NOTE — TELEPHONE ENCOUNTER
LVM requesting return call.  Will advise that the patient increase Imdur to 90 mg daily per Dr. Horner.  Will await return call.     Lima Kahn, APRN

## 2021-10-20 NOTE — TELEPHONE ENCOUNTER
I spoke with the patient and advised per Dr. Horner to take 90 mg of isosorbide daily.  She currently has 30 mg tablets that she has taking 2 up daily.  She will up this to 3 tablets daily, as she recently got a shipment for 90 days worth sent to her house.  We will send in a new prescription once we ascertain whether 90 mg will be sufficient or if she needs to be bumped up to 120 mg.  All questions and concerns addressed at this time, understanding and agreement verbalized.    Lima Kahn, APRN

## 2021-11-10 ENCOUNTER — TELEPHONE (OUTPATIENT)
Dept: CARDIOLOGY | Facility: CLINIC | Age: 81
End: 2021-11-10

## 2021-11-10 ENCOUNTER — HOSPITAL ENCOUNTER (OUTPATIENT)
Dept: CARDIOLOGY | Facility: HOSPITAL | Age: 81
Discharge: HOME | End: 2021-11-10
Admitting: NURSE PRACTITIONER

## 2021-11-10 VITALS
HEIGHT: 65 IN | WEIGHT: 174.4 LBS | OXYGEN SATURATION: 96 % | SYSTOLIC BLOOD PRESSURE: 110 MMHG | HEART RATE: 71 BPM | BODY MASS INDEX: 29.06 KG/M2 | DIASTOLIC BLOOD PRESSURE: 60 MMHG

## 2021-11-10 DIAGNOSIS — I48.0 PAROXYSMAL ATRIAL FIBRILLATION (HCC): ICD-10-CM

## 2021-11-10 DIAGNOSIS — I25.10 CORONARY ARTERY DISEASE INVOLVING NATIVE CORONARY ARTERY OF NATIVE HEART WITHOUT ANGINA PECTORIS: ICD-10-CM

## 2021-11-10 DIAGNOSIS — I50.32 CHRONIC DIASTOLIC (CONGESTIVE) HEART FAILURE (HCC): ICD-10-CM

## 2021-11-10 DIAGNOSIS — I50.20 HFREF (HEART FAILURE WITH REDUCED EJECTION FRACTION) (HCC): Primary | ICD-10-CM

## 2021-11-10 DIAGNOSIS — I25.5 ISCHEMIC CARDIOMYOPATHY: ICD-10-CM

## 2021-11-10 PROCEDURE — 99214 OFFICE O/P EST MOD 30 MIN: CPT | Performed by: NURSE PRACTITIONER

## 2021-11-10 PROCEDURE — 94618 PULMONARY STRESS TESTING: CPT

## 2021-11-10 RX ORDER — ISOSORBIDE MONONITRATE 30 MG/1
90 TABLET, EXTENDED RELEASE ORAL EVERY MORNING
COMMUNITY
End: 2022-04-08 | Stop reason: HOSPADM

## 2021-11-10 NOTE — PROGRESS NOTES
[facility] HEART FAILURE CLINIC      Patient Name: Alice Mabry  :1940  Age: 80 y.o.  Sex: female  Referring Provider: Pat Paul AP*   Primary Cardiologist: Murphy Horner MD   Encounter Provider: Johanny Wells, PharmD, BCACP      Chief Complaint:   Chief Complaint   Patient presents with   • Congestive Heart Failure       HPI:  Pt presents for a follow-up visit in the [facility]. PMH is significant for heart failure with improved EF (recent EF 43%), A.fib, CAD, HLD, HTN, Hx of MI in  and  (w/ stent placement in ) mitral valve stenosis, syncope. Per Dr. Horner concern that HFrEF is a result of ventricular tachycardia. At last visit, patient had had an episode of chest pain and she was advised to increase her isosorbide mononitrate to 90 mg (3 tablets) daily. She states she continues to feel a heaviness on her chest and she has a lot of dyspnea on exertion and fatigue. She did forget to bring in her home BP readings to clinic today, but states her SBP is usually around 110.       Current Regimen:  Heart Failure  Metoprolol succinate 200 mg nightly  Spironolactone 25 mg tab (0.5 tab) daily  Bumex 1 mg daily  Jardiance 10 mg daily      Other CV Meds  Digoxin 125 mg MWF    Atorvastatin 80 mg daily  Apixaban 5 mg bid    Ranexa 500 mg bid   Isosorbide mononitrate 30 mg (3 tablets) daily       Medication Use:  Adherence: good  uses pillbox at home  Past hx of medication use/intolerance: ramipril (confusion - 2012); metoprolol tartrate (alternate therapy); Entresto (hypotension)  Affordability: traditional Medicare + supplemental + Part D  Jardiance through PAP      Diet:   Breakfast: special K bar / no appetite first thing in the morning; frequently eats egg and toast w/ butter   Lunch: chicken salad sandwich + low sodium chips   Dinner: chicken casserole + peas    Drinks: sparkling water 1 bottle/day and water with eating       Social History:  Tobacco use: quit smoking in  "1999 (after first MI)   EtOH use: none  Illicit drug use: none  Exercise: minimal due to comorbid conditions      Immunization Status:  Pneumococcal: PPSV23 (2011) and PCV 13 (2016)   Influenza: obtains annually   has received for 2021  COVID-19: 1/27/21 and 2/17/21  booster 9/29/21       OBJECTIVE:    /60 (BP Location: Left arm, Patient Position: Sitting)   Pulse 71   Ht 165.1 cm (65\")   Wt 79.1 kg (174 lb 6.4 oz)   SpO2 96%   BMI 29.02 kg/m²     Body mass index is 29.02 kg/m².  Wt Readings from Last 1 Encounters:   11/10/21 79.1 kg (174 lb 6.4 oz)       Home BP Readings (HR): patient forgot to bring in home readings today      Lab Review:  Renal Function: CrCl cannot be calculated (Patient's most recent lab result is older than the maximum 30 days allowed.).    Lab Results   Component Value Date    PROBNP 1,255.0 07/20/2021         ASSESSMENT/PLAN OF CARE:    1. HF with improved EF (most recent EF 43%)  - Patient is overall stable today. She does have significant dyspnea on exertion and fatigue/weakness  - Continue metoprolol succinate to 200 mg nightly  - Continue spironolactone 25 mg tab (0.5 tab) daily  - Continue bumetanide 1 mg daily  - Continue Jardiance 10 mg daily  - Would look to try low dose ACEi/ARB at future visit to see if patient tolerates. She was unable to tolerate Entresto due to hypotension  - Advised patient to call clinic with 2-3 lb weight gain in 24 hrs or >5 lb weight gain in 1 week         Thank you for allowing me to participate in the care of your patient,       Johanny Wells, PharmD, BCACP  [facility]  Phone: [phone]      "

## 2021-11-10 NOTE — PROGRESS NOTES
[facility] HEART FAILURE      Patient Name: Alice Mabry  :1940  Age: 80 y.o.  Sex: female  Referring Provider: Pat Paul AP*   Primary Cardiologist: Murphy Horner MD  Encounter Provider:  JOHNNIE Mccray      Chief Complaint:   Chief Complaint   Patient presents with   • Congestive Heart Failure         Congestive Heart Failure  Associated symptoms include chest pain, fatigue and shortness of breath. Pertinent negatives include no palpitations or unexpected weight change.    this 80-year-old female, known to this provider, comes today for further evaluation regarding her chronic systolic heart failure.  Current diagnoses to include paroxysmal atrial fibrillation, coronary artery disease, history of cervical cancer, depression, fatty liver, GERD, hyperlipidemia, hypertension, IBS, prior myocardial infarction, obesity, and sinus bradycardia.    6/10/2014 she underwent cardiac catheterization with PCI of RCA, RAJ placement noted at that time.  LVEF prior to cardiac cath noted to be 30%.  Post PCI her echocardiogram repeated 90 days later showed improvement to 50%.    Stress testing in 2015 revealed an EF of 45% with no reversible ischemia noted.  Repeat echocardiogram 2016 showed further improvement of her LVEF to 60%.    In  she underwent repeat cardiac catheterization and it was found that she had in-stent restenosis of her RCA, this was stented again; it was also noted that she had 3 diagonals with significant disease that were too small for intervention.    Echocardiogram 2019 revealed normal LVEF of 58% with moderate MR and aortic stenosis with a mean gradient of 14 mmHg and PILAR of 1.3 cm².     she was seen by JOHNNIE Lord and at that time had, per chart review, complaints of shortness of breath with racing palpitations.  She also had complained at that visit of a heavy chest sensation that was worsening.  She was found to  be in atrial fibrillation with RVR on ECG in office that day, rate as high as 120s with hypertension noted.  Metoprolol tartrate was started at that point as well as apixaban.  On July 8, 2021 she was noted to be very short of breath via telephone call to primary cardiologist and she was referred to the emergency department.  She was admitted from July 8 to July 12, 2021 with acute exacerbation of her diastolic heart failure.  At that time Dr. Horner felt she had likely been in atrial fibrillation with RVR, on noted for likely a month or 2, and this is what led to a tachycardia mediated cardiomyopathy now with LV C dilation.  Her MR was noted to have worsened and is now severe.  Repeat echocardiogram done during admission yielded an LVEF of 31 to 35%.      On 7/20/2021 losartan, carvedilol, and Lasix were discontinued, Entresto, metoprolol succinate, and Bumex were started.    July 27, 2021 metoprolol succinate was increased and Jardiance was started.    Imdur was increased to 90 mg at her last appointment at recommendation of Dr. Horner.  She does still complain of some slight chest discomfort, dyspnea particularly on exertion, and fatigue.  This is not worsened, however, it is also not improved.      The following portions of the patient's history were reviewed and updated as appropriate: allergies, current medications, past family history, past medical history, past social history, past surgical history and problem list.    Current Outpatient Medications   Medication Sig Dispense Refill   • apixaban (ELIQUIS) 5 MG tablet tablet Take 1 tablet by mouth Every 12 (Twelve) Hours. 60 tablet 11   • atorvastatin (LIPITOR) 80 MG tablet Take 80 mg by mouth Every Night.     • bumetanide (BUMEX) 1 MG tablet Take 1 tablet by mouth Daily. 30 tablet 1   • Cyanocobalamin (VITAMIN B-12 CR PO) Take  by mouth Daily.     • digoxin (LANOXIN) 125 MCG tablet Take 1 tablet by mouth 3 (Three) Times a Week. 36 tablet 2   •  diphenhydrAMINE-acetaminophen (Tylenol PM Extra Strength)  MG tablet per tablet Take 2 tablets by mouth Every Night.     • esomeprazole (nexIUM) 40 MG capsule Take 40 mg by mouth Every Morning Before Breakfast.     • FLUoxetine (PROzac) 20 MG capsule Take 20 mg by mouth daily.     • isosorbide mononitrate (IMDUR) 30 MG 24 hr tablet Take 120 mg by mouth Daily.     • Jardiance 10 MG tablet tablet Take 1 tablet by mouth Daily. 14 tablet 0   • LORazepam (ATIVAN) 1 MG tablet Take 1 mg by mouth every 8 (eight) hours as needed for anxiety.     • Melatonin 10 MG tablet Take 5 mg by mouth Every Night.     • metoprolol succinate XL (TOPROL-XL) 200 MG 24 hr tablet Take 1 tablet by mouth Daily. 31 tablet 0   • multivitamin with minerals (Alive Once Daily Womens 50+) tablet tablet Take 1 tablet by mouth Daily.     • nitroglycerin (NITROSTAT) 0.4 MG SL tablet Place 1 tablet under the tongue Every 5 (Five) Minutes As Needed for Chest Pain. Take no more than 3 doses in 15 minutes. 100 tablet 3   • ranolazine (RANEXA) 500 MG 12 hr tablet TAKE 1 TABLET EVERY 12     HOURS 180 tablet 2   • spironolactone (ALDACTONE) 25 MG tablet Take 0.5 tablets by mouth Daily. 30 tablet 11   • vitamin C (ASCORBIC ACID) 500 MG tablet Take 500 mg by mouth Daily.     • Vitamin D, Ergocalciferol, 2000 units capsule Take 2,000 Units by mouth Daily.       No current facility-administered medications for this encounter.       Past Medical History:   Diagnosis Date   • Acute diastolic (congestive) heart failure (HCC)    • Anxiety    • Arthritis    • Atrial fibrillation (HCC) 07/06/2021   • CAD (coronary artery disease)    • Cervical cancer (HCC)    • Current tear of meniscus    • Depression    • Dizziness    • Fatty liver    • GERD (gastroesophageal reflux disease)    • H/O blood clots    • H/O Clostridium difficile infection    • Hyperlipidemia    • Hypertension    • IBS (irritable bowel syndrome)    • Myocardial infarction (HCC)    • Obesity    •  Peptic ulcer    • Pneumonia    • Sinus bradycardia    • Syncope    • Vitamin D deficiency        Past Surgical History:   Procedure Laterality Date   • APPENDECTOMY  1960   • BACK SURGERY     • CARDIAC CATHETERIZATION  06/10/2014    Dr. Murphy Horner   • CARDIAC CATHETERIZATION  11/13/2007    Dr. Murphy Horner   • CARDIAC CATHETERIZATION N/A 10/19/2004    Dr. Murphy Horner   • CARDIAC CATHETERIZATION N/A 07/15/2004    Dr. Gregorio Gonzales   • CARDIAC CATHETERIZATION  10/2003    Dr. Murphy Horner   • CHOLECYSTECTOMY  1998   • COLONOSCOPY N/A 07/2001    negative   • COLONOSCOPY N/A 02/28/2012    negative   • CORONARY ANGIOPLASTY WITH STENT PLACEMENT N/A 07/15/2004    Dr. Murphy Horner   • CORONARY ANGIOPLASTY WITH STENT PLACEMENT  03/2002    to RCA   • HYSTERECTOMY  1974   • UPPER GASTROINTESTINAL ENDOSCOPY N/A 04/20/2015    Mild Schatzki ring, hiatus hernia, non-bleeding erosive gastropathy, erythematous mucosa in the antrum, normal duodenum-Dr. Alex Gill       Physical Exam  Vitals and nursing note reviewed.   Constitutional:       Appearance: She is well-developed. She is obese.   HENT:      Head: Normocephalic and atraumatic.   Eyes:      Conjunctiva/sclera: Conjunctivae normal.      Pupils: Pupils are equal, round, and reactive to light.   Neck:      Vascular: No JVD.   Cardiovascular:      Rate and Rhythm: Normal rate. Rhythm irregular.      Heart sounds: Normal heart sounds. No murmur heard.  No friction rub. No gallop.    Pulmonary:      Effort: Pulmonary effort is normal. No respiratory distress.      Breath sounds: Normal breath sounds.   Abdominal:      General: Bowel sounds are normal. There is no distension.      Palpations: Abdomen is soft.   Musculoskeletal:         General: No swelling or deformity.   Skin:     General: Skin is warm and dry.      Capillary Refill: Capillary refill takes less than 2 seconds.   Neurological:      Mental Status: She is alert and oriented to person, place, and  "time. Mental status is at baseline.   Psychiatric:         Mood and Affect: Mood normal.         Behavior: Behavior normal.          Review of Systems   Constitutional: Positive for fatigue. Negative for unexpected weight change.   HENT: Negative for congestion and nosebleeds.    Eyes: Negative for photophobia and visual disturbance.   Respiratory: Positive for chest tightness and shortness of breath. Negative for cough.         Improved   Cardiovascular: Positive for chest pain. Negative for palpitations and leg swelling.        Radiating to left shoulder blade; relieved by nitro   Gastrointestinal: Negative for abdominal distention and blood in stool.   Endocrine: Negative for polyphagia and polyuria.   Genitourinary: Positive for frequency. Negative for urgency.   Musculoskeletal: Negative for joint swelling and myalgias.   Skin: Negative for pallor and rash.   Neurological: Negative for dizziness, syncope, weakness, light-headedness, numbness and headaches.   Hematological: Does not bruise/bleed easily.   Psychiatric/Behavioral: Negative for confusion and sleep disturbance.        OBJECTIVE:  /60 (BP Location: Left arm, Patient Position: Sitting)   Pulse 71   Ht 165.1 cm (65\")   Wt 79.1 kg (174 lb 6.4 oz)   SpO2 96%   BMI 29.02 kg/m²      Body mass index is 29.02 kg/m².  Wt Readings from Last 1 Encounters:   11/10/21 79.1 kg (174 lb 6.4 oz)       Lab Review:  Renal Function: CrCl cannot be calculated (Patient's most recent lab result is older than the maximum 30 days allowed.).    Lab Results   Component Value Date    PROBNP 1,255.0 07/20/2021       Results for orders placed during the hospital encounter of 08/12/21    Adult Transthoracic Echo Complete w/ Color, Spectral and Contrast if Necessary Per Protocol    Interpretation Summary  · There is moderate calcification of trileaflet aortic valve with Doppler findings suggesting moderate stenosis: Peak velocity of the flow distal to the aortic valve is " 262.8 cm/s. Aortic valve maximum pressure gradient is 27.6 mmHg. Aortic valve mean pressure gradient is 16.3 mmHg. Aortic valve dimensionless index is 0.3 .Aortic valve area is 0.92 cm2.  · Calculated left ventricular EF = 43.5% Estimated left ventricular EF was in agreement with the calculated left ventricular EF. Left ventricular systolic function is mildly decreased with global hypokinesis..Left ventricular wall thickness is consistent with moderate eccentric hypertrophy.  · Left ventricular diastolic dysfunction is noted. Normal left atrial filling pressure.  · Moderate mitral valve regurgitation is present.  · Calculated right ventricular systolic pressure from tricuspid regurgitation is 25 mmHg.  · Patient was in atrial fibrillation during study.  · Slightly improved left ventricular ejection fraction compared to study on 7/8/2021.      Procedures      6 MINUTE WALK                      Cardiac Procedures:  1.  6/10/2014 Cardiac catheterization  IMPRESSION:  Class III to IV angina on good medical therapy, ischemic  cardiomyopathy.  Prior stents are patent.  Diagonal disease is unchanged.  What  is new is she has developed a new 90% origin right coronary artery lesion. We  are going to proceed on with intervention of that.  I have discussed this with  her and her family beforehand.      Plavix 600 mg was given and heparin 7000 units was given. We exchanged a sheath  in the right radial artery and put a 6-Occitan sheath in. We then brought a  6-JR-4 guide up, but it would not fit into the right coronary artery, and we  switched to a 6-AR-1 guide. A BMW was passed easily into the distal RCA. We  brought a 3.5 x 15 NC trek balloon up and inflated it at 14 atmospheres in the  origin of the RCA.  Then I brought a 4.0 x 15 Xience Xpedition drug-eluting  stent down, and I deployed that at 14 atmospheres. We postdilated that with a  4.0 x 12 NC trek at 20 atmospheres. There was 0% residual and ALIS-3 flow, and  at that  point, balloons, wires, and guides were removed. The ACT was 301  seconds. The sheath was removed on the table and an R band was applied.      IMPRESSION:  Successful angioplasty drug-eluting stenting to the origin of the  right coronary artery.      RECOMMENDATIONS:  1.  Percutaneous coronary intervention care.   2.  Continue risk modification.   3.  Home in the morning if she is okay.  4.  Reassess her left ventricular function in 3 months.        Previously trialed diuretics  Lasix  Bumex      Previously trialed GDMT    Losartan  Carvedilol  Spironolactone  Metoprolol succinate  Entresto  Jardiance    ASSESSMENT:     Diagnosis Plan   1. HFrEF (heart failure with reduced ejection fraction) (HCC)     2. Paroxysmal atrial fibrillation (HCC)     3. Chronic diastolic (congestive) heart failure (HCC)     4. Coronary artery disease involving native coronary artery of native heart without angina pectoris     5. Ischemic cardiomyopathy           PLAN OF CARE:  1.  HFrEF-NYHA class II.  Current GDMT to include metoprolol succinate, spironolactone, and Jardiance, diuresed on Bumex.    She is stable from a symptom standpoint.  She remains euvolemic on exam.  I would like to add a low-dose ACE inhibitor, however, I believe we will have to further increase her Imdur as below.  I am not sure that her blood pressure will allow for us to add a low-dose ACE inhibitor.  Her blood pressure log at home reveals pressures in the low 100s to 110s.    She has been adherent to a low-sodium diet of 2000 mg daily or less, she was commended and encouraged to continue this.  She also has been adherent with fluid restriction of 1.5 L daily.  Daily weights are stable.    Directions for when to call the clinic reviewed with the patient to include weight gain of 2 to 3 pounds in 24 hours, weight gain of 5 to 10 pounds within 7 days; worsening shortness of breath; worsening lower extremity edema or abdominal distention.    2.  Chest  pain-intermittent, radiates to left shoulder blade.  Relieved by nitro.  I discussed this with Dr. Horner at prior appointment, he would like to optimize her antianginal regimen before taking her to the Cath Lab.  Imdur was increased to 90, she has not seen much relief with this so we will increase her further to 120 mg.  I had like to see her back in 1 to 2 weeks to see how she is feeling after this, and if her blood pressure will tolerate it I will add a low-dose ACE as above.    3.  Atrial fibrillation-rate controlled on target dosing of Toprol..  Anticoagulated.  Managed by primary cardiologist.  Stable.    4.  Hypertension-stable and controlled    5.  CAD-see dictation above.  Managed by primary cardiologist.  Increase Imdur to 120 mg daily       Increase Imdur to 120 mg daily; follow-up in 1 to 2 weeks or sooner if needed with repeat blood pressure log    Advance Care Planning   ACP discussion was held with the patient during this visit. Patient has an advance directive (not in EMR), copy requested.      Thank you for allowing me to participate in the care of your patient,    Lima JOHNNIE Roman  [facility] HEART FAILURE  11/10/21  09:20 EDT      **Fadia Disclaimer:**  Much of this encounter note is an electronic transcription/translation of spoken language to printed text. The electronic translation of spoken language may permit erroneous, or at times, nonsensical words or phrases to be inadvertently transcribed. Although I have reviewed the note for such errors, some may still exist.

## 2021-11-10 NOTE — TELEPHONE ENCOUNTER
The patient called with blood pressure readings and average systolic pressure since increasing isosorbide is 119.  I would like her to continue the log, and if her systolic pressure remains above 110 we will attempt to make medication changes previously noted in office note.    Lima Kahn, APRN

## 2021-11-12 RX ORDER — METOPROLOL SUCCINATE 200 MG/1
200 TABLET, EXTENDED RELEASE ORAL DAILY
Qty: 31 TABLET | Refills: 0 | Status: SHIPPED | OUTPATIENT
Start: 2021-11-12 | End: 2021-12-03 | Stop reason: SDUPTHER

## 2021-11-12 RX ORDER — BUMETANIDE 1 MG/1
1 TABLET ORAL DAILY
Qty: 30 TABLET | Refills: 0 | Status: SHIPPED | OUTPATIENT
Start: 2021-11-12 | End: 2021-12-03 | Stop reason: SDUPTHER

## 2021-11-24 ENCOUNTER — HOSPITAL ENCOUNTER (OUTPATIENT)
Dept: CARDIOLOGY | Facility: HOSPITAL | Age: 81
Discharge: HOME | End: 2021-11-24
Admitting: NURSE PRACTITIONER

## 2021-11-24 VITALS
DIASTOLIC BLOOD PRESSURE: 68 MMHG | BODY MASS INDEX: 29.29 KG/M2 | HEART RATE: 68 BPM | WEIGHT: 175.8 LBS | SYSTOLIC BLOOD PRESSURE: 110 MMHG | HEIGHT: 65 IN | RESPIRATION RATE: 20 BRPM | OXYGEN SATURATION: 95 %

## 2021-11-24 DIAGNOSIS — I50.20 HFREF (HEART FAILURE WITH REDUCED EJECTION FRACTION) (HCC): ICD-10-CM

## 2021-11-24 DIAGNOSIS — I25.5 ISCHEMIC CARDIOMYOPATHY: ICD-10-CM

## 2021-11-24 DIAGNOSIS — I25.10 CORONARY ARTERY DISEASE INVOLVING NATIVE CORONARY ARTERY OF NATIVE HEART WITHOUT ANGINA PECTORIS: ICD-10-CM

## 2021-11-24 DIAGNOSIS — G47.39 SLEEP APNEA-LIKE BEHAVIOR: ICD-10-CM

## 2021-11-24 DIAGNOSIS — I48.0 PAROXYSMAL ATRIAL FIBRILLATION (HCC): Primary | ICD-10-CM

## 2021-11-24 PROCEDURE — 99214 OFFICE O/P EST MOD 30 MIN: CPT | Performed by: NURSE PRACTITIONER

## 2021-11-24 PROCEDURE — G0463 HOSPITAL OUTPT CLINIC VISIT: HCPCS

## 2021-11-24 NOTE — PROGRESS NOTES
[facility] HEART FAILURE      Patient Name: Alice Mabry  :1940  Age: 81 y.o.  Sex: female  Referring Provider: Pat Paul AP*   Primary Cardiologist: Murphy Horner MD  Encounter Provider:  JOHNNIE Mccray      Chief Complaint:   Chief Complaint   Patient presents with   • Congestive Heart Failure         Congestive Heart Failure  Associated symptoms include fatigue and shortness of breath. Pertinent negatives include no chest pain, palpitations or unexpected weight change.    this 81-year-old female, known to this provider, comes today for further evaluation regarding her chronic systolic heart failure.  Current diagnoses to include paroxysmal atrial fibrillation, coronary artery disease, history of cervical cancer, depression, fatty liver, GERD, hyperlipidemia, hypertension, IBS, prior myocardial infarction, obesity, and sinus bradycardia.    6/10/2014 she underwent cardiac catheterization with PCI of RCA, RAJ placement noted at that time.  LVEF prior to cardiac cath noted to be 30%.  Post PCI her echocardiogram repeated 90 days later showed improvement to 50%.    Stress testing in 2015 revealed an EF of 45% with no reversible ischemia noted.  Repeat echocardiogram 2016 showed further improvement of her LVEF to 60%.    In  she underwent repeat cardiac catheterization and it was found that she had in-stent restenosis of her RCA, this was stented again; it was also noted that she had 3 diagonals with significant disease that were too small for intervention.    Echocardiogram 2019 revealed normal LVEF of 58% with moderate MR and aortic stenosis with a mean gradient of 14 mmHg and PILAR of 1.3 cm².     she was seen by JOHNNIE Lord and at that time had, per chart review, complaints of shortness of breath with racing palpitations.  She also had complained at that visit of a heavy chest sensation that was worsening.  She was found to  be in atrial fibrillation with RVR on ECG in office that day, rate as high as 120s with hypertension noted.  Metoprolol tartrate was started at that point as well as apixaban.  On July 8, 2021 she was noted to be very short of breath via telephone call to primary cardiologist and she was referred to the emergency department.  She was admitted from July 8 to July 12, 2021 with acute exacerbation of her diastolic heart failure.  At that time Dr. Horner felt she had likely been in atrial fibrillation with RVR, on noted for likely a month or 2, and this is what led to a tachycardia mediated cardiomyopathy now with LV C dilation.  Her MR was noted to have worsened and is now severe.  Repeat echocardiogram done during admission yielded an LVEF of 31 to 35%.      On 7/20/2021 losartan, carvedilol, and Lasix were discontinued, Entresto, metoprolol succinate, and Bumex were started.    July 27, 2021 metoprolol succinate was increased and Jardiance was started.    Imdur was increased to 90 mg in October 2021, and her discomfort of her chest had not improved, although it had not worsened.  This was increased to 120 mg 11/10/2021.    She is now currently complaining of some intermittent low blood pressure with systolic in the 90s with which she is symptomatic with dizziness and lightheadedness.  Her angina is unchanged.  She does complain that she wakes up frequently during the night, and this leaves her feeling very tired during the day.      The following portions of the patient's history were reviewed and updated as appropriate: allergies, current medications, past family history, past medical history, past social history, past surgical history and problem list.    Current Outpatient Medications   Medication Sig Dispense Refill   • apixaban (ELIQUIS) 5 MG tablet tablet Take 1 tablet by mouth Every 12 (Twelve) Hours. 60 tablet 11   • atorvastatin (LIPITOR) 80 MG tablet Take 80 mg by mouth Every Night.     • bumetanide (BUMEX)  1 MG tablet Take 1 tablet by mouth Daily. 30 tablet 0   • Cyanocobalamin (VITAMIN B-12 CR PO) Take  by mouth Daily.     • digoxin (LANOXIN) 125 MCG tablet Take 1 tablet by mouth 3 (Three) Times a Week. 36 tablet 2   • diphenhydrAMINE-acetaminophen (Tylenol PM Extra Strength)  MG tablet per tablet Take 2 tablets by mouth Every Night.     • esomeprazole (nexIUM) 40 MG capsule Take 40 mg by mouth Every Morning Before Breakfast.     • FLUoxetine (PROzac) 20 MG capsule Take 20 mg by mouth daily.     • isosorbide mononitrate (IMDUR) 30 MG 24 hr tablet Take 120 mg by mouth Daily.     • Jardiance 10 MG tablet tablet Take 1 tablet by mouth Daily. 14 tablet 0   • LORazepam (ATIVAN) 1 MG tablet Take 1 mg by mouth every 8 (eight) hours as needed for anxiety.     • Melatonin 10 MG tablet Take 5 mg by mouth Every Night.     • metoprolol succinate XL (TOPROL-XL) 200 MG 24 hr tablet Take 1 tablet by mouth Daily. 31 tablet 0   • multivitamin with minerals (Alive Once Daily Womens 50+) tablet tablet Take 1 tablet by mouth Daily.     • nitroglycerin (NITROSTAT) 0.4 MG SL tablet Place 1 tablet under the tongue Every 5 (Five) Minutes As Needed for Chest Pain. Take no more than 3 doses in 15 minutes. 100 tablet 3   • ranolazine (RANEXA) 500 MG 12 hr tablet TAKE 1 TABLET EVERY 12     HOURS 180 tablet 2   • spironolactone (ALDACTONE) 25 MG tablet Take 0.5 tablets by mouth Daily. 30 tablet 11   • vitamin C (ASCORBIC ACID) 500 MG tablet Take 500 mg by mouth Daily.     • Vitamin D, Ergocalciferol, 2000 units capsule Take 2,000 Units by mouth Daily.       No current facility-administered medications for this encounter.       Past Medical History:   Diagnosis Date   • Acute diastolic (congestive) heart failure (HCC)    • Anxiety    • Arthritis    • Atrial fibrillation (HCC) 07/06/2021   • CAD (coronary artery disease)    • Cervical cancer (HCC)    • Current tear of meniscus    • Depression    • Dizziness    • Fatty liver    • GERD  (gastroesophageal reflux disease)    • H/O blood clots    • H/O Clostridium difficile infection    • Hyperlipidemia    • Hypertension    • IBS (irritable bowel syndrome)    • Myocardial infarction (HCC)    • Obesity    • Peptic ulcer    • Pneumonia    • Sinus bradycardia    • Syncope    • Vitamin D deficiency        Past Surgical History:   Procedure Laterality Date   • APPENDECTOMY  1960   • BACK SURGERY     • CARDIAC CATHETERIZATION  06/10/2014    Dr. Murphy Horner   • CARDIAC CATHETERIZATION  11/13/2007    Dr. Murphy Horner   • CARDIAC CATHETERIZATION N/A 10/19/2004    Dr. Murphy Horner   • CARDIAC CATHETERIZATION N/A 07/15/2004    Dr. Gregorio Gonzales   • CARDIAC CATHETERIZATION  10/2003    Dr. Murphy Horner   • CHOLECYSTECTOMY  1998   • COLONOSCOPY N/A 07/2001    negative   • COLONOSCOPY N/A 02/28/2012    negative   • CORONARY ANGIOPLASTY WITH STENT PLACEMENT N/A 07/15/2004    Dr. Murphy Horner   • CORONARY ANGIOPLASTY WITH STENT PLACEMENT  03/2002    to RCA   • HYSTERECTOMY  1974   • UPPER GASTROINTESTINAL ENDOSCOPY N/A 04/20/2015    Mild Schatzki ring, hiatus hernia, non-bleeding erosive gastropathy, erythematous mucosa in the antrum, normal duodenum-Dr. Alex Gill       Physical Exam  Vitals and nursing note reviewed.   Constitutional:       General: She is not in acute distress.     Appearance: She is well-developed. She is obese. She is not ill-appearing.   HENT:      Head: Normocephalic and atraumatic.   Eyes:      Conjunctiva/sclera: Conjunctivae normal.      Pupils: Pupils are equal, round, and reactive to light.   Neck:      Vascular: No JVD.   Cardiovascular:      Rate and Rhythm: Normal rate. Rhythm irregular.      Heart sounds: Normal heart sounds. No murmur heard.  No friction rub. No gallop.    Pulmonary:      Effort: Pulmonary effort is normal. No respiratory distress.      Breath sounds: Normal breath sounds.   Abdominal:      General: Bowel sounds are normal. There is no distension.       "Palpations: Abdomen is soft.   Musculoskeletal:         General: No swelling or deformity.   Skin:     General: Skin is warm and dry.      Capillary Refill: Capillary refill takes less than 2 seconds.   Neurological:      Mental Status: She is alert and oriented to person, place, and time. Mental status is at baseline.   Psychiatric:         Mood and Affect: Mood normal.         Behavior: Behavior normal.          Review of Systems   Constitutional: Positive for fatigue. Negative for unexpected weight change.   HENT: Negative for congestion and nosebleeds.    Eyes: Negative for photophobia and visual disturbance.   Respiratory: Positive for chest tightness and shortness of breath. Negative for cough.         Improved   Cardiovascular: Negative for chest pain, palpitations and leg swelling.   Gastrointestinal: Negative for abdominal distention and blood in stool.   Endocrine: Negative for polyphagia and polyuria.   Genitourinary: Positive for frequency. Negative for urgency.   Musculoskeletal: Negative for joint swelling and myalgias.   Skin: Negative for pallor and rash.   Neurological: Positive for light-headedness. Negative for dizziness, syncope, weakness, numbness and headaches.   Hematological: Does not bruise/bleed easily.   Psychiatric/Behavioral: Positive for sleep disturbance. Negative for confusion.        OBJECTIVE:  /68 (BP Location: Right arm, Patient Position: Sitting)   Pulse 68   Resp 20   Ht 165.1 cm (65\")   Wt 79.7 kg (175 lb 12.8 oz)   SpO2 95%   BMI 29.25 kg/m²      Body mass index is 29.25 kg/m².  Wt Readings from Last 1 Encounters:   11/24/21 79.7 kg (175 lb 12.8 oz)       Lab Review:  Renal Function: CrCl cannot be calculated (Patient's most recent lab result is older than the maximum 30 days allowed.).    Lab Results   Component Value Date    PROBNP 1,255.0 07/20/2021       Results for orders placed during the hospital encounter of 08/12/21    Adult Transthoracic Echo Complete w/ " Color, Spectral and Contrast if Necessary Per Protocol    Interpretation Summary  · There is moderate calcification of trileaflet aortic valve with Doppler findings suggesting moderate stenosis: Peak velocity of the flow distal to the aortic valve is 262.8 cm/s. Aortic valve maximum pressure gradient is 27.6 mmHg. Aortic valve mean pressure gradient is 16.3 mmHg. Aortic valve dimensionless index is 0.3 .Aortic valve area is 0.92 cm2.  · Calculated left ventricular EF = 43.5% Estimated left ventricular EF was in agreement with the calculated left ventricular EF. Left ventricular systolic function is mildly decreased with global hypokinesis..Left ventricular wall thickness is consistent with moderate eccentric hypertrophy.  · Left ventricular diastolic dysfunction is noted. Normal left atrial filling pressure.  · Moderate mitral valve regurgitation is present.  · Calculated right ventricular systolic pressure from tricuspid regurgitation is 25 mmHg.  · Patient was in atrial fibrillation during study.  · Slightly improved left ventricular ejection fraction compared to study on 7/8/2021.      Procedures      6 MINUTE WALK                      Cardiac Procedures:  1.  6/10/2014 Cardiac catheterization  IMPRESSION:  Class III to IV angina on good medical therapy, ischemic  cardiomyopathy.  Prior stents are patent.  Diagonal disease is unchanged.  What  is new is she has developed a new 90% origin right coronary artery lesion. We  are going to proceed on with intervention of that.  I have discussed this with  her and her family beforehand.      Plavix 600 mg was given and heparin 7000 units was given. We exchanged a sheath  in the right radial artery and put a 6-Comoran sheath in. We then brought a  6-JR-4 guide up, but it would not fit into the right coronary artery, and we  switched to a 6-AR-1 guide. A BMW was passed easily into the distal RCA. We  brought a 3.5 x 15 NC trek balloon up and inflated it at 14 atmospheres  in the  origin of the RCA.  Then I brought a 4.0 x 15 Xience Xpedition drug-eluting  stent down, and I deployed that at 14 atmospheres. We postdilated that with a  4.0 x 12 NC trek at 20 atmospheres. There was 0% residual and ALIS-3 flow, and  at that point, balloons, wires, and guides were removed. The ACT was 301  seconds. The sheath was removed on the table and an R band was applied.      IMPRESSION:  Successful angioplasty drug-eluting stenting to the origin of the  right coronary artery.      RECOMMENDATIONS:  1.  Percutaneous coronary intervention care.   2.  Continue risk modification.   3.  Home in the morning if she is okay.  4.  Reassess her left ventricular function in 3 months.        Previously trialed diuretics  Lasix  Bumex      Previously trialed GDMT    Losartan  Carvedilol  Spironolactone  Metoprolol succinate  Entresto  Jardiance    ASSESSMENT:     Diagnosis Plan   1. Paroxysmal atrial fibrillation ([facility])     2. HFrEF (heart failure with reduced ejection fraction) ([facility])  Ambulatory Referral to Sleep Medicine   3. Coronary artery disease involving native coronary artery of native heart without angina pectoris     4. Ischemic cardiomyopathy     5. Sleep apnea-like behavior           PLAN OF CARE:  1.  HFrEF-NYHA class II.  Current GDMT to include metoprolol succinate, spironolactone, and Jardiance, diuresed on Bumex.    She remains euvolemic on exam.  I do not believe her blood pressure will tolerate the addition of an ACE inhibitor.  In fact, her blood pressure has been a bit low and she has been symptomatic with it on the increased dose of Imdur.  See dictation below.  I have strong suspicion that she may have sleep apnea, see below.  She is to remain on a low-sodium diet of 2000 mg daily or less, fluid restriction of 1500 mL daily, as well as adherence with daily weights.  We reviewed tips and tricks to follow during the holiday season given the heavier, saltier foods.  Would like to see her back  in 4 weeks or sooner if needed.    Directions for when to call the clinic reviewed with the patient to include weight gain of 2 to 3 pounds in 24 hours, weight gain of 5 to 10 pounds within 7 days; worsening shortness of breath; worsening lower extremity edema or abdominal distention.    2.  Chest pain-she states this is neither better nor worse on target dosing of Imdur.  She has not been taking any nitro however.  Her blood pressure is low, in the 90s systolically on target dosing of Imdur.  She is symptomatic with this with dizziness and lightheadedness.  I will reduce her back to 90 mg daily, and defer further plan of care and treatment of this to Dr. Horner.  It does appear stable at this point however.    3.  Atrial fibrillation-rate controlled on target dosing of Toprol..  Anticoagulated.  Managed by primary cardiologist.  Stable.    4.  Hypertension-stable and controlled    5.  CAD-see dictation above.  Stable for patient baseline it appears.  Further plan of care deferred to primary cardiology team.    6.  Suspected sleep apnea-the patient complains of fatigue during the day, difficulty staying asleep at night, and likely snores.  She is willing to undergo a sleep study at this time.  Ambulatory referral placed.      Reduce Imdur back to 90 mg daily; ambulatory referral for sleep study; follow-up with Dr. Horner as well; follow-up in 4 weeks or sooner if needed    Advance Care Planning   ACP discussion was held with the patient during this visit. Patient has an advance directive (not in EMR), copy requested.      Thank you for allowing me to participate in the care of your patient,    [facility]  11/24/21  09:20 EDT      **Fadia Disclaimer:**  Much of this encounter note is an electronic transcription/translation of spoken language to printed text. The electronic translation of spoken language may permit erroneous, or at times, nonsensical words or phrases to be  inadvertently transcribed. Although I have reviewed the note for such errors, some may still exist.

## 2021-12-03 ENCOUNTER — TELEPHONE (OUTPATIENT)
Dept: CARDIOLOGY | Facility: HOSPITAL | Age: 81
End: 2021-12-03

## 2021-12-03 RX ORDER — BUMETANIDE 1 MG/1
1 TABLET ORAL DAILY
Qty: 90 TABLET | Refills: 1 | Status: SHIPPED | OUTPATIENT
Start: 2021-12-03 | End: 2022-04-15 | Stop reason: SDUPTHER

## 2021-12-03 RX ORDER — METOPROLOL SUCCINATE 200 MG/1
200 TABLET, EXTENDED RELEASE ORAL DAILY
Qty: 90 TABLET | Refills: 1 | Status: SHIPPED | OUTPATIENT
Start: 2021-12-03 | End: 2022-04-08 | Stop reason: HOSPADM

## 2021-12-03 NOTE — TELEPHONE ENCOUNTER
Called and spoke to patient regarding patient assistance application. [employer] recently changed their income limits and she will no longer qualify for their program. Spoke with JOHNNIE Saez who is okay with switching to [employer]. Asked patient to stop by clinic at some point to sign [employer] application and will fax to [employer]. Patient also asked for refills on her metoprolol and bumetanide. No further questions at this time.      Johanny Wells, PharmD, BCACP  [facility]  Phone: [phone]

## 2021-12-06 RX ORDER — ATORVASTATIN CALCIUM 80 MG/1
TABLET, FILM COATED ORAL
Qty: 90 TABLET | Refills: 3 | OUTPATIENT
Start: 2021-12-06

## 2021-12-17 RX ORDER — ATORVASTATIN CALCIUM 80 MG/1
TABLET, FILM COATED ORAL
Qty: 90 TABLET | Refills: 3 | Status: SHIPPED | OUTPATIENT
Start: 2021-12-17 | End: 2022-04-08 | Stop reason: HOSPADM

## 2021-12-22 ENCOUNTER — HOSPITAL ENCOUNTER (OUTPATIENT)
Dept: CARDIOLOGY | Facility: HOSPITAL | Age: 81
Discharge: HOME | End: 2021-12-22
Admitting: NURSE PRACTITIONER

## 2021-12-22 ENCOUNTER — TELEPHONE (OUTPATIENT)
Dept: CARDIOLOGY | Facility: HOSPITAL | Age: 81
End: 2021-12-22

## 2021-12-22 VITALS
OXYGEN SATURATION: 96 % | SYSTOLIC BLOOD PRESSURE: 118 MMHG | DIASTOLIC BLOOD PRESSURE: 80 MMHG | BODY MASS INDEX: 28.89 KG/M2 | HEART RATE: 76 BPM | HEIGHT: 65 IN | WEIGHT: 173.4 LBS

## 2021-12-22 DIAGNOSIS — I48.0 PAROXYSMAL ATRIAL FIBRILLATION (HCC): ICD-10-CM

## 2021-12-22 DIAGNOSIS — I25.10 CORONARY ARTERY DISEASE INVOLVING NATIVE CORONARY ARTERY OF NATIVE HEART WITHOUT ANGINA PECTORIS: ICD-10-CM

## 2021-12-22 DIAGNOSIS — I50.20 HFREF (HEART FAILURE WITH REDUCED EJECTION FRACTION) (HCC): Primary | ICD-10-CM

## 2021-12-22 DIAGNOSIS — I25.5 ISCHEMIC CARDIOMYOPATHY: ICD-10-CM

## 2021-12-22 DIAGNOSIS — G47.39 SLEEP APNEA-LIKE BEHAVIOR: ICD-10-CM

## 2021-12-22 LAB
ANION GAP SERPL CALCULATED.3IONS-SCNC: 7.7 MMOL/L (ref 5–15)
BASOPHILS # BLD AUTO: 0.02 10*3/MM3 (ref 0–0.2)
BASOPHILS NFR BLD AUTO: 0.4 % (ref 0–1.5)
BUN SERPL-MCNC: 14 MG/DL (ref 8–23)
BUN/CREAT SERPL: 14 (ref 7–25)
CALCIUM SPEC-SCNC: 9.7 MG/DL (ref 8.6–10.5)
CHLORIDE SERPL-SCNC: 104 MMOL/L (ref 98–107)
CO2 SERPL-SCNC: 28.3 MMOL/L (ref 22–29)
CREAT SERPL-MCNC: 1 MG/DL (ref 0.57–1)
DEPRECATED RDW RBC AUTO: 47.9 FL (ref 37–54)
EOSINOPHIL # BLD AUTO: 0.05 10*3/MM3 (ref 0–0.4)
EOSINOPHIL NFR BLD AUTO: 0.9 % (ref 0.3–6.2)
ERYTHROCYTE [DISTWIDTH] IN BLOOD BY AUTOMATED COUNT: 13.2 % (ref 12.3–15.4)
GFR SERPL CREATININE-BSD FRML MDRD: 53 ML/MIN/1.73
GLUCOSE SERPL-MCNC: 146 MG/DL (ref 65–99)
HCT VFR BLD AUTO: 50.7 % (ref 34–46.6)
HGB BLD-MCNC: 17.2 G/DL (ref 12–15.9)
IMM GRANULOCYTES # BLD AUTO: 0.01 10*3/MM3 (ref 0–0.05)
IMM GRANULOCYTES NFR BLD AUTO: 0.2 % (ref 0–0.5)
LYMPHOCYTES # BLD AUTO: 2.47 10*3/MM3 (ref 0.7–3.1)
LYMPHOCYTES NFR BLD AUTO: 43.3 % (ref 19.6–45.3)
MCH RBC QN AUTO: 33.7 PG (ref 26.6–33)
MCHC RBC AUTO-ENTMCNC: 33.9 G/DL (ref 31.5–35.7)
MCV RBC AUTO: 99.2 FL (ref 79–97)
MONOCYTES # BLD AUTO: 0.68 10*3/MM3 (ref 0.1–0.9)
MONOCYTES NFR BLD AUTO: 11.9 % (ref 5–12)
NEUTROPHILS NFR BLD AUTO: 2.47 10*3/MM3 (ref 1.7–7)
NEUTROPHILS NFR BLD AUTO: 43.3 % (ref 42.7–76)
NRBC BLD AUTO-RTO: 0 /100 WBC (ref 0–0.2)
NT-PROBNP SERPL-MCNC: 977.7 PG/ML (ref 0–1800)
PLATELET # BLD AUTO: 156 10*3/MM3 (ref 140–450)
PMV BLD AUTO: 9.8 FL (ref 6–12)
POTASSIUM SERPL-SCNC: 4.6 MMOL/L (ref 3.5–5.2)
RBC # BLD AUTO: 5.11 10*6/MM3 (ref 3.77–5.28)
SODIUM SERPL-SCNC: 140 MMOL/L (ref 136–145)
WBC NRBC COR # BLD: 5.7 10*3/MM3 (ref 3.4–10.8)

## 2021-12-22 PROCEDURE — 85025 COMPLETE CBC W/AUTO DIFF WBC: CPT

## 2021-12-22 PROCEDURE — G0463 HOSPITAL OUTPT CLINIC VISIT: HCPCS

## 2021-12-22 PROCEDURE — 83880 ASSAY OF NATRIURETIC PEPTIDE: CPT

## 2021-12-22 PROCEDURE — 99214 OFFICE O/P EST MOD 30 MIN: CPT | Performed by: NURSE PRACTITIONER

## 2021-12-22 PROCEDURE — 80048 BASIC METABOLIC PNL TOTAL CA: CPT

## 2021-12-22 NOTE — PROGRESS NOTES
[facility] HEART FAILURE      Patient Name: Alice Mabry  :1940  Age: 81 y.o.  Sex: female  Referring Provider: Pat Paul AP*   Primary Cardiologist: Murphy Horner MD  Encounter Provider:  JOHNNIE Mccray      Chief Complaint:   Chief Complaint   Patient presents with   • Congestive Heart Failure         Congestive Heart Failure  Associated symptoms include shortness of breath. Pertinent negatives include no chest pain, fatigue, palpitations or unexpected weight change.    this 81-year-old female, known to this provider, comes today for further evaluation regarding her chronic systolic heart failure.  Current diagnoses to include paroxysmal atrial fibrillation, coronary artery disease, history of cervical cancer, depression, fatty liver, GERD, hyperlipidemia, hypertension, IBS, prior myocardial infarction, obesity, and sinus bradycardia.    6/10/2014 she underwent cardiac catheterization with PCI of RCA, RAJ placement noted at that time.  LVEF prior to cardiac cath noted to be 30%.  Post PCI her echocardiogram repeated 90 days later showed improvement to 50%.    Stress testing in 2015 revealed an EF of 45% with no reversible ischemia noted.  Repeat echocardiogram 2016 showed further improvement of her LVEF to 60%.    In  she underwent repeat cardiac catheterization and it was found that she had in-stent restenosis of her RCA, this was stented again; it was also noted that she had 3 diagonals with significant disease that were too small for intervention.    Echocardiogram 2019 revealed normal LVEF of 58% with moderate MR and aortic stenosis with a mean gradient of 14 mmHg and PILAR of 1.3 cm².     she was seen by JOHNNIE Lord and at that time had, per chart review, complaints of shortness of breath with racing palpitations.  She also had complained at that visit of a heavy chest sensation that was worsening.  She was found to be  in atrial fibrillation with RVR on ECG in office that day, rate as high as 120s with hypertension noted.  Metoprolol tartrate was started at that point as well as apixaban.  On July 8, 2021 she was noted to be very short of breath via telephone call to primary cardiologist and she was referred to the emergency department.  She was admitted from July 8 to July 12, 2021 with acute exacerbation of her diastolic heart failure.  At that time Dr. Horner felt she had likely been in atrial fibrillation with RVR, on noted for likely a month or 2, and this is what led to a tachycardia mediated cardiomyopathy now with LV C dilation.  Her MR was noted to have worsened and is now severe.  Repeat echocardiogram done during admission yielded an LVEF of 31 to 35%.      On 7/20/2021 losartan, carvedilol, and Lasix were discontinued, Entresto, metoprolol succinate, and Bumex were started.    July 27, 2021 metoprolol succinate was increased and Jardiance was started.    Imdur was increased to 90 mg in October 2021, and her discomfort of her chest had not improved, although it had not worsened.  This was increased to 120 mg 11/10/2021.    She was referred to sleep medicine for evaluation for sleep apnea at her last appointment and is scheduled 1/17/22. She is currently complaining of dizziness that is random in timing and noted both at rest, when not moving, and when ambulating.  She does still have some shortness of breath on exertion, but overall feels quite well at home.      The following portions of the patient's history were reviewed and updated as appropriate: allergies, current medications, past family history, past medical history, past social history, past surgical history and problem list.    Current Outpatient Medications   Medication Sig Dispense Refill   • apixaban (ELIQUIS) 5 MG tablet tablet Take 1 tablet by mouth Every 12 (Twelve) Hours. 60 tablet 11   • atorvastatin (LIPITOR) 80 MG tablet TAKE 1 TABLET DAILY 90 tablet  3   • bumetanide (BUMEX) 1 MG tablet Take 1 tablet by mouth Daily. 90 tablet 1   • Cyanocobalamin (VITAMIN B-12 CR PO) Take  by mouth Daily.     • digoxin (LANOXIN) 125 MCG tablet Take 1 tablet by mouth 3 (Three) Times a Week. 36 tablet 2   • diphenhydrAMINE-acetaminophen (Tylenol PM Extra Strength)  MG tablet per tablet Take 2 tablets by mouth Every Night.     • esomeprazole (nexIUM) 40 MG capsule Take 40 mg by mouth Every Morning Before Breakfast.     • FLUoxetine (PROzac) 20 MG capsule Take 20 mg by mouth daily.     • isosorbide mononitrate (IMDUR) 30 MG 24 hr tablet Take 120 mg by mouth Daily.     • Jardiance 10 MG tablet tablet Take 1 tablet by mouth Daily. 14 tablet 0   • LORazepam (ATIVAN) 1 MG tablet Take 1 mg by mouth every 8 (eight) hours as needed for anxiety.     • Melatonin 10 MG tablet Take 5 mg by mouth Every Night.     • metoprolol succinate XL (TOPROL-XL) 200 MG 24 hr tablet Take 1 tablet by mouth Daily. 90 tablet 1   • multivitamin with minerals (Alive Once Daily Womens 50+) tablet tablet Take 1 tablet by mouth Daily.     • nitroglycerin (NITROSTAT) 0.4 MG SL tablet Place 1 tablet under the tongue Every 5 (Five) Minutes As Needed for Chest Pain. Take no more than 3 doses in 15 minutes. 100 tablet 3   • ranolazine (RANEXA) 500 MG 12 hr tablet TAKE 1 TABLET EVERY 12     HOURS 180 tablet 2   • spironolactone (ALDACTONE) 25 MG tablet Take 0.5 tablets by mouth Daily. 30 tablet 11   • vitamin C (ASCORBIC ACID) 500 MG tablet Take 500 mg by mouth Daily.     • Vitamin D, Ergocalciferol, 2000 units capsule Take 2,000 Units by mouth Daily.       No current facility-administered medications for this encounter.       Past Medical History:   Diagnosis Date   • Acute diastolic (congestive) heart failure (HCC)    • Anxiety    • Arthritis    • Atrial fibrillation (HCC) 07/06/2021   • CAD (coronary artery disease)    • Cervical cancer (HCC)    • Current tear of meniscus    • Depression    • Dizziness    •  Fatty liver    • GERD (gastroesophageal reflux disease)    • H/O blood clots    • H/O Clostridium difficile infection    • Hyperlipidemia    • Hypertension    • IBS (irritable bowel syndrome)    • Myocardial infarction (HCC)    • Obesity    • Peptic ulcer    • Pneumonia    • Sinus bradycardia    • Syncope    • Vitamin D deficiency        Past Surgical History:   Procedure Laterality Date   • APPENDECTOMY  1960   • BACK SURGERY     • CARDIAC CATHETERIZATION  06/10/2014    Dr. Murphy Horner   • CARDIAC CATHETERIZATION  11/13/2007    Dr. Murphy Horner   • CARDIAC CATHETERIZATION N/A 10/19/2004    Dr. Murphy Horner   • CARDIAC CATHETERIZATION N/A 07/15/2004    Dr. Gregorio Gonzales   • CARDIAC CATHETERIZATION  10/2003    Dr. Murphy Horner   • CHOLECYSTECTOMY  1998   • COLONOSCOPY N/A 07/2001    negative   • COLONOSCOPY N/A 02/28/2012    negative   • CORONARY ANGIOPLASTY WITH STENT PLACEMENT N/A 07/15/2004    Dr. Murphy Horner   • CORONARY ANGIOPLASTY WITH STENT PLACEMENT  03/2002    to RCA   • HYSTERECTOMY  1974   • UPPER GASTROINTESTINAL ENDOSCOPY N/A 04/20/2015    Mild Schatzki ring, hiatus hernia, non-bleeding erosive gastropathy, erythematous mucosa in the antrum, normal duodenum-Dr. Alex Gill       Physical Exam  Vitals and nursing note reviewed.   Constitutional:       General: She is not in acute distress.     Appearance: She is well-developed. She is obese. She is not ill-appearing.   HENT:      Head: Normocephalic and atraumatic.   Eyes:      Conjunctiva/sclera: Conjunctivae normal.      Pupils: Pupils are equal, round, and reactive to light.   Neck:      Vascular: No JVD.   Cardiovascular:      Rate and Rhythm: Normal rate. Rhythm irregular.      Heart sounds: Normal heart sounds. No murmur heard.  No friction rub. No gallop.    Pulmonary:      Effort: Pulmonary effort is normal. No respiratory distress.      Breath sounds: Normal breath sounds.   Abdominal:      General: Bowel sounds are normal. There  "is no distension.      Palpations: Abdomen is soft.   Musculoskeletal:         General: No swelling or deformity.   Skin:     General: Skin is warm and dry.      Capillary Refill: Capillary refill takes less than 2 seconds.   Neurological:      Mental Status: She is alert and oriented to person, place, and time. Mental status is at baseline.   Psychiatric:         Mood and Affect: Mood normal.         Behavior: Behavior normal.          Review of Systems   Constitutional: Negative for fatigue and unexpected weight change.   HENT: Negative for congestion and nosebleeds.    Eyes: Negative for photophobia and visual disturbance.   Respiratory: Positive for shortness of breath. Negative for cough and chest tightness.         Improved   Cardiovascular: Negative for chest pain, palpitations and leg swelling.   Gastrointestinal: Negative for abdominal distention and blood in stool.   Endocrine: Negative for polyphagia and polyuria.   Genitourinary: Positive for frequency. Negative for urgency.   Musculoskeletal: Negative for joint swelling and myalgias.   Skin: Negative for pallor and rash.   Neurological: Positive for dizziness and light-headedness. Negative for syncope, weakness, numbness and headaches.   Hematological: Does not bruise/bleed easily.   Psychiatric/Behavioral: Negative for confusion and sleep disturbance.        OBJECTIVE:  /80 (BP Location: Right arm, Patient Position: Sitting)   Pulse 76   Ht 165.1 cm (65\")   Wt 78.7 kg (173 lb 6.4 oz)   SpO2 96%   BMI 28.86 kg/m²      Body mass index is 28.86 kg/m².  Wt Readings from Last 1 Encounters:   12/22/21 78.7 kg (173 lb 6.4 oz)       Lab Review:  Renal Function: CrCl cannot be calculated (Patient's most recent lab result is older than the maximum 30 days allowed.).    Lab Results   Component Value Date    PROBNP 1,255.0 07/20/2021       Results for orders placed during the hospital encounter of 08/12/21    Adult Transthoracic Echo Complete w/ Color, " Spectral and Contrast if Necessary Per Protocol    Interpretation Summary  · There is moderate calcification of trileaflet aortic valve with Doppler findings suggesting moderate stenosis: Peak velocity of the flow distal to the aortic valve is 262.8 cm/s. Aortic valve maximum pressure gradient is 27.6 mmHg. Aortic valve mean pressure gradient is 16.3 mmHg. Aortic valve dimensionless index is 0.3 .Aortic valve area is 0.92 cm2.  · Calculated left ventricular EF = 43.5% Estimated left ventricular EF was in agreement with the calculated left ventricular EF. Left ventricular systolic function is mildly decreased with global hypokinesis..Left ventricular wall thickness is consistent with moderate eccentric hypertrophy.  · Left ventricular diastolic dysfunction is noted. Normal left atrial filling pressure.  · Moderate mitral valve regurgitation is present.  · Calculated right ventricular systolic pressure from tricuspid regurgitation is 25 mmHg.  · Patient was in atrial fibrillation during study.  · Slightly improved left ventricular ejection fraction compared to study on 7/8/2021.      Procedures      6 MINUTE WALK                      Cardiac Procedures:  1.  6/10/2014 Cardiac catheterization  IMPRESSION:  Class III to IV angina on good medical therapy, ischemic  cardiomyopathy.  Prior stents are patent.  Diagonal disease is unchanged.  What  is new is she has developed a new 90% origin right coronary artery lesion. We  are going to proceed on with intervention of that.  I have discussed this with  her and her family beforehand.      Plavix 600 mg was given and heparin 7000 units was given. We exchanged a sheath  in the right radial artery and put a 6-Maltese sheath in. We then brought a  6-JR-4 guide up, but it would not fit into the right coronary artery, and we  switched to a 6-AR-1 guide. A BMW was passed easily into the distal RCA. We  brought a 3.5 x 15 NC trek balloon up and inflated it at 14 atmospheres in  the  origin of the RCA.  Then I brought a 4.0 x 15 Xience Xpedition drug-eluting  stent down, and I deployed that at 14 atmospheres. We postdilated that with a  4.0 x 12 NC trek at 20 atmospheres. There was 0% residual and ALIS-3 flow, and  at that point, balloons, wires, and guides were removed. The ACT was 301  seconds. The sheath was removed on the table and an R band was applied.      IMPRESSION:  Successful angioplasty drug-eluting stenting to the origin of the  right coronary artery.      RECOMMENDATIONS:  1.  Percutaneous coronary intervention care.   2.  Continue risk modification.   3.  Home in the morning if she is okay.  4.  Reassess her left ventricular function in 3 months.        Previously trialed diuretics  Lasix  Bumex      Previously trialed GDMT    Losartan  Carvedilol  Spironolactone  Metoprolol succinate  Entresto  Jardiance    ASSESSMENT:     Diagnosis Plan   1. HFrEF (heart failure with reduced ejection fraction) (HCC)  CBC & Differential    Basic Metabolic Panel    BNP   2. Paroxysmal atrial fibrillation ([facility])     3. Ischemic cardiomyopathy     4. Coronary artery disease involving native coronary artery of native heart without angina pectoris     5. Sleep apnea-like behavior           PLAN OF CARE:  1.  HFrEF-NYHA class II.  Current GDMT to include metoprolol succinate, spironolactone, and Jardiance, diuresed on Bumex.    Unable to tolerate ACE inhibitor given relative hypotension.  Her blood pressures do appear a bit more stable at home, they continue in this trend at her next appointment we will attempt a low-dose ARB.  She is euvolemic on exam today, and her shortness of breath is stable per baseline.  She does not have any complaints of weight gain, edema, or dry cough.  She has been following her low-sodium diet closely with the exception of Thanksgiving day and a plan to cheat on Gabby Day.  She also has been adherent with fluid restriction of 1500 mL daily.  I will check labs today  as below.    Directions for when to call the clinic reviewed with the patient to include weight gain of 2 to 3 pounds in 24 hours, weight gain of 5 to 10 pounds within 7 days; worsening shortness of breath; worsening lower extremity edema or abdominal distention.    2.  Chest pain-she states this is neither better nor worse on target dosing of Imdur.  She has not been taking any nitro however.  Her blood pressure is low, in the 90s systolically on target dosing of Imdur.  She is symptomatic with this with dizziness and lightheadedness.  I will reduce her back to 90 mg daily, and defer further plan of care and treatment of this to Dr. Horner.  Now resolved.     3.  Atrial fibrillation-rate controlled on target dosing of Toprol..  Anticoagulated.  Managed by primary cardiologist.  Stable.    4.  Hypertension-stable and controlled    5.  CAD-see dictation above.  Stable with no further complaint of angina..  Further plan of care deferred to primary cardiology team.    6.  Suspected sleep apnea-the patient complains of fatigue during the day, difficulty staying asleep at night, and likely snores.  She is willing to undergo a sleep study at this time.  Ambulatory referral placed.  Evaluation planned for 1/17/2022.      BMP/CBC/BMP today; follow-up in 3 months or sooner if needed; continue current GDMT    Advance Care Planning   ACP discussion was held with the patient during this visit. Patient has an advance directive (not in EMR), copy requested.      Thank you for allowing me to participate in the care of your patient,    Lima BRAGG JOHNNIE Kahn  [facility] HEART FAILURE  12/22/21  09:20 EDT      **Fadia Disclaimer:**  Much of this encounter note is an electronic transcription/translation of spoken language to printed text. The electronic translation of spoken language may permit erroneous, or at times, nonsensical words or phrases to be inadvertently transcribed. Although I have reviewed the note for such errors,  some may still exist.

## 2021-12-22 NOTE — TELEPHONE ENCOUNTER
----- Message from JOHNNIE Mccray sent at 12/22/2021  1:44 PM EST -----  Hey guys,Please let the patient know that labs look great from a heart failure standpoint.  No change to current plan of care.Thanks,Ita  ----- Message -----  From: Lab, Background User  Sent: 12/22/2021  11:24 AM EST  To: [facility] Heart Fail [facility] Clinical Pool

## 2022-01-17 ENCOUNTER — APPOINTMENT (OUTPATIENT)
Dept: SLEEP MEDICINE | Facility: HOSPITAL | Age: 82
End: 2022-01-17

## 2022-01-18 ENCOUNTER — TELEPHONE (OUTPATIENT)
Dept: CARDIOLOGY | Facility: CLINIC | Age: 82
End: 2022-01-18

## 2022-01-18 NOTE — TELEPHONE ENCOUNTER
Patient called said she had to pay 463.00 for 30 day supply of Eliquis.  I told her to check with her ins. To see what they do cover.  She will get back with us. [phone] or [phone]

## 2022-03-04 RX ORDER — RANOLAZINE 500 MG/1
TABLET, EXTENDED RELEASE ORAL
Qty: 180 TABLET | Refills: 2 | Status: SHIPPED | OUTPATIENT
Start: 2022-03-04 | End: 2022-05-06

## 2022-03-10 ENCOUNTER — OFFICE VISIT (OUTPATIENT)
Dept: CARDIOLOGY | Facility: CLINIC | Age: 82
End: 2022-03-10

## 2022-03-10 VITALS
DIASTOLIC BLOOD PRESSURE: 78 MMHG | BODY MASS INDEX: 29.16 KG/M2 | SYSTOLIC BLOOD PRESSURE: 128 MMHG | WEIGHT: 175 LBS | HEIGHT: 65 IN | HEART RATE: 72 BPM

## 2022-03-10 DIAGNOSIS — I25.5 ISCHEMIC CARDIOMYOPATHY: ICD-10-CM

## 2022-03-10 DIAGNOSIS — I25.10 CORONARY ARTERY DISEASE INVOLVING NATIVE CORONARY ARTERY OF NATIVE HEART WITHOUT ANGINA PECTORIS: Primary | ICD-10-CM

## 2022-03-10 DIAGNOSIS — R06.09 DYSPNEA ON EXERTION: ICD-10-CM

## 2022-03-10 DIAGNOSIS — I48.11 LONGSTANDING PERSISTENT ATRIAL FIBRILLATION: ICD-10-CM

## 2022-03-10 PROCEDURE — 99214 OFFICE O/P EST MOD 30 MIN: CPT | Performed by: NURSE PRACTITIONER

## 2022-03-10 PROCEDURE — 93000 ELECTROCARDIOGRAM COMPLETE: CPT | Performed by: NURSE PRACTITIONER

## 2022-03-10 NOTE — H&P (VIEW-ONLY)
Date of Office Visit: 03/10/2022  Encounter Provider: JOHNNIE Sadler  Place of Service: [facility] CARDIOLOGY  Patient Name: Alice Mabry  :1940    Chief Complaint   Patient presents with   • Coronary Artery Disease   :     HPI: Alice Mabry is a 81 y.o. female.  She is a patient of Dr. Horner's whom we follow for the management of hypertension, hyperlipidemia, and coronary artery disease.  In , she suffered an inferior STEMI for which she underwent angioplasty and drug-eluting stenting of the RCA.  At that time, she was noted to have an ischemic cardiomyopathy with an EF of 30%.  In , she underwent drug-eluting stenting to the LAD.    She did have improvement of her EF several months later to 57%.  In  RAJ to RCA.   In 2021, she presented with increased shortness of breath and palpitations.  She was found to be in atrial fibrillation RVR and was started on metoprolol and Eliquis.  Two days later, she presented to the ED with shortness of breath and was admitted.  Echocardiogram demonstrated severely reduced LV systolic function with an EF of 31 to 35%, mild to moderate aortic regurgitation.  She was started on guideline derived medical therapy and referred to the heart failure clinic.  In August, she underwent a stress test demonstrating a large sized infarct in the inferior wall with no evidence of ischemia.  Repeat echocardiogram demonstrated an EF of 43%.  Entresto was subsequently discontinued due to low blood pressure.     She was last seen in the heart failure clinic in December at which time she was overall doing well.  There had not been a significant change in her dyspnea or chest pain.  Ultimately, the isosorbide was reduced to 90 mg.  She is here today for follow-up.   Overall there has not been a significant change.  She is still reporting fatigue and dyspnea with very mild exertion including washing dishes and taking a shower.  She  denies any PND, orthopnea, or edema.  She denies any chest pain.  Her weights have been very stable.    Past Medical History:   Diagnosis Date   • Acute diastolic (congestive) heart failure (HCC)    • Anxiety    • Arthritis    • Atrial fibrillation (HCC) 2021   • CAD (coronary artery disease)    • Cervical cancer (HCC)    • Current tear of meniscus    • Depression    • Dizziness    • Fatty liver    • GERD (gastroesophageal reflux disease)    • H/O blood clots    • H/O Clostridium difficile infection    • Hyperlipidemia    • Hypertension    • IBS (irritable bowel syndrome)    • Myocardial infarction (HCC)    • Obesity    • Peptic ulcer    • Pneumonia    • Sinus bradycardia    • Syncope    • Vitamin D deficiency        Past Surgical History:   Procedure Laterality Date   • APPENDECTOMY  1960   • BACK SURGERY     • CARDIAC CATHETERIZATION  06/10/2014    Dr. Murphy Horner   • CARDIAC CATHETERIZATION  2007    Dr. Murphy Horner   • CARDIAC CATHETERIZATION N/A 10/19/2004    Dr. Murphy Horner   • CARDIAC CATHETERIZATION N/A 07/15/2004    Dr. Gregorio Gonzales   • CARDIAC CATHETERIZATION  10/2003    Dr. Murphy Horner   • CHOLECYSTECTOMY     • COLONOSCOPY N/A 2001    negative   • COLONOSCOPY N/A 2012    negative   • CORONARY ANGIOPLASTY WITH STENT PLACEMENT N/A 07/15/2004    Dr. Murphy Horner   • CORONARY ANGIOPLASTY WITH STENT PLACEMENT  2002    to RCA   • HYSTERECTOMY  1974   • UPPER GASTROINTESTINAL ENDOSCOPY N/A 2015    Mild Schatzki ring, hiatus hernia, non-bleeding erosive gastropathy, erythematous mucosa in the antrum, normal duodenum-Dr. Alex Gill       Social History     Socioeconomic History   • Marital status:    Tobacco Use   • Smoking status: Former Smoker     Quit date:      Years since quittin.2   • Smokeless tobacco: Never Used   Vaping Use   • Vaping Use: Never used   Substance and Sexual Activity   • Alcohol use: No   • Drug use: No   • Sexual  activity: Defer       Family History   Problem Relation Age of Onset   • Heart disease Mother    • No Known Problems Father    • No Known Problems Maternal Grandmother    • No Known Problems Maternal Grandfather    • No Known Problems Paternal Grandmother    • No Known Problems Paternal Grandfather    • Heart disease Sister    • Heart disease Brother        Review of Systems   Constitutional: Positive for malaise/fatigue.   Cardiovascular: Positive for dyspnea on exertion. Negative for chest pain, leg swelling, orthopnea, paroxysmal nocturnal dyspnea and syncope.   Respiratory: Negative.    Hematologic/Lymphatic: Negative for bleeding problem.   Musculoskeletal: Negative for falls.   Gastrointestinal: Negative for melena.   Neurological: Negative for dizziness and light-headedness.       Allergies   Allergen Reactions   • Codeine Other (See Comments)     Chest pain .     • Penicillins Hives   • Ambien [Zolpidem Tartrate] Confusion         Current Outpatient Medications:   •  apixaban (ELIQUIS) 5 MG tablet tablet, Take 1 tablet by mouth Every 12 (Twelve) Hours., Disp: 60 tablet, Rfl: 11  •  atorvastatin (LIPITOR) 80 MG tablet, TAKE 1 TABLET DAILY, Disp: 90 tablet, Rfl: 3  •  bumetanide (BUMEX) 1 MG tablet, Take 1 tablet by mouth Daily., Disp: 90 tablet, Rfl: 1  •  Cyanocobalamin (VITAMIN B-12 CR PO), Take  by mouth Daily., Disp: , Rfl:   •  digoxin (LANOXIN) 125 MCG tablet, Take 1 tablet by mouth 3 (Three) Times a Week., Disp: 36 tablet, Rfl: 2  •  diphenhydrAMINE-acetaminophen (Tylenol PM Extra Strength)  MG tablet per tablet, Take 2 tablets by mouth Every Night., Disp: , Rfl:   •  esomeprazole (nexIUM) 40 MG capsule, Take 40 mg by mouth Every Morning Before Breakfast., Disp: , Rfl:   •  FLUoxetine (PROzac) 20 MG capsule, Take 20 mg by mouth daily., Disp: , Rfl:   •  isosorbide mononitrate (IMDUR) 30 MG 24 hr tablet, Take 90 mg by mouth Daily., Disp: , Rfl:   •  Jardiance 10 MG tablet tablet, Take 1 tablet by  "mouth Daily., Disp: 14 tablet, Rfl: 0  •  LORazepam (ATIVAN) 1 MG tablet, Take 1 mg by mouth every 8 (eight) hours as needed for anxiety., Disp: , Rfl:   •  Melatonin 10 MG tablet, Take 5 mg by mouth Every Night., Disp: , Rfl:   •  metoprolol succinate XL (TOPROL-XL) 200 MG 24 hr tablet, Take 1 tablet by mouth Daily., Disp: 90 tablet, Rfl: 1  •  multivitamin with minerals tablet tablet, Take 1 tablet by mouth Daily., Disp: , Rfl:   •  nitroglycerin (NITROSTAT) 0.4 MG SL tablet, Place 1 tablet under the tongue Every 5 (Five) Minutes As Needed for Chest Pain. Take no more than 3 doses in 15 minutes., Disp: 100 tablet, Rfl: 3  •  ranolazine (RANEXA) 500 MG 12 hr tablet, TAKE 1 TABLET EVERY 12     HOURS, Disp: 180 tablet, Rfl: 2  •  spironolactone (ALDACTONE) 25 MG tablet, Take 0.5 tablets by mouth Daily., Disp: 30 tablet, Rfl: 11  •  vitamin C (ASCORBIC ACID) 500 MG tablet, Take 500 mg by mouth Daily., Disp: , Rfl:   •  Vitamin D, Ergocalciferol, 2000 units capsule, Take 2,000 Units by mouth Daily., Disp: , Rfl:       Objective:     Vitals:    03/10/22 1134   BP: 128/78   Pulse: 72   Weight: 79.4 kg (175 lb)   Height: 165.1 cm (65\")     Body mass index is 29.12 kg/m².    PHYSICAL EXAM:    Neck:      Vascular: No JVD.   Pulmonary:      Effort: Pulmonary effort is normal.      Breath sounds: Normal breath sounds.   Cardiovascular:      Normal rate. Irregular rhythm.      Murmurs: There is no murmur.      No gallop. No click. No rub.   Pulses:     Intact distal pulses.           ECG 12 Lead    Date/Time: 3/10/2022 11:38 AM  Performed by: Pat Paul APRN  Authorized by: Pat Paul APRN   Comparison: compared with previous ECG from 9/30/2021  Similar to previous ECG  Rhythm: atrial fibrillation  Rate: normal  BPM: 72    Clinical impression: abnormal EKG  Comments: Indication: CAD              Assessment:       Diagnosis Plan   1. Coronary artery disease involving native coronary artery of native heart " without angina pectoris  ECG 12 Lead   2. Ischemic cardiomyopathy     3. Longstanding persistent atrial fibrillation (HCC)     4. Dyspnea on exertion       Orders Placed This Encounter   Procedures   • ECG 12 Lead     This order was created via procedure documentation     Order Specific Question:   Release to patient     Answer:   Immediate          Plan:       1.  Coronary artery disease.  History of inferior STEMI in 2004 with angioplasty and drug-eluting stenting of the RCA.  Stress test from August 2021 was negative for ischemia.      2.  Ischemic cardiomyopathy.  EF 43%.  She appears euvolemic.  She is on guideline directed medical therapy including Toprol, spironolactone, and isosorbide.  She was previously trialed on Entresto which ultimately had to be discontinued due to low blood pressure.  She is following with the heart failure clinic.       3.  Persistent atrial fibrillation.  She is rate controlled with metoprolol and digoxin and anticoagulated on Eliquis.      4.  Dyspnea on exertion.  I do not think this is a volume issue.  To be honest, I am worried about ischemia.  In July of last year, echocardiogram demonstrated reduced LV systolic function with an EF of 31 to 35%.  This was in the presence of atrial fibrillation RVR.  Once we got her rate controlled, we did repeat an echocardiogram demonstrating improvement of her LV function but it was still not normal.  Although she had a negative stress test, she is still symptomatic.  She is on more than adequate medical therapy.  The dose of isosorbide has been adjusted up and down for blood pressure.  The dose does not seem to affect her dyspnea.  I think she needs a cardiac catheterization.  I am going to discuss with Dr. Horner.      I will call her following my discussion with Dr. Horner.      As always, it has been a pleasure to participate in your patient's care.      Sincerely,         JOHNNIE Zuniga

## 2022-03-10 NOTE — PROGRESS NOTES
Date of Office Visit: 03/10/2022  Encounter Provider: JOHNNIE Sadler  Place of Service: [facility] CARDIOLOGY  Patient Name: Alice Mabry  :1940    Chief Complaint   Patient presents with   • Coronary Artery Disease   :     HPI: Alice Mabry is a 81 y.o. female.  She is a patient of Dr. Horner's whom we follow for the management of hypertension, hyperlipidemia, and coronary artery disease.  In , she suffered an inferior STEMI for which she underwent angioplasty and drug-eluting stenting of the RCA.  At that time, she was noted to have an ischemic cardiomyopathy with an EF of 30%.  In , she underwent drug-eluting stenting to the LAD.    She did have improvement of her EF several months later to 57%.  In  RAJ to RCA.   In 2021, she presented with increased shortness of breath and palpitations.  She was found to be in atrial fibrillation RVR and was started on metoprolol and Eliquis.  Two days later, she presented to the ED with shortness of breath and was admitted.  Echocardiogram demonstrated severely reduced LV systolic function with an EF of 31 to 35%, mild to moderate aortic regurgitation.  She was started on guideline derived medical therapy and referred to the heart failure clinic.  In August, she underwent a stress test demonstrating a large sized infarct in the inferior wall with no evidence of ischemia.  Repeat echocardiogram demonstrated an EF of 43%.  Entresto was subsequently discontinued due to low blood pressure.     She was last seen in the heart failure clinic in December at which time she was overall doing well.  There had not been a significant change in her dyspnea or chest pain.  Ultimately, the isosorbide was reduced to 90 mg.  She is here today for follow-up.   Overall there has not been a significant change.  She is still reporting fatigue and dyspnea with very mild exertion including washing dishes and taking a shower.  She  denies any PND, orthopnea, or edema.  She denies any chest pain.  Her weights have been very stable.    Past Medical History:   Diagnosis Date   • Acute diastolic (congestive) heart failure (HCC)    • Anxiety    • Arthritis    • Atrial fibrillation (HCC) 2021   • CAD (coronary artery disease)    • Cervical cancer (HCC)    • Current tear of meniscus    • Depression    • Dizziness    • Fatty liver    • GERD (gastroesophageal reflux disease)    • H/O blood clots    • H/O Clostridium difficile infection    • Hyperlipidemia    • Hypertension    • IBS (irritable bowel syndrome)    • Myocardial infarction (HCC)    • Obesity    • Peptic ulcer    • Pneumonia    • Sinus bradycardia    • Syncope    • Vitamin D deficiency        Past Surgical History:   Procedure Laterality Date   • APPENDECTOMY  1960   • BACK SURGERY     • CARDIAC CATHETERIZATION  06/10/2014    Dr. Murphy Horner   • CARDIAC CATHETERIZATION  2007    Dr. Murphy Horner   • CARDIAC CATHETERIZATION N/A 10/19/2004    Dr. Murphy Horner   • CARDIAC CATHETERIZATION N/A 07/15/2004    Dr. Gregorio Gonzales   • CARDIAC CATHETERIZATION  10/2003    Dr. Murphy Horner   • CHOLECYSTECTOMY     • COLONOSCOPY N/A 2001    negative   • COLONOSCOPY N/A 2012    negative   • CORONARY ANGIOPLASTY WITH STENT PLACEMENT N/A 07/15/2004    Dr. Murphy Horner   • CORONARY ANGIOPLASTY WITH STENT PLACEMENT  2002    to RCA   • HYSTERECTOMY  1974   • UPPER GASTROINTESTINAL ENDOSCOPY N/A 2015    Mild Schatzki ring, hiatus hernia, non-bleeding erosive gastropathy, erythematous mucosa in the antrum, normal duodenum-Dr. Alex Gill       Social History     Socioeconomic History   • Marital status:    Tobacco Use   • Smoking status: Former Smoker     Quit date:      Years since quittin.2   • Smokeless tobacco: Never Used   Vaping Use   • Vaping Use: Never used   Substance and Sexual Activity   • Alcohol use: No   • Drug use: No   • Sexual  activity: Defer       Family History   Problem Relation Age of Onset   • Heart disease Mother    • No Known Problems Father    • No Known Problems Maternal Grandmother    • No Known Problems Maternal Grandfather    • No Known Problems Paternal Grandmother    • No Known Problems Paternal Grandfather    • Heart disease Sister    • Heart disease Brother        Review of Systems   Constitutional: Positive for malaise/fatigue.   Cardiovascular: Positive for dyspnea on exertion. Negative for chest pain, leg swelling, orthopnea, paroxysmal nocturnal dyspnea and syncope.   Respiratory: Negative.    Hematologic/Lymphatic: Negative for bleeding problem.   Musculoskeletal: Negative for falls.   Gastrointestinal: Negative for melena.   Neurological: Negative for dizziness and light-headedness.       Allergies   Allergen Reactions   • Codeine Other (See Comments)     Chest pain .     • Penicillins Hives   • Ambien [Zolpidem Tartrate] Confusion         Current Outpatient Medications:   •  apixaban (ELIQUIS) 5 MG tablet tablet, Take 1 tablet by mouth Every 12 (Twelve) Hours., Disp: 60 tablet, Rfl: 11  •  atorvastatin (LIPITOR) 80 MG tablet, TAKE 1 TABLET DAILY, Disp: 90 tablet, Rfl: 3  •  bumetanide (BUMEX) 1 MG tablet, Take 1 tablet by mouth Daily., Disp: 90 tablet, Rfl: 1  •  Cyanocobalamin (VITAMIN B-12 CR PO), Take  by mouth Daily., Disp: , Rfl:   •  digoxin (LANOXIN) 125 MCG tablet, Take 1 tablet by mouth 3 (Three) Times a Week., Disp: 36 tablet, Rfl: 2  •  diphenhydrAMINE-acetaminophen (Tylenol PM Extra Strength)  MG tablet per tablet, Take 2 tablets by mouth Every Night., Disp: , Rfl:   •  esomeprazole (nexIUM) 40 MG capsule, Take 40 mg by mouth Every Morning Before Breakfast., Disp: , Rfl:   •  FLUoxetine (PROzac) 20 MG capsule, Take 20 mg by mouth daily., Disp: , Rfl:   •  isosorbide mononitrate (IMDUR) 30 MG 24 hr tablet, Take 90 mg by mouth Daily., Disp: , Rfl:   •  Jardiance 10 MG tablet tablet, Take 1 tablet by  "mouth Daily., Disp: 14 tablet, Rfl: 0  •  LORazepam (ATIVAN) 1 MG tablet, Take 1 mg by mouth every 8 (eight) hours as needed for anxiety., Disp: , Rfl:   •  Melatonin 10 MG tablet, Take 5 mg by mouth Every Night., Disp: , Rfl:   •  metoprolol succinate XL (TOPROL-XL) 200 MG 24 hr tablet, Take 1 tablet by mouth Daily., Disp: 90 tablet, Rfl: 1  •  multivitamin with minerals tablet tablet, Take 1 tablet by mouth Daily., Disp: , Rfl:   •  nitroglycerin (NITROSTAT) 0.4 MG SL tablet, Place 1 tablet under the tongue Every 5 (Five) Minutes As Needed for Chest Pain. Take no more than 3 doses in 15 minutes., Disp: 100 tablet, Rfl: 3  •  ranolazine (RANEXA) 500 MG 12 hr tablet, TAKE 1 TABLET EVERY 12     HOURS, Disp: 180 tablet, Rfl: 2  •  spironolactone (ALDACTONE) 25 MG tablet, Take 0.5 tablets by mouth Daily., Disp: 30 tablet, Rfl: 11  •  vitamin C (ASCORBIC ACID) 500 MG tablet, Take 500 mg by mouth Daily., Disp: , Rfl:   •  Vitamin D, Ergocalciferol, 2000 units capsule, Take 2,000 Units by mouth Daily., Disp: , Rfl:       Objective:     Vitals:    03/10/22 1134   BP: 128/78   Pulse: 72   Weight: 79.4 kg (175 lb)   Height: 165.1 cm (65\")     Body mass index is 29.12 kg/m².    PHYSICAL EXAM:    Neck:      Vascular: No JVD.   Pulmonary:      Effort: Pulmonary effort is normal.      Breath sounds: Normal breath sounds.   Cardiovascular:      Normal rate. Irregular rhythm.      Murmurs: There is no murmur.      No gallop. No click. No rub.   Pulses:     Intact distal pulses.           ECG 12 Lead    Date/Time: 3/10/2022 11:38 AM  Performed by: Pat Paul APRN  Authorized by: Pat Paul APRN   Comparison: compared with previous ECG from 9/30/2021  Similar to previous ECG  Rhythm: atrial fibrillation  Rate: normal  BPM: 72    Clinical impression: abnormal EKG  Comments: Indication: CAD              Assessment:       Diagnosis Plan   1. Coronary artery disease involving native coronary artery of native heart " without angina pectoris  ECG 12 Lead   2. Ischemic cardiomyopathy     3. Longstanding persistent atrial fibrillation (HCC)     4. Dyspnea on exertion       Orders Placed This Encounter   Procedures   • ECG 12 Lead     This order was created via procedure documentation     Order Specific Question:   Release to patient     Answer:   Immediate          Plan:       1.  Coronary artery disease.  History of inferior STEMI in 2004 with angioplasty and drug-eluting stenting of the RCA.  Stress test from August 2021 was negative for ischemia.      2.  Ischemic cardiomyopathy.  EF 43%.  She appears euvolemic.  She is on guideline directed medical therapy including Toprol, spironolactone, and isosorbide.  She was previously trialed on Entresto which ultimately had to be discontinued due to low blood pressure.  She is following with the heart failure clinic.       3.  Persistent atrial fibrillation.  She is rate controlled with metoprolol and digoxin and anticoagulated on Eliquis.      4.  Dyspnea on exertion.  I do not think this is a volume issue.  To be honest, I am worried about ischemia.  In July of last year, echocardiogram demonstrated reduced LV systolic function with an EF of 31 to 35%.  This was in the presence of atrial fibrillation RVR.  Once we got her rate controlled, we did repeat an echocardiogram demonstrating improvement of her LV function but it was still not normal.  Although she had a negative stress test, she is still symptomatic.  She is on more than adequate medical therapy.  The dose of isosorbide has been adjusted up and down for blood pressure.  The dose does not seem to affect her dyspnea.  I think she needs a cardiac catheterization.  I am going to discuss with Dr. Horner.      I will call her following my discussion with Dr. Horner.      As always, it has been a pleasure to participate in your patient's care.      Sincerely,         JOHNNIE Zuniga

## 2022-03-11 ENCOUNTER — TELEPHONE (OUTPATIENT)
Dept: CARDIOLOGY | Facility: CLINIC | Age: 82
End: 2022-03-11

## 2022-03-11 DIAGNOSIS — I25.10 CORONARY ARTERY DISEASE INVOLVING NATIVE CORONARY ARTERY OF NATIVE HEART WITHOUT ANGINA PECTORIS: Primary | ICD-10-CM

## 2022-03-11 NOTE — TELEPHONE ENCOUNTER
I spoke with Dr. Horner regarding the plan of care.  Given her persistent symptoms, we are recommending proceeding with a cardiac catheterization.  Spoke with the patient and she is agreeable.  She will hold the Eliquis 48 hours prior.    Please call the patient and schedule cardiac catheterization with Dr. Horner.

## 2022-03-14 ENCOUNTER — TRANSCRIBE ORDERS (OUTPATIENT)
Dept: ADMINISTRATIVE | Facility: HOSPITAL | Age: 82
End: 2022-03-14

## 2022-03-14 DIAGNOSIS — Z01.818 OTHER SPECIFIED PRE-OPERATIVE EXAMINATION: Primary | ICD-10-CM

## 2022-03-18 ENCOUNTER — LAB (OUTPATIENT)
Dept: LAB | Facility: HOSPITAL | Age: 82
End: 2022-03-18

## 2022-03-18 DIAGNOSIS — Z01.818 OTHER SPECIFIED PRE-OPERATIVE EXAMINATION: ICD-10-CM

## 2022-03-18 DIAGNOSIS — I25.10 CORONARY ARTERY DISEASE INVOLVING NATIVE CORONARY ARTERY OF NATIVE HEART WITHOUT ANGINA PECTORIS: ICD-10-CM

## 2022-03-18 LAB
ANION GAP SERPL CALCULATED.3IONS-SCNC: 9 MMOL/L (ref 5–15)
BUN SERPL-MCNC: 12 MG/DL (ref 8–23)
BUN/CREAT SERPL: 13.2 (ref 7–25)
CALCIUM SPEC-SCNC: 9.5 MG/DL (ref 8.6–10.5)
CHLORIDE SERPL-SCNC: 101 MMOL/L (ref 98–107)
CO2 SERPL-SCNC: 28 MMOL/L (ref 22–29)
CREAT SERPL-MCNC: 0.91 MG/DL (ref 0.57–1)
DEPRECATED RDW RBC AUTO: 47.9 FL (ref 37–54)
EGFRCR SERPLBLD CKD-EPI 2021: 63.1 ML/MIN/1.73
ERYTHROCYTE [DISTWIDTH] IN BLOOD BY AUTOMATED COUNT: 13.1 % (ref 12.3–15.4)
GLUCOSE SERPL-MCNC: 124 MG/DL (ref 65–99)
HCT VFR BLD AUTO: 48.9 % (ref 34–46.6)
HGB BLD-MCNC: 16.7 G/DL (ref 12–15.9)
MCH RBC QN AUTO: 34.4 PG (ref 26.6–33)
MCHC RBC AUTO-ENTMCNC: 34.2 G/DL (ref 31.5–35.7)
MCV RBC AUTO: 100.6 FL (ref 79–97)
PLATELET # BLD AUTO: 202 10*3/MM3 (ref 140–450)
PMV BLD AUTO: 9.9 FL (ref 6–12)
POTASSIUM SERPL-SCNC: 4.4 MMOL/L (ref 3.5–5.2)
RBC # BLD AUTO: 4.86 10*6/MM3 (ref 3.77–5.28)
SARS-COV-2 ORF1AB RESP QL NAA+PROBE: NOT DETECTED
SODIUM SERPL-SCNC: 138 MMOL/L (ref 136–145)
WBC NRBC COR # BLD: 6.6 10*3/MM3 (ref 3.4–10.8)

## 2022-03-18 PROCEDURE — U0005 INFEC AGEN DETEC AMPLI PROBE: HCPCS

## 2022-03-18 PROCEDURE — 85027 COMPLETE CBC AUTOMATED: CPT

## 2022-03-18 PROCEDURE — 80048 BASIC METABOLIC PNL TOTAL CA: CPT

## 2022-03-18 PROCEDURE — 36415 COLL VENOUS BLD VENIPUNCTURE: CPT

## 2022-03-18 PROCEDURE — U0004 COV-19 TEST NON-CDC HGH THRU: HCPCS

## 2022-03-18 PROCEDURE — C9803 HOPD COVID-19 SPEC COLLECT: HCPCS

## 2022-03-21 ENCOUNTER — HOSPITAL ENCOUNTER (OUTPATIENT)
Facility: HOSPITAL | Age: 82
Setting detail: HOSPITAL OUTPATIENT SURGERY
Discharge: HOME | End: 2022-03-21
Attending: INTERNAL MEDICINE | Admitting: INTERNAL MEDICINE

## 2022-03-21 VITALS
OXYGEN SATURATION: 94 % | SYSTOLIC BLOOD PRESSURE: 128 MMHG | HEIGHT: 65 IN | BODY MASS INDEX: 28.36 KG/M2 | TEMPERATURE: 97.2 F | RESPIRATION RATE: 18 BRPM | HEART RATE: 62 BPM | WEIGHT: 170.2 LBS | DIASTOLIC BLOOD PRESSURE: 80 MMHG

## 2022-03-21 DIAGNOSIS — I25.10 CORONARY ARTERY DISEASE INVOLVING NATIVE CORONARY ARTERY OF NATIVE HEART WITHOUT ANGINA PECTORIS: ICD-10-CM

## 2022-03-21 LAB
GLUCOSE BLDC GLUCOMTR-MCNC: 139 MG/DL (ref 70–130)
HCT VFR BLDA CALC: 43 % (ref 38–51)
HCT VFR BLDA CALC: 43 % (ref 38–51)
HGB BLDA-MCNC: 14.6 G/DL (ref 12–17)
HGB BLDA-MCNC: 14.6 G/DL (ref 12–17)
SAO2 % BLDA: 66 % (ref 95–98)
SAO2 % BLDA: 96 % (ref 95–98)

## 2022-03-21 PROCEDURE — 25010000002 FENTANYL CITRATE (PF) 50 MCG/ML SOLUTION: Performed by: INTERNAL MEDICINE

## 2022-03-21 PROCEDURE — 25010000002 HEPARIN (PORCINE) PER 1000 UNITS: Performed by: INTERNAL MEDICINE

## 2022-03-21 PROCEDURE — 0 IOPAMIDOL PER 1 ML: Performed by: INTERNAL MEDICINE

## 2022-03-21 PROCEDURE — C1769 GUIDE WIRE: HCPCS | Performed by: INTERNAL MEDICINE

## 2022-03-21 PROCEDURE — 93460 R&L HRT ART/VENTRICLE ANGIO: CPT | Performed by: INTERNAL MEDICINE

## 2022-03-21 PROCEDURE — 82962 GLUCOSE BLOOD TEST: CPT

## 2022-03-21 PROCEDURE — C1894 INTRO/SHEATH, NON-LASER: HCPCS | Performed by: INTERNAL MEDICINE

## 2022-03-21 PROCEDURE — 85018 HEMOGLOBIN: CPT

## 2022-03-21 PROCEDURE — 85014 HEMATOCRIT: CPT

## 2022-03-21 PROCEDURE — 25010000002 MIDAZOLAM PER 1 MG: Performed by: INTERNAL MEDICINE

## 2022-03-21 RX ORDER — SODIUM CHLORIDE 9 MG/ML
INJECTION, SOLUTION INTRAVENOUS CONTINUOUS PRN
Status: COMPLETED | OUTPATIENT
Start: 2022-03-21 | End: 2022-03-21

## 2022-03-21 RX ORDER — LIDOCAINE HYDROCHLORIDE 20 MG/ML
INJECTION, SOLUTION INFILTRATION; PERINEURAL AS NEEDED
Status: DISCONTINUED | OUTPATIENT
Start: 2022-03-21 | End: 2022-03-21 | Stop reason: HOSPADM

## 2022-03-21 RX ORDER — ACETAMINOPHEN 325 MG/1
650 TABLET ORAL EVERY 4 HOURS PRN
Status: DISCONTINUED | OUTPATIENT
Start: 2022-03-21 | End: 2022-03-21 | Stop reason: HOSPADM

## 2022-03-21 RX ORDER — SODIUM CHLORIDE 9 MG/ML
75 INJECTION, SOLUTION INTRAVENOUS CONTINUOUS
Status: DISCONTINUED | OUTPATIENT
Start: 2022-03-21 | End: 2022-03-21 | Stop reason: HOSPADM

## 2022-03-21 RX ORDER — FENTANYL CITRATE 50 UG/ML
INJECTION, SOLUTION INTRAMUSCULAR; INTRAVENOUS AS NEEDED
Status: DISCONTINUED | OUTPATIENT
Start: 2022-03-21 | End: 2022-03-21 | Stop reason: HOSPADM

## 2022-03-21 RX ORDER — SODIUM CHLORIDE 0.9 % (FLUSH) 0.9 %
10 SYRINGE (ML) INJECTION AS NEEDED
Status: DISCONTINUED | OUTPATIENT
Start: 2022-03-21 | End: 2022-03-21 | Stop reason: HOSPADM

## 2022-03-21 RX ORDER — SODIUM CHLORIDE 0.9 % (FLUSH) 0.9 %
10 SYRINGE (ML) INJECTION EVERY 12 HOURS SCHEDULED
Status: DISCONTINUED | OUTPATIENT
Start: 2022-03-21 | End: 2022-03-21 | Stop reason: HOSPADM

## 2022-03-21 RX ORDER — MIDAZOLAM HYDROCHLORIDE 1 MG/ML
INJECTION INTRAMUSCULAR; INTRAVENOUS AS NEEDED
Status: DISCONTINUED | OUTPATIENT
Start: 2022-03-21 | End: 2022-03-21 | Stop reason: HOSPADM

## 2022-03-21 RX ADMIN — SODIUM CHLORIDE 75 ML/HR: 9 INJECTION, SOLUTION INTRAVENOUS at 09:23

## 2022-03-21 NOTE — INTERVAL H&P NOTE
This is really developed profound dyspnea on exertion with even minimal activity.  We have tried her on medical therapy and that does not seem to have had any difference and so we will bring her back to the Cath Lab for a left and right heart cath. H&P reviewed. The patient was examined and there are no changes to the H&P. I have explained the risks and benefits of the procedure to the patient.  The patient understands and agrees to proceed

## 2022-03-21 NOTE — CONSULTS
Patient Care Team:  Tho Mar MD as PCP - General (Internal Medicine)    Chief complaint: CAD    Subjective     History of Present Illness  Patient is a 81 y.o. female with a past medical history including CAD, hx of STEMI s/p PCI 2014, persistent atrial fibrillation on eliquis,  Moderate aortic valve stenosis, moderate mitral regurgitation, hypertension, and hyperlipidemia who has been followed by Dr. Horner for CAD.  She has been having increased shortness of breath and exertional chest pain which has increased over the past few weeks.  She presented today for out patient cardiac catheterization.  Her cardiac cath revealed moerate to severe ichemica cardiomyopathy, moderate AS, severe 3-vessel CAD with left main stenosis and moderate mitral regurgitation. We were consulted for cardiac surgery evaluation.    Review of Systems   Constitutional: Positive for activity change and fatigue.   HENT: Negative.    Respiratory: Positive for chest tightness and shortness of breath.    Cardiovascular: Positive for chest pain and palpitations.        Past Medical History:   Diagnosis Date   • Acute diastolic (congestive) heart failure (HCC)    • Anxiety    • Arthritis    • Atrial fibrillation (HCC) 07/06/2021   • CAD (coronary artery disease)    • Cervical cancer (HCC)    • Current tear of meniscus    • Depression    • Dizziness    • Fatty liver    • GERD (gastroesophageal reflux disease)    • H/O blood clots    • H/O Clostridium difficile infection    • Hyperlipidemia    • Hypertension    • IBS (irritable bowel syndrome)    • Myocardial infarction (HCC)    • Obesity    • Peptic ulcer    • Pneumonia    • Sinus bradycardia    • Syncope    • Vitamin D deficiency      Past Surgical History:   Procedure Laterality Date   • APPENDECTOMY  1960   • BACK SURGERY     • CARDIAC CATHETERIZATION  06/10/2014    Dr. Murphy Horner   • CARDIAC CATHETERIZATION  11/13/2007    Dr. Murphy Horner   • CARDIAC CATHETERIZATION N/A  10/19/2004    Dr. Murphy Horner   • CARDIAC CATHETERIZATION N/A 07/15/2004    Dr. Gregorio Gonzales   • CARDIAC CATHETERIZATION  10/2003    Dr. Murphy Horner   • CHOLECYSTECTOMY     • COLONOSCOPY N/A 2001    negative   • COLONOSCOPY N/A 2012    negative   • CORONARY ANGIOPLASTY WITH STENT PLACEMENT N/A 07/15/2004    Dr. Murphy Horner   • CORONARY ANGIOPLASTY WITH STENT PLACEMENT  2002    to RCA   • HYSTERECTOMY  1974   • UPPER GASTROINTESTINAL ENDOSCOPY N/A 2015    Mild Schatzki ring, hiatus hernia, non-bleeding erosive gastropathy, erythematous mucosa in the antrum, normal duodenum-Dr. Alex Gill     Family History   Problem Relation Age of Onset   • Heart disease Mother    • No Known Problems Father    • No Known Problems Maternal Grandmother    • No Known Problems Maternal Grandfather    • No Known Problems Paternal Grandmother    • No Known Problems Paternal Grandfather    • Heart disease Sister    • Heart disease Brother      Social History     Tobacco Use   • Smoking status: Former Smoker     Quit date:      Years since quittin.2   • Smokeless tobacco: Never Used   Vaping Use   • Vaping Use: Never used   Substance Use Topics   • Alcohol use: No   • Drug use: No     Medications Prior to Admission   Medication Sig Dispense Refill Last Dose   • atorvastatin (LIPITOR) 80 MG tablet TAKE 1 TABLET DAILY 90 tablet 3 3/20/2022 at Unknown time   • bumetanide (BUMEX) 1 MG tablet Take 1 tablet by mouth Daily. 90 tablet 1 3/20/2022 at Unknown time   • Cyanocobalamin (VITAMIN B-12 CR PO) Take  by mouth Daily.   3/20/2022 at Unknown time   • Dapagliflozin Propanediol (FARXIGA PO) Take  by mouth.   3/20/2022 at Unknown time   • digoxin (LANOXIN) 125 MCG tablet Take 1 tablet by mouth 3 (Three) Times a Week. 36 tablet 2 3/20/2022 at Unknown time   • diphenhydrAMINE-acetaminophen (Tylenol PM Extra Strength)  MG tablet per tablet Take 2 tablets by mouth Every Night.   3/20/2022 at Unknown  "time   • esomeprazole (nexIUM) 40 MG capsule Take 40 mg by mouth Every Morning Before Breakfast.   3/20/2022 at Unknown time   • FLUoxetine (PROzac) 20 MG capsule Take 20 mg by mouth daily.   3/20/2022 at Unknown time   • isosorbide mononitrate (IMDUR) 30 MG 24 hr tablet Take 90 mg by mouth Daily.   3/20/2022 at Unknown time   • LORazepam (ATIVAN) 1 MG tablet Take 1 mg by mouth every 8 (eight) hours as needed for anxiety.   3/20/2022 at Unknown time   • Melatonin 10 MG tablet Take 5 mg by mouth Every Night.   3/20/2022 at Unknown time   • metoprolol succinate XL (TOPROL-XL) 200 MG 24 hr tablet Take 1 tablet by mouth Daily. 90 tablet 1 3/20/2022 at Unknown time   • multivitamin with minerals tablet tablet Take 1 tablet by mouth Daily.   3/20/2022 at Unknown time   • nitroglycerin (NITROSTAT) 0.4 MG SL tablet Place 1 tablet under the tongue Every 5 (Five) Minutes As Needed for Chest Pain. Take no more than 3 doses in 15 minutes. 100 tablet 3 Past Month at Unknown time   • ranolazine (RANEXA) 500 MG 12 hr tablet TAKE 1 TABLET EVERY 12     HOURS 180 tablet 2 3/20/2022 at Unknown time   • spironolactone (ALDACTONE) 25 MG tablet Take 0.5 tablets by mouth Daily. 30 tablet 11 3/20/2022 at Unknown time   • vitamin C (ASCORBIC ACID) 500 MG tablet Take 500 mg by mouth Daily.   3/20/2022 at Unknown time   • Vitamin D, Ergocalciferol, 2000 units capsule Take 2,000 Units by mouth Daily.   3/20/2022 at Unknown time   • apixaban (ELIQUIS) 5 MG tablet tablet Take 1 tablet by mouth Every 12 (Twelve) Hours. 60 tablet 11 3/17/2022     sodium chloride, 10 mL, Intravenous, Q12H  sodium chloride, 10 mL, Intravenous, Q12H      Allergies:  Codeine, Penicillins, and Ambien [zolpidem tartrate]    Objective      Vital Signs  Temp:  [97.2 °F (36.2 °C)] 97.2 °F (36.2 °C)  Heart Rate:  [73] 73  Resp:  [15-24] 16  BP: (138)/(92) 138/92    Flowsheet Rows    Flowsheet Row First Filed Value   Admission Height 165.1 cm (65\") Documented at 03/21/2022 " "0915   Admission Weight 77.2 kg (170 lb 3.2 oz) Documented at 03/21/2022 0915        165.1 cm (65\")    Physical Exam  Constitutional:       General: She is not in acute distress.     Appearance: Normal appearance. She is not ill-appearing.   HENT:      Head: Normocephalic and atraumatic.   Cardiovascular:      Pulses: Normal pulses.      Heart sounds: Murmur heard.   Pulmonary:      Effort: Pulmonary effort is normal. No respiratory distress.      Breath sounds: Normal breath sounds.   Abdominal:      General: There is no distension.   Musculoskeletal:         General: Normal range of motion.   Skin:     General: Skin is warm.   Neurological:      General: No focal deficit present.      Mental Status: She is alert and oriented to person, place, and time.         Results Review:   Lab Results (last 24 hours)     Procedure Component Value Units Date/Time    POC Glucose Once [[id number]]  (Abnormal) Collected: 03/21/22 0919    Specimen: Blood Updated: 03/21/22 0920     Glucose 139 mg/dL      Comment: Meter: [id number] : [id number] Juan Carlos SUAREZ                 Assessment/Plan       CAD (coronary artery disease)      Assessment & Plan      -Coronary artery disease- with left main stenosis  -hx STEMI s/p RAJ to RCA--2014   -moderate aortic stenosis  -moderate mitral regurgitation   -ischemic cardiomyopathy EF 43%   -Persistent atrial fibrillation-- on eliquis  -hyperlipidemia  -hypertension   -polycythemia-- hgb 16.7    Dr. Lynn reviewed films and recommends cardiac surgery   She is going to go home and discuss with her family.  Plans to contact our office on Thursday to schedule- will need to stop eliquis 5 days before surgery    Thank you for allowing us to participate in the care of this patient.      JOHNNIE Abdullahi  03/21/22  11:38 EDT            "

## 2022-03-21 NOTE — DISCHARGE INSTRUCTIONS
[facility]  [address] [place] [address]    Coronary Angiogram (Radial/Ulnar Approach) After Care    Refer to this sheet in the next few weeks. These instructions provide you with information on caring for yourself after your procedure. Your caregiver may also give you more specific instructions. Your treatment has been planned according to current medical practices, but problems sometimes occur. Call your caregiver if you have any problems or questions after your procedure.    Home Care Instructions:  You may shower the day after the procedure. Remove the bandage (dressing) and gently wash the site with plain soap and water. Gently pat the site dry. You may apply a band aid daily for 2 days if desired.    Do not apply powder or lotion to the site.  Do not submerge the affected site in water for 3 to 5 days or until the site is completely healed.   Do not lift, push or pull anything over 5 pounds for 5 days after your procedure or as directed by your physician.  As a reference, a gallon of milk weighs 8 pounds.   Inspect the site at least twice daily. You may notice some bruising at the site and it may be tender for 1 to 2 weeks.     Increase your fluid intake for the next 2 days.    Keep arm elevated for 24 hours. For the remainder of the day, keep your arm in “Pledge of Allegiance” position when up and about.     You may drive 24 hours after the procedure unless otherwise instructed by your caregiver.  Do not operate machinery or power tools for 24 hours.  A responsible adult should be with you for the first 24 hours after you arrive home. Do not make any important legal decisions or sign legal papers for 24 hours.  Do not drink alcohol for 24 hours.    Metformin or any medications containing Metformin should not be taken for 48 hours after your procedure.      Call Your Doctor if:   You have unusual pain at the radial/ulnar (wrist) site.  You have redness, warmth, swelling, or pain at the  radial/ulnar (wrist) site.  You have drainage (other than a small amount of blood on the dressing).  `You have chills or a fever > 101.  Your arm becomes pale or dark, cool, tingly, or numb.  You develop chest pain, shortness of breath, feel faint or pass out.    You have heavy bleeding from the site, hold pressure on the site for 20 minutes.  If the bleeding stops, apply a fresh bandage and call your cardiologist.  However, if you        continue to have bleeding, call 911 and continue to apply pressure to the site.   You have any symptoms of a stroke.  Remember BE FAST  B-balance. Sudden trouble walking or loss of balance.  E-eyes.  Sudden changes in how you see or a sudden onset of a very bad headache.   F-face. Sudden weakness or loss of feeling of the face or facial droop on one side.   A-arms Sudden weakness or numbness in one arm.  One arm drifts down if they are both held out in front of you. This happens suddenly and usually on one side of the body.   S-speech.  Sudden trouble speaking, slurred speech or trouble understanding what are saying.   T-time  Time to call emergency services.  Write down the symptoms and the time they started.

## 2022-03-22 ENCOUNTER — TELEPHONE (OUTPATIENT)
Dept: CARDIAC SURGERY | Facility: CLINIC | Age: 82
End: 2022-03-22

## 2022-03-22 ENCOUNTER — PREP FOR SURGERY (OUTPATIENT)
Dept: OTHER | Facility: HOSPITAL | Age: 82
End: 2022-03-22

## 2022-03-22 DIAGNOSIS — R79.9 ABNORMAL FINDING OF BLOOD CHEMISTRY, UNSPECIFIED: ICD-10-CM

## 2022-03-22 DIAGNOSIS — I25.118 CORONARY ARTERY DISEASE OF NATIVE HEART WITH STABLE ANGINA PECTORIS, UNSPECIFIED VESSEL OR LESION TYPE: Primary | ICD-10-CM

## 2022-03-22 DIAGNOSIS — Z48.812 ENCOUNTER FOR SURGICAL AFTERCARE FOLLOWING SURGERY ON THE CIRCULATORY SYSTEM: ICD-10-CM

## 2022-03-22 DIAGNOSIS — R93.1 ABNORMAL FINDINGS ON DIAGNOSTIC IMAGING OF HEART AND CORONARY CIRCULATION: ICD-10-CM

## 2022-03-22 DIAGNOSIS — R79.1 ABNORMAL COAGULATION PROFILE: ICD-10-CM

## 2022-03-22 DIAGNOSIS — I11.0 HYPERTENSIVE HEART DISEASE WITH HEART FAILURE: ICD-10-CM

## 2022-03-22 RX ORDER — CHLORHEXIDINE GLUCONATE 0.12 MG/ML
15 RINSE ORAL EVERY 12 HOURS
Status: CANCELLED | OUTPATIENT
Start: 2022-03-22 | End: 2022-03-23

## 2022-03-22 RX ORDER — CHLORHEXIDINE GLUCONATE 500 MG/1
1 CLOTH TOPICAL EVERY 12 HOURS PRN
Status: CANCELLED | OUTPATIENT
Start: 2022-03-22

## 2022-03-22 RX ORDER — CHLORHEXIDINE GLUCONATE 0.12 MG/ML
15 RINSE ORAL ONCE
Status: CANCELLED | OUTPATIENT
Start: 2022-03-22 | End: 2022-03-22

## 2022-03-23 ENCOUNTER — TELEPHONE (OUTPATIENT)
Dept: CARDIAC SURGERY | Facility: CLINIC | Age: 82
End: 2022-03-23

## 2022-03-23 PROBLEM — R93.1 ABNORMAL FINDINGS ON DIAGNOSTIC IMAGING OF HEART AND CORONARY CIRCULATION: Status: ACTIVE | Noted: 2022-03-23

## 2022-03-25 ENCOUNTER — HOSPITAL ENCOUNTER (OUTPATIENT)
Dept: GENERAL RADIOLOGY | Facility: HOSPITAL | Age: 82
Discharge: HOME | End: 2022-03-25

## 2022-03-25 ENCOUNTER — PRE-ADMISSION TESTING (OUTPATIENT)
Dept: PREADMISSION TESTING | Facility: HOSPITAL | Age: 82
End: 2022-03-25

## 2022-03-25 ENCOUNTER — HOSPITAL ENCOUNTER (OUTPATIENT)
Dept: CARDIOLOGY | Facility: HOSPITAL | Age: 82
Discharge: HOME | End: 2022-03-25

## 2022-03-25 ENCOUNTER — ANESTHESIA EVENT (OUTPATIENT)
Dept: PERIOP | Facility: HOSPITAL | Age: 82
End: 2022-03-25

## 2022-03-25 VITALS
WEIGHT: 171 LBS | TEMPERATURE: 97.4 F | DIASTOLIC BLOOD PRESSURE: 66 MMHG | HEIGHT: 65 IN | BODY MASS INDEX: 28.49 KG/M2 | HEART RATE: 70 BPM | OXYGEN SATURATION: 99 % | SYSTOLIC BLOOD PRESSURE: 108 MMHG | RESPIRATION RATE: 22 BRPM

## 2022-03-25 DIAGNOSIS — Z48.812 ENCOUNTER FOR SURGICAL AFTERCARE FOLLOWING SURGERY ON THE CIRCULATORY SYSTEM: ICD-10-CM

## 2022-03-25 DIAGNOSIS — R79.1 ABNORMAL COAGULATION PROFILE: ICD-10-CM

## 2022-03-25 DIAGNOSIS — I25.118 CORONARY ARTERY DISEASE OF NATIVE HEART WITH STABLE ANGINA PECTORIS, UNSPECIFIED VESSEL OR LESION TYPE: ICD-10-CM

## 2022-03-25 DIAGNOSIS — R79.9 ABNORMAL FINDING OF BLOOD CHEMISTRY, UNSPECIFIED: ICD-10-CM

## 2022-03-25 DIAGNOSIS — I11.0 HYPERTENSIVE HEART DISEASE WITH HEART FAILURE: ICD-10-CM

## 2022-03-25 LAB
ABO GROUP BLD: NORMAL
ALBUMIN SERPL-MCNC: 3.9 G/DL (ref 3.5–5.2)
ALBUMIN/GLOB SERPL: 1.1 G/DL
ALP SERPL-CCNC: 61 U/L (ref 39–117)
ALT SERPL W P-5'-P-CCNC: 24 U/L (ref 1–33)
ANION GAP SERPL CALCULATED.3IONS-SCNC: 18.3 MMOL/L (ref 5–15)
APTT PPP: 38 SECONDS (ref 22.7–35.4)
ARTERIAL PATENCY WRIST A: POSITIVE
AST SERPL-CCNC: 25 U/L (ref 1–32)
ATMOSPHERIC PRESS: 749 MMHG
BASE EXCESS BLDA CALC-SCNC: 1.1 MMOL/L (ref 0–2)
BASOPHILS # BLD AUTO: 0.02 10*3/MM3 (ref 0–0.2)
BASOPHILS NFR BLD AUTO: 0.2 % (ref 0–1.5)
BDY SITE: ABNORMAL
BH CV XLRA MEAS - DIST GSV CALF DIST LEFT: 0.26 CM
BH CV XLRA MEAS - DIST GSV CALF DIST RIGHT: 0.31 CM
BH CV XLRA MEAS - DIST GSV THIGH DIST LEFT: 0.42 CM
BH CV XLRA MEAS - DIST GSV THIGH DIST RIGHT: 0.45 CM
BH CV XLRA MEAS - GSV ANKLE DIST LEFT: 0.25 CM
BH CV XLRA MEAS - GSV ANKLE DIST RIGHT: 0.32 CM
BH CV XLRA MEAS - GSV KNEE DIST LEFT: 0.46 CM
BH CV XLRA MEAS - GSV KNEE DIST RIGHT: 0.56 CM
BH CV XLRA MEAS - GSV ORIGIN DIST LEFT: 0.71 CM
BH CV XLRA MEAS - GSV ORIGIN DIST RIGHT: 0.78 CM
BH CV XLRA MEAS - MID GSV CALF LEFT: 0.13 CM
BH CV XLRA MEAS - MID GSV CALF RIGHT: 0.3 CM
BH CV XLRA MEAS - MID GSV THIGH  LEFT: 0.41 CM
BH CV XLRA MEAS - MID GSV THIGH  RIGHT: 0.4 CM
BH CV XLRA MEAS - PROX GSV CALF DIST LEFT: 0.35 CM
BH CV XLRA MEAS - PROX GSV CALF DIST RIGHT: 0.48 CM
BH CV XLRA MEAS - PROX GSV THIGH  LEFT: 0.43 CM
BH CV XLRA MEAS - PROX GSV THIGH  RIGHT: 0.52 CM
BH CV XLRA MEAS LEFT DIST CCA EDV: 10.6 CM/SEC
BH CV XLRA MEAS LEFT DIST CCA PSV: 60.8 CM/SEC
BH CV XLRA MEAS LEFT DIST ICA EDV: -18.8 CM/SEC
BH CV XLRA MEAS LEFT DIST ICA PSV: -61.8 CM/SEC
BH CV XLRA MEAS LEFT ICA/CCA RATIO: 1.02
BH CV XLRA MEAS LEFT MID ICA EDV: -16.3 CM/SEC
BH CV XLRA MEAS LEFT MID ICA PSV: -47.4 CM/SEC
BH CV XLRA MEAS LEFT PROX CCA EDV: 18 CM/SEC
BH CV XLRA MEAS LEFT PROX CCA PSV: 72.1 CM/SEC
BH CV XLRA MEAS LEFT PROX ECA EDV: -14.9 CM/SEC
BH CV XLRA MEAS LEFT PROX ECA PSV: -70.6 CM/SEC
BH CV XLRA MEAS LEFT PROX ICA EDV: -9.9 CM/SEC
BH CV XLRA MEAS LEFT PROX ICA PSV: -31.9 CM/SEC
BH CV XLRA MEAS LEFT PROX SCLA PSV: 64.8 CM/SEC
BH CV XLRA MEAS LEFT VERTEBRAL A EDV: 14.1 CM/SEC
BH CV XLRA MEAS LEFT VERTEBRAL A PSV: 34.9 CM/SEC
BH CV XLRA MEAS RIGHT DIST CCA EDV: 12.2 CM/SEC
BH CV XLRA MEAS RIGHT DIST CCA PSV: 39.5 CM/SEC
BH CV XLRA MEAS RIGHT DIST ICA EDV: -28 CM/SEC
BH CV XLRA MEAS RIGHT DIST ICA PSV: -75 CM/SEC
BH CV XLRA MEAS RIGHT ICA/CCA RATIO: 1.9
BH CV XLRA MEAS RIGHT MID ICA EDV: -17.9 CM/SEC
BH CV XLRA MEAS RIGHT MID ICA PSV: -44.8 CM/SEC
BH CV XLRA MEAS RIGHT PROX CCA EDV: -16.2 CM/SEC
BH CV XLRA MEAS RIGHT PROX CCA PSV: -65.9 CM/SEC
BH CV XLRA MEAS RIGHT PROX ECA PSV: -81.1 CM/SEC
BH CV XLRA MEAS RIGHT PROX ICA EDV: -6 CM/SEC
BH CV XLRA MEAS RIGHT PROX ICA PSV: -27.5 CM/SEC
BH CV XLRA MEAS RIGHT PROX SCLA PSV: 97.5 CM/SEC
BH CV XLRA MEAS RIGHT VERTEBRAL A EDV: 11.6 CM/SEC
BH CV XLRA MEAS RIGHT VERTEBRAL A PSV: 46.2 CM/SEC
BILIRUB SERPL-MCNC: 0.8 MG/DL (ref 0–1.2)
BILIRUB UR QL STRIP: NEGATIVE
BLD GP AB SCN SERPL QL: NEGATIVE
BLD GP AB SCN SERPL QL: POSITIVE
BUN SERPL-MCNC: 13 MG/DL (ref 8–23)
BUN/CREAT SERPL: 14.6 (ref 7–25)
CALCIUM SPEC-SCNC: 9.4 MG/DL (ref 8.6–10.5)
CHLORIDE SERPL-SCNC: 96 MMOL/L (ref 98–107)
CHOLEST SERPL-MCNC: 129 MG/DL (ref 0–200)
CLARITY UR: CLEAR
CLOSE TME COLL+ADP + EPINEP PNL BLD: 94 % (ref 86–100)
CO2 SERPL-SCNC: 27.7 MMOL/L (ref 22–29)
COLOR UR: YELLOW
CREAT SERPL-MCNC: 0.89 MG/DL (ref 0.57–1)
DEPRECATED RDW RBC AUTO: 48.4 FL (ref 37–54)
EGFRCR SERPLBLD CKD-EPI 2021: 64.8 ML/MIN/1.73
EOSINOPHIL # BLD AUTO: 0.35 10*3/MM3 (ref 0–0.4)
EOSINOPHIL NFR BLD AUTO: 4 % (ref 0.3–6.2)
ERYTHROCYTE [DISTWIDTH] IN BLOOD BY AUTOMATED COUNT: 12.8 % (ref 12.3–15.4)
GLOBULIN UR ELPH-MCNC: 3.5 GM/DL
GLUCOSE SERPL-MCNC: 181 MG/DL (ref 65–99)
GLUCOSE UR STRIP-MCNC: ABNORMAL MG/DL
HBA1C MFR BLD: 6.6 % (ref 4.8–5.6)
HCO3 BLDA-SCNC: 25.1 MMOL/L (ref 22–28)
HCT VFR BLD AUTO: 48.6 % (ref 34–46.6)
HDLC SERPL-MCNC: 31 MG/DL (ref 40–60)
HGB BLD-MCNC: 16.4 G/DL (ref 12–15.9)
HGB UR QL STRIP.AUTO: NEGATIVE
IMM GRANULOCYTES # BLD AUTO: 0.04 10*3/MM3 (ref 0–0.05)
IMM GRANULOCYTES NFR BLD AUTO: 0.5 % (ref 0–0.5)
INR PPP: 1.08 (ref 0.9–1.1)
KETONES UR QL STRIP: NEGATIVE
LDLC SERPL CALC-MCNC: 70 MG/DL (ref 0–100)
LDLC/HDLC SERPL: 2.12 {RATIO}
LEFT ARM BP: NORMAL MMHG
LEUKOCYTE ESTERASE UR QL STRIP.AUTO: NEGATIVE
LYMPHOCYTES # BLD AUTO: 2.11 10*3/MM3 (ref 0.7–3.1)
LYMPHOCYTES NFR BLD AUTO: 24.1 % (ref 19.6–45.3)
MAGNESIUM SERPL-MCNC: 1.8 MG/DL (ref 1.6–2.4)
MAXIMAL PREDICTED HEART RATE: 138 BPM
MAXIMAL PREDICTED HEART RATE: 138 BPM
MCH RBC QN AUTO: 34.2 PG (ref 26.6–33)
MCHC RBC AUTO-ENTMCNC: 33.7 G/DL (ref 31.5–35.7)
MCV RBC AUTO: 101.5 FL (ref 79–97)
MODALITY: ABNORMAL
MONOCYTES # BLD AUTO: 1.26 10*3/MM3 (ref 0.1–0.9)
MONOCYTES NFR BLD AUTO: 14.4 % (ref 5–12)
NEUTROPHILS NFR BLD AUTO: 4.98 10*3/MM3 (ref 1.7–7)
NEUTROPHILS NFR BLD AUTO: 56.8 % (ref 42.7–76)
NITRITE UR QL STRIP: NEGATIVE
NONSPECIFIC GEL REACTION: NORMAL
NRBC BLD AUTO-RTO: 0 /100 WBC (ref 0–0.2)
NT-PROBNP SERPL-MCNC: 1093 PG/ML (ref 0–1800)
PCO2 BLDA: 37.4 MM HG (ref 35–45)
PH BLDA: 7.43 PH UNITS (ref 7.35–7.45)
PH UR STRIP.AUTO: 6 [PH] (ref 5–8)
PLATELET # BLD AUTO: 219 10*3/MM3 (ref 140–450)
PMV BLD AUTO: 10 FL (ref 6–12)
PO2 BLDA: 73.3 MM HG (ref 80–100)
POTASSIUM SERPL-SCNC: 4.2 MMOL/L (ref 3.5–5.2)
PROT SERPL-MCNC: 7.4 G/DL (ref 6–8.5)
PROT UR QL STRIP: NEGATIVE
PROTHROMBIN TIME: 13.9 SECONDS (ref 11.7–14.2)
QT INTERVAL: 396 MS
RBC # BLD AUTO: 4.79 10*6/MM3 (ref 3.77–5.28)
RH BLD: POSITIVE
RIGHT ARM BP: NORMAL MMHG
SAO2 % BLDCOA: 95.1 % (ref 92–99)
SARS-COV-2 ORF1AB RESP QL NAA+PROBE: NOT DETECTED
SODIUM SERPL-SCNC: 142 MMOL/L (ref 136–145)
SP GR UR STRIP: 1.02 (ref 1–1.03)
STRESS TARGET HR: 117 BPM
STRESS TARGET HR: 117 BPM
T&S EXPIRATION DATE: NORMAL
TOTAL RATE: 18 BREATHS/MINUTE
TRIGL SERPL-MCNC: 161 MG/DL (ref 0–150)
UROBILINOGEN UR QL STRIP: ABNORMAL
VLDLC SERPL-MCNC: 28 MG/DL (ref 5–40)
WBC NRBC COR # BLD: 8.76 10*3/MM3 (ref 3.4–10.8)

## 2022-03-25 PROCEDURE — 71046 X-RAY EXAM CHEST 2 VIEWS: CPT

## 2022-03-25 PROCEDURE — 93970 EXTREMITY STUDY: CPT

## 2022-03-25 PROCEDURE — 93880 EXTRACRANIAL BILAT STUDY: CPT

## 2022-03-25 PROCEDURE — 85025 COMPLETE CBC W/AUTO DIFF WBC: CPT

## 2022-03-25 PROCEDURE — 86920 COMPATIBILITY TEST SPIN: CPT

## 2022-03-25 PROCEDURE — 94799 UNLISTED PULMONARY SVC/PX: CPT

## 2022-03-25 PROCEDURE — 86850 RBC ANTIBODY SCREEN: CPT

## 2022-03-25 PROCEDURE — 86922 COMPATIBILITY TEST ANTIGLOB: CPT

## 2022-03-25 PROCEDURE — U0004 COV-19 TEST NON-CDC HGH THRU: HCPCS

## 2022-03-25 PROCEDURE — 36600 WITHDRAWAL OF ARTERIAL BLOOD: CPT | Performed by: INTERNAL MEDICINE

## 2022-03-25 PROCEDURE — 86900 BLOOD TYPING SEROLOGIC ABO: CPT

## 2022-03-25 PROCEDURE — 93010 ELECTROCARDIOGRAM REPORT: CPT | Performed by: INTERNAL MEDICINE

## 2022-03-25 PROCEDURE — 82803 BLOOD GASES ANY COMBINATION: CPT | Performed by: INTERNAL MEDICINE

## 2022-03-25 PROCEDURE — 83036 HEMOGLOBIN GLYCOSYLATED A1C: CPT

## 2022-03-25 PROCEDURE — 86870 RBC ANTIBODY IDENTIFICATION: CPT

## 2022-03-25 PROCEDURE — 36415 COLL VENOUS BLD VENIPUNCTURE: CPT

## 2022-03-25 PROCEDURE — 80053 COMPREHEN METABOLIC PANEL: CPT

## 2022-03-25 PROCEDURE — 83880 ASSAY OF NATRIURETIC PEPTIDE: CPT

## 2022-03-25 PROCEDURE — 85576 BLOOD PLATELET AGGREGATION: CPT

## 2022-03-25 PROCEDURE — 93005 ELECTROCARDIOGRAM TRACING: CPT

## 2022-03-25 PROCEDURE — U0005 INFEC AGEN DETEC AMPLI PROBE: HCPCS

## 2022-03-25 PROCEDURE — 86901 BLOOD TYPING SEROLOGIC RH(D): CPT

## 2022-03-25 PROCEDURE — 85730 THROMBOPLASTIN TIME PARTIAL: CPT

## 2022-03-25 PROCEDURE — C9803 HOPD COVID-19 SPEC COLLECT: HCPCS

## 2022-03-25 PROCEDURE — 80061 LIPID PANEL: CPT

## 2022-03-25 PROCEDURE — 83735 ASSAY OF MAGNESIUM: CPT

## 2022-03-25 PROCEDURE — 85610 PROTHROMBIN TIME: CPT

## 2022-03-25 PROCEDURE — 81003 URINALYSIS AUTO W/O SCOPE: CPT

## 2022-03-25 RX ORDER — CHLORHEXIDINE GLUCONATE 0.12 MG/ML
15 RINSE ORAL
COMMUNITY
End: 2022-04-08 | Stop reason: HOSPADM

## 2022-03-25 RX ORDER — CHLORHEXIDINE GLUCONATE 0.12 MG/ML
15 RINSE ORAL EVERY 12 HOURS
Status: DISPENSED | OUTPATIENT
Start: 2022-03-25 | End: 2022-03-26

## 2022-03-25 RX ORDER — DAPAGLIFLOZIN 10 MG/1
10 TABLET, FILM COATED ORAL EVERY MORNING
COMMUNITY
End: 2022-11-28 | Stop reason: SDUPTHER

## 2022-03-25 RX ORDER — CHLORHEXIDINE GLUCONATE 500 MG/1
1 CLOTH TOPICAL
COMMUNITY
End: 2022-04-08 | Stop reason: HOSPADM

## 2022-03-25 RX ORDER — CHLORHEXIDINE GLUCONATE 500 MG/1
1 CLOTH TOPICAL EVERY 12 HOURS PRN
Status: DISCONTINUED | OUTPATIENT
Start: 2022-03-25 | End: 2022-04-14

## 2022-03-25 NOTE — ANESTHESIA PREPROCEDURE EVALUATION
Anesthesia Evaluation     Patient summary reviewed and Nursing notes reviewed   NPO Solid Status: > 8 hours  NPO Liquid Status: > 8 hours           Airway   Mallampati: II  TM distance: >3 FB  Neck ROM: full  No difficulty expected  Dental - normal exam     Pulmonary - normal exam   (+) shortness of breath,   Cardiovascular - normal exam  Exercise tolerance: poor (<4 METS)    ECG reviewed  PT is on anticoagulation therapy  Patient on routine beta blocker and Beta blocker given within 24 hours of surgery    (+) hypertension, valvular problems/murmurs MVP, past MI  >12 months, CAD, cardiac stents more than 12 months ago dysrhythmias Atrial Fib, CHF , hyperlipidemia,       Neuro/Psych  (+) syncope, psychiatric history Anxiety and Depression,    GI/Hepatic/Renal/Endo    (+)  GERD,  liver disease,     Musculoskeletal     Abdominal    Substance History      OB/GYN          Other   arthritis,    history of cancer                    Anesthesia Plan    ASA 4     general, [place] and CVL     intravenous induction     Anesthetic plan, all risks, benefits, and alternatives have been provided, discussed and informed consent has been obtained with: patient and child.        CODE STATUS:

## 2022-03-28 ENCOUNTER — APPOINTMENT (OUTPATIENT)
Dept: GENERAL RADIOLOGY | Facility: HOSPITAL | Age: 82
End: 2022-03-28

## 2022-03-28 ENCOUNTER — HOSPITAL ENCOUNTER (INPATIENT)
Facility: HOSPITAL | Age: 82
LOS: 11 days | Discharge: HOME HEALTH | End: 2022-04-08
Attending: THORACIC SURGERY (CARDIOTHORACIC VASCULAR SURGERY) | Admitting: THORACIC SURGERY (CARDIOTHORACIC VASCULAR SURGERY)

## 2022-03-28 ENCOUNTER — ANCILLARY PROCEDURE (OUTPATIENT)
Dept: PERIOP | Facility: HOSPITAL | Age: 82
End: 2022-03-28

## 2022-03-28 ENCOUNTER — ANESTHESIA (OUTPATIENT)
Dept: PERIOP | Facility: HOSPITAL | Age: 82
End: 2022-03-28

## 2022-03-28 DIAGNOSIS — R93.1 ABNORMAL FINDINGS ON DIAGNOSTIC IMAGING OF HEART AND CORONARY CIRCULATION: ICD-10-CM

## 2022-03-28 DIAGNOSIS — Z95.1 S/P CABG (CORONARY ARTERY BYPASS GRAFT): Primary | ICD-10-CM

## 2022-03-28 DIAGNOSIS — Z98.890 S/P MVR (MITRAL VALVE REPAIR): ICD-10-CM

## 2022-03-28 DIAGNOSIS — I25.118 CORONARY ARTERY DISEASE OF NATIVE HEART WITH STABLE ANGINA PECTORIS, UNSPECIFIED VESSEL OR LESION TYPE: ICD-10-CM

## 2022-03-28 DIAGNOSIS — Z95.2 S/P AVR (AORTIC VALVE REPLACEMENT): ICD-10-CM

## 2022-03-28 LAB
ABO GROUP BLD: NORMAL
ACT BLD: 422 SECONDS (ref 82–152)
ALBUMIN SERPL-MCNC: 3.4 G/DL (ref 3.5–5.2)
ALBUMIN SERPL-MCNC: 4.4 G/DL (ref 3.5–5.2)
ANION GAP SERPL CALCULATED.3IONS-SCNC: 11 MMOL/L (ref 5–15)
ANION GAP SERPL CALCULATED.3IONS-SCNC: 11 MMOL/L (ref 5–15)
APTT PPP: 40.8 SECONDS (ref 22.7–35.4)
ARTERIAL PATENCY WRIST A: ABNORMAL
ATMOSPHERIC PRESS: 753.9 MMHG
ATMOSPHERIC PRESS: 755.4 MMHG
ATMOSPHERIC PRESS: 756 MMHG
ATMOSPHERIC PRESS: 756.1 MMHG
ATMOSPHERIC PRESS: 756.1 MMHG
ATMOSPHERIC PRESS: 756.4 MMHG
BASE EXCESS BLDA CALC-SCNC: -1 MMOL/L (ref 0–2)
BASE EXCESS BLDA CALC-SCNC: -2 MMOL/L (ref 0–2)
BASE EXCESS BLDA CALC-SCNC: -3 MMOL/L (ref 0–2)
BASE EXCESS BLDA CALC-SCNC: -3.8 MMOL/L (ref 0–2)
BASE EXCESS BLDA CALC-SCNC: -3.9 MMOL/L (ref 0–2)
BASE EXCESS BLDA CALC-SCNC: -3.9 MMOL/L (ref 0–2)
BASE EXCESS BLDA CALC-SCNC: 0 MMOL/L (ref -5–5)
BASE EXCESS BLDA CALC-SCNC: 3 MMOL/L (ref -5–5)
BASE EXCESS BLDA CALC-SCNC: 3 MMOL/L (ref -5–5)
BASE EXCESS BLDA CALC-SCNC: 4 MMOL/L (ref -5–5)
BASE EXCESS BLDA CALC-SCNC: 5 MMOL/L (ref -5–5)
BASE EXCESS BLDA CALC-SCNC: 6 MMOL/L (ref -5–5)
BASE EXCESS BLDA CALC-SCNC: 6 MMOL/L (ref -5–5)
BASOPHILS # BLD AUTO: 0.03 10*3/MM3 (ref 0–0.2)
BASOPHILS NFR BLD AUTO: 0.2 % (ref 0–1.5)
BDY SITE: ABNORMAL
BUN SERPL-MCNC: 11 MG/DL (ref 8–23)
BUN SERPL-MCNC: 12 MG/DL (ref 8–23)
BUN/CREAT SERPL: 14.3 (ref 7–25)
BUN/CREAT SERPL: 14.6 (ref 7–25)
CA-I BLD-MCNC: 4.6 MG/DL (ref 4.6–5.4)
CA-I BLDA-SCNC: ABNORMAL MMOL/L
CA-I SERPL ISE-MCNC: 1.15 MMOL/L (ref 1.15–1.35)
CALCIUM SPEC-SCNC: 8 MG/DL (ref 8.6–10.5)
CALCIUM SPEC-SCNC: 8 MG/DL (ref 8.6–10.5)
CHLORIDE SERPL-SCNC: 111 MMOL/L (ref 98–107)
CHLORIDE SERPL-SCNC: 111 MMOL/L (ref 98–107)
CO2 BLDA-SCNC: 27 MMOL/L (ref 24–29)
CO2 BLDA-SCNC: 29 MMOL/L (ref 24–29)
CO2 BLDA-SCNC: 30 MMOL/L (ref 24–29)
CO2 BLDA-SCNC: 31 MMOL/L (ref 24–29)
CO2 BLDA-SCNC: 31 MMOL/L (ref 24–29)
CO2 BLDA-SCNC: 32 MMOL/L (ref 24–29)
CO2 BLDA-SCNC: 33 MMOL/L (ref 24–29)
CO2 SERPL-SCNC: 20 MMOL/L (ref 22–29)
CO2 SERPL-SCNC: 22 MMOL/L (ref 22–29)
CREAT SERPL-MCNC: 0.77 MG/DL (ref 0.57–1)
CREAT SERPL-MCNC: 0.82 MG/DL (ref 0.57–1)
DEPRECATED RDW RBC AUTO: 48 FL (ref 37–54)
DEPRECATED RDW RBC AUTO: 48.1 FL (ref 37–54)
EGFRCR SERPLBLD CKD-EPI 2021: 71.5 ML/MIN/1.73
EGFRCR SERPLBLD CKD-EPI 2021: 77.1 ML/MIN/1.73
EOSINOPHIL # BLD AUTO: 0.06 10*3/MM3 (ref 0–0.4)
EOSINOPHIL NFR BLD AUTO: 0.5 % (ref 0.3–6.2)
ERYTHROCYTE [DISTWIDTH] IN BLOOD BY AUTOMATED COUNT: 12.8 % (ref 12.3–15.4)
ERYTHROCYTE [DISTWIDTH] IN BLOOD BY AUTOMATED COUNT: 12.8 % (ref 12.3–15.4)
FIBRINOGEN PPP-MCNC: 194 MG/DL (ref 219–464)
GAS FLOW AIRWAY: 4 LPM
GLUCOSE BLDC GLUCOMTR-MCNC: 119 MG/DL (ref 70–130)
GLUCOSE BLDC GLUCOMTR-MCNC: 121 MG/DL (ref 70–130)
GLUCOSE BLDC GLUCOMTR-MCNC: 125 MG/DL (ref 70–130)
GLUCOSE BLDC GLUCOMTR-MCNC: 127 MG/DL (ref 70–130)
GLUCOSE BLDC GLUCOMTR-MCNC: 131 MG/DL (ref 70–130)
GLUCOSE BLDC GLUCOMTR-MCNC: 131 MG/DL (ref 70–130)
GLUCOSE BLDC GLUCOMTR-MCNC: 138 MG/DL (ref 70–130)
GLUCOSE BLDC GLUCOMTR-MCNC: 138 MG/DL (ref 70–130)
GLUCOSE BLDC GLUCOMTR-MCNC: 142 MG/DL (ref 70–130)
GLUCOSE BLDC GLUCOMTR-MCNC: 143 MG/DL (ref 70–130)
GLUCOSE BLDC GLUCOMTR-MCNC: 147 MG/DL (ref 70–130)
GLUCOSE BLDC GLUCOMTR-MCNC: 151 MG/DL (ref 70–130)
GLUCOSE BLDC GLUCOMTR-MCNC: 152 MG/DL (ref 70–130)
GLUCOSE BLDC GLUCOMTR-MCNC: 155 MG/DL (ref 70–130)
GLUCOSE BLDC GLUCOMTR-MCNC: 161 MG/DL (ref 70–130)
GLUCOSE BLDC GLUCOMTR-MCNC: 162 MG/DL (ref 70–130)
GLUCOSE BLDC GLUCOMTR-MCNC: 162 MG/DL (ref 70–130)
GLUCOSE BLDC GLUCOMTR-MCNC: 172 MG/DL (ref 70–130)
GLUCOSE SERPL-MCNC: 136 MG/DL (ref 65–99)
GLUCOSE SERPL-MCNC: 154 MG/DL (ref 65–99)
HCO3 BLDA-SCNC: 21.5 MMOL/L (ref 22–28)
HCO3 BLDA-SCNC: 21.9 MMOL/L (ref 22–28)
HCO3 BLDA-SCNC: 23.9 MMOL/L (ref 22–28)
HCO3 BLDA-SCNC: 24.7 MMOL/L (ref 22–28)
HCO3 BLDA-SCNC: 25.1 MMOL/L (ref 22–28)
HCO3 BLDA-SCNC: 25.8 MMOL/L (ref 22–26)
HCO3 BLDA-SCNC: 26 MMOL/L (ref 22–28)
HCO3 BLDA-SCNC: 28.1 MMOL/L (ref 22–26)
HCO3 BLDA-SCNC: 28.3 MMOL/L (ref 22–26)
HCO3 BLDA-SCNC: 29.2 MMOL/L (ref 22–26)
HCO3 BLDA-SCNC: 29.7 MMOL/L (ref 22–26)
HCO3 BLDA-SCNC: 30.6 MMOL/L (ref 22–26)
HCO3 BLDA-SCNC: 31.1 MMOL/L (ref 22–26)
HCT VFR BLD AUTO: 33.2 % (ref 34–46.6)
HCT VFR BLD AUTO: 35.3 % (ref 34–46.6)
HCT VFR BLDA CALC: 26 % (ref 38–51)
HCT VFR BLDA CALC: 26 % (ref 38–51)
HCT VFR BLDA CALC: 27 % (ref 38–51)
HCT VFR BLDA CALC: 39 % (ref 38–51)
HGB BLD-MCNC: 11 G/DL (ref 12–15.9)
HGB BLD-MCNC: 11.8 G/DL (ref 12–15.9)
HGB BLDA-MCNC: 13.3 G/DL (ref 12–17)
HGB BLDA-MCNC: 8.8 G/DL (ref 12–17)
HGB BLDA-MCNC: 8.8 G/DL (ref 12–17)
HGB BLDA-MCNC: 9.2 G/DL (ref 12–17)
IMM GRANULOCYTES # BLD AUTO: 0.09 10*3/MM3 (ref 0–0.05)
IMM GRANULOCYTES NFR BLD AUTO: 0.7 % (ref 0–0.5)
INHALED O2 CONCENTRATION: 100 %
INHALED O2 CONCENTRATION: 40 %
INR PPP: 1.61 (ref 0.9–1.1)
LYMPHOCYTES # BLD AUTO: 1.13 10*3/MM3 (ref 0.7–3.1)
LYMPHOCYTES NFR BLD AUTO: 8.6 % (ref 19.6–45.3)
MAGNESIUM SERPL-MCNC: 2.1 MG/DL (ref 1.6–2.4)
MAGNESIUM SERPL-MCNC: 2.5 MG/DL (ref 1.6–2.4)
MCH RBC QN AUTO: 34.1 PG (ref 26.6–33)
MCH RBC QN AUTO: 34.3 PG (ref 26.6–33)
MCHC RBC AUTO-ENTMCNC: 33.1 G/DL (ref 31.5–35.7)
MCHC RBC AUTO-ENTMCNC: 33.4 G/DL (ref 31.5–35.7)
MCV RBC AUTO: 102.6 FL (ref 79–97)
MCV RBC AUTO: 102.8 FL (ref 79–97)
MODALITY: ABNORMAL
MONOCYTES # BLD AUTO: 1.53 10*3/MM3 (ref 0.1–0.9)
MONOCYTES NFR BLD AUTO: 11.6 % (ref 5–12)
NEUTROPHILS NFR BLD AUTO: 10.35 10*3/MM3 (ref 1.7–7)
NEUTROPHILS NFR BLD AUTO: 78.4 % (ref 42.7–76)
NRBC BLD AUTO-RTO: 0 /100 WBC (ref 0–0.2)
O2 A-A PPRESDIFF RESPIRATORY: 0.4 MMHG
O2 A-A PPRESDIFF RESPIRATORY: 0.5 MMHG
O2 A-A PPRESDIFF RESPIRATORY: 0.5 MMHG
PCO2 BLDA: 39.3 MM HG (ref 35–45)
PCO2 BLDA: 41.6 MM HG (ref 35–45)
PCO2 BLDA: 45.7 MM HG (ref 35–45)
PCO2 BLDA: 46.4 MM HG (ref 35–45)
PCO2 BLDA: 48 MM HG (ref 35–45)
PCO2 BLDA: 49.5 MM HG (ref 35–45)
PCO2 BLDA: 49.8 MM HG (ref 35–45)
PCO2 BLDA: 50.6 MM HG (ref 35–45)
PCO2 BLDA: 51.1 MM HG (ref 35–45)
PCO2 BLDA: 52.8 MM HG (ref 35–45)
PCO2 BLDA: 53.6 MM HG (ref 35–45)
PCO2 BLDA: 54.9 MM HG (ref 35–45)
PCO2 BLDA: 65 MM HG (ref 35–45)
PEEP RESPIRATORY: 7.5 CM[H2O]
PH BLDA: 7.2 PH UNITS (ref 7.35–7.45)
PH BLDA: 7.28 PH UNITS (ref 7.35–7.45)
PH BLDA: 7.29 PH UNITS (ref 7.35–7.45)
PH BLDA: 7.3 PH UNITS (ref 7.35–7.45)
PH BLDA: 7.33 PH UNITS (ref 7.35–7.45)
PH BLDA: 7.34 PH UNITS (ref 7.35–7.45)
PH BLDA: 7.35 PH UNITS (ref 7.35–7.6)
PH BLDA: 7.36 PH UNITS (ref 7.35–7.6)
PH BLDA: 7.37 PH UNITS (ref 7.35–7.6)
PH BLDA: 7.39 PH UNITS (ref 7.35–7.6)
PH BLDA: 7.4 PH UNITS (ref 7.35–7.6)
PH BLDA: 7.42 PH UNITS (ref 7.35–7.6)
PH BLDA: 7.44 PH UNITS (ref 7.35–7.6)
PHOSPHATE SERPL-MCNC: 2.8 MG/DL (ref 2.5–4.5)
PHOSPHATE SERPL-MCNC: 3.2 MG/DL (ref 2.5–4.5)
PLATELET # BLD AUTO: 100 10*3/MM3 (ref 140–450)
PLATELET # BLD AUTO: 96 10*3/MM3 (ref 140–450)
PMV BLD AUTO: 10 FL (ref 6–12)
PMV BLD AUTO: 10.1 FL (ref 6–12)
PO2 BLDA: 107 MM HG (ref 80–100)
PO2 BLDA: 112 MM HG (ref 80–100)
PO2 BLDA: 316 MMHG (ref 80–105)
PO2 BLDA: 322.8 MM HG (ref 80–100)
PO2 BLDA: 444 MMHG (ref 80–105)
PO2 BLDA: 457 MMHG (ref 80–105)
PO2 BLDA: 467 MMHG (ref 80–105)
PO2 BLDA: 49 MMHG (ref 80–105)
PO2 BLDA: 493 MMHG (ref 80–105)
PO2 BLDA: 495 MMHG (ref 80–105)
PO2 BLDA: 84.7 MM HG (ref 80–100)
PO2 BLDA: 93.1 MM HG (ref 80–100)
PO2 BLDA: 97.9 MM HG (ref 80–100)
POTASSIUM BLDA-SCNC: 3.1 MMOL/L (ref 3.5–4.9)
POTASSIUM BLDA-SCNC: 4.1 MMOL/L (ref 3.5–4.9)
POTASSIUM BLDA-SCNC: 4.1 MMOL/L (ref 3.5–4.9)
POTASSIUM BLDA-SCNC: 4.3 MMOL/L (ref 3.5–4.9)
POTASSIUM BLDA-SCNC: 4.3 MMOL/L (ref 3.5–4.9)
POTASSIUM BLDA-SCNC: 4.4 MMOL/L (ref 3.5–4.9)
POTASSIUM BLDA-SCNC: 4.7 MMOL/L (ref 3.5–4.9)
POTASSIUM SERPL-SCNC: 4.2 MMOL/L (ref 3.5–5.2)
POTASSIUM SERPL-SCNC: 4.4 MMOL/L (ref 3.5–5.2)
PROTHROMBIN TIME: 19.1 SECONDS (ref 11.7–14.2)
PSV: 8 CMH2O
PSV: 8 CMH2O
RBC # BLD AUTO: 3.23 10*6/MM3 (ref 3.77–5.28)
RBC # BLD AUTO: 3.44 10*6/MM3 (ref 3.77–5.28)
RH BLD: POSITIVE
SAO2 % BLDA: 100 % (ref 95–98)
SAO2 % BLDA: 81 % (ref 95–98)
SAO2 % BLDCOA: 93.1 % (ref 92–99)
SAO2 % BLDCOA: 96 % (ref 92–99)
SAO2 % BLDCOA: 96.7 % (ref 92–99)
SAO2 % BLDCOA: 97.4 % (ref 92–99)
SAO2 % BLDCOA: 98.1 % (ref 92–99)
SAO2 % BLDCOA: 99.9 % (ref 92–99)
SET MECH RESP RATE: 16
SET MECH RESP RATE: 16
SET MECH RESP RATE: 18
SODIUM SERPL-SCNC: 142 MMOL/L (ref 136–145)
SODIUM SERPL-SCNC: 144 MMOL/L (ref 136–145)
TOTAL RATE: 20 BREATHS/MINUTE
TOTAL RATE: 21 BREATHS/MINUTE
TOTAL RATE: 21 BREATHS/MINUTE
TOTAL RATE: 24 BREATHS/MINUTE
VENTILATOR MODE: ABNORMAL
VENTILATOR MODE: AC
VENTILATOR MODE: AC
VT ON VENT VENT: 345 ML
VT ON VENT VENT: 405 ML
VT ON VENT VENT: 454 ML
VT ON VENT VENT: 550 ML
VT ON VENT VENT: 550 ML
WBC NRBC COR # BLD: 12.46 10*3/MM3 (ref 3.4–10.8)
WBC NRBC COR # BLD: 13.19 10*3/MM3 (ref 3.4–10.8)

## 2022-03-28 PROCEDURE — 5A1221Z PERFORMANCE OF CARDIAC OUTPUT, CONTINUOUS: ICD-10-PCS | Performed by: THORACIC SURGERY (CARDIOTHORACIC VASCULAR SURGERY)

## 2022-03-28 PROCEDURE — 0W9D00Z DRAINAGE OF PERICARDIAL CAVITY WITH DRAINAGE DEVICE, OPEN APPROACH: ICD-10-PCS | Performed by: THORACIC SURGERY (CARDIOTHORACIC VASCULAR SURGERY)

## 2022-03-28 PROCEDURE — 88304 TISSUE EXAM BY PATHOLOGIST: CPT | Performed by: THORACIC SURGERY (CARDIOTHORACIC VASCULAR SURGERY)

## 2022-03-28 PROCEDURE — 85018 HEMOGLOBIN: CPT

## 2022-03-28 PROCEDURE — 94799 UNLISTED PULMONARY SVC/PX: CPT

## 2022-03-28 PROCEDURE — 85730 THROMBOPLASTIN TIME PARTIAL: CPT | Performed by: THORACIC SURGERY (CARDIOTHORACIC VASCULAR SURGERY)

## 2022-03-28 PROCEDURE — 87205 SMEAR GRAM STAIN: CPT | Performed by: THORACIC SURGERY (CARDIOTHORACIC VASCULAR SURGERY)

## 2022-03-28 PROCEDURE — 25010000002 HEPARIN (PORCINE) PER 1000 UNITS

## 2022-03-28 PROCEDURE — 33427 REPAIR OF MITRAL VALVE: CPT | Performed by: THORACIC SURGERY (CARDIOTHORACIC VASCULAR SURGERY)

## 2022-03-28 PROCEDURE — C1889 IMPLANT/INSERT DEVICE, NOC: HCPCS | Performed by: THORACIC SURGERY (CARDIOTHORACIC VASCULAR SURGERY)

## 2022-03-28 PROCEDURE — 82330 ASSAY OF CALCIUM: CPT | Performed by: THORACIC SURGERY (CARDIOTHORACIC VASCULAR SURGERY)

## 2022-03-28 PROCEDURE — 25010000002 VANCOMYCIN 10 G RECONSTITUTED SOLUTION: Performed by: THORACIC SURGERY (CARDIOTHORACIC VASCULAR SURGERY)

## 2022-03-28 PROCEDURE — 33259 ABLATE ATRIA W/BYPASS ADD-ON: CPT | Performed by: THORACIC SURGERY (CARDIOTHORACIC VASCULAR SURGERY)

## 2022-03-28 PROCEDURE — 33405 REPLACEMENT AORTIC VALVE OPN: CPT | Performed by: THORACIC SURGERY (CARDIOTHORACIC VASCULAR SURGERY)

## 2022-03-28 PROCEDURE — 0 PROTAMINE SULFATE PER 10 MG: Performed by: THORACIC SURGERY (CARDIOTHORACIC VASCULAR SURGERY)

## 2022-03-28 PROCEDURE — A4648 IMPLANTABLE TISSUE MARKER: HCPCS | Performed by: THORACIC SURGERY (CARDIOTHORACIC VASCULAR SURGERY)

## 2022-03-28 PROCEDURE — C1751 CATH, INF, PER/CENT/MIDLINE: HCPCS | Performed by: ANESTHESIOLOGY

## 2022-03-28 PROCEDURE — C1713 ANCHOR/SCREW BN/BN,TIS/BN: HCPCS | Performed by: THORACIC SURGERY (CARDIOTHORACIC VASCULAR SURGERY)

## 2022-03-28 PROCEDURE — 25010000002 HEPARIN (PORCINE) PER 1000 UNITS: Performed by: ANESTHESIOLOGY

## 2022-03-28 PROCEDURE — 25010000002 PROPOFOL 10 MG/ML EMULSION: Performed by: ANESTHESIOLOGY

## 2022-03-28 PROCEDURE — 5A2204Z RESTORATION OF CARDIAC RHYTHM, SINGLE: ICD-10-PCS | Performed by: THORACIC SURGERY (CARDIOTHORACIC VASCULAR SURGERY)

## 2022-03-28 PROCEDURE — 02580ZZ DESTRUCTION OF CONDUCTION MECHANISM, OPEN APPROACH: ICD-10-PCS | Performed by: THORACIC SURGERY (CARDIOTHORACIC VASCULAR SURGERY)

## 2022-03-28 PROCEDURE — 85347 COAGULATION TIME ACTIVATED: CPT

## 2022-03-28 PROCEDURE — 021209W BYPASS CORONARY ARTERY, THREE ARTERIES FROM AORTA WITH AUTOLOGOUS VENOUS TISSUE, OPEN APPROACH: ICD-10-PCS | Performed by: THORACIC SURGERY (CARDIOTHORACIC VASCULAR SURGERY)

## 2022-03-28 PROCEDURE — 86900 BLOOD TYPING SEROLOGIC ABO: CPT

## 2022-03-28 PROCEDURE — 94760 N-INVAS EAR/PLS OXIMETRY 1: CPT

## 2022-03-28 PROCEDURE — 02L70CK OCCLUSION OF LEFT ATRIAL APPENDAGE WITH EXTRALUMINAL DEVICE, OPEN APPROACH: ICD-10-PCS | Performed by: THORACIC SURGERY (CARDIOTHORACIC VASCULAR SURGERY)

## 2022-03-28 PROCEDURE — 87070 CULTURE OTHR SPECIMN AEROBIC: CPT | Performed by: THORACIC SURGERY (CARDIOTHORACIC VASCULAR SURGERY)

## 2022-03-28 PROCEDURE — 33508 ENDOSCOPIC VEIN HARVEST: CPT | Performed by: THORACIC SURGERY (CARDIOTHORACIC VASCULAR SURGERY)

## 2022-03-28 PROCEDURE — 25010000002 HEPARIN (PORCINE) PER 1000 UNITS: Performed by: THORACIC SURGERY (CARDIOTHORACIC VASCULAR SURGERY)

## 2022-03-28 PROCEDURE — 25010000002 MAGNESIUM SULFATE IN D5W 1G/100ML (PREMIX) 1-5 GM/100ML-% SOLUTION: Performed by: THORACIC SURGERY (CARDIOTHORACIC VASCULAR SURGERY)

## 2022-03-28 PROCEDURE — C2618 PROBE/NEEDLE, CRYO: HCPCS | Performed by: THORACIC SURGERY (CARDIOTHORACIC VASCULAR SURGERY)

## 2022-03-28 PROCEDURE — 02C03ZZ EXTIRPATION OF MATTER FROM CORONARY ARTERY, ONE ARTERY, PERCUTANEOUS APPROACH: ICD-10-PCS | Performed by: THORACIC SURGERY (CARDIOTHORACIC VASCULAR SURGERY)

## 2022-03-28 PROCEDURE — 25010000002 AMIODARONE IN DEXTROSE 5% 360-4.14 MG/200ML-% SOLUTION: Performed by: ANESTHESIOLOGY

## 2022-03-28 PROCEDURE — P9047 ALBUMIN (HUMAN), 25%, 50ML: HCPCS

## 2022-03-28 PROCEDURE — 25010000002 METOCLOPRAMIDE PER 10 MG: Performed by: THORACIC SURGERY (CARDIOTHORACIC VASCULAR SURGERY)

## 2022-03-28 PROCEDURE — 85014 HEMATOCRIT: CPT

## 2022-03-28 PROCEDURE — 85384 FIBRINOGEN ACTIVITY: CPT | Performed by: THORACIC SURGERY (CARDIOTHORACIC VASCULAR SURGERY)

## 2022-03-28 PROCEDURE — 0 MILRINONE LACTATE IN DEXTROSE 20-5 MG/100ML-% SOLUTION: Performed by: THORACIC SURGERY (CARDIOTHORACIC VASCULAR SURGERY)

## 2022-03-28 PROCEDURE — 25010000002 VANCOMYCIN 1 G RECONSTITUTED SOLUTION

## 2022-03-28 PROCEDURE — 25010000002 ALBUMIN HUMAN 25% PER 50 ML

## 2022-03-28 PROCEDURE — 82803 BLOOD GASES ANY COMBINATION: CPT

## 2022-03-28 PROCEDURE — 85610 PROTHROMBIN TIME: CPT | Performed by: THORACIC SURGERY (CARDIOTHORACIC VASCULAR SURGERY)

## 2022-03-28 PROCEDURE — 0HB5XZZ EXCISION OF CHEST SKIN, EXTERNAL APPROACH: ICD-10-PCS | Performed by: THORACIC SURGERY (CARDIOTHORACIC VASCULAR SURGERY)

## 2022-03-28 PROCEDURE — 25010000002 ONDANSETRON PER 1 MG: Performed by: ANESTHESIOLOGY

## 2022-03-28 PROCEDURE — 82947 ASSAY GLUCOSE BLOOD QUANT: CPT

## 2022-03-28 PROCEDURE — 02RF08Z REPLACEMENT OF AORTIC VALVE WITH ZOOPLASTIC TISSUE, OPEN APPROACH: ICD-10-PCS | Performed by: THORACIC SURGERY (CARDIOTHORACIC VASCULAR SURGERY)

## 2022-03-28 PROCEDURE — 25010000002 MIDAZOLAM PER 1 MG: Performed by: ANESTHESIOLOGY

## 2022-03-28 PROCEDURE — 82962 GLUCOSE BLOOD TEST: CPT

## 2022-03-28 PROCEDURE — 33519 CABG ARTERY-VEIN THREE: CPT | Performed by: THORACIC SURGERY (CARDIOTHORACIC VASCULAR SURGERY)

## 2022-03-28 PROCEDURE — 63710000001 INSULIN REGULAR HUMAN PER 5 UNITS: Performed by: ANESTHESIOLOGY

## 2022-03-28 PROCEDURE — 3E080GC INTRODUCTION OF OTHER THERAPEUTIC SUBSTANCE INTO HEART, OPEN APPROACH: ICD-10-PCS | Performed by: THORACIC SURGERY (CARDIOTHORACIC VASCULAR SURGERY)

## 2022-03-28 PROCEDURE — 87176 TISSUE HOMOGENIZATION CULTR: CPT | Performed by: THORACIC SURGERY (CARDIOTHORACIC VASCULAR SURGERY)

## 2022-03-28 PROCEDURE — 71045 X-RAY EXAM CHEST 1 VIEW: CPT

## 2022-03-28 PROCEDURE — 25010000002 MORPHINE PER 10 MG: Performed by: THORACIC SURGERY (CARDIOTHORACIC VASCULAR SURGERY)

## 2022-03-28 PROCEDURE — 83735 ASSAY OF MAGNESIUM: CPT | Performed by: THORACIC SURGERY (CARDIOTHORACIC VASCULAR SURGERY)

## 2022-03-28 PROCEDURE — 80069 RENAL FUNCTION PANEL: CPT | Performed by: THORACIC SURGERY (CARDIOTHORACIC VASCULAR SURGERY)

## 2022-03-28 PROCEDURE — 94002 VENT MGMT INPAT INIT DAY: CPT

## 2022-03-28 PROCEDURE — 93318 ECHO TRANSESOPHAGEAL INTRAOP: CPT | Performed by: ANESTHESIOLOGY

## 2022-03-28 PROCEDURE — 06BQ4ZZ EXCISION OF LEFT SAPHENOUS VEIN, PERCUTANEOUS ENDOSCOPIC APPROACH: ICD-10-PCS | Performed by: THORACIC SURGERY (CARDIOTHORACIC VASCULAR SURGERY)

## 2022-03-28 PROCEDURE — 33572 OPEN CORONARY ENDARTERECTOMY: CPT | Performed by: THORACIC SURGERY (CARDIOTHORACIC VASCULAR SURGERY)

## 2022-03-28 PROCEDURE — 88311 DECALCIFY TISSUE: CPT | Performed by: THORACIC SURGERY (CARDIOTHORACIC VASCULAR SURGERY)

## 2022-03-28 PROCEDURE — 33533 CABG ARTERIAL SINGLE: CPT | Performed by: THORACIC SURGERY (CARDIOTHORACIC VASCULAR SURGERY)

## 2022-03-28 PROCEDURE — 86901 BLOOD TYPING SEROLOGIC RH(D): CPT

## 2022-03-28 PROCEDURE — 85027 COMPLETE CBC AUTOMATED: CPT | Performed by: THORACIC SURGERY (CARDIOTHORACIC VASCULAR SURGERY)

## 2022-03-28 PROCEDURE — 02100Z9 BYPASS CORONARY ARTERY, ONE ARTERY FROM LEFT INTERNAL MAMMARY, OPEN APPROACH: ICD-10-PCS | Performed by: THORACIC SURGERY (CARDIOTHORACIC VASCULAR SURGERY)

## 2022-03-28 PROCEDURE — 25010000002 ALBUMIN HUMAN 5% PER 50 ML: Performed by: THORACIC SURGERY (CARDIOTHORACIC VASCULAR SURGERY)

## 2022-03-28 PROCEDURE — 25010000002 FENTANYL CITRATE (PF) 50 MCG/ML SOLUTION: Performed by: ANESTHESIOLOGY

## 2022-03-28 PROCEDURE — 88305 TISSUE EXAM BY PATHOLOGIST: CPT | Performed by: THORACIC SURGERY (CARDIOTHORACIC VASCULAR SURGERY)

## 2022-03-28 PROCEDURE — 25010000002 VANCOMYCIN 10 G RECONSTITUTED SOLUTION: Performed by: NURSE PRACTITIONER

## 2022-03-28 PROCEDURE — 02QG0ZZ REPAIR MITRAL VALVE, OPEN APPROACH: ICD-10-PCS | Performed by: THORACIC SURGERY (CARDIOTHORACIC VASCULAR SURGERY)

## 2022-03-28 PROCEDURE — 94660 CPAP INITIATION&MGMT: CPT

## 2022-03-28 PROCEDURE — 85025 COMPLETE CBC W/AUTO DIFF WBC: CPT | Performed by: THORACIC SURGERY (CARDIOTHORACIC VASCULAR SURGERY)

## 2022-03-28 PROCEDURE — P9041 ALBUMIN (HUMAN),5%, 50ML: HCPCS | Performed by: THORACIC SURGERY (CARDIOTHORACIC VASCULAR SURGERY)

## 2022-03-28 PROCEDURE — 25010000002 PAPAVERINE PER 60 MG: Performed by: THORACIC SURGERY (CARDIOTHORACIC VASCULAR SURGERY)

## 2022-03-28 DEVICE — SS SUTURE, 4 PER SLEEVE
Type: IMPLANTABLE DEVICE | Site: STERNUM | Status: FUNCTIONAL
Brand: MYO/WIRE II

## 2022-03-28 DEVICE — IMPLANTABLE DEVICE: Type: IMPLANTABLE DEVICE | Site: HEART | Status: FUNCTIONAL

## 2022-03-28 DEVICE — SS SUTURE, 3 PER SLEEVE
Type: IMPLANTABLE DEVICE | Site: STERNUM | Status: FUNCTIONAL
Brand: MYO/WIRE II

## 2022-03-28 DEVICE — ABSORBABLE HEMOSTAT (OXIDIZED REGENERATED CELLULOSE, U.S.P.)
Type: IMPLANTABLE DEVICE | Site: HEART | Status: FUNCTIONAL
Brand: SURGICEL

## 2022-03-28 DEVICE — COR-KNOT MINI® COMBO KITBASE PACKAGE TYPE - KITEACH STERILE PACKAGE KIT CONTAINS (2) SINGLE PATIENT USE COR-KNOT MINI® DEVICES AND (12) COR-KNOT® QUICK LOADS®.
Type: IMPLANTABLE DEVICE | Site: HEART | Status: FUNCTIONAL
Brand: COR-KNOT MINI®

## 2022-03-28 DEVICE — VLV AORTA INSPRIS RESILIA 21MM: Type: IMPLANTABLE DEVICE | Site: HEART | Status: FUNCTIONAL

## 2022-03-28 RX ORDER — AMINOCAPROIC ACID 250 MG/ML
INJECTION, SOLUTION INTRAVENOUS AS NEEDED
Status: DISCONTINUED | OUTPATIENT
Start: 2022-03-28 | End: 2022-03-28 | Stop reason: SURG

## 2022-03-28 RX ORDER — DEXMEDETOMIDINE HYDROCHLORIDE 4 UG/ML
.2-1.5 INJECTION, SOLUTION INTRAVENOUS
Status: DISCONTINUED | OUTPATIENT
Start: 2022-03-28 | End: 2022-03-29

## 2022-03-28 RX ORDER — CHLORHEXIDINE GLUCONATE 0.12 MG/ML
15 RINSE ORAL EVERY 12 HOURS
Status: DISCONTINUED | OUTPATIENT
Start: 2022-03-28 | End: 2022-03-29

## 2022-03-28 RX ORDER — ACETAMINOPHEN 325 MG/1
650 TABLET ORAL EVERY 4 HOURS
Status: DISCONTINUED | OUTPATIENT
Start: 2022-03-28 | End: 2022-03-29

## 2022-03-28 RX ORDER — DOPAMINE HYDROCHLORIDE 160 MG/100ML
2-20 INJECTION, SOLUTION INTRAVENOUS CONTINUOUS PRN
Status: DISCONTINUED | OUTPATIENT
Start: 2022-03-28 | End: 2022-03-29

## 2022-03-28 RX ORDER — LIDOCAINE HYDROCHLORIDE 10 MG/ML
0.5 INJECTION, SOLUTION EPIDURAL; INFILTRATION; INTRACAUDAL; PERINEURAL ONCE AS NEEDED
Status: DISCONTINUED | OUTPATIENT
Start: 2022-03-28 | End: 2022-03-28 | Stop reason: HOSPADM

## 2022-03-28 RX ORDER — HYDROCODONE BITARTRATE AND ACETAMINOPHEN 5; 325 MG/1; MG/1
2 TABLET ORAL EVERY 4 HOURS PRN
Status: DISCONTINUED | OUTPATIENT
Start: 2022-03-28 | End: 2022-04-04

## 2022-03-28 RX ORDER — MIDAZOLAM HYDROCHLORIDE 1 MG/ML
4 INJECTION INTRAMUSCULAR; INTRAVENOUS ONCE
Status: DISCONTINUED | OUTPATIENT
Start: 2022-03-28 | End: 2022-03-28

## 2022-03-28 RX ORDER — PANTOPRAZOLE SODIUM 40 MG/10ML
40 INJECTION, POWDER, LYOPHILIZED, FOR SOLUTION INTRAVENOUS ONCE
Status: COMPLETED | OUTPATIENT
Start: 2022-03-28 | End: 2022-03-28

## 2022-03-28 RX ORDER — ACETAMINOPHEN 650 MG/1
650 SUPPOSITORY RECTAL EVERY 4 HOURS
Status: DISCONTINUED | OUTPATIENT
Start: 2022-03-28 | End: 2022-03-29

## 2022-03-28 RX ORDER — ONDANSETRON 2 MG/ML
INJECTION INTRAMUSCULAR; INTRAVENOUS AS NEEDED
Status: DISCONTINUED | OUTPATIENT
Start: 2022-03-28 | End: 2022-03-28 | Stop reason: SURG

## 2022-03-28 RX ORDER — ASPIRIN 81 MG/1
81 TABLET ORAL DAILY
Status: DISCONTINUED | OUTPATIENT
Start: 2022-03-29 | End: 2022-04-08 | Stop reason: HOSPADM

## 2022-03-28 RX ORDER — ACETAMINOPHEN 650 MG/1
650 SUPPOSITORY RECTAL EVERY 4 HOURS PRN
Status: DISCONTINUED | OUTPATIENT
Start: 2022-03-29 | End: 2022-04-08 | Stop reason: HOSPADM

## 2022-03-28 RX ORDER — PROTAMINE SULFATE 10 MG/ML
INJECTION, SOLUTION INTRAVENOUS AS NEEDED
Status: DISCONTINUED | OUTPATIENT
Start: 2022-03-28 | End: 2022-03-28 | Stop reason: HOSPADM

## 2022-03-28 RX ORDER — MAGNESIUM SULFATE HEPTAHYDRATE 40 MG/ML
2 INJECTION, SOLUTION INTRAVENOUS AS NEEDED
Status: DISCONTINUED | OUTPATIENT
Start: 2022-03-28 | End: 2022-04-08 | Stop reason: HOSPADM

## 2022-03-28 RX ORDER — MORPHINE SULFATE 2 MG/ML
4 INJECTION, SOLUTION INTRAMUSCULAR; INTRAVENOUS
Status: DISCONTINUED | OUTPATIENT
Start: 2022-03-28 | End: 2022-03-29

## 2022-03-28 RX ORDER — CHLORHEXIDINE GLUCONATE 0.12 MG/ML
15 RINSE ORAL ONCE
Status: COMPLETED | OUTPATIENT
Start: 2022-03-28 | End: 2022-03-28

## 2022-03-28 RX ORDER — MORPHINE SULFATE 2 MG/ML
1 INJECTION, SOLUTION INTRAMUSCULAR; INTRAVENOUS EVERY 4 HOURS PRN
Status: DISCONTINUED | OUTPATIENT
Start: 2022-03-28 | End: 2022-04-02

## 2022-03-28 RX ORDER — ACETAMINOPHEN 325 MG/1
650 TABLET ORAL EVERY 4 HOURS PRN
Status: DISCONTINUED | OUTPATIENT
Start: 2022-03-29 | End: 2022-04-08 | Stop reason: HOSPADM

## 2022-03-28 RX ORDER — MIDAZOLAM HYDROCHLORIDE 1 MG/ML
INJECTION INTRAMUSCULAR; INTRAVENOUS AS NEEDED
Status: DISCONTINUED | OUTPATIENT
Start: 2022-03-28 | End: 2022-03-28 | Stop reason: SURG

## 2022-03-28 RX ORDER — PROPOFOL 10 MG/ML
VIAL (ML) INTRAVENOUS CONTINUOUS PRN
Status: DISCONTINUED | OUTPATIENT
Start: 2022-03-28 | End: 2022-03-28 | Stop reason: SURG

## 2022-03-28 RX ORDER — SODIUM CHLORIDE 0.9 % (FLUSH) 0.9 %
30 SYRINGE (ML) INJECTION ONCE AS NEEDED
Status: DISCONTINUED | OUTPATIENT
Start: 2022-03-28 | End: 2022-03-29

## 2022-03-28 RX ORDER — DEXMEDETOMIDINE HYDROCHLORIDE 4 UG/ML
INJECTION, SOLUTION INTRAVENOUS CONTINUOUS PRN
Status: DISCONTINUED | OUTPATIENT
Start: 2022-03-28 | End: 2022-03-28 | Stop reason: SURG

## 2022-03-28 RX ORDER — POLYETHYLENE GLYCOL 3350 17 G/17G
17 POWDER, FOR SOLUTION ORAL DAILY PRN
Status: DISCONTINUED | OUTPATIENT
Start: 2022-03-28 | End: 2022-04-01

## 2022-03-28 RX ORDER — BISACODYL 5 MG/1
10 TABLET, DELAYED RELEASE ORAL DAILY PRN
Status: DISCONTINUED | OUTPATIENT
Start: 2022-03-28 | End: 2022-04-05

## 2022-03-28 RX ORDER — PROPOFOL 10 MG/ML
VIAL (ML) INTRAVENOUS AS NEEDED
Status: DISCONTINUED | OUTPATIENT
Start: 2022-03-28 | End: 2022-03-28 | Stop reason: SURG

## 2022-03-28 RX ORDER — CHLORHEXIDINE GLUCONATE 500 MG/1
1 CLOTH TOPICAL EVERY 12 HOURS PRN
Status: DISCONTINUED | OUTPATIENT
Start: 2022-03-28 | End: 2022-03-28 | Stop reason: HOSPADM

## 2022-03-28 RX ORDER — NITROGLYCERIN 20 MG/100ML
5-200 INJECTION INTRAVENOUS
Status: DISCONTINUED | OUTPATIENT
Start: 2022-03-28 | End: 2022-03-29

## 2022-03-28 RX ORDER — POTASSIUM CHLORIDE 29.8 MG/ML
20 INJECTION INTRAVENOUS
Status: DISCONTINUED | OUTPATIENT
Start: 2022-03-28 | End: 2022-04-08 | Stop reason: HOSPADM

## 2022-03-28 RX ORDER — SODIUM CHLORIDE, SODIUM LACTATE, POTASSIUM CHLORIDE, CALCIUM CHLORIDE 600; 310; 30; 20 MG/100ML; MG/100ML; MG/100ML; MG/100ML
9 INJECTION, SOLUTION INTRAVENOUS CONTINUOUS PRN
Status: DISCONTINUED | OUTPATIENT
Start: 2022-03-28 | End: 2022-03-28

## 2022-03-28 RX ORDER — METOCLOPRAMIDE HYDROCHLORIDE 5 MG/ML
10 INJECTION INTRAMUSCULAR; INTRAVENOUS EVERY 6 HOURS
Status: DISCONTINUED | OUTPATIENT
Start: 2022-03-28 | End: 2022-03-29

## 2022-03-28 RX ORDER — BISACODYL 10 MG
10 SUPPOSITORY, RECTAL RECTAL DAILY PRN
Status: DISCONTINUED | OUTPATIENT
Start: 2022-03-29 | End: 2022-04-05

## 2022-03-28 RX ORDER — HEPARIN SODIUM 1000 [USP'U]/ML
INJECTION, SOLUTION INTRAVENOUS; SUBCUTANEOUS AS NEEDED
Status: DISCONTINUED | OUTPATIENT
Start: 2022-03-28 | End: 2022-03-28 | Stop reason: SURG

## 2022-03-28 RX ORDER — FAMOTIDINE 10 MG/ML
20 INJECTION, SOLUTION INTRAVENOUS ONCE
Status: COMPLETED | OUTPATIENT
Start: 2022-03-28 | End: 2022-03-28

## 2022-03-28 RX ORDER — MILRINONE LACTATE 0.2 MG/ML
0.12 INJECTION, SOLUTION INTRAVENOUS CONTINUOUS PRN
Status: DISCONTINUED | OUTPATIENT
Start: 2022-03-28 | End: 2022-03-29

## 2022-03-28 RX ORDER — SODIUM CHLORIDE 0.9 % (FLUSH) 0.9 %
10 SYRINGE (ML) INJECTION EVERY 12 HOURS SCHEDULED
Status: DISCONTINUED | OUTPATIENT
Start: 2022-03-28 | End: 2022-03-28 | Stop reason: HOSPADM

## 2022-03-28 RX ORDER — ACETAMINOPHEN 160 MG/5ML
650 SOLUTION ORAL EVERY 4 HOURS PRN
Status: DISCONTINUED | OUTPATIENT
Start: 2022-03-29 | End: 2022-04-08 | Stop reason: HOSPADM

## 2022-03-28 RX ORDER — ALBUMIN, HUMAN INJ 5% 5 %
1500 SOLUTION INTRAVENOUS AS NEEDED
Status: DISCONTINUED | OUTPATIENT
Start: 2022-03-28 | End: 2022-03-29

## 2022-03-28 RX ORDER — SODIUM CHLORIDE 9 MG/ML
9 INJECTION, SOLUTION INTRAVENOUS CONTINUOUS
Status: DISCONTINUED | OUTPATIENT
Start: 2022-03-28 | End: 2022-03-28

## 2022-03-28 RX ORDER — SODIUM CHLORIDE 0.9 % (FLUSH) 0.9 %
10 SYRINGE (ML) INJECTION AS NEEDED
Status: DISCONTINUED | OUTPATIENT
Start: 2022-03-28 | End: 2022-03-28 | Stop reason: HOSPADM

## 2022-03-28 RX ORDER — POTASSIUM CHLORIDE 7.45 MG/ML
10 INJECTION INTRAVENOUS
Status: DISCONTINUED | OUTPATIENT
Start: 2022-03-28 | End: 2022-04-08 | Stop reason: HOSPADM

## 2022-03-28 RX ORDER — PANTOPRAZOLE SODIUM 40 MG/1
40 TABLET, DELAYED RELEASE ORAL EVERY MORNING
Status: COMPLETED | OUTPATIENT
Start: 2022-03-29 | End: 2022-03-31

## 2022-03-28 RX ORDER — ONDANSETRON 2 MG/ML
4 INJECTION INTRAMUSCULAR; INTRAVENOUS EVERY 6 HOURS PRN
Status: DISCONTINUED | OUTPATIENT
Start: 2022-03-28 | End: 2022-04-08 | Stop reason: HOSPADM

## 2022-03-28 RX ORDER — FENTANYL CITRATE 50 UG/ML
INJECTION, SOLUTION INTRAMUSCULAR; INTRAVENOUS AS NEEDED
Status: DISCONTINUED | OUTPATIENT
Start: 2022-03-28 | End: 2022-03-28 | Stop reason: SURG

## 2022-03-28 RX ORDER — HEPARIN SODIUM 5000 [USP'U]/ML
INJECTION, SOLUTION INTRAVENOUS; SUBCUTANEOUS AS NEEDED
Status: DISCONTINUED | OUTPATIENT
Start: 2022-03-28 | End: 2022-03-28 | Stop reason: HOSPADM

## 2022-03-28 RX ORDER — SODIUM CHLORIDE 9 MG/ML
30 INJECTION, SOLUTION INTRAVENOUS CONTINUOUS
Status: DISCONTINUED | OUTPATIENT
Start: 2022-03-28 | End: 2022-03-29

## 2022-03-28 RX ORDER — ACETAMINOPHEN 160 MG/5ML
650 SOLUTION ORAL EVERY 4 HOURS
Status: DISCONTINUED | OUTPATIENT
Start: 2022-03-28 | End: 2022-03-29

## 2022-03-28 RX ORDER — SODIUM CHLORIDE 9 MG/ML
INJECTION, SOLUTION INTRAVENOUS CONTINUOUS PRN
Status: DISCONTINUED | OUTPATIENT
Start: 2022-03-28 | End: 2022-03-28 | Stop reason: SURG

## 2022-03-28 RX ORDER — OXYCODONE HYDROCHLORIDE 5 MG/1
10 TABLET ORAL EVERY 4 HOURS PRN
Status: DISCONTINUED | OUTPATIENT
Start: 2022-03-28 | End: 2022-04-04

## 2022-03-28 RX ORDER — MIDAZOLAM HYDROCHLORIDE 1 MG/ML
2 INJECTION INTRAMUSCULAR; INTRAVENOUS
Status: DISCONTINUED | OUTPATIENT
Start: 2022-03-28 | End: 2022-03-29

## 2022-03-28 RX ORDER — PHENYLEPHRINE HCL IN 0.9% NACL 0.5 MG/5ML
.2-3 SYRINGE (ML) INTRAVENOUS CONTINUOUS PRN
Status: DISCONTINUED | OUTPATIENT
Start: 2022-03-28 | End: 2022-03-29

## 2022-03-28 RX ORDER — MAGNESIUM SULFATE HEPTAHYDRATE 40 MG/ML
4 INJECTION, SOLUTION INTRAVENOUS AS NEEDED
Status: DISCONTINUED | OUTPATIENT
Start: 2022-03-28 | End: 2022-04-08 | Stop reason: HOSPADM

## 2022-03-28 RX ORDER — NALOXONE HCL 0.4 MG/ML
0.4 VIAL (ML) INJECTION
Status: DISCONTINUED | OUTPATIENT
Start: 2022-03-28 | End: 2022-04-08 | Stop reason: HOSPADM

## 2022-03-28 RX ORDER — ATORVASTATIN CALCIUM 20 MG/1
40 TABLET, FILM COATED ORAL NIGHTLY
Status: DISCONTINUED | OUTPATIENT
Start: 2022-03-28 | End: 2022-04-08 | Stop reason: HOSPADM

## 2022-03-28 RX ORDER — MAGNESIUM SULFATE 1 G/100ML
1 INJECTION INTRAVENOUS EVERY 8 HOURS
Status: COMPLETED | OUTPATIENT
Start: 2022-03-28 | End: 2022-03-29

## 2022-03-28 RX ORDER — NOREPINEPHRINE BIT/0.9 % NACL 8 MG/250ML
.02-.3 INFUSION BOTTLE (ML) INTRAVENOUS CONTINUOUS PRN
Status: DISCONTINUED | OUTPATIENT
Start: 2022-03-28 | End: 2022-03-29

## 2022-03-28 RX ORDER — ROCURONIUM BROMIDE 10 MG/ML
INJECTION, SOLUTION INTRAVENOUS AS NEEDED
Status: DISCONTINUED | OUTPATIENT
Start: 2022-03-28 | End: 2022-03-28 | Stop reason: SURG

## 2022-03-28 RX ORDER — ALPRAZOLAM 0.25 MG/1
0.25 TABLET ORAL EVERY 8 HOURS PRN
Status: DISCONTINUED | OUTPATIENT
Start: 2022-03-28 | End: 2022-03-29

## 2022-03-28 RX ORDER — PAPAVERINE HYDROCHLORIDE 30 MG/ML
INJECTION INTRAMUSCULAR; INTRAVENOUS AS NEEDED
Status: DISCONTINUED | OUTPATIENT
Start: 2022-03-28 | End: 2022-03-28 | Stop reason: HOSPADM

## 2022-03-28 RX ORDER — AMOXICILLIN 250 MG
2 CAPSULE ORAL 2 TIMES DAILY
Status: DISCONTINUED | OUTPATIENT
Start: 2022-03-28 | End: 2022-04-05

## 2022-03-28 RX ORDER — SODIUM CHLORIDE 9 MG/ML
30 INJECTION, SOLUTION INTRAVENOUS CONTINUOUS PRN
Status: DISCONTINUED | OUTPATIENT
Start: 2022-03-28 | End: 2022-03-29

## 2022-03-28 RX ORDER — MEPERIDINE HYDROCHLORIDE 25 MG/ML
25 INJECTION INTRAMUSCULAR; INTRAVENOUS; SUBCUTANEOUS EVERY 4 HOURS PRN
Status: DISCONTINUED | OUTPATIENT
Start: 2022-03-28 | End: 2022-03-29

## 2022-03-28 RX ORDER — CYCLOBENZAPRINE HCL 10 MG
10 TABLET ORAL EVERY 8 HOURS PRN
Status: DISCONTINUED | OUTPATIENT
Start: 2022-03-29 | End: 2022-04-04

## 2022-03-28 RX ADMIN — MORPHINE SULFATE 1 MG: 2 INJECTION, SOLUTION INTRAMUSCULAR; INTRAVENOUS at 20:29

## 2022-03-28 RX ADMIN — ROCURONIUM BROMIDE 50 MG: 50 INJECTION INTRAVENOUS at 08:46

## 2022-03-28 RX ADMIN — SODIUM CHLORIDE 9 ML/HR: 9 INJECTION, SOLUTION INTRAVENOUS at 06:17

## 2022-03-28 RX ADMIN — MAGNESIUM SULFATE 1 G: 1 INJECTION INTRAVENOUS at 14:45

## 2022-03-28 RX ADMIN — AMIODARONE HYDROCHLORIDE 1 MG/MIN: 1.8 INJECTION, SOLUTION INTRAVENOUS at 12:10

## 2022-03-28 RX ADMIN — Medication 1250 MG: at 06:34

## 2022-03-28 RX ADMIN — SODIUM BICARBONATE 50 MEQ: 84 INJECTION INTRAVENOUS at 21:44

## 2022-03-28 RX ADMIN — FENTANYL CITRATE 100 MCG: 50 INJECTION, SOLUTION INTRAMUSCULAR; INTRAVENOUS at 07:50

## 2022-03-28 RX ADMIN — ALBUMIN HUMAN 250 ML: 0.05 INJECTION, SOLUTION INTRAVENOUS at 13:44

## 2022-03-28 RX ADMIN — PANTOPRAZOLE SODIUM 40 MG: 40 INJECTION, POWDER, FOR SOLUTION INTRAVENOUS at 14:45

## 2022-03-28 RX ADMIN — ONDANSETRON 4 MG: 2 INJECTION INTRAMUSCULAR; INTRAVENOUS at 12:05

## 2022-03-28 RX ADMIN — AMINOCAPROIC ACID 10 G: 250 INJECTION, SOLUTION INTRAVENOUS at 07:40

## 2022-03-28 RX ADMIN — ALBUMIN HUMAN 250 ML: 0.05 INJECTION, SOLUTION INTRAVENOUS at 14:40

## 2022-03-28 RX ADMIN — MAGNESIUM SULFATE 1 G: 1 INJECTION INTRAVENOUS at 20:52

## 2022-03-28 RX ADMIN — MIDAZOLAM 1 MG: 1 INJECTION INTRAMUSCULAR; INTRAVENOUS at 06:40

## 2022-03-28 RX ADMIN — ALBUMIN HUMAN 250 ML: 0.05 INJECTION, SOLUTION INTRAVENOUS at 15:55

## 2022-03-28 RX ADMIN — HEPARIN SODIUM 24000 UNITS: 1000 INJECTION INTRAVENOUS; SUBCUTANEOUS at 08:36

## 2022-03-28 RX ADMIN — ALBUMIN HUMAN 250 ML: 0.05 INJECTION, SOLUTION INTRAVENOUS at 18:07

## 2022-03-28 RX ADMIN — FENTANYL CITRATE 100 MCG: 50 INJECTION, SOLUTION INTRAMUSCULAR; INTRAVENOUS at 08:46

## 2022-03-28 RX ADMIN — ALBUMIN HUMAN 250 ML: 0.05 INJECTION, SOLUTION INTRAVENOUS at 13:25

## 2022-03-28 RX ADMIN — INSULIN HUMAN 5 UNITS: 100 INJECTION, SOLUTION PARENTERAL at 09:48

## 2022-03-28 RX ADMIN — Medication 25 MCG/KG/MIN: at 07:45

## 2022-03-28 RX ADMIN — CHLORHEXIDINE GLUCONATE 15 ML: 1.2 RINSE ORAL at 14:45

## 2022-03-28 RX ADMIN — FENTANYL CITRATE 200 MCG: 50 INJECTION, SOLUTION INTRAMUSCULAR; INTRAVENOUS at 12:05

## 2022-03-28 RX ADMIN — CHLORHEXIDINE GLUCONATE 15 ML: 1.2 RINSE ORAL at 20:43

## 2022-03-28 RX ADMIN — DOCUSATE SODIUM 50MG AND SENNOSIDES 8.6MG 2 TABLET: 8.6; 5 TABLET, FILM COATED ORAL at 20:31

## 2022-03-28 RX ADMIN — MUPIROCIN 1 APPLICATION: 20 OINTMENT TOPICAL at 20:31

## 2022-03-28 RX ADMIN — METOCLOPRAMIDE HYDROCHLORIDE 10 MG: 5 INJECTION INTRAMUSCULAR; INTRAVENOUS at 20:30

## 2022-03-28 RX ADMIN — MILRINONE LACTATE IN DEXTROSE 0.25 MCG/KG/MIN: 200 INJECTION, SOLUTION INTRAVENOUS at 13:44

## 2022-03-28 RX ADMIN — VANCOMYCIN HYDROCHLORIDE 1250 MG: 10 INJECTION, POWDER, LYOPHILIZED, FOR SOLUTION INTRAVENOUS at 17:20

## 2022-03-28 RX ADMIN — MIDAZOLAM 1 MG: 1 INJECTION INTRAMUSCULAR; INTRAVENOUS at 07:00

## 2022-03-28 RX ADMIN — CHLORHEXIDINE GLUCONATE 15 ML: 1.2 RINSE ORAL at 06:13

## 2022-03-28 RX ADMIN — ALBUMIN HUMAN 250 ML: 0.05 INJECTION, SOLUTION INTRAVENOUS at 21:48

## 2022-03-28 RX ADMIN — FENTANYL CITRATE 200 MCG: 50 INJECTION, SOLUTION INTRAMUSCULAR; INTRAVENOUS at 07:00

## 2022-03-28 RX ADMIN — SODIUM CHLORIDE 2 UNITS/HR: 9 INJECTION, SOLUTION INTRAVENOUS at 07:45

## 2022-03-28 RX ADMIN — DEXMEDETOMIDINE HYDROCHLORIDE 0.5 MCG/KG/HR: 4 INJECTION, SOLUTION INTRAVENOUS at 07:45

## 2022-03-28 RX ADMIN — FAMOTIDINE 20 MG: 10 INJECTION, SOLUTION INTRAVENOUS at 06:29

## 2022-03-28 RX ADMIN — ALBUMIN HUMAN 250 ML: 0.05 INJECTION, SOLUTION INTRAVENOUS at 23:15

## 2022-03-28 RX ADMIN — ROCURONIUM BROMIDE 70 MG: 50 INJECTION INTRAVENOUS at 07:00

## 2022-03-28 RX ADMIN — ATORVASTATIN CALCIUM 40 MG: 20 TABLET, FILM COATED ORAL at 20:31

## 2022-03-28 RX ADMIN — ACETAMINOPHEN 650 MG: 325 TABLET ORAL at 20:52

## 2022-03-28 RX ADMIN — PROPOFOL 100 MG: 10 INJECTION, EMULSION INTRAVENOUS at 07:00

## 2022-03-28 RX ADMIN — AMINOCAPROIC ACID 10 G: 250 INJECTION, SOLUTION INTRAVENOUS at 11:40

## 2022-03-28 RX ADMIN — SODIUM CHLORIDE: 9 INJECTION, SOLUTION INTRAVENOUS at 06:34

## 2022-03-28 RX ADMIN — FENTANYL CITRATE 100 MCG: 50 INJECTION, SOLUTION INTRAMUSCULAR; INTRAVENOUS at 07:35

## 2022-03-28 RX ADMIN — OXYCODONE HYDROCHLORIDE 10 MG: 5 TABLET ORAL at 21:41

## 2022-03-28 RX ADMIN — METOPROLOL TARTRATE 12.5 MG: 25 TABLET, FILM COATED ORAL at 06:13

## 2022-03-28 NOTE — ANESTHESIA PROCEDURE NOTES
Airway  Urgency: elective    Date/Time: 3/28/2022 7:03 AM  Airway not difficult    General Information and Staff    Patient location during procedure: OR  Anesthesiologist: German Bentley MD    Indications and Patient Condition  Indications for airway management: airway protection    Preoxygenated: yes  MILS maintained throughout  Mask difficulty assessment: 1 - vent by mask    Final Airway Details  Final airway type: endotracheal airway      Successful airway: ETT  Cuffed: yes   Successful intubation technique: direct laryngoscopy  Endotracheal tube insertion site: oral  Blade: Beck  Blade size: 3  ETT size (mm): 7.0  Cormack-Lehane Classification: grade I - full view of glottis  Placement verified by: chest auscultation and capnometry   Measured from: lips  ETT/EBT  to lips (cm): 21  Number of attempts at approach: 1  Assessment: lips, teeth, and gum same as pre-op and atraumatic intubation

## 2022-03-28 NOTE — ANESTHESIA POSTPROCEDURE EVALUATION
Patient: Alice Mabry    Procedure Summary     Date: 03/28/22 Room / Location: [place] [id number] / [facility] CARDIOVASCULAR OPERATING ROOM    Anesthesia Start: 0634 Anesthesia Stop: 1305    Procedure: [place] STERNOTOMY CORONARY ARTERY BYPASS GRAFTING TIMES 4 USING LEFT INTERNAL MAMMARY ARTERY AND LEFT GREATER SAPHENOUS VEIN GRAFT PER ENDOSCOPIC VEIN HARVESTING, RIGHT CORONARY ENDARTERECTOMY, AORTIC VALVE REPLACEMENT, MITRAL VALVE REPAIR, ATRICURE MAZE PROCEDURE, CLOSURE OF LEFT ATRIAL APPENDAGE AND PRP (N/A Chest) Diagnosis:       Abnormal findings on diagnostic imaging of heart and coronary circulation      Coronary artery disease of native heart with stable angina pectoris, unspecified vessel or lesion type (HCC)      (Abnormal findings on diagnostic imaging of heart and coronary circulation [R93.1])      (Coronary artery disease of native heart with stable angina pectoris, unspecified vessel or lesion type (HCC) [I25.118])    Surgeons: Jr Isaias Lynn MD Provider: German Bentley MD    Anesthesia Type: general, [place], CVL ASA Status: 4          Anesthesia Type: general, [place], CVL    Vitals  Vitals Value Taken Time   /70 03/28/22 1716   Temp 37.6 °C (99.68 °F) 03/28/22 1723   Pulse 89 03/28/22 1723   Resp 16 03/28/22 1336   SpO2 100 % 03/28/22 1723   Vitals shown include unvalidated device data.        Post Anesthesia Care and Evaluation    Patient location during evaluation: ICU  Patient participation: complete - patient cannot participate  Level of consciousness: responsive to physical stimuli  Pain management: adequate  Airway patency: patent  Anesthetic complications: No anesthetic complications  PONV Status: none  Cardiovascular status: acceptable and hemodynamically stable  Respiratory status: acceptable, ETT, intubated and ventilator  Hydration status: acceptable

## 2022-03-28 NOTE — ANESTHESIA PROCEDURE NOTES
Diagnostic Intraop DALTON    Procedure Performed: Diagnostic Intraop DALTON     Start Time:        End Time:        General Procedure Information  Physician Requesting Echo: Jr Isaias Lynn MD  Location performed:  OR  Intubated  Bite block not placed  Heart visualized  Probe Insertion:  Easy  Probe Type:  Multiplane  Modalities:  Color flow mapping, continuous wave Doppler and pulse wave Doppler    Echocardiographic and Doppler Measurements    Ventricles    Left Ventricle:  Global Function moderately impaired.  Ejection Fraction 40%.          Valves    Other Valve Findings:       Mitral valve leaflets are thickened and diastolic motion appears restricted. Chordae appear to be thickened and restricting leaflet motion. Annulus = 3.9 cm, tenting height = 10.6 mm and posterior leaflets angle is 32.6 degrees. Mechanism appears to be ischemic and functional. LV  Is mildly dilated.     AV leaflets are thickened, calcified, tricuspid and valve area by planimetry is 0.7 cm2. Valve peak velocity is 2.6 m/sec and mean gradient is 12 mmHg. Appears to be low flow low gradient severe AS.                        Anesthesia Information  Performed Personally  Anesthesiologist:  German Bentley MD      Echocardiogram Comments:       Post CPB:  LV EF is 40-45%. RV function is mildly reduced.  There is a annuloplasty ring noted in mitral area. Mitral valve: V max is  94.1 cm/ sec, mean gradient is 2 mmHg and  PHT 72 ms at HR of 90/min. There is mild/moderate MR.    21 mm Bio-prosthetic valve is well seated in aortic position.  Vmax is 1.39 m/ sec and  Mean gradient is  4 mmHg. There is mild intravalvular AI. There are 2 distinct jets, appear to be intravalvular between the leaflets.   Rest of valvular function is similar to baseline.   Aorta is intact.  There is a small pericardial effusion around Rt heart with no compression.

## 2022-03-28 NOTE — ANESTHESIA PROCEDURE NOTES
[place] catheter      Patient reassessed immediately prior to procedure    Patient location during procedure: OR  Indications: central pressure monitoring and vascular access  Staff  Anesthesiologist: German Bentley MD  Preanesthetic Checklist  Completed: patient identified, IV checked, site marked, risks and benefits discussed, surgical consent, monitors and equipment checked, pre-op evaluation and timeout performed  Central Line Prep  Sterile Tech:gloves, cap, gown, mask and sterile barriers  Prep: chloraprep  no medical exclusion documented for following all elements of maximal sterile barrier technique  Patient monitoring: blood pressure monitoring, continuous pulse oximetry and EKG  Central Line Procedure  Laterality:right  Location:internal jugular  Catheter Type:[place]  Catheter Size:7 Fr  Guidance:ultrasound guided  PROCEDURE NOTE/ULTRASOUND INTERPRETATION.  Using ultrasound guidance the potential vascular sites for insertion of the catheter were visualized to determine the patency of the vessel to be used for vascular access.  After selecting the appropriate site for insertion, the needle was visualized under ultrasound being inserted into the internal jugular vein, followed by ultrasound confirmation of wire and catheter placement. There were no abnormalities seen on ultrasound; an image was taken; and the patient tolerated the procedure with no complications. Images: still images not obtained  Assessment  Post procedure:biopatch applied, line sutured and occlusive dressing applied  Assessement:blood return through all ports, free fluid flow and chest x-ray ordered  Complications:no  Patient Tolerance:patient tolerated the procedure well with no apparent complications

## 2022-03-28 NOTE — ANESTHESIA PROCEDURE NOTES
Arterial Line      Patient reassessed immediately prior to procedure    Patient location during procedure: OR   Line placed for hemodynamic monitoring and ABGs/Labs/ISTAT.  Performed By   Anesthesiologist: German Bentley MD  Preanesthetic Checklist  Completed: patient identified, IV checked, site marked, risks and benefits discussed, surgical consent, monitors and equipment checked, pre-op evaluation and timeout performed  Arterial Line Prep   Sterile Tech: cap, gloves, sterile barriers and mask  Prep: ChloraPrep  Patient monitoring: EKG, continuous pulse oximetry and blood pressure monitoring  Arterial Line Procedure   Laterality:left  Location:  radial artery  Catheter size: 20 G   Guidance: palpation technique  Number of attempts: 1  Successful placement: yes  Post Assessment   Dressing Type: wrist guard applied, secured with tape and occlusive dressing applied.   Complications no  Circ/Move/Sens Assessment: normal and unchanged.   Patient Tolerance: patient tolerated the procedure well with no apparent complications

## 2022-03-28 NOTE — ANESTHESIA PROCEDURE NOTES
Central Line      Patient reassessed immediately prior to procedure    Patient location during procedure: OR  Indications: central pressure monitoring and vascular access  Staff  Anesthesiologist: German Bentley MD  Preanesthetic Checklist  Completed: patient identified, IV checked, site marked, risks and benefits discussed, surgical consent, monitors and equipment checked, pre-op evaluation and timeout performed  Central Line Prep  Sterile Tech:gloves, cap, gown, mask and sterile barriers  Prep: chloraprep  no medical exclusion documented for following all elements of maximal sterile barrier technique  Patient monitoring: blood pressure monitoring, continuous pulse oximetry and EKG  Central Line Procedure  Laterality:right  Location:internal jugular  Catheter Type:MAC  Catheter Size:9 Fr  Guidance:ultrasound guided  PROCEDURE NOTE/ULTRASOUND INTERPRETATION.  Using ultrasound guidance the potential vascular sites for insertion of the catheter were visualized to determine the patency of the vessel to be used for vascular access.  After selecting the appropriate site for insertion, the needle was visualized under ultrasound being inserted into the internal jugular vein, followed by ultrasound confirmation of wire and catheter placement. There were no abnormalities seen on ultrasound; an image was taken; and the patient tolerated the procedure with no complications. Images: still images not obtained  Assessment  Post procedure:biopatch applied, line sutured and occlusive dressing applied  Assessement:blood return through all ports, free fluid flow and chest x-ray ordered  Complications:no  Patient Tolerance:patient tolerated the procedure well with no apparent complications

## 2022-03-29 ENCOUNTER — APPOINTMENT (OUTPATIENT)
Dept: GENERAL RADIOLOGY | Facility: HOSPITAL | Age: 82
End: 2022-03-29

## 2022-03-29 LAB
ACT BLD: 136 SECONDS (ref 82–152)
ACT BLD: 148 SECONDS (ref 82–152)
ACT BLD: 380 SECONDS (ref 82–152)
ACT BLD: 416 SECONDS (ref 82–152)
ACT BLD: 416 SECONDS (ref 82–152)
ACT BLD: 428 SECONDS (ref 82–152)
ALBUMIN SERPL-MCNC: 4.7 G/DL (ref 3.5–5.2)
ANION GAP SERPL CALCULATED.3IONS-SCNC: 9 MMOL/L (ref 5–15)
ARTERIAL PATENCY WRIST A: ABNORMAL
ATMOSPHERIC PRESS: 755.9 MMHG
BASE EXCESS BLDA CALC-SCNC: 0.5 MMOL/L (ref 0–2)
BASOPHILS # BLD AUTO: 0.01 10*3/MM3 (ref 0–0.2)
BASOPHILS NFR BLD AUTO: 0.1 % (ref 0–1.5)
BDY SITE: ABNORMAL
BUN SERPL-MCNC: 12 MG/DL (ref 8–23)
BUN/CREAT SERPL: 17.4 (ref 7–25)
CA-I BLD-MCNC: 4.9 MG/DL (ref 4.6–5.4)
CA-I SERPL ISE-MCNC: 1.22 MMOL/L (ref 1.15–1.35)
CALCIUM SPEC-SCNC: 8.1 MG/DL (ref 8.6–10.5)
CHLORIDE SERPL-SCNC: 113 MMOL/L (ref 98–107)
CO2 SERPL-SCNC: 27 MMOL/L (ref 22–29)
CREAT SERPL-MCNC: 0.69 MG/DL (ref 0.57–1)
DEPRECATED RDW RBC AUTO: 50.3 FL (ref 37–54)
EGFRCR SERPLBLD CKD-EPI 2021: 86.8 ML/MIN/1.73
EOSINOPHIL # BLD AUTO: 0 10*3/MM3 (ref 0–0.4)
EOSINOPHIL NFR BLD AUTO: 0 % (ref 0.3–6.2)
ERYTHROCYTE [DISTWIDTH] IN BLOOD BY AUTOMATED COUNT: 13.2 % (ref 12.3–15.4)
GLUCOSE BLDC GLUCOMTR-MCNC: 104 MG/DL (ref 70–130)
GLUCOSE BLDC GLUCOMTR-MCNC: 108 MG/DL (ref 70–130)
GLUCOSE BLDC GLUCOMTR-MCNC: 115 MG/DL (ref 70–130)
GLUCOSE BLDC GLUCOMTR-MCNC: 118 MG/DL (ref 70–130)
GLUCOSE BLDC GLUCOMTR-MCNC: 124 MG/DL (ref 70–130)
GLUCOSE BLDC GLUCOMTR-MCNC: 128 MG/DL (ref 70–130)
GLUCOSE BLDC GLUCOMTR-MCNC: 131 MG/DL (ref 70–130)
GLUCOSE BLDC GLUCOMTR-MCNC: 137 MG/DL (ref 70–130)
GLUCOSE BLDC GLUCOMTR-MCNC: 197 MG/DL (ref 70–130)
GLUCOSE BLDC GLUCOMTR-MCNC: 212 MG/DL (ref 70–130)
GLUCOSE BLDC GLUCOMTR-MCNC: 231 MG/DL (ref 70–130)
GLUCOSE BLDC GLUCOMTR-MCNC: 43 MG/DL (ref 70–130)
GLUCOSE SERPL-MCNC: 121 MG/DL (ref 65–99)
HCO3 BLDA-SCNC: 27.2 MMOL/L (ref 22–28)
HCT VFR BLD AUTO: 29.4 % (ref 34–46.6)
HGB BLD-MCNC: 9.7 G/DL (ref 12–15.9)
IMM GRANULOCYTES # BLD AUTO: 0.03 10*3/MM3 (ref 0–0.05)
IMM GRANULOCYTES NFR BLD AUTO: 0.3 % (ref 0–0.5)
INHALED O2 CONCENTRATION: 40 %
INR PPP: 1.36 (ref 0.9–1.1)
LAB AP CASE REPORT: NORMAL
LYMPHOCYTES # BLD AUTO: 1.28 10*3/MM3 (ref 0.7–3.1)
LYMPHOCYTES NFR BLD AUTO: 14 % (ref 19.6–45.3)
MAGNESIUM SERPL-MCNC: 2.6 MG/DL (ref 1.6–2.4)
MCH RBC QN AUTO: 34.4 PG (ref 26.6–33)
MCHC RBC AUTO-ENTMCNC: 33 G/DL (ref 31.5–35.7)
MCV RBC AUTO: 104.3 FL (ref 79–97)
MODALITY: ABNORMAL
MONOCYTES # BLD AUTO: 1.16 10*3/MM3 (ref 0.1–0.9)
MONOCYTES NFR BLD AUTO: 12.6 % (ref 5–12)
NEUTROPHILS NFR BLD AUTO: 6.69 10*3/MM3 (ref 1.7–7)
NEUTROPHILS NFR BLD AUTO: 73 % (ref 42.7–76)
NRBC BLD AUTO-RTO: 0 /100 WBC (ref 0–0.2)
O2 A-A PPRESDIFF RESPIRATORY: 0.4 MMHG
PATH REPORT.FINAL DX SPEC: NORMAL
PATH REPORT.GROSS SPEC: NORMAL
PCO2 BLDA: 53.4 MM HG (ref 35–45)
PH BLDA: 7.32 PH UNITS (ref 7.35–7.45)
PHOSPHATE SERPL-MCNC: 4.2 MG/DL (ref 2.5–4.5)
PLATELET # BLD AUTO: 95 10*3/MM3 (ref 140–450)
PMV BLD AUTO: 10.7 FL (ref 6–12)
PO2 BLDA: 85.4 MM HG (ref 80–100)
POTASSIUM SERPL-SCNC: 3.6 MMOL/L (ref 3.5–5.2)
POTASSIUM SERPL-SCNC: 3.9 MMOL/L (ref 3.5–5.2)
PROTHROMBIN TIME: 16.7 SECONDS (ref 11.7–14.2)
QT INTERVAL: 470 MS
RBC # BLD AUTO: 2.82 10*6/MM3 (ref 3.77–5.28)
SAO2 % BLDCOA: 95.3 % (ref 92–99)
SET MECH RESP RATE: 18
SODIUM SERPL-SCNC: 142 MMOL/L (ref 136–145)
SODIUM SERPL-SCNC: 149 MMOL/L (ref 136–145)
TOTAL RATE: 20 BREATHS/MINUTE
VENTILATOR MODE: ABNORMAL
VT ON VENT VENT: 488 ML
WBC NRBC COR # BLD: 9.17 10*3/MM3 (ref 3.4–10.8)

## 2022-03-29 PROCEDURE — 82330 ASSAY OF CALCIUM: CPT | Performed by: THORACIC SURGERY (CARDIOTHORACIC VASCULAR SURGERY)

## 2022-03-29 PROCEDURE — 84295 ASSAY OF SERUM SODIUM: CPT | Performed by: NURSE PRACTITIONER

## 2022-03-29 PROCEDURE — 25010000002 MAGNESIUM SULFATE IN D5W 1G/100ML (PREMIX) 1-5 GM/100ML-% SOLUTION: Performed by: THORACIC SURGERY (CARDIOTHORACIC VASCULAR SURGERY)

## 2022-03-29 PROCEDURE — 83735 ASSAY OF MAGNESIUM: CPT | Performed by: THORACIC SURGERY (CARDIOTHORACIC VASCULAR SURGERY)

## 2022-03-29 PROCEDURE — 63710000001 INSULIN LISPRO (HUMAN) PER 5 UNITS: Performed by: THORACIC SURGERY (CARDIOTHORACIC VASCULAR SURGERY)

## 2022-03-29 PROCEDURE — 25010000002 FUROSEMIDE PER 20 MG: Performed by: THORACIC SURGERY (CARDIOTHORACIC VASCULAR SURGERY)

## 2022-03-29 PROCEDURE — 25010000002 VANCOMYCIN 10 G RECONSTITUTED SOLUTION: Performed by: THORACIC SURGERY (CARDIOTHORACIC VASCULAR SURGERY)

## 2022-03-29 PROCEDURE — 84132 ASSAY OF SERUM POTASSIUM: CPT | Performed by: NURSE PRACTITIONER

## 2022-03-29 PROCEDURE — 0 MILRINONE LACTATE IN DEXTROSE 20-5 MG/100ML-% SOLUTION: Performed by: THORACIC SURGERY (CARDIOTHORACIC VASCULAR SURGERY)

## 2022-03-29 PROCEDURE — 71045 X-RAY EXAM CHEST 1 VIEW: CPT

## 2022-03-29 PROCEDURE — 80069 RENAL FUNCTION PANEL: CPT | Performed by: THORACIC SURGERY (CARDIOTHORACIC VASCULAR SURGERY)

## 2022-03-29 PROCEDURE — 99222 1ST HOSP IP/OBS MODERATE 55: CPT | Performed by: INTERNAL MEDICINE

## 2022-03-29 PROCEDURE — 93005 ELECTROCARDIOGRAM TRACING: CPT | Performed by: THORACIC SURGERY (CARDIOTHORACIC VASCULAR SURGERY)

## 2022-03-29 PROCEDURE — 25010000002 MORPHINE PER 10 MG: Performed by: THORACIC SURGERY (CARDIOTHORACIC VASCULAR SURGERY)

## 2022-03-29 PROCEDURE — 0 POTASSIUM CHLORIDE PER 2 MEQ: Performed by: THORACIC SURGERY (CARDIOTHORACIC VASCULAR SURGERY)

## 2022-03-29 PROCEDURE — 82962 GLUCOSE BLOOD TEST: CPT

## 2022-03-29 PROCEDURE — 25010000002 METOCLOPRAMIDE PER 10 MG: Performed by: THORACIC SURGERY (CARDIOTHORACIC VASCULAR SURGERY)

## 2022-03-29 PROCEDURE — 94799 UNLISTED PULMONARY SVC/PX: CPT

## 2022-03-29 PROCEDURE — 97530 THERAPEUTIC ACTIVITIES: CPT

## 2022-03-29 PROCEDURE — 25010000002 ENOXAPARIN PER 10 MG: Performed by: THORACIC SURGERY (CARDIOTHORACIC VASCULAR SURGERY)

## 2022-03-29 PROCEDURE — 85025 COMPLETE CBC W/AUTO DIFF WBC: CPT | Performed by: THORACIC SURGERY (CARDIOTHORACIC VASCULAR SURGERY)

## 2022-03-29 PROCEDURE — 82803 BLOOD GASES ANY COMBINATION: CPT

## 2022-03-29 PROCEDURE — 97162 PT EVAL MOD COMPLEX 30 MIN: CPT

## 2022-03-29 PROCEDURE — 85610 PROTHROMBIN TIME: CPT | Performed by: THORACIC SURGERY (CARDIOTHORACIC VASCULAR SURGERY)

## 2022-03-29 RX ORDER — POTASSIUM CHLORIDE 1.5 G/1.77G
40 POWDER, FOR SOLUTION ORAL AS NEEDED
Status: DISCONTINUED | OUTPATIENT
Start: 2022-03-29 | End: 2022-04-08 | Stop reason: HOSPADM

## 2022-03-29 RX ORDER — CHLORHEXIDINE GLUCONATE 0.12 MG/ML
15 RINSE ORAL EVERY 12 HOURS
Status: COMPLETED | OUTPATIENT
Start: 2022-03-29 | End: 2022-03-30

## 2022-03-29 RX ORDER — SODIUM CHLORIDE 9 MG/ML
15 INJECTION, SOLUTION INTRAVENOUS CONTINUOUS
Status: DISCONTINUED | OUTPATIENT
Start: 2022-03-29 | End: 2022-04-01

## 2022-03-29 RX ORDER — POTASSIUM CHLORIDE 750 MG/1
40 TABLET, FILM COATED, EXTENDED RELEASE ORAL AS NEEDED
Status: DISCONTINUED | OUTPATIENT
Start: 2022-03-29 | End: 2022-04-08 | Stop reason: HOSPADM

## 2022-03-29 RX ORDER — DEXTROSE MONOHYDRATE 25 G/50ML
25 INJECTION, SOLUTION INTRAVENOUS
Status: DISCONTINUED | OUTPATIENT
Start: 2022-03-29 | End: 2022-04-08 | Stop reason: HOSPADM

## 2022-03-29 RX ORDER — MILRINONE LACTATE 0.2 MG/ML
0.12 INJECTION, SOLUTION INTRAVENOUS CONTINUOUS
Status: DISCONTINUED | OUTPATIENT
Start: 2022-03-29 | End: 2022-03-30

## 2022-03-29 RX ORDER — NICOTINE POLACRILEX 4 MG
15 LOZENGE BUCCAL
Status: DISCONTINUED | OUTPATIENT
Start: 2022-03-29 | End: 2022-04-08 | Stop reason: HOSPADM

## 2022-03-29 RX ORDER — INSULIN LISPRO 100 [IU]/ML
0-7 INJECTION, SOLUTION INTRAVENOUS; SUBCUTANEOUS
Status: DISCONTINUED | OUTPATIENT
Start: 2022-03-29 | End: 2022-03-30

## 2022-03-29 RX ORDER — FUROSEMIDE 10 MG/ML
40 INJECTION INTRAMUSCULAR; INTRAVENOUS EVERY 12 HOURS
Status: DISCONTINUED | OUTPATIENT
Start: 2022-03-29 | End: 2022-04-01

## 2022-03-29 RX ADMIN — DOCUSATE SODIUM 50MG AND SENNOSIDES 8.6MG 2 TABLET: 8.6; 5 TABLET, FILM COATED ORAL at 08:20

## 2022-03-29 RX ADMIN — POTASSIUM CHLORIDE 20 MEQ: 400 INJECTION, SOLUTION INTRAVENOUS at 06:29

## 2022-03-29 RX ADMIN — ASPIRIN 81 MG: 81 TABLET, COATED ORAL at 08:20

## 2022-03-29 RX ADMIN — INSULIN LISPRO 3 UNITS: 100 INJECTION, SOLUTION INTRAVENOUS; SUBCUTANEOUS at 16:55

## 2022-03-29 RX ADMIN — MUPIROCIN 1 APPLICATION: 20 OINTMENT TOPICAL at 08:21

## 2022-03-29 RX ADMIN — HYDROCODONE BITARTRATE AND ACETAMINOPHEN 2 TABLET: 5; 325 TABLET ORAL at 06:37

## 2022-03-29 RX ADMIN — CHLORHEXIDINE GLUCONATE 0.12% ORAL RINSE 15 ML: 1.2 LIQUID ORAL at 08:20

## 2022-03-29 RX ADMIN — MORPHINE SULFATE 1 MG: 2 INJECTION, SOLUTION INTRAMUSCULAR; INTRAVENOUS at 17:33

## 2022-03-29 RX ADMIN — CHLORHEXIDINE GLUCONATE 0.12% ORAL RINSE 15 ML: 1.2 LIQUID ORAL at 20:20

## 2022-03-29 RX ADMIN — METOCLOPRAMIDE HYDROCHLORIDE 10 MG: 5 INJECTION INTRAMUSCULAR; INTRAVENOUS at 06:46

## 2022-03-29 RX ADMIN — PANTOPRAZOLE SODIUM 40 MG: 40 TABLET, DELAYED RELEASE ORAL at 06:16

## 2022-03-29 RX ADMIN — METOCLOPRAMIDE HYDROCHLORIDE 10 MG: 5 INJECTION INTRAMUSCULAR; INTRAVENOUS at 01:07

## 2022-03-29 RX ADMIN — SODIUM BICARBONATE 50 MEQ: 84 INJECTION INTRAVENOUS at 00:05

## 2022-03-29 RX ADMIN — INSULIN LISPRO 3 UNITS: 100 INJECTION, SOLUTION INTRAVENOUS; SUBCUTANEOUS at 11:18

## 2022-03-29 RX ADMIN — VANCOMYCIN HYDROCHLORIDE 1250 MG: 10 INJECTION, POWDER, LYOPHILIZED, FOR SOLUTION INTRAVENOUS at 18:17

## 2022-03-29 RX ADMIN — HYDROCODONE BITARTRATE AND ACETAMINOPHEN 2 TABLET: 5; 325 TABLET ORAL at 16:08

## 2022-03-29 RX ADMIN — ATORVASTATIN CALCIUM 40 MG: 20 TABLET, FILM COATED ORAL at 20:21

## 2022-03-29 RX ADMIN — ENOXAPARIN SODIUM 40 MG: 100 INJECTION SUBCUTANEOUS at 17:30

## 2022-03-29 RX ADMIN — MAGNESIUM SULFATE 1 G: 1 INJECTION INTRAVENOUS at 04:52

## 2022-03-29 RX ADMIN — INSULIN HUMAN 1.8 UNITS/HR: 1 INJECTION, SOLUTION INTRAVENOUS at 00:14

## 2022-03-29 RX ADMIN — MILRINONE LACTATE IN DEXTROSE 0.25 MCG/KG/MIN: 200 INJECTION, SOLUTION INTRAVENOUS at 00:23

## 2022-03-29 RX ADMIN — MORPHINE SULFATE 1 MG: 2 INJECTION, SOLUTION INTRAMUSCULAR; INTRAVENOUS at 03:53

## 2022-03-29 RX ADMIN — POTASSIUM CHLORIDE 40 MEQ: 750 TABLET, EXTENDED RELEASE ORAL at 08:21

## 2022-03-29 RX ADMIN — FUROSEMIDE 40 MG: 10 INJECTION, SOLUTION INTRAMUSCULAR; INTRAVENOUS at 17:29

## 2022-03-29 RX ADMIN — HYDROCODONE BITARTRATE AND ACETAMINOPHEN 2 TABLET: 5; 325 TABLET ORAL at 11:39

## 2022-03-29 RX ADMIN — DOCUSATE SODIUM 50MG AND SENNOSIDES 8.6MG 2 TABLET: 8.6; 5 TABLET, FILM COATED ORAL at 20:20

## 2022-03-29 RX ADMIN — ACETAMINOPHEN 650 MG: 325 TABLET ORAL at 01:07

## 2022-03-29 RX ADMIN — VANCOMYCIN HYDROCHLORIDE 1250 MG: 10 INJECTION, POWDER, LYOPHILIZED, FOR SOLUTION INTRAVENOUS at 06:13

## 2022-03-29 RX ADMIN — FUROSEMIDE 40 MG: 10 INJECTION, SOLUTION INTRAMUSCULAR; INTRAVENOUS at 06:15

## 2022-03-29 RX ADMIN — MUPIROCIN 1 APPLICATION: 20 OINTMENT TOPICAL at 20:20

## 2022-03-29 RX ADMIN — CYCLOBENZAPRINE 10 MG: 10 TABLET, FILM COATED ORAL at 08:52

## 2022-03-30 ENCOUNTER — APPOINTMENT (OUTPATIENT)
Dept: GENERAL RADIOLOGY | Facility: HOSPITAL | Age: 82
End: 2022-03-30

## 2022-03-30 LAB
ANION GAP SERPL CALCULATED.3IONS-SCNC: 14.8 MMOL/L (ref 5–15)
BUN SERPL-MCNC: 17 MG/DL (ref 8–23)
BUN/CREAT SERPL: 22.4 (ref 7–25)
CALCIUM SPEC-SCNC: 8.7 MG/DL (ref 8.6–10.5)
CHLORIDE SERPL-SCNC: 105 MMOL/L (ref 98–107)
CO2 SERPL-SCNC: 25.2 MMOL/L (ref 22–29)
CREAT SERPL-MCNC: 0.76 MG/DL (ref 0.57–1)
DEPRECATED RDW RBC AUTO: 48.6 FL (ref 37–54)
EGFRCR SERPLBLD CKD-EPI 2021: 78.3 ML/MIN/1.73
ERYTHROCYTE [DISTWIDTH] IN BLOOD BY AUTOMATED COUNT: 12.9 % (ref 12.3–15.4)
GLUCOSE BLDC GLUCOMTR-MCNC: 146 MG/DL (ref 70–130)
GLUCOSE BLDC GLUCOMTR-MCNC: 167 MG/DL (ref 70–130)
GLUCOSE BLDC GLUCOMTR-MCNC: 178 MG/DL (ref 70–130)
GLUCOSE BLDC GLUCOMTR-MCNC: 199 MG/DL (ref 70–130)
GLUCOSE SERPL-MCNC: 176 MG/DL (ref 65–99)
HCT VFR BLD AUTO: 30.3 % (ref 34–46.6)
HGB BLD-MCNC: 10.1 G/DL (ref 12–15.9)
MCH RBC QN AUTO: 34.1 PG (ref 26.6–33)
MCHC RBC AUTO-ENTMCNC: 33.3 G/DL (ref 31.5–35.7)
MCV RBC AUTO: 102.4 FL (ref 79–97)
PLATELET # BLD AUTO: 112 10*3/MM3 (ref 140–450)
PMV BLD AUTO: 11.1 FL (ref 6–12)
POTASSIUM SERPL-SCNC: 4.1 MMOL/L (ref 3.5–5.2)
QT INTERVAL: 363 MS
RBC # BLD AUTO: 2.96 10*6/MM3 (ref 3.77–5.28)
SODIUM SERPL-SCNC: 145 MMOL/L (ref 136–145)
WBC NRBC COR # BLD: 13.55 10*3/MM3 (ref 3.4–10.8)

## 2022-03-30 PROCEDURE — 63710000001 INSULIN LISPRO (HUMAN) PER 5 UNITS: Performed by: THORACIC SURGERY (CARDIOTHORACIC VASCULAR SURGERY)

## 2022-03-30 PROCEDURE — 80048 BASIC METABOLIC PNL TOTAL CA: CPT | Performed by: THORACIC SURGERY (CARDIOTHORACIC VASCULAR SURGERY)

## 2022-03-30 PROCEDURE — 85027 COMPLETE CBC AUTOMATED: CPT | Performed by: THORACIC SURGERY (CARDIOTHORACIC VASCULAR SURGERY)

## 2022-03-30 PROCEDURE — 97530 THERAPEUTIC ACTIVITIES: CPT

## 2022-03-30 PROCEDURE — 71045 X-RAY EXAM CHEST 1 VIEW: CPT

## 2022-03-30 PROCEDURE — 63710000001 INSULIN LISPRO (HUMAN) PER 5 UNITS: Performed by: NURSE PRACTITIONER

## 2022-03-30 PROCEDURE — 99232 SBSQ HOSP IP/OBS MODERATE 35: CPT | Performed by: NURSE PRACTITIONER

## 2022-03-30 PROCEDURE — 0 MILRINONE LACTATE IN DEXTROSE 20-5 MG/100ML-% SOLUTION: Performed by: NURSE PRACTITIONER

## 2022-03-30 PROCEDURE — 82962 GLUCOSE BLOOD TEST: CPT

## 2022-03-30 PROCEDURE — 25010000002 FUROSEMIDE PER 20 MG: Performed by: THORACIC SURGERY (CARDIOTHORACIC VASCULAR SURGERY)

## 2022-03-30 PROCEDURE — 93005 ELECTROCARDIOGRAM TRACING: CPT | Performed by: THORACIC SURGERY (CARDIOTHORACIC VASCULAR SURGERY)

## 2022-03-30 PROCEDURE — 25010000002 ENOXAPARIN PER 10 MG: Performed by: THORACIC SURGERY (CARDIOTHORACIC VASCULAR SURGERY)

## 2022-03-30 RX ORDER — LORAZEPAM 0.5 MG/1
0.5 TABLET ORAL EVERY 8 HOURS PRN
Status: DISCONTINUED | OUTPATIENT
Start: 2022-03-30 | End: 2022-04-04

## 2022-03-30 RX ORDER — INSULIN LISPRO 100 [IU]/ML
0-14 INJECTION, SOLUTION INTRAVENOUS; SUBCUTANEOUS
Status: DISCONTINUED | OUTPATIENT
Start: 2022-03-30 | End: 2022-04-02

## 2022-03-30 RX ORDER — GUAIFENESIN 600 MG/1
1200 TABLET, EXTENDED RELEASE ORAL EVERY 12 HOURS SCHEDULED
Status: DISCONTINUED | OUTPATIENT
Start: 2022-03-30 | End: 2022-04-08 | Stop reason: HOSPADM

## 2022-03-30 RX ORDER — NITROGLYCERIN 0.4 MG/1
0.4 TABLET SUBLINGUAL
Status: DISCONTINUED | OUTPATIENT
Start: 2022-03-30 | End: 2022-04-08 | Stop reason: HOSPADM

## 2022-03-30 RX ADMIN — DOCUSATE SODIUM 50MG AND SENNOSIDES 8.6MG 2 TABLET: 8.6; 5 TABLET, FILM COATED ORAL at 09:17

## 2022-03-30 RX ADMIN — ASPIRIN 81 MG: 81 TABLET, COATED ORAL at 09:17

## 2022-03-30 RX ADMIN — MILRINONE LACTATE IN DEXTROSE 0.12 MCG/KG/MIN: 200 INJECTION, SOLUTION INTRAVENOUS at 04:43

## 2022-03-30 RX ADMIN — GUAIFENESIN 1200 MG: 600 TABLET, EXTENDED RELEASE ORAL at 20:13

## 2022-03-30 RX ADMIN — METOPROLOL TARTRATE 25 MG: 25 TABLET, FILM COATED ORAL at 20:13

## 2022-03-30 RX ADMIN — MUPIROCIN 1 APPLICATION: 20 OINTMENT TOPICAL at 09:17

## 2022-03-30 RX ADMIN — INSULIN LISPRO 3 UNITS: 100 INJECTION, SOLUTION INTRAVENOUS; SUBCUTANEOUS at 22:38

## 2022-03-30 RX ADMIN — ATORVASTATIN CALCIUM 40 MG: 20 TABLET, FILM COATED ORAL at 20:13

## 2022-03-30 RX ADMIN — GUAIFENESIN 1200 MG: 600 TABLET, EXTENDED RELEASE ORAL at 09:20

## 2022-03-30 RX ADMIN — PANTOPRAZOLE SODIUM 40 MG: 40 TABLET, DELAYED RELEASE ORAL at 06:37

## 2022-03-30 RX ADMIN — INSULIN LISPRO 3 UNITS: 100 INJECTION, SOLUTION INTRAVENOUS; SUBCUTANEOUS at 11:41

## 2022-03-30 RX ADMIN — HYDROCODONE BITARTRATE AND ACETAMINOPHEN 2 TABLET: 5; 325 TABLET ORAL at 09:17

## 2022-03-30 RX ADMIN — INSULIN LISPRO 2 UNITS: 100 INJECTION, SOLUTION INTRAVENOUS; SUBCUTANEOUS at 06:45

## 2022-03-30 RX ADMIN — HYDROCODONE BITARTRATE AND ACETAMINOPHEN 2 TABLET: 5; 325 TABLET ORAL at 03:10

## 2022-03-30 RX ADMIN — ENOXAPARIN SODIUM 40 MG: 100 INJECTION SUBCUTANEOUS at 18:31

## 2022-03-30 RX ADMIN — FUROSEMIDE 40 MG: 10 INJECTION, SOLUTION INTRAMUSCULAR; INTRAVENOUS at 18:31

## 2022-03-30 RX ADMIN — METOPROLOL TARTRATE 25 MG: 25 TABLET, FILM COATED ORAL at 09:20

## 2022-03-30 RX ADMIN — HYDROCODONE BITARTRATE AND ACETAMINOPHEN 2 TABLET: 5; 325 TABLET ORAL at 16:50

## 2022-03-30 RX ADMIN — FUROSEMIDE 40 MG: 10 INJECTION, SOLUTION INTRAMUSCULAR; INTRAVENOUS at 06:37

## 2022-03-30 RX ADMIN — LORAZEPAM 0.5 MG: 0.5 TABLET ORAL at 10:08

## 2022-03-30 RX ADMIN — CHLORHEXIDINE GLUCONATE 0.12% ORAL RINSE 15 ML: 1.2 LIQUID ORAL at 09:17

## 2022-03-30 RX ADMIN — DOCUSATE SODIUM 50MG AND SENNOSIDES 8.6MG 2 TABLET: 8.6; 5 TABLET, FILM COATED ORAL at 20:12

## 2022-03-30 RX ADMIN — MUPIROCIN 1 APPLICATION: 20 OINTMENT TOPICAL at 20:13

## 2022-03-30 RX ADMIN — CHLORHEXIDINE GLUCONATE 0.12% ORAL RINSE 15 ML: 1.2 LIQUID ORAL at 20:21

## 2022-03-31 ENCOUNTER — APPOINTMENT (OUTPATIENT)
Dept: GENERAL RADIOLOGY | Facility: HOSPITAL | Age: 82
End: 2022-03-31

## 2022-03-31 LAB
ANION GAP SERPL CALCULATED.3IONS-SCNC: 9 MMOL/L (ref 5–15)
BACTERIA SPEC AEROBE CULT: NORMAL
BUN SERPL-MCNC: 21 MG/DL (ref 8–23)
BUN/CREAT SERPL: 38.2 (ref 7–25)
CALCIUM SPEC-SCNC: 8.7 MG/DL (ref 8.6–10.5)
CHLORIDE SERPL-SCNC: 106 MMOL/L (ref 98–107)
CO2 SERPL-SCNC: 28 MMOL/L (ref 22–29)
CREAT SERPL-MCNC: 0.55 MG/DL (ref 0.57–1)
DEPRECATED RDW RBC AUTO: 47.9 FL (ref 37–54)
EGFRCR SERPLBLD CKD-EPI 2021: 91.6 ML/MIN/1.73
ERYTHROCYTE [DISTWIDTH] IN BLOOD BY AUTOMATED COUNT: 12.8 % (ref 12.3–15.4)
GLUCOSE BLDC GLUCOMTR-MCNC: 109 MG/DL (ref 70–130)
GLUCOSE BLDC GLUCOMTR-MCNC: 112 MG/DL (ref 70–130)
GLUCOSE BLDC GLUCOMTR-MCNC: 158 MG/DL (ref 70–130)
GLUCOSE BLDC GLUCOMTR-MCNC: 158 MG/DL (ref 70–130)
GLUCOSE SERPL-MCNC: 132 MG/DL (ref 65–99)
GRAM STN SPEC: NORMAL
HCT VFR BLD AUTO: 30.6 % (ref 34–46.6)
HGB BLD-MCNC: 10.1 G/DL (ref 12–15.9)
MCH RBC QN AUTO: 34.1 PG (ref 26.6–33)
MCHC RBC AUTO-ENTMCNC: 33 G/DL (ref 31.5–35.7)
MCV RBC AUTO: 103.4 FL (ref 79–97)
PLATELET # BLD AUTO: 121 10*3/MM3 (ref 140–450)
PMV BLD AUTO: 10.8 FL (ref 6–12)
POTASSIUM SERPL-SCNC: 3.7 MMOL/L (ref 3.5–5.2)
QT INTERVAL: 425 MS
RBC # BLD AUTO: 2.96 10*6/MM3 (ref 3.77–5.28)
SODIUM SERPL-SCNC: 143 MMOL/L (ref 136–145)
WBC NRBC COR # BLD: 12.58 10*3/MM3 (ref 3.4–10.8)

## 2022-03-31 PROCEDURE — 25010000002 ENOXAPARIN PER 10 MG: Performed by: THORACIC SURGERY (CARDIOTHORACIC VASCULAR SURGERY)

## 2022-03-31 PROCEDURE — 97530 THERAPEUTIC ACTIVITIES: CPT

## 2022-03-31 PROCEDURE — 25010000002 METOCLOPRAMIDE PER 10 MG: Performed by: NURSE PRACTITIONER

## 2022-03-31 PROCEDURE — 93005 ELECTROCARDIOGRAM TRACING: CPT | Performed by: NURSE PRACTITIONER

## 2022-03-31 PROCEDURE — 71046 X-RAY EXAM CHEST 2 VIEWS: CPT

## 2022-03-31 PROCEDURE — 25010000002 ONDANSETRON PER 1 MG: Performed by: THORACIC SURGERY (CARDIOTHORACIC VASCULAR SURGERY)

## 2022-03-31 PROCEDURE — 82962 GLUCOSE BLOOD TEST: CPT

## 2022-03-31 PROCEDURE — 74018 RADEX ABDOMEN 1 VIEW: CPT

## 2022-03-31 PROCEDURE — 99232 SBSQ HOSP IP/OBS MODERATE 35: CPT | Performed by: INTERNAL MEDICINE

## 2022-03-31 PROCEDURE — 97110 THERAPEUTIC EXERCISES: CPT

## 2022-03-31 PROCEDURE — 85027 COMPLETE CBC AUTOMATED: CPT | Performed by: THORACIC SURGERY (CARDIOTHORACIC VASCULAR SURGERY)

## 2022-03-31 PROCEDURE — 94799 UNLISTED PULMONARY SVC/PX: CPT

## 2022-03-31 PROCEDURE — 63710000001 INSULIN LISPRO (HUMAN) PER 5 UNITS: Performed by: NURSE PRACTITIONER

## 2022-03-31 PROCEDURE — 80048 BASIC METABOLIC PNL TOTAL CA: CPT | Performed by: THORACIC SURGERY (CARDIOTHORACIC VASCULAR SURGERY)

## 2022-03-31 PROCEDURE — 93010 ELECTROCARDIOGRAM REPORT: CPT | Performed by: INTERNAL MEDICINE

## 2022-03-31 PROCEDURE — 25010000002 FUROSEMIDE PER 20 MG: Performed by: THORACIC SURGERY (CARDIOTHORACIC VASCULAR SURGERY)

## 2022-03-31 RX ORDER — METOCLOPRAMIDE HYDROCHLORIDE 5 MG/ML
10 INJECTION INTRAMUSCULAR; INTRAVENOUS EVERY 6 HOURS
Status: DISCONTINUED | OUTPATIENT
Start: 2022-03-31 | End: 2022-04-01

## 2022-03-31 RX ORDER — DEXTROSE, SODIUM CHLORIDE, AND POTASSIUM CHLORIDE 5; .45; .15 G/100ML; G/100ML; G/100ML
75 INJECTION INTRAVENOUS CONTINUOUS
Status: DISCONTINUED | OUTPATIENT
Start: 2022-03-31 | End: 2022-04-02

## 2022-03-31 RX ORDER — PANTOPRAZOLE SODIUM 40 MG/1
40 TABLET, DELAYED RELEASE ORAL
Status: DISCONTINUED | OUTPATIENT
Start: 2022-04-01 | End: 2022-04-08 | Stop reason: HOSPADM

## 2022-03-31 RX ORDER — POTASSIUM CHLORIDE 750 MG/1
20 TABLET, FILM COATED, EXTENDED RELEASE ORAL 2 TIMES DAILY WITH MEALS
Status: DISCONTINUED | OUTPATIENT
Start: 2022-03-31 | End: 2022-04-01

## 2022-03-31 RX ORDER — METOCLOPRAMIDE HYDROCHLORIDE 5 MG/ML
10 INJECTION INTRAMUSCULAR; INTRAVENOUS EVERY 6 HOURS
Status: DISCONTINUED | OUTPATIENT
Start: 2022-03-31 | End: 2022-03-31

## 2022-03-31 RX ADMIN — METOCLOPRAMIDE HYDROCHLORIDE 10 MG: 5 INJECTION INTRAMUSCULAR; INTRAVENOUS at 22:48

## 2022-03-31 RX ADMIN — INSULIN LISPRO 3 UNITS: 100 INJECTION, SOLUTION INTRAVENOUS; SUBCUTANEOUS at 20:07

## 2022-03-31 RX ADMIN — LORAZEPAM 0.5 MG: 0.5 TABLET ORAL at 20:00

## 2022-03-31 RX ADMIN — ATORVASTATIN CALCIUM 40 MG: 20 TABLET, FILM COATED ORAL at 20:00

## 2022-03-31 RX ADMIN — METOPROLOL TARTRATE 25 MG: 25 TABLET, FILM COATED ORAL at 08:47

## 2022-03-31 RX ADMIN — GUAIFENESIN 1200 MG: 600 TABLET, EXTENDED RELEASE ORAL at 20:00

## 2022-03-31 RX ADMIN — ONDANSETRON 4 MG: 2 INJECTION INTRAMUSCULAR; INTRAVENOUS at 07:56

## 2022-03-31 RX ADMIN — POTASSIUM CHLORIDE, DEXTROSE MONOHYDRATE AND SODIUM CHLORIDE 75 ML/HR: 150; 5; 450 INJECTION, SOLUTION INTRAVENOUS at 18:46

## 2022-03-31 RX ADMIN — INSULIN LISPRO 3 UNITS: 100 INJECTION, SOLUTION INTRAVENOUS; SUBCUTANEOUS at 11:19

## 2022-03-31 RX ADMIN — ASPIRIN 81 MG: 81 TABLET, COATED ORAL at 08:47

## 2022-03-31 RX ADMIN — MUPIROCIN 1 APPLICATION: 20 OINTMENT TOPICAL at 20:00

## 2022-03-31 RX ADMIN — METOPROLOL TARTRATE 25 MG: 25 TABLET, FILM COATED ORAL at 20:00

## 2022-03-31 RX ADMIN — HYDROCODONE BITARTRATE AND ACETAMINOPHEN 2 TABLET: 5; 325 TABLET ORAL at 01:17

## 2022-03-31 RX ADMIN — METOCLOPRAMIDE HYDROCHLORIDE 10 MG: 5 INJECTION INTRAMUSCULAR; INTRAVENOUS at 16:49

## 2022-03-31 RX ADMIN — ONDANSETRON 4 MG: 2 INJECTION INTRAMUSCULAR; INTRAVENOUS at 22:22

## 2022-03-31 RX ADMIN — FUROSEMIDE 40 MG: 10 INJECTION, SOLUTION INTRAMUSCULAR; INTRAVENOUS at 18:44

## 2022-03-31 RX ADMIN — POTASSIUM CHLORIDE 20 MEQ: 10 TABLET, EXTENDED RELEASE ORAL at 18:44

## 2022-03-31 RX ADMIN — ENOXAPARIN SODIUM 40 MG: 100 INJECTION SUBCUTANEOUS at 18:44

## 2022-03-31 RX ADMIN — MUPIROCIN 1 APPLICATION: 20 OINTMENT TOPICAL at 08:47

## 2022-03-31 RX ADMIN — GUAIFENESIN 1200 MG: 600 TABLET, EXTENDED RELEASE ORAL at 08:47

## 2022-03-31 RX ADMIN — FUROSEMIDE 40 MG: 10 INJECTION, SOLUTION INTRAMUSCULAR; INTRAVENOUS at 07:04

## 2022-03-31 RX ADMIN — ONDANSETRON 4 MG: 2 INJECTION INTRAMUSCULAR; INTRAVENOUS at 16:30

## 2022-03-31 RX ADMIN — HYDROCODONE BITARTRATE AND ACETAMINOPHEN 2 TABLET: 5; 325 TABLET ORAL at 23:37

## 2022-03-31 RX ADMIN — DOCUSATE SODIUM 50MG AND SENNOSIDES 8.6MG 2 TABLET: 8.6; 5 TABLET, FILM COATED ORAL at 08:47

## 2022-03-31 RX ADMIN — POTASSIUM CHLORIDE 20 MEQ: 10 TABLET, EXTENDED RELEASE ORAL at 08:57

## 2022-03-31 RX ADMIN — HYDROCODONE BITARTRATE AND ACETAMINOPHEN 2 TABLET: 5; 325 TABLET ORAL at 11:18

## 2022-03-31 RX ADMIN — PANTOPRAZOLE SODIUM 40 MG: 40 TABLET, DELAYED RELEASE ORAL at 07:04

## 2022-04-01 ENCOUNTER — APPOINTMENT (OUTPATIENT)
Dept: GENERAL RADIOLOGY | Facility: HOSPITAL | Age: 82
End: 2022-04-01

## 2022-04-01 ENCOUNTER — APPOINTMENT (OUTPATIENT)
Dept: CT IMAGING | Facility: HOSPITAL | Age: 82
End: 2022-04-01

## 2022-04-01 LAB
ANION GAP SERPL CALCULATED.3IONS-SCNC: 6.4 MMOL/L (ref 5–15)
BUN SERPL-MCNC: 21 MG/DL (ref 8–23)
BUN/CREAT SERPL: 33.9 (ref 7–25)
CALCIUM SPEC-SCNC: 8.8 MG/DL (ref 8.6–10.5)
CHLORIDE SERPL-SCNC: 106 MMOL/L (ref 98–107)
CO2 SERPL-SCNC: 30.6 MMOL/L (ref 22–29)
CREAT SERPL-MCNC: 0.62 MG/DL (ref 0.57–1)
DEPRECATED RDW RBC AUTO: 47 FL (ref 37–54)
EGFRCR SERPLBLD CKD-EPI 2021: 89 ML/MIN/1.73
ERYTHROCYTE [DISTWIDTH] IN BLOOD BY AUTOMATED COUNT: 12.7 % (ref 12.3–15.4)
GLUCOSE BLDC GLUCOMTR-MCNC: 128 MG/DL (ref 70–130)
GLUCOSE BLDC GLUCOMTR-MCNC: 129 MG/DL (ref 70–130)
GLUCOSE BLDC GLUCOMTR-MCNC: 137 MG/DL (ref 70–130)
GLUCOSE BLDC GLUCOMTR-MCNC: 138 MG/DL (ref 70–130)
GLUCOSE BLDC GLUCOMTR-MCNC: 147 MG/DL (ref 70–130)
GLUCOSE SERPL-MCNC: 146 MG/DL (ref 65–99)
HCT VFR BLD AUTO: 30.1 % (ref 34–46.6)
HGB BLD-MCNC: 10 G/DL (ref 12–15.9)
MCH RBC QN AUTO: 34 PG (ref 26.6–33)
MCHC RBC AUTO-ENTMCNC: 33.2 G/DL (ref 31.5–35.7)
MCV RBC AUTO: 102.4 FL (ref 79–97)
PLATELET # BLD AUTO: 147 10*3/MM3 (ref 140–450)
PMV BLD AUTO: 10.7 FL (ref 6–12)
POTASSIUM SERPL-SCNC: 3.7 MMOL/L (ref 3.5–5.2)
QT INTERVAL: 445 MS
RBC # BLD AUTO: 2.94 10*6/MM3 (ref 3.77–5.28)
SODIUM SERPL-SCNC: 143 MMOL/L (ref 136–145)
WBC NRBC COR # BLD: 9.31 10*3/MM3 (ref 3.4–10.8)

## 2022-04-01 PROCEDURE — 97530 THERAPEUTIC ACTIVITIES: CPT

## 2022-04-01 PROCEDURE — 25010000002 IOPAMIDOL 61 % SOLUTION: Performed by: THORACIC SURGERY (CARDIOTHORACIC VASCULAR SURGERY)

## 2022-04-01 PROCEDURE — 71045 X-RAY EXAM CHEST 1 VIEW: CPT

## 2022-04-01 PROCEDURE — 25010000002 MAGNESIUM SULFATE IN D5W 1G/100ML (PREMIX) 1-5 GM/100ML-% SOLUTION: Performed by: NURSE PRACTITIONER

## 2022-04-01 PROCEDURE — 25010000002 METOCLOPRAMIDE PER 10 MG: Performed by: NURSE PRACTITIONER

## 2022-04-01 PROCEDURE — 82962 GLUCOSE BLOOD TEST: CPT

## 2022-04-01 PROCEDURE — 93005 ELECTROCARDIOGRAM TRACING: CPT | Performed by: NURSE PRACTITIONER

## 2022-04-01 PROCEDURE — 85027 COMPLETE CBC AUTOMATED: CPT | Performed by: THORACIC SURGERY (CARDIOTHORACIC VASCULAR SURGERY)

## 2022-04-01 PROCEDURE — 25010000002 ENOXAPARIN PER 10 MG: Performed by: THORACIC SURGERY (CARDIOTHORACIC VASCULAR SURGERY)

## 2022-04-01 PROCEDURE — 25010000002 ONDANSETRON PER 1 MG: Performed by: THORACIC SURGERY (CARDIOTHORACIC VASCULAR SURGERY)

## 2022-04-01 PROCEDURE — 25010000002 FUROSEMIDE PER 20 MG: Performed by: THORACIC SURGERY (CARDIOTHORACIC VASCULAR SURGERY)

## 2022-04-01 PROCEDURE — 99222 1ST HOSP IP/OBS MODERATE 55: CPT | Performed by: SURGERY

## 2022-04-01 PROCEDURE — 93010 ELECTROCARDIOGRAM REPORT: CPT | Performed by: INTERNAL MEDICINE

## 2022-04-01 PROCEDURE — 74177 CT ABD & PELVIS W/CONTRAST: CPT

## 2022-04-01 PROCEDURE — 80048 BASIC METABOLIC PNL TOTAL CA: CPT | Performed by: THORACIC SURGERY (CARDIOTHORACIC VASCULAR SURGERY)

## 2022-04-01 PROCEDURE — 99232 SBSQ HOSP IP/OBS MODERATE 35: CPT | Performed by: INTERNAL MEDICINE

## 2022-04-01 RX ORDER — FUROSEMIDE 40 MG/1
40 TABLET ORAL DAILY
Status: DISCONTINUED | OUTPATIENT
Start: 2022-04-02 | End: 2022-04-05 | Stop reason: DRUGHIGH

## 2022-04-01 RX ORDER — SCOLOPAMINE TRANSDERMAL SYSTEM 1 MG/1
1 PATCH, EXTENDED RELEASE TRANSDERMAL
Status: DISCONTINUED | OUTPATIENT
Start: 2022-04-01 | End: 2022-04-04

## 2022-04-01 RX ORDER — MAGNESIUM SULFATE 1 G/100ML
1 INJECTION INTRAVENOUS ONCE
Status: COMPLETED | OUTPATIENT
Start: 2022-04-01 | End: 2022-04-01

## 2022-04-01 RX ORDER — POTASSIUM CHLORIDE 750 MG/1
20 TABLET, FILM COATED, EXTENDED RELEASE ORAL DAILY
Status: DISCONTINUED | OUTPATIENT
Start: 2022-04-02 | End: 2022-04-06

## 2022-04-01 RX ADMIN — MUPIROCIN 1 APPLICATION: 20 OINTMENT TOPICAL at 21:30

## 2022-04-01 RX ADMIN — ASPIRIN 81 MG: 81 TABLET, COATED ORAL at 08:25

## 2022-04-01 RX ADMIN — LORAZEPAM 0.5 MG: 0.5 TABLET ORAL at 21:30

## 2022-04-01 RX ADMIN — GUAIFENESIN 1200 MG: 600 TABLET, EXTENDED RELEASE ORAL at 21:30

## 2022-04-01 RX ADMIN — ATORVASTATIN CALCIUM 40 MG: 20 TABLET, FILM COATED ORAL at 21:30

## 2022-04-01 RX ADMIN — MAGNESIUM SULFATE 1 G: 1 INJECTION INTRAVENOUS at 15:03

## 2022-04-01 RX ADMIN — LORAZEPAM 0.5 MG: 0.5 TABLET ORAL at 08:26

## 2022-04-01 RX ADMIN — POTASSIUM CHLORIDE, DEXTROSE MONOHYDRATE AND SODIUM CHLORIDE 75 ML/HR: 150; 5; 450 INJECTION, SOLUTION INTRAVENOUS at 21:29

## 2022-04-01 RX ADMIN — METOPROLOL TARTRATE 5 MG: 1 INJECTION, SOLUTION INTRAVENOUS at 15:03

## 2022-04-01 RX ADMIN — IOPAMIDOL 85 ML: 612 INJECTION, SOLUTION INTRAVENOUS at 10:53

## 2022-04-01 RX ADMIN — METOPROLOL TARTRATE 25 MG: 25 TABLET, FILM COATED ORAL at 21:30

## 2022-04-01 RX ADMIN — METOCLOPRAMIDE HYDROCHLORIDE 10 MG: 5 INJECTION INTRAMUSCULAR; INTRAVENOUS at 05:43

## 2022-04-01 RX ADMIN — HYDROCODONE BITARTRATE AND ACETAMINOPHEN 2 TABLET: 5; 325 TABLET ORAL at 13:39

## 2022-04-01 RX ADMIN — SCOPALAMINE 1 PATCH: 1 PATCH, EXTENDED RELEASE TRANSDERMAL at 14:35

## 2022-04-01 RX ADMIN — POTASSIUM CHLORIDE, DEXTROSE MONOHYDRATE AND SODIUM CHLORIDE 75 ML/HR: 150; 5; 450 INJECTION, SOLUTION INTRAVENOUS at 05:44

## 2022-04-01 RX ADMIN — ENOXAPARIN SODIUM 40 MG: 100 INJECTION SUBCUTANEOUS at 17:24

## 2022-04-01 RX ADMIN — POTASSIUM CHLORIDE 20 MEQ: 10 TABLET, EXTENDED RELEASE ORAL at 08:26

## 2022-04-01 RX ADMIN — MUPIROCIN 1 APPLICATION: 20 OINTMENT TOPICAL at 08:25

## 2022-04-01 RX ADMIN — METOPROLOL TARTRATE 25 MG: 25 TABLET, FILM COATED ORAL at 08:26

## 2022-04-01 RX ADMIN — HYDROCODONE BITARTRATE AND ACETAMINOPHEN 2 TABLET: 5; 325 TABLET ORAL at 21:37

## 2022-04-01 RX ADMIN — PANTOPRAZOLE SODIUM 40 MG: 40 TABLET, DELAYED RELEASE ORAL at 05:43

## 2022-04-01 RX ADMIN — METOPROLOL TARTRATE 12.5 MG: 25 TABLET, FILM COATED ORAL at 17:23

## 2022-04-01 RX ADMIN — ONDANSETRON 4 MG: 2 INJECTION INTRAMUSCULAR; INTRAVENOUS at 11:26

## 2022-04-01 RX ADMIN — GUAIFENESIN 1200 MG: 600 TABLET, EXTENDED RELEASE ORAL at 08:25

## 2022-04-01 RX ADMIN — FUROSEMIDE 40 MG: 10 INJECTION, SOLUTION INTRAMUSCULAR; INTRAVENOUS at 06:37

## 2022-04-02 LAB
ANION GAP SERPL CALCULATED.3IONS-SCNC: 5.2 MMOL/L (ref 5–15)
BUN SERPL-MCNC: 18 MG/DL (ref 8–23)
BUN/CREAT SERPL: 28.6 (ref 7–25)
CALCIUM SPEC-SCNC: 8.5 MG/DL (ref 8.6–10.5)
CHLORIDE SERPL-SCNC: 106 MMOL/L (ref 98–107)
CO2 SERPL-SCNC: 30.8 MMOL/L (ref 22–29)
CREAT SERPL-MCNC: 0.63 MG/DL (ref 0.57–1)
EGFRCR SERPLBLD CKD-EPI 2021: 88.7 ML/MIN/1.73
GLUCOSE BLDC GLUCOMTR-MCNC: 120 MG/DL (ref 70–130)
GLUCOSE BLDC GLUCOMTR-MCNC: 151 MG/DL (ref 70–130)
GLUCOSE SERPL-MCNC: 149 MG/DL (ref 65–99)
POTASSIUM SERPL-SCNC: 3.9 MMOL/L (ref 3.5–5.2)
QT INTERVAL: 336 MS
SODIUM SERPL-SCNC: 142 MMOL/L (ref 136–145)

## 2022-04-02 PROCEDURE — 99024 POSTOP FOLLOW-UP VISIT: CPT | Performed by: NURSE PRACTITIONER

## 2022-04-02 PROCEDURE — 25010000002 ENOXAPARIN PER 10 MG: Performed by: THORACIC SURGERY (CARDIOTHORACIC VASCULAR SURGERY)

## 2022-04-02 PROCEDURE — 94799 UNLISTED PULMONARY SVC/PX: CPT

## 2022-04-02 PROCEDURE — 93005 ELECTROCARDIOGRAM TRACING: CPT | Performed by: INTERNAL MEDICINE

## 2022-04-02 PROCEDURE — 80048 BASIC METABOLIC PNL TOTAL CA: CPT | Performed by: THORACIC SURGERY (CARDIOTHORACIC VASCULAR SURGERY)

## 2022-04-02 PROCEDURE — 82962 GLUCOSE BLOOD TEST: CPT

## 2022-04-02 PROCEDURE — 99231 SBSQ HOSP IP/OBS SF/LOW 25: CPT | Performed by: SURGERY

## 2022-04-02 PROCEDURE — 25010000002 MAGNESIUM SULFATE IN D5W 1G/100ML (PREMIX) 1-5 GM/100ML-% SOLUTION: Performed by: NURSE PRACTITIONER

## 2022-04-02 PROCEDURE — 99232 SBSQ HOSP IP/OBS MODERATE 35: CPT | Performed by: INTERNAL MEDICINE

## 2022-04-02 PROCEDURE — 97116 GAIT TRAINING THERAPY: CPT

## 2022-04-02 PROCEDURE — 93010 ELECTROCARDIOGRAM REPORT: CPT | Performed by: INTERNAL MEDICINE

## 2022-04-02 RX ORDER — POTASSIUM CHLORIDE 750 MG/1
40 TABLET, FILM COATED, EXTENDED RELEASE ORAL ONCE
Status: COMPLETED | OUTPATIENT
Start: 2022-04-02 | End: 2022-04-02

## 2022-04-02 RX ORDER — AMIODARONE HYDROCHLORIDE 200 MG/1
400 TABLET ORAL EVERY 12 HOURS SCHEDULED
Status: DISCONTINUED | OUTPATIENT
Start: 2022-04-02 | End: 2022-04-03

## 2022-04-02 RX ORDER — MAGNESIUM SULFATE 1 G/100ML
1 INJECTION INTRAVENOUS ONCE
Status: COMPLETED | OUTPATIENT
Start: 2022-04-02 | End: 2022-04-02

## 2022-04-02 RX ADMIN — GUAIFENESIN 1200 MG: 600 TABLET, EXTENDED RELEASE ORAL at 20:40

## 2022-04-02 RX ADMIN — LORAZEPAM 0.5 MG: 0.5 TABLET ORAL at 20:40

## 2022-04-02 RX ADMIN — ENOXAPARIN SODIUM 40 MG: 100 INJECTION SUBCUTANEOUS at 17:41

## 2022-04-02 RX ADMIN — AMIODARONE HYDROCHLORIDE 400 MG: 200 TABLET ORAL at 20:40

## 2022-04-02 RX ADMIN — PANTOPRAZOLE SODIUM 40 MG: 40 TABLET, DELAYED RELEASE ORAL at 05:13

## 2022-04-02 RX ADMIN — MAGNESIUM SULFATE 1 G: 1 INJECTION INTRAVENOUS at 15:54

## 2022-04-02 RX ADMIN — POTASSIUM CHLORIDE, DEXTROSE MONOHYDRATE AND SODIUM CHLORIDE 75 ML/HR: 150; 5; 450 INJECTION, SOLUTION INTRAVENOUS at 10:09

## 2022-04-02 RX ADMIN — MUPIROCIN 1 APPLICATION: 20 OINTMENT TOPICAL at 20:40

## 2022-04-02 RX ADMIN — HYDROCODONE BITARTRATE AND ACETAMINOPHEN 2 TABLET: 5; 325 TABLET ORAL at 11:09

## 2022-04-02 RX ADMIN — FUROSEMIDE 40 MG: 40 TABLET ORAL at 04:43

## 2022-04-02 RX ADMIN — ATORVASTATIN CALCIUM 40 MG: 20 TABLET, FILM COATED ORAL at 20:40

## 2022-04-02 RX ADMIN — HYDROCODONE BITARTRATE AND ACETAMINOPHEN 2 TABLET: 5; 325 TABLET ORAL at 23:16

## 2022-04-02 RX ADMIN — POTASSIUM CHLORIDE 40 MEQ: 10 TABLET, EXTENDED RELEASE ORAL at 17:41

## 2022-04-02 RX ADMIN — METOPROLOL TARTRATE 25 MG: 25 TABLET, FILM COATED ORAL at 20:40

## 2022-04-02 RX ADMIN — MUPIROCIN 1 APPLICATION: 20 OINTMENT TOPICAL at 08:48

## 2022-04-02 RX ADMIN — METOPROLOL TARTRATE 25 MG: 25 TABLET, FILM COATED ORAL at 08:47

## 2022-04-02 RX ADMIN — ASPIRIN 81 MG: 81 TABLET, COATED ORAL at 08:47

## 2022-04-03 LAB
ANION GAP SERPL CALCULATED.3IONS-SCNC: 7 MMOL/L (ref 5–15)
BUN SERPL-MCNC: 17 MG/DL (ref 8–23)
BUN/CREAT SERPL: 27 (ref 7–25)
CALCIUM SPEC-SCNC: 8.9 MG/DL (ref 8.6–10.5)
CHLORIDE SERPL-SCNC: 105 MMOL/L (ref 98–107)
CO2 SERPL-SCNC: 29 MMOL/L (ref 22–29)
CREAT SERPL-MCNC: 0.63 MG/DL (ref 0.57–1)
EGFRCR SERPLBLD CKD-EPI 2021: 88.7 ML/MIN/1.73
GLUCOSE SERPL-MCNC: 104 MG/DL (ref 65–99)
POTASSIUM SERPL-SCNC: 4.3 MMOL/L (ref 3.5–5.2)
SODIUM SERPL-SCNC: 141 MMOL/L (ref 136–145)

## 2022-04-03 PROCEDURE — 99231 SBSQ HOSP IP/OBS SF/LOW 25: CPT | Performed by: SURGERY

## 2022-04-03 PROCEDURE — 80048 BASIC METABOLIC PNL TOTAL CA: CPT | Performed by: THORACIC SURGERY (CARDIOTHORACIC VASCULAR SURGERY)

## 2022-04-03 PROCEDURE — 99024 POSTOP FOLLOW-UP VISIT: CPT | Performed by: NURSE PRACTITIONER

## 2022-04-03 PROCEDURE — 99232 SBSQ HOSP IP/OBS MODERATE 35: CPT | Performed by: INTERNAL MEDICINE

## 2022-04-03 PROCEDURE — 94799 UNLISTED PULMONARY SVC/PX: CPT

## 2022-04-03 PROCEDURE — 25010000002 ENOXAPARIN PER 10 MG: Performed by: THORACIC SURGERY (CARDIOTHORACIC VASCULAR SURGERY)

## 2022-04-03 RX ORDER — METOPROLOL TARTRATE 50 MG/1
50 TABLET, FILM COATED ORAL EVERY 12 HOURS SCHEDULED
Status: DISCONTINUED | OUTPATIENT
Start: 2022-04-03 | End: 2022-04-05

## 2022-04-03 RX ORDER — AMIODARONE HYDROCHLORIDE 200 MG/1
400 TABLET ORAL
Status: DISCONTINUED | OUTPATIENT
Start: 2022-04-03 | End: 2022-04-08 | Stop reason: HOSPADM

## 2022-04-03 RX ADMIN — GUAIFENESIN 1200 MG: 600 TABLET, EXTENDED RELEASE ORAL at 20:28

## 2022-04-03 RX ADMIN — HYDROCODONE BITARTRATE AND ACETAMINOPHEN 2 TABLET: 5; 325 TABLET ORAL at 06:54

## 2022-04-03 RX ADMIN — POTASSIUM CHLORIDE 20 MEQ: 10 TABLET, EXTENDED RELEASE ORAL at 09:05

## 2022-04-03 RX ADMIN — PANTOPRAZOLE SODIUM 40 MG: 40 TABLET, DELAYED RELEASE ORAL at 06:54

## 2022-04-03 RX ADMIN — FUROSEMIDE 40 MG: 40 TABLET ORAL at 09:05

## 2022-04-03 RX ADMIN — LORAZEPAM 0.5 MG: 0.5 TABLET ORAL at 20:28

## 2022-04-03 RX ADMIN — ENOXAPARIN SODIUM 40 MG: 100 INJECTION SUBCUTANEOUS at 17:16

## 2022-04-03 RX ADMIN — AMIODARONE HYDROCHLORIDE 400 MG: 200 TABLET ORAL at 11:47

## 2022-04-03 RX ADMIN — ASPIRIN 81 MG: 81 TABLET, COATED ORAL at 09:05

## 2022-04-03 RX ADMIN — AMIODARONE HYDROCHLORIDE 400 MG: 200 TABLET ORAL at 09:05

## 2022-04-03 RX ADMIN — GUAIFENESIN 1200 MG: 600 TABLET, EXTENDED RELEASE ORAL at 09:05

## 2022-04-03 RX ADMIN — MUPIROCIN 1 APPLICATION: 20 OINTMENT TOPICAL at 09:06

## 2022-04-03 RX ADMIN — METOPROLOL TARTRATE 25 MG: 25 TABLET, FILM COATED ORAL at 09:05

## 2022-04-03 RX ADMIN — AMIODARONE HYDROCHLORIDE 400 MG: 200 TABLET ORAL at 17:16

## 2022-04-03 RX ADMIN — MUPIROCIN 1 APPLICATION: 20 OINTMENT TOPICAL at 20:28

## 2022-04-03 RX ADMIN — HYDROCODONE BITARTRATE AND ACETAMINOPHEN 2 TABLET: 5; 325 TABLET ORAL at 20:50

## 2022-04-03 RX ADMIN — ATORVASTATIN CALCIUM 40 MG: 20 TABLET, FILM COATED ORAL at 20:27

## 2022-04-03 RX ADMIN — METOPROLOL TARTRATE 50 MG: 50 TABLET, FILM COATED ORAL at 20:27

## 2022-04-04 ENCOUNTER — APPOINTMENT (OUTPATIENT)
Dept: GENERAL RADIOLOGY | Facility: HOSPITAL | Age: 82
End: 2022-04-04

## 2022-04-04 LAB
ANION GAP SERPL CALCULATED.3IONS-SCNC: 8 MMOL/L (ref 5–15)
BUN SERPL-MCNC: 17 MG/DL (ref 8–23)
BUN/CREAT SERPL: 27.9 (ref 7–25)
CALCIUM SPEC-SCNC: 8.9 MG/DL (ref 8.6–10.5)
CHLORIDE SERPL-SCNC: 103 MMOL/L (ref 98–107)
CO2 SERPL-SCNC: 28 MMOL/L (ref 22–29)
CREAT SERPL-MCNC: 0.61 MG/DL (ref 0.57–1)
EGFRCR SERPLBLD CKD-EPI 2021: 89.4 ML/MIN/1.73
GLUCOSE SERPL-MCNC: 117 MG/DL (ref 65–99)
POTASSIUM SERPL-SCNC: 4 MMOL/L (ref 3.5–5.2)
SODIUM SERPL-SCNC: 139 MMOL/L (ref 136–145)

## 2022-04-04 PROCEDURE — 71045 X-RAY EXAM CHEST 1 VIEW: CPT

## 2022-04-04 PROCEDURE — 97116 GAIT TRAINING THERAPY: CPT

## 2022-04-04 PROCEDURE — 94799 UNLISTED PULMONARY SVC/PX: CPT

## 2022-04-04 PROCEDURE — 99024 POSTOP FOLLOW-UP VISIT: CPT | Performed by: NURSE PRACTITIONER

## 2022-04-04 PROCEDURE — 94618 PULMONARY STRESS TESTING: CPT

## 2022-04-04 PROCEDURE — 94660 CPAP INITIATION&MGMT: CPT

## 2022-04-04 PROCEDURE — 80048 BASIC METABOLIC PNL TOTAL CA: CPT | Performed by: THORACIC SURGERY (CARDIOTHORACIC VASCULAR SURGERY)

## 2022-04-04 RX ADMIN — PANTOPRAZOLE SODIUM 40 MG: 40 TABLET, DELAYED RELEASE ORAL at 06:16

## 2022-04-04 RX ADMIN — ASPIRIN 81 MG: 81 TABLET, COATED ORAL at 08:27

## 2022-04-04 RX ADMIN — AMIODARONE HYDROCHLORIDE 400 MG: 200 TABLET ORAL at 17:34

## 2022-04-04 RX ADMIN — AMIODARONE HYDROCHLORIDE 400 MG: 200 TABLET ORAL at 11:55

## 2022-04-04 RX ADMIN — FUROSEMIDE 40 MG: 40 TABLET ORAL at 08:27

## 2022-04-04 RX ADMIN — APIXABAN 5 MG: 5 TABLET, FILM COATED ORAL at 21:31

## 2022-04-04 RX ADMIN — GUAIFENESIN 1200 MG: 600 TABLET, EXTENDED RELEASE ORAL at 08:28

## 2022-04-04 RX ADMIN — METOPROLOL TARTRATE 50 MG: 50 TABLET, FILM COATED ORAL at 08:27

## 2022-04-04 RX ADMIN — GUAIFENESIN 1200 MG: 600 TABLET, EXTENDED RELEASE ORAL at 21:06

## 2022-04-04 RX ADMIN — MUPIROCIN 1 APPLICATION: 20 OINTMENT TOPICAL at 21:31

## 2022-04-04 RX ADMIN — HYDROCODONE BITARTRATE AND ACETAMINOPHEN 2 TABLET: 5; 325 TABLET ORAL at 11:52

## 2022-04-04 RX ADMIN — ACETAMINOPHEN 650 MG: 325 TABLET ORAL at 21:06

## 2022-04-04 RX ADMIN — ATORVASTATIN CALCIUM 40 MG: 20 TABLET, FILM COATED ORAL at 21:06

## 2022-04-04 RX ADMIN — MUPIROCIN 1 APPLICATION: 20 OINTMENT TOPICAL at 08:28

## 2022-04-04 RX ADMIN — POTASSIUM CHLORIDE 20 MEQ: 10 TABLET, EXTENDED RELEASE ORAL at 08:27

## 2022-04-04 RX ADMIN — METOPROLOL TARTRATE 50 MG: 50 TABLET, FILM COATED ORAL at 21:06

## 2022-04-04 RX ADMIN — AMIODARONE HYDROCHLORIDE 400 MG: 200 TABLET ORAL at 06:17

## 2022-04-05 ENCOUNTER — HOME HEALTH ADMISSION (OUTPATIENT)
Dept: HOME HEALTH SERVICES | Facility: HOME HEALTHCARE | Age: 82
End: 2022-04-05

## 2022-04-05 ENCOUNTER — APPOINTMENT (OUTPATIENT)
Dept: GENERAL RADIOLOGY | Facility: HOSPITAL | Age: 82
End: 2022-04-05

## 2022-04-05 ENCOUNTER — APPOINTMENT (OUTPATIENT)
Dept: MRI IMAGING | Facility: HOSPITAL | Age: 82
End: 2022-04-05

## 2022-04-05 LAB
AMMONIA BLD-SCNC: 14 UMOL/L (ref 11–51)
ANION GAP SERPL CALCULATED.3IONS-SCNC: 8 MMOL/L (ref 5–15)
ARTERIAL PATENCY WRIST A: ABNORMAL
ATMOSPHERIC PRESS: 743.4 MMHG
BASE EXCESS BLDA CALC-SCNC: 2.4 MMOL/L (ref 0–2)
BDY SITE: ABNORMAL
BUN SERPL-MCNC: 18 MG/DL (ref 8–23)
BUN/CREAT SERPL: 30 (ref 7–25)
CALCIUM SPEC-SCNC: 9 MG/DL (ref 8.6–10.5)
CHLORIDE SERPL-SCNC: 100 MMOL/L (ref 98–107)
CO2 SERPL-SCNC: 28 MMOL/L (ref 22–29)
CREAT SERPL-MCNC: 0.6 MG/DL (ref 0.57–1)
DEPRECATED RDW RBC AUTO: 51.8 FL (ref 37–54)
EGFRCR SERPLBLD CKD-EPI 2021: 89.7 ML/MIN/1.73
ERYTHROCYTE [DISTWIDTH] IN BLOOD BY AUTOMATED COUNT: 14 % (ref 12.3–15.4)
FOLATE SERPL-MCNC: >20 NG/ML (ref 4.78–24.2)
GAS FLOW AIRWAY: 2.5 LPM
GLUCOSE SERPL-MCNC: 130 MG/DL (ref 65–99)
HBA1C MFR BLD: 6.4 % (ref 4.8–5.6)
HCO3 BLDA-SCNC: 27.2 MMOL/L (ref 22–28)
HCT VFR BLD AUTO: 36.3 % (ref 34–46.6)
HGB BLD-MCNC: 12.1 G/DL (ref 12–15.9)
MCH RBC QN AUTO: 34.3 PG (ref 26.6–33)
MCHC RBC AUTO-ENTMCNC: 33.3 G/DL (ref 31.5–35.7)
MCV RBC AUTO: 102.8 FL (ref 79–97)
MODALITY: ABNORMAL
PCO2 BLDA: 41.9 MM HG (ref 35–45)
PH BLDA: 7.42 PH UNITS (ref 7.35–7.45)
PLATELET # BLD AUTO: 212 10*3/MM3 (ref 140–450)
PMV BLD AUTO: 11 FL (ref 6–12)
PO2 BLDA: 62 MM HG (ref 80–100)
POTASSIUM SERPL-SCNC: 3.8 MMOL/L (ref 3.5–5.2)
PROCALCITONIN SERPL-MCNC: <0.02 NG/ML (ref 0–0.25)
RBC # BLD AUTO: 3.53 10*6/MM3 (ref 3.77–5.28)
SAO2 % BLDCOA: 91.7 % (ref 92–99)
SET MECH RESP RATE: 22
SODIUM SERPL-SCNC: 136 MMOL/L (ref 136–145)
TSH SERPL DL<=0.05 MIU/L-ACNC: 4.74 UIU/ML (ref 0.27–4.2)
VIT B12 BLD-MCNC: 1873 PG/ML (ref 211–946)
WBC NRBC COR # BLD: 8.43 10*3/MM3 (ref 3.4–10.8)

## 2022-04-05 PROCEDURE — 97110 THERAPEUTIC EXERCISES: CPT

## 2022-04-05 PROCEDURE — 36600 WITHDRAWAL OF ARTERIAL BLOOD: CPT

## 2022-04-05 PROCEDURE — 94799 UNLISTED PULMONARY SVC/PX: CPT

## 2022-04-05 PROCEDURE — 84145 PROCALCITONIN (PCT): CPT | Performed by: INTERNAL MEDICINE

## 2022-04-05 PROCEDURE — 82607 VITAMIN B-12: CPT | Performed by: INTERNAL MEDICINE

## 2022-04-05 PROCEDURE — 80048 BASIC METABOLIC PNL TOTAL CA: CPT | Performed by: THORACIC SURGERY (CARDIOTHORACIC VASCULAR SURGERY)

## 2022-04-05 PROCEDURE — 82140 ASSAY OF AMMONIA: CPT | Performed by: INTERNAL MEDICINE

## 2022-04-05 PROCEDURE — 25010000002 FUROSEMIDE PER 20 MG: Performed by: INTERNAL MEDICINE

## 2022-04-05 PROCEDURE — 99024 POSTOP FOLLOW-UP VISIT: CPT | Performed by: NURSE PRACTITIONER

## 2022-04-05 PROCEDURE — 82803 BLOOD GASES ANY COMBINATION: CPT

## 2022-04-05 PROCEDURE — 85027 COMPLETE CBC AUTOMATED: CPT | Performed by: NURSE PRACTITIONER

## 2022-04-05 PROCEDURE — 71045 X-RAY EXAM CHEST 1 VIEW: CPT

## 2022-04-05 PROCEDURE — 99233 SBSQ HOSP IP/OBS HIGH 50: CPT | Performed by: INTERNAL MEDICINE

## 2022-04-05 PROCEDURE — 83036 HEMOGLOBIN GLYCOSYLATED A1C: CPT | Performed by: INTERNAL MEDICINE

## 2022-04-05 PROCEDURE — 82746 ASSAY OF FOLIC ACID SERUM: CPT | Performed by: INTERNAL MEDICINE

## 2022-04-05 PROCEDURE — 86592 SYPHILIS TEST NON-TREP QUAL: CPT | Performed by: INTERNAL MEDICINE

## 2022-04-05 PROCEDURE — 84443 ASSAY THYROID STIM HORMONE: CPT | Performed by: INTERNAL MEDICINE

## 2022-04-05 RX ORDER — METOPROLOL TARTRATE 50 MG/1
50 TABLET, FILM COATED ORAL EVERY 12 HOURS SCHEDULED
Qty: 60 TABLET | Refills: 0 | Status: SHIPPED | OUTPATIENT
Start: 2022-04-05 | End: 2022-04-14

## 2022-04-05 RX ORDER — FLUOXETINE HYDROCHLORIDE 20 MG/1
20 CAPSULE ORAL NIGHTLY
Status: DISCONTINUED | OUTPATIENT
Start: 2022-04-05 | End: 2022-04-08 | Stop reason: HOSPADM

## 2022-04-05 RX ORDER — METOPROLOL SUCCINATE 100 MG/1
100 TABLET, EXTENDED RELEASE ORAL NIGHTLY
Status: DISCONTINUED | OUTPATIENT
Start: 2022-04-05 | End: 2022-04-08 | Stop reason: HOSPADM

## 2022-04-05 RX ORDER — FUROSEMIDE 10 MG/ML
40 INJECTION INTRAMUSCULAR; INTRAVENOUS EVERY 12 HOURS
Status: DISCONTINUED | OUTPATIENT
Start: 2022-04-05 | End: 2022-04-07

## 2022-04-05 RX ORDER — LORAZEPAM 1 MG/1
1 TABLET ORAL EVERY 8 HOURS PRN
Status: DISCONTINUED | OUTPATIENT
Start: 2022-04-05 | End: 2022-04-08 | Stop reason: HOSPADM

## 2022-04-05 RX ORDER — AMIODARONE HYDROCHLORIDE 200 MG/1
200 TABLET ORAL 2 TIMES DAILY
Qty: 180 TABLET | Refills: 0 | Status: SHIPPED | OUTPATIENT
Start: 2022-04-05 | End: 2022-04-14

## 2022-04-05 RX ORDER — ASPIRIN 81 MG/1
81 TABLET ORAL DAILY
Start: 2022-04-06 | End: 2022-04-14

## 2022-04-05 RX ORDER — LORAZEPAM 0.5 MG/1
0.5 TABLET ORAL ONCE
Status: COMPLETED | OUTPATIENT
Start: 2022-04-05 | End: 2022-04-05

## 2022-04-05 RX ORDER — LORAZEPAM 1 MG/1
1 TABLET ORAL NIGHTLY
Status: DISCONTINUED | OUTPATIENT
Start: 2022-04-05 | End: 2022-04-08 | Stop reason: HOSPADM

## 2022-04-05 RX ADMIN — POTASSIUM CHLORIDE 20 MEQ: 10 TABLET, EXTENDED RELEASE ORAL at 08:26

## 2022-04-05 RX ADMIN — FUROSEMIDE 40 MG: 10 INJECTION, SOLUTION INTRAMUSCULAR; INTRAVENOUS at 23:48

## 2022-04-05 RX ADMIN — FUROSEMIDE 40 MG: 10 INJECTION, SOLUTION INTRAMUSCULAR; INTRAVENOUS at 12:44

## 2022-04-05 RX ADMIN — PANTOPRAZOLE SODIUM 40 MG: 40 TABLET, DELAYED RELEASE ORAL at 06:16

## 2022-04-05 RX ADMIN — LORAZEPAM 0.5 MG: 0.5 TABLET ORAL at 04:17

## 2022-04-05 RX ADMIN — AMIODARONE HYDROCHLORIDE 400 MG: 200 TABLET ORAL at 11:32

## 2022-04-05 RX ADMIN — ACETAMINOPHEN 650 MG: 325 TABLET ORAL at 16:20

## 2022-04-05 RX ADMIN — DOCUSATE SODIUM 50MG AND SENNOSIDES 8.6MG 2 TABLET: 8.6; 5 TABLET, FILM COATED ORAL at 08:26

## 2022-04-05 RX ADMIN — FUROSEMIDE 40 MG: 40 TABLET ORAL at 08:26

## 2022-04-05 RX ADMIN — ASPIRIN 81 MG: 81 TABLET, COATED ORAL at 08:26

## 2022-04-05 RX ADMIN — METOPROLOL TARTRATE 50 MG: 50 TABLET, FILM COATED ORAL at 08:26

## 2022-04-05 RX ADMIN — AMIODARONE HYDROCHLORIDE 400 MG: 200 TABLET ORAL at 16:19

## 2022-04-05 RX ADMIN — APIXABAN 5 MG: 5 TABLET, FILM COATED ORAL at 20:19

## 2022-04-05 RX ADMIN — MUPIROCIN 1 APPLICATION: 20 OINTMENT TOPICAL at 08:26

## 2022-04-05 RX ADMIN — AMIODARONE HYDROCHLORIDE 400 MG: 200 TABLET ORAL at 06:38

## 2022-04-05 RX ADMIN — ATORVASTATIN CALCIUM 40 MG: 20 TABLET, FILM COATED ORAL at 20:19

## 2022-04-05 RX ADMIN — MUPIROCIN 1 APPLICATION: 20 OINTMENT TOPICAL at 20:18

## 2022-04-05 RX ADMIN — DOCUSATE SODIUM 50MG AND SENNOSIDES 8.6MG 2 TABLET: 8.6; 5 TABLET, FILM COATED ORAL at 20:19

## 2022-04-05 RX ADMIN — LORAZEPAM 1 MG: 1 TABLET ORAL at 12:44

## 2022-04-05 RX ADMIN — GUAIFENESIN 1200 MG: 600 TABLET, EXTENDED RELEASE ORAL at 20:19

## 2022-04-05 RX ADMIN — GUAIFENESIN 1200 MG: 600 TABLET, EXTENDED RELEASE ORAL at 08:26

## 2022-04-05 RX ADMIN — LORAZEPAM 1 MG: 1 TABLET ORAL at 20:21

## 2022-04-05 RX ADMIN — APIXABAN 5 MG: 5 TABLET, FILM COATED ORAL at 08:26

## 2022-04-05 RX ADMIN — METOPROLOL SUCCINATE 100 MG: 100 TABLET, EXTENDED RELEASE ORAL at 20:19

## 2022-04-05 RX ADMIN — FLUOXETINE HYDROCHLORIDE 20 MG: 20 CAPSULE ORAL at 23:33

## 2022-04-06 ENCOUNTER — APPOINTMENT (OUTPATIENT)
Dept: GENERAL RADIOLOGY | Facility: HOSPITAL | Age: 82
End: 2022-04-06

## 2022-04-06 ENCOUNTER — APPOINTMENT (OUTPATIENT)
Dept: CT IMAGING | Facility: HOSPITAL | Age: 82
End: 2022-04-06

## 2022-04-06 ENCOUNTER — APPOINTMENT (OUTPATIENT)
Dept: MRI IMAGING | Facility: HOSPITAL | Age: 82
End: 2022-04-06

## 2022-04-06 PROBLEM — I38 VALVULAR HEART DISEASE: Status: ACTIVE | Noted: 2022-04-06

## 2022-04-06 PROBLEM — R41.0 CONFUSION: Status: ACTIVE | Noted: 2022-04-06

## 2022-04-06 PROBLEM — D64.9 ANEMIA: Status: ACTIVE | Noted: 2022-04-06

## 2022-04-06 PROBLEM — I10 ESSENTIAL HYPERTENSION: Status: ACTIVE | Noted: 2022-04-06

## 2022-04-06 PROBLEM — E87.6 HYPOKALEMIA: Status: ACTIVE | Noted: 2022-04-06

## 2022-04-06 PROBLEM — N28.89 RIGHT KIDNEY MASS: Status: ACTIVE | Noted: 2022-04-06

## 2022-04-06 PROBLEM — Z86.73 RECENT CEREBROVASCULAR ACCIDENT (CVA): Status: ACTIVE | Noted: 2022-04-06

## 2022-04-06 PROBLEM — E11.9 TYPE 2 DIABETES MELLITUS: Status: ACTIVE | Noted: 2022-04-06

## 2022-04-06 LAB
ALBUMIN SERPL-MCNC: 3.9 G/DL (ref 3.5–5.2)
ALBUMIN/GLOB SERPL: 1.3 G/DL
ALP SERPL-CCNC: 52 U/L (ref 39–117)
ALT SERPL W P-5'-P-CCNC: 32 U/L (ref 1–33)
ANION GAP SERPL CALCULATED.3IONS-SCNC: 11.9 MMOL/L (ref 5–15)
AST SERPL-CCNC: 33 U/L (ref 1–32)
BACTERIA UR QL AUTO: NORMAL /HPF
BILIRUB SERPL-MCNC: 1 MG/DL (ref 0–1.2)
BILIRUB UR QL STRIP: NEGATIVE
BUN SERPL-MCNC: 14 MG/DL (ref 8–23)
BUN/CREAT SERPL: 19.2 (ref 7–25)
CALCIUM SPEC-SCNC: 9 MG/DL (ref 8.6–10.5)
CHLORIDE SERPL-SCNC: 97 MMOL/L (ref 98–107)
CLARITY UR: CLEAR
CO2 SERPL-SCNC: 32.1 MMOL/L (ref 22–29)
COLOR UR: YELLOW
CREAT SERPL-MCNC: 0.73 MG/DL (ref 0.57–1)
DEPRECATED RDW RBC AUTO: 51.8 FL (ref 37–54)
EGFRCR SERPLBLD CKD-EPI 2021: 82.2 ML/MIN/1.73
ERYTHROCYTE [DISTWIDTH] IN BLOOD BY AUTOMATED COUNT: 13.2 % (ref 12.3–15.4)
GLOBULIN UR ELPH-MCNC: 2.9 GM/DL
GLUCOSE BLDC GLUCOMTR-MCNC: 124 MG/DL (ref 70–130)
GLUCOSE SERPL-MCNC: 115 MG/DL (ref 65–99)
GLUCOSE UR STRIP-MCNC: NEGATIVE MG/DL
HCT VFR BLD AUTO: 35.5 % (ref 34–46.6)
HGB BLD-MCNC: 11.4 G/DL (ref 12–15.9)
HGB UR QL STRIP.AUTO: NEGATIVE
HYALINE CASTS UR QL AUTO: NORMAL /LPF
KETONES UR QL STRIP: NEGATIVE
LEUKOCYTE ESTERASE UR QL STRIP.AUTO: ABNORMAL
MAGNESIUM SERPL-MCNC: 1.8 MG/DL (ref 1.6–2.4)
MCH RBC QN AUTO: 34.3 PG (ref 26.6–33)
MCHC RBC AUTO-ENTMCNC: 32.1 G/DL (ref 31.5–35.7)
MCV RBC AUTO: 106.9 FL (ref 79–97)
NITRITE UR QL STRIP: NEGATIVE
PH UR STRIP.AUTO: 5.5 [PH] (ref 5–8)
PLATELET # BLD AUTO: 240 10*3/MM3 (ref 140–450)
PMV BLD AUTO: 10.5 FL (ref 6–12)
POTASSIUM SERPL-SCNC: 2.9 MMOL/L (ref 3.5–5.2)
POTASSIUM SERPL-SCNC: 3.8 MMOL/L (ref 3.5–5.2)
PROT SERPL-MCNC: 6.8 G/DL (ref 6–8.5)
PROT UR QL STRIP: NEGATIVE
RBC # BLD AUTO: 3.32 10*6/MM3 (ref 3.77–5.28)
RBC # UR STRIP: NORMAL /HPF
REF LAB TEST METHOD: NORMAL
RPR SER QL: NORMAL
SODIUM SERPL-SCNC: 141 MMOL/L (ref 136–145)
SP GR UR STRIP: 1.01 (ref 1–1.03)
SQUAMOUS #/AREA URNS HPF: NORMAL /HPF
UROBILINOGEN UR QL STRIP: ABNORMAL
WBC # UR STRIP: NORMAL /HPF
WBC NRBC COR # BLD: 9.76 10*3/MM3 (ref 3.4–10.8)

## 2022-04-06 PROCEDURE — 83735 ASSAY OF MAGNESIUM: CPT | Performed by: INTERNAL MEDICINE

## 2022-04-06 PROCEDURE — 94762 N-INVAS EAR/PLS OXIMTRY CONT: CPT

## 2022-04-06 PROCEDURE — 84132 ASSAY OF SERUM POTASSIUM: CPT | Performed by: THORACIC SURGERY (CARDIOTHORACIC VASCULAR SURGERY)

## 2022-04-06 PROCEDURE — A9577 INJ MULTIHANCE: HCPCS | Performed by: THORACIC SURGERY (CARDIOTHORACIC VASCULAR SURGERY)

## 2022-04-06 PROCEDURE — 25010000002 ONDANSETRON PER 1 MG: Performed by: THORACIC SURGERY (CARDIOTHORACIC VASCULAR SURGERY)

## 2022-04-06 PROCEDURE — 80053 COMPREHEN METABOLIC PANEL: CPT | Performed by: INTERNAL MEDICINE

## 2022-04-06 PROCEDURE — 99232 SBSQ HOSP IP/OBS MODERATE 35: CPT | Performed by: INTERNAL MEDICINE

## 2022-04-06 PROCEDURE — 0 GADOBENATE DIMEGLUMINE 529 MG/ML SOLUTION: Performed by: THORACIC SURGERY (CARDIOTHORACIC VASCULAR SURGERY)

## 2022-04-06 PROCEDURE — 94799 UNLISTED PULMONARY SVC/PX: CPT

## 2022-04-06 PROCEDURE — 70553 MRI BRAIN STEM W/O & W/DYE: CPT

## 2022-04-06 PROCEDURE — 94760 N-INVAS EAR/PLS OXIMETRY 1: CPT

## 2022-04-06 PROCEDURE — 71045 X-RAY EXAM CHEST 1 VIEW: CPT

## 2022-04-06 PROCEDURE — 25010000002 FUROSEMIDE PER 20 MG: Performed by: INTERNAL MEDICINE

## 2022-04-06 PROCEDURE — 70450 CT HEAD/BRAIN W/O DYE: CPT

## 2022-04-06 PROCEDURE — 82962 GLUCOSE BLOOD TEST: CPT

## 2022-04-06 PROCEDURE — 81001 URINALYSIS AUTO W/SCOPE: CPT | Performed by: INTERNAL MEDICINE

## 2022-04-06 PROCEDURE — 85027 COMPLETE CBC AUTOMATED: CPT | Performed by: NURSE PRACTITIONER

## 2022-04-06 RX ORDER — POTASSIUM CHLORIDE 750 MG/1
20 TABLET, FILM COATED, EXTENDED RELEASE ORAL
Status: DISCONTINUED | OUTPATIENT
Start: 2022-04-06 | End: 2022-04-07

## 2022-04-06 RX ADMIN — ASPIRIN 81 MG: 81 TABLET, COATED ORAL at 13:11

## 2022-04-06 RX ADMIN — FLUOXETINE HYDROCHLORIDE 20 MG: 20 CAPSULE ORAL at 20:32

## 2022-04-06 RX ADMIN — POTASSIUM CHLORIDE 40 MEQ: 750 TABLET, EXTENDED RELEASE ORAL at 06:47

## 2022-04-06 RX ADMIN — AMIODARONE HYDROCHLORIDE 400 MG: 200 TABLET ORAL at 17:14

## 2022-04-06 RX ADMIN — GADOBENATE DIMEGLUMINE 17 ML: 529 INJECTION, SOLUTION INTRAVENOUS at 11:39

## 2022-04-06 RX ADMIN — APIXABAN 5 MG: 5 TABLET, FILM COATED ORAL at 13:11

## 2022-04-06 RX ADMIN — POTASSIUM CHLORIDE 20 MEQ: 750 TABLET, EXTENDED RELEASE ORAL at 13:18

## 2022-04-06 RX ADMIN — POTASSIUM CHLORIDE 20 MEQ: 750 TABLET, EXTENDED RELEASE ORAL at 09:41

## 2022-04-06 RX ADMIN — ACETAMINOPHEN 650 MG: 325 TABLET ORAL at 17:14

## 2022-04-06 RX ADMIN — GUAIFENESIN 1200 MG: 600 TABLET, EXTENDED RELEASE ORAL at 09:41

## 2022-04-06 RX ADMIN — PANTOPRAZOLE SODIUM 40 MG: 40 TABLET, DELAYED RELEASE ORAL at 05:56

## 2022-04-06 RX ADMIN — AMIODARONE HYDROCHLORIDE 400 MG: 200 TABLET ORAL at 06:47

## 2022-04-06 RX ADMIN — EMPAGLIFLOZIN 10 MG: 10 TABLET, FILM COATED ORAL at 18:56

## 2022-04-06 RX ADMIN — ONDANSETRON 4 MG: 2 INJECTION INTRAMUSCULAR; INTRAVENOUS at 00:34

## 2022-04-06 RX ADMIN — METOPROLOL SUCCINATE 100 MG: 100 TABLET, EXTENDED RELEASE ORAL at 20:32

## 2022-04-06 RX ADMIN — APIXABAN 5 MG: 5 TABLET, FILM COATED ORAL at 20:32

## 2022-04-06 RX ADMIN — ONDANSETRON 4 MG: 2 INJECTION INTRAMUSCULAR; INTRAVENOUS at 21:24

## 2022-04-06 RX ADMIN — AMIODARONE HYDROCHLORIDE 400 MG: 200 TABLET ORAL at 13:11

## 2022-04-06 RX ADMIN — GUAIFENESIN 1200 MG: 600 TABLET, EXTENDED RELEASE ORAL at 20:32

## 2022-04-06 RX ADMIN — LORAZEPAM 1 MG: 1 TABLET ORAL at 20:32

## 2022-04-06 RX ADMIN — FUROSEMIDE 40 MG: 10 INJECTION, SOLUTION INTRAMUSCULAR; INTRAVENOUS at 13:11

## 2022-04-06 RX ADMIN — POTASSIUM CHLORIDE 20 MEQ: 750 TABLET, EXTENDED RELEASE ORAL at 18:56

## 2022-04-06 RX ADMIN — MUPIROCIN 1 APPLICATION: 20 OINTMENT TOPICAL at 09:41

## 2022-04-06 RX ADMIN — MUPIROCIN 1 APPLICATION: 20 OINTMENT TOPICAL at 20:32

## 2022-04-06 RX ADMIN — ATORVASTATIN CALCIUM 40 MG: 20 TABLET, FILM COATED ORAL at 20:32

## 2022-04-07 LAB
ANION GAP SERPL CALCULATED.3IONS-SCNC: 12 MMOL/L (ref 5–15)
BUN SERPL-MCNC: 15 MG/DL (ref 8–23)
BUN/CREAT SERPL: 17.4 (ref 7–25)
CALCIUM SPEC-SCNC: 9 MG/DL (ref 8.6–10.5)
CHLORIDE SERPL-SCNC: 100 MMOL/L (ref 98–107)
CO2 SERPL-SCNC: 31 MMOL/L (ref 22–29)
CREAT SERPL-MCNC: 0.86 MG/DL (ref 0.57–1)
DEPRECATED RDW RBC AUTO: 51 FL (ref 37–54)
EGFRCR SERPLBLD CKD-EPI 2021: 67.5 ML/MIN/1.73
ERYTHROCYTE [DISTWIDTH] IN BLOOD BY AUTOMATED COUNT: 13.3 % (ref 12.3–15.4)
GLUCOSE BLDC GLUCOMTR-MCNC: 116 MG/DL (ref 70–130)
GLUCOSE BLDC GLUCOMTR-MCNC: 117 MG/DL (ref 70–130)
GLUCOSE BLDC GLUCOMTR-MCNC: 124 MG/DL (ref 70–130)
GLUCOSE BLDC GLUCOMTR-MCNC: 94 MG/DL (ref 70–130)
GLUCOSE SERPL-MCNC: 120 MG/DL (ref 65–99)
HCT VFR BLD AUTO: 34.9 % (ref 34–46.6)
HGB BLD-MCNC: 11.3 G/DL (ref 12–15.9)
MCH RBC QN AUTO: 34.2 PG (ref 26.6–33)
MCHC RBC AUTO-ENTMCNC: 32.4 G/DL (ref 31.5–35.7)
MCV RBC AUTO: 105.8 FL (ref 79–97)
PLATELET # BLD AUTO: 240 10*3/MM3 (ref 140–450)
PMV BLD AUTO: 10.4 FL (ref 6–12)
POTASSIUM SERPL-SCNC: 4.1 MMOL/L (ref 3.5–5.2)
RBC # BLD AUTO: 3.3 10*6/MM3 (ref 3.77–5.28)
SODIUM SERPL-SCNC: 143 MMOL/L (ref 136–145)
WBC NRBC COR # BLD: 8.72 10*3/MM3 (ref 3.4–10.8)

## 2022-04-07 PROCEDURE — 99232 SBSQ HOSP IP/OBS MODERATE 35: CPT | Performed by: INTERNAL MEDICINE

## 2022-04-07 PROCEDURE — 80048 BASIC METABOLIC PNL TOTAL CA: CPT | Performed by: THORACIC SURGERY (CARDIOTHORACIC VASCULAR SURGERY)

## 2022-04-07 PROCEDURE — 82962 GLUCOSE BLOOD TEST: CPT

## 2022-04-07 PROCEDURE — 94762 N-INVAS EAR/PLS OXIMTRY CONT: CPT

## 2022-04-07 PROCEDURE — 94799 UNLISTED PULMONARY SVC/PX: CPT

## 2022-04-07 PROCEDURE — 25010000002 FUROSEMIDE PER 20 MG: Performed by: INTERNAL MEDICINE

## 2022-04-07 PROCEDURE — 85027 COMPLETE CBC AUTOMATED: CPT | Performed by: INTERNAL MEDICINE

## 2022-04-07 RX ORDER — POTASSIUM CHLORIDE 750 MG/1
20 TABLET, FILM COATED, EXTENDED RELEASE ORAL 2 TIMES DAILY WITH MEALS
Status: DISCONTINUED | OUTPATIENT
Start: 2022-04-07 | End: 2022-04-08 | Stop reason: HOSPADM

## 2022-04-07 RX ORDER — FUROSEMIDE 40 MG/1
40 TABLET ORAL
Status: DISCONTINUED | OUTPATIENT
Start: 2022-04-07 | End: 2022-04-08 | Stop reason: HOSPADM

## 2022-04-07 RX ADMIN — FUROSEMIDE 40 MG: 40 TABLET ORAL at 08:05

## 2022-04-07 RX ADMIN — AMIODARONE HYDROCHLORIDE 400 MG: 200 TABLET ORAL at 18:47

## 2022-04-07 RX ADMIN — POTASSIUM CHLORIDE 20 MEQ: 10 TABLET, EXTENDED RELEASE ORAL at 18:47

## 2022-04-07 RX ADMIN — EMPAGLIFLOZIN 10 MG: 10 TABLET, FILM COATED ORAL at 07:59

## 2022-04-07 RX ADMIN — POTASSIUM CHLORIDE 20 MEQ: 10 TABLET, EXTENDED RELEASE ORAL at 07:58

## 2022-04-07 RX ADMIN — APIXABAN 5 MG: 5 TABLET, FILM COATED ORAL at 20:21

## 2022-04-07 RX ADMIN — GUAIFENESIN 1200 MG: 600 TABLET, EXTENDED RELEASE ORAL at 07:57

## 2022-04-07 RX ADMIN — GUAIFENESIN 1200 MG: 600 TABLET, EXTENDED RELEASE ORAL at 20:21

## 2022-04-07 RX ADMIN — METOPROLOL SUCCINATE 100 MG: 100 TABLET, EXTENDED RELEASE ORAL at 20:20

## 2022-04-07 RX ADMIN — ASPIRIN 81 MG: 81 TABLET, COATED ORAL at 07:58

## 2022-04-07 RX ADMIN — MUPIROCIN 1 APPLICATION: 20 OINTMENT TOPICAL at 07:56

## 2022-04-07 RX ADMIN — APIXABAN 5 MG: 5 TABLET, FILM COATED ORAL at 07:57

## 2022-04-07 RX ADMIN — FUROSEMIDE 40 MG: 40 TABLET ORAL at 18:47

## 2022-04-07 RX ADMIN — ACETAMINOPHEN 650 MG: 325 TABLET ORAL at 18:10

## 2022-04-07 RX ADMIN — FLUOXETINE HYDROCHLORIDE 20 MG: 20 CAPSULE ORAL at 20:21

## 2022-04-07 RX ADMIN — PANTOPRAZOLE SODIUM 40 MG: 40 TABLET, DELAYED RELEASE ORAL at 05:47

## 2022-04-07 RX ADMIN — ATORVASTATIN CALCIUM 40 MG: 20 TABLET, FILM COATED ORAL at 20:20

## 2022-04-07 RX ADMIN — AMIODARONE HYDROCHLORIDE 400 MG: 200 TABLET ORAL at 07:56

## 2022-04-07 RX ADMIN — LORAZEPAM 1 MG: 1 TABLET ORAL at 20:21

## 2022-04-07 RX ADMIN — MUPIROCIN 1 APPLICATION: 20 OINTMENT TOPICAL at 20:21

## 2022-04-07 RX ADMIN — FUROSEMIDE 40 MG: 10 INJECTION, SOLUTION INTRAMUSCULAR; INTRAVENOUS at 00:23

## 2022-04-08 ENCOUNTER — READMISSION MANAGEMENT (OUTPATIENT)
Dept: CALL CENTER | Facility: HOSPITAL | Age: 82
End: 2022-04-08

## 2022-04-08 VITALS
HEIGHT: 65 IN | WEIGHT: 179.9 LBS | RESPIRATION RATE: 16 BRPM | TEMPERATURE: 98.2 F | HEART RATE: 87 BPM | DIASTOLIC BLOOD PRESSURE: 45 MMHG | OXYGEN SATURATION: 94 % | BODY MASS INDEX: 29.97 KG/M2 | SYSTOLIC BLOOD PRESSURE: 105 MMHG

## 2022-04-08 LAB
ANION GAP SERPL CALCULATED.3IONS-SCNC: 10 MMOL/L (ref 5–15)
BUN SERPL-MCNC: 18 MG/DL (ref 8–23)
BUN/CREAT SERPL: 19.6 (ref 7–25)
CALCIUM SPEC-SCNC: 8.9 MG/DL (ref 8.6–10.5)
CHLORIDE SERPL-SCNC: 100 MMOL/L (ref 98–107)
CO2 SERPL-SCNC: 30 MMOL/L (ref 22–29)
CREAT SERPL-MCNC: 0.92 MG/DL (ref 0.57–1)
DEPRECATED RDW RBC AUTO: 51.6 FL (ref 37–54)
EGFRCR SERPLBLD CKD-EPI 2021: 62.3 ML/MIN/1.73
ERYTHROCYTE [DISTWIDTH] IN BLOOD BY AUTOMATED COUNT: 13.6 % (ref 12.3–15.4)
GLUCOSE BLDC GLUCOMTR-MCNC: 108 MG/DL (ref 70–130)
GLUCOSE BLDC GLUCOMTR-MCNC: 128 MG/DL (ref 70–130)
GLUCOSE SERPL-MCNC: 114 MG/DL (ref 65–99)
HCT VFR BLD AUTO: 33.2 % (ref 34–46.6)
HGB BLD-MCNC: 10.8 G/DL (ref 12–15.9)
MCH RBC QN AUTO: 34.3 PG (ref 26.6–33)
MCHC RBC AUTO-ENTMCNC: 32.5 G/DL (ref 31.5–35.7)
MCV RBC AUTO: 105.4 FL (ref 79–97)
PLATELET # BLD AUTO: 246 10*3/MM3 (ref 140–450)
PMV BLD AUTO: 10.3 FL (ref 6–12)
POTASSIUM SERPL-SCNC: 3.8 MMOL/L (ref 3.5–5.2)
QT INTERVAL: 395 MS
RBC # BLD AUTO: 3.15 10*6/MM3 (ref 3.77–5.28)
SODIUM SERPL-SCNC: 140 MMOL/L (ref 136–145)
WBC NRBC COR # BLD: 6.64 10*3/MM3 (ref 3.4–10.8)

## 2022-04-08 PROCEDURE — 94618 PULMONARY STRESS TESTING: CPT

## 2022-04-08 PROCEDURE — 93010 ELECTROCARDIOGRAM REPORT: CPT | Performed by: INTERNAL MEDICINE

## 2022-04-08 PROCEDURE — 99232 SBSQ HOSP IP/OBS MODERATE 35: CPT | Performed by: INTERNAL MEDICINE

## 2022-04-08 PROCEDURE — 82962 GLUCOSE BLOOD TEST: CPT

## 2022-04-08 PROCEDURE — 93005 ELECTROCARDIOGRAM TRACING: CPT | Performed by: INTERNAL MEDICINE

## 2022-04-08 PROCEDURE — 97116 GAIT TRAINING THERAPY: CPT

## 2022-04-08 PROCEDURE — 80048 BASIC METABOLIC PNL TOTAL CA: CPT | Performed by: THORACIC SURGERY (CARDIOTHORACIC VASCULAR SURGERY)

## 2022-04-08 PROCEDURE — 85027 COMPLETE CBC AUTOMATED: CPT | Performed by: INTERNAL MEDICINE

## 2022-04-08 RX ORDER — ATORVASTATIN CALCIUM 40 MG/1
40 TABLET, FILM COATED ORAL NIGHTLY
Qty: 30 TABLET | Refills: 3 | Status: SHIPPED | OUTPATIENT
Start: 2022-04-08 | End: 2022-10-20

## 2022-04-08 RX ADMIN — ACETAMINOPHEN 650 MG: 325 TABLET ORAL at 02:38

## 2022-04-08 RX ADMIN — ASPIRIN 81 MG: 81 TABLET, COATED ORAL at 08:18

## 2022-04-08 RX ADMIN — GUAIFENESIN 1200 MG: 600 TABLET, EXTENDED RELEASE ORAL at 08:18

## 2022-04-08 RX ADMIN — POTASSIUM CHLORIDE 20 MEQ: 10 TABLET, EXTENDED RELEASE ORAL at 08:18

## 2022-04-08 RX ADMIN — FUROSEMIDE 40 MG: 40 TABLET ORAL at 08:18

## 2022-04-08 RX ADMIN — AMIODARONE HYDROCHLORIDE 400 MG: 200 TABLET ORAL at 08:18

## 2022-04-08 RX ADMIN — PANTOPRAZOLE SODIUM 40 MG: 40 TABLET, DELAYED RELEASE ORAL at 06:14

## 2022-04-08 RX ADMIN — MUPIROCIN 1 APPLICATION: 20 OINTMENT TOPICAL at 08:17

## 2022-04-08 RX ADMIN — EMPAGLIFLOZIN 10 MG: 10 TABLET, FILM COATED ORAL at 08:18

## 2022-04-08 RX ADMIN — APIXABAN 5 MG: 5 TABLET, FILM COATED ORAL at 08:18

## 2022-04-09 ENCOUNTER — HOME CARE VISIT (OUTPATIENT)
Dept: HOME HEALTH SERVICES | Facility: HOME HEALTHCARE | Age: 82
End: 2022-04-09

## 2022-04-09 ENCOUNTER — HOME HEALTH ADMISSION (OUTPATIENT)
Dept: HOME HEALTH SERVICES | Facility: HOME HEALTHCARE | Age: 82
End: 2022-04-09

## 2022-04-09 VITALS
WEIGHT: 168.5 LBS | HEIGHT: 65 IN | HEART RATE: 100 BPM | RESPIRATION RATE: 20 BRPM | TEMPERATURE: 98 F | DIASTOLIC BLOOD PRESSURE: 66 MMHG | BODY MASS INDEX: 28.07 KG/M2 | SYSTOLIC BLOOD PRESSURE: 96 MMHG

## 2022-04-09 LAB
BH BB BLOOD EXPIRATION DATE: NORMAL
BH BB BLOOD EXPIRATION DATE: NORMAL
BH BB BLOOD TYPE BARCODE: 5100
BH BB BLOOD TYPE BARCODE: 5100
BH BB DISPENSE STATUS: NORMAL
BH BB DISPENSE STATUS: NORMAL
BH BB PRODUCT CODE: NORMAL
BH BB PRODUCT CODE: NORMAL
BH BB UNIT NUMBER: NORMAL
BH BB UNIT NUMBER: NORMAL
CROSSMATCH INTERPRETATION: NORMAL
CROSSMATCH INTERPRETATION: NORMAL
UNIT  ABO: NORMAL
UNIT  ABO: NORMAL
UNIT  RH: NORMAL
UNIT  RH: NORMAL

## 2022-04-09 PROCEDURE — G0299 HHS/HOSPICE OF RN EA 15 MIN: HCPCS

## 2022-04-09 NOTE — OUTREACH NOTE
Prep Survey    Flowsheet Row Responses   [facility] patient discharged from? [facility]   Is LACE score < 7 ? No   Emergency Room discharge w/ pulse ox? No   Eligibility Readm Mgmt   Discharge diagnosis Severe CAD with multiple stents & in-stent stenosis, aortic stenosis, mitral incompetence s/p CABG x4 utilizing LIMA/LSVG, AVR, MVr, MAZE, closure of left atrial appendage    Does the patient have one of the following disease processes/diagnoses(primary or secondary)? Cardiothoracic surgery   Does the patient have Home health ordered? Yes   What is the Home health agency?  [facility]   Is there a DME ordered? Yes   What DME was ordered? Oxygen per [facility]    Medication alerts for this patient Amiodarone HCL, ASA    Prep survey completed? Yes          LIYA BARBA - Registered Nurse

## 2022-04-10 ENCOUNTER — HOME CARE VISIT (OUTPATIENT)
Dept: HOME HEALTH SERVICES | Facility: HOME HEALTHCARE | Age: 82
End: 2022-04-10

## 2022-04-10 VITALS
TEMPERATURE: 97.2 F | OXYGEN SATURATION: 97 % | SYSTOLIC BLOOD PRESSURE: 116 MMHG | HEART RATE: 101 BPM | DIASTOLIC BLOOD PRESSURE: 70 MMHG

## 2022-04-10 PROCEDURE — G0151 HHCP-SERV OF PT,EA 15 MIN: HCPCS

## 2022-04-10 NOTE — HOME HEALTH
Reason for referral Patient has decreased strength, activity tolerance, difficulty with transfers, abnormal gait due to CABG    Diagnosis Patient in [facility] 3/28 to 4/8/22 with CAD    surgical procedure CABG x 4    PMHx CAD, CHF, renal mass, cardiac catheterization, A fib, anxiety and depression, HTN, IBS, DVT     Prior level of function  Patient ambulated with rollator walker due to SOB, Patient independent with ADLs, independent with cooking and laundry but would get SOB.     Home social environment Patient has 3 steps to enter home, resides with spouse. Daughter staying to assist.    Next MD appointment 4/13/22

## 2022-04-10 NOTE — HOME HEALTH
PT SEEN BY  3/28-4/8  POST OP CABGX4 WITH L JESSA, AVR AND MVR AND MAZE AND CLOSURE OF L ATRIUM APPENDAGE. PAF POST OP ON ELIQUIS.   PT ALSO WITH HX OF CHF, NEWLY DX RENAL MASS, ANXIETY /DEPRESSION HTN, HLD, IBS

## 2022-04-12 ENCOUNTER — HOME CARE VISIT (OUTPATIENT)
Dept: HOME HEALTH SERVICES | Facility: HOME HEALTHCARE | Age: 82
End: 2022-04-12

## 2022-04-12 ENCOUNTER — READMISSION MANAGEMENT (OUTPATIENT)
Dept: CALL CENTER | Facility: HOSPITAL | Age: 82
End: 2022-04-12

## 2022-04-12 ENCOUNTER — TELEPHONE (OUTPATIENT)
Dept: CARDIOLOGY | Facility: CLINIC | Age: 82
End: 2022-04-12

## 2022-04-12 VITALS
HEART RATE: 58 BPM | BODY MASS INDEX: 28.12 KG/M2 | OXYGEN SATURATION: 95 % | SYSTOLIC BLOOD PRESSURE: 130 MMHG | WEIGHT: 169 LBS | DIASTOLIC BLOOD PRESSURE: 72 MMHG | RESPIRATION RATE: 20 BRPM

## 2022-04-12 PROCEDURE — G0495 RN CARE TRAIN/EDU IN HH: HCPCS

## 2022-04-12 NOTE — TELEPHONE ENCOUNTER
Spoke with Evette about recommendations, she verbalized understanding and is going to reach out to the patient.    Radha Cisse RN  Triage MG

## 2022-04-12 NOTE — OUTREACH NOTE
CT Surgery Week 1 Survey    Flowsheet Row Responses   [facility] patient discharged from? [facility]   Does the patient have one of the following disease processes/diagnoses(primary or secondary)? Cardiothoracic surgery   Week 1 attempt successful? No   Unsuccessful attempts Attempt 1  [Patient phone attempt x 2 unsuccessful.]            OMARI BRAGG - Registered Nurse

## 2022-04-12 NOTE — TELEPHONE ENCOUNTER
Evette from home health called today in regards to Alice Mabry. During her visit today the patient stated she was more SOA than normal this morning. The nurse said that her weight was unchanged and oxygen level was fine. She said towards the end of visit the patient started to feel better. The only swelling noted is trace edema in her left leg where her graft site was harvested. BP was 130/72. Her HR was 58. This was different than what she had been running. Usually her HR is  per Evette.     It looks as the though patient is scheduled to see you on 4/14. Do you have any recommendations?    Evette can be reached at [phone]    Radha Cisse RN  Triage [facility] [place]

## 2022-04-12 NOTE — HOME HEALTH
Patient A&O x4, increased SOA. Patient is reporting having SOA sometimes at just rest. Noted no weight gain, lungs clear, blood pressure WNL for patient. Trace non pitting edema in left leg. Noted that heart rate is decreased to 58 which is unusual for patient. Called cardiology and notified of findings. MD instructed patient to take an extra bumex today and tomorrow. Patient to see PCP on 4/13 and cardiology on 4/14.

## 2022-04-12 NOTE — TELEPHONE ENCOUNTER
She could take an extra dose of her Bumex today and tomorrow.  I will see her on Friday and make further recommendations at that time.

## 2022-04-14 ENCOUNTER — OFFICE VISIT (OUTPATIENT)
Dept: CARDIOLOGY | Facility: CLINIC | Age: 82
End: 2022-04-14

## 2022-04-14 ENCOUNTER — HOME CARE VISIT (OUTPATIENT)
Dept: HOME HEALTH SERVICES | Facility: HOME HEALTHCARE | Age: 82
End: 2022-04-14

## 2022-04-14 ENCOUNTER — LAB (OUTPATIENT)
Dept: LAB | Facility: HOSPITAL | Age: 82
End: 2022-04-14

## 2022-04-14 VITALS
SYSTOLIC BLOOD PRESSURE: 128 MMHG | TEMPERATURE: 95.8 F | HEART RATE: 64 BPM | OXYGEN SATURATION: 90 % | DIASTOLIC BLOOD PRESSURE: 82 MMHG | RESPIRATION RATE: 20 BRPM

## 2022-04-14 VITALS
WEIGHT: 164 LBS | BODY MASS INDEX: 27.32 KG/M2 | SYSTOLIC BLOOD PRESSURE: 124 MMHG | HEIGHT: 65 IN | HEART RATE: 58 BPM | DIASTOLIC BLOOD PRESSURE: 72 MMHG

## 2022-04-14 DIAGNOSIS — Z95.2 S/P AVR: ICD-10-CM

## 2022-04-14 DIAGNOSIS — Z95.1 S/P CABG (CORONARY ARTERY BYPASS GRAFT): ICD-10-CM

## 2022-04-14 DIAGNOSIS — I50.20 HFREF (HEART FAILURE WITH REDUCED EJECTION FRACTION): ICD-10-CM

## 2022-04-14 DIAGNOSIS — I35.0 NONRHEUMATIC AORTIC VALVE STENOSIS: ICD-10-CM

## 2022-04-14 DIAGNOSIS — R06.09 DYSPNEA ON EXERTION: ICD-10-CM

## 2022-04-14 DIAGNOSIS — I25.118 CORONARY ARTERY DISEASE OF NATIVE HEART WITH STABLE ANGINA PECTORIS, UNSPECIFIED VESSEL OR LESION TYPE: Primary | ICD-10-CM

## 2022-04-14 DIAGNOSIS — I48.0 PAROXYSMAL ATRIAL FIBRILLATION: ICD-10-CM

## 2022-04-14 DIAGNOSIS — G47.33 OSA (OBSTRUCTIVE SLEEP APNEA): ICD-10-CM

## 2022-04-14 DIAGNOSIS — I10 ESSENTIAL HYPERTENSION: ICD-10-CM

## 2022-04-14 LAB
ANION GAP SERPL CALCULATED.3IONS-SCNC: 16.3 MMOL/L (ref 5–15)
BUN SERPL-MCNC: 16 MG/DL (ref 8–23)
BUN/CREAT SERPL: 17.2 (ref 7–25)
CALCIUM SPEC-SCNC: 9.5 MG/DL (ref 8.6–10.5)
CHLORIDE SERPL-SCNC: 100 MMOL/L (ref 98–107)
CO2 SERPL-SCNC: 27.7 MMOL/L (ref 22–29)
CREAT SERPL-MCNC: 0.93 MG/DL (ref 0.57–1)
EGFRCR SERPLBLD CKD-EPI 2021: 61.5 ML/MIN/1.73
GLUCOSE SERPL-MCNC: 102 MG/DL (ref 65–99)
NT-PROBNP SERPL-MCNC: 1994 PG/ML (ref 0–1800)
POTASSIUM SERPL-SCNC: 4 MMOL/L (ref 3.5–5.2)
SODIUM SERPL-SCNC: 144 MMOL/L (ref 136–145)

## 2022-04-14 PROCEDURE — 93000 ELECTROCARDIOGRAM COMPLETE: CPT | Performed by: NURSE PRACTITIONER

## 2022-04-14 PROCEDURE — 36415 COLL VENOUS BLD VENIPUNCTURE: CPT

## 2022-04-14 PROCEDURE — 83880 ASSAY OF NATRIURETIC PEPTIDE: CPT

## 2022-04-14 PROCEDURE — G0151 HHCP-SERV OF PT,EA 15 MIN: HCPCS

## 2022-04-14 PROCEDURE — 80048 BASIC METABOLIC PNL TOTAL CA: CPT

## 2022-04-14 PROCEDURE — 99214 OFFICE O/P EST MOD 30 MIN: CPT | Performed by: NURSE PRACTITIONER

## 2022-04-14 RX ORDER — AMIODARONE HYDROCHLORIDE 200 MG/1
200 TABLET ORAL DAILY
Qty: 180 TABLET | Refills: 0
Start: 2022-04-14 | End: 2022-10-20

## 2022-04-14 RX ORDER — METOPROLOL SUCCINATE 50 MG/1
50 TABLET, EXTENDED RELEASE ORAL DAILY
Qty: 90 TABLET | Refills: 0 | Status: SHIPPED | OUTPATIENT
Start: 2022-04-14 | End: 2022-08-23 | Stop reason: SDUPTHER

## 2022-04-14 NOTE — PROGRESS NOTES
Date of Office Visit: 2022  Encounter Provider: JOHNNIE Sadler  Place of Service: [facility] CARDIOLOGY  Patient Name: Alice Mabry  :1940    Chief Complaint   Patient presents with   • Coronary Artery Disease   :     HPI: Alice Mabry is a 81 y.o. female.  She is a patient of Dr. Horner's whom we follow for the management of hypertension, hyperlipidemia, and coronary artery disease.  In , she suffered inferior STEMI for which she underwent angioplasty and drug-eluting stenting of the RCA.  At that time, she had an ischemic cardiomyopathy with an EF of 30%.  In , she underwent PCI of the LAD.  In , she underwent angioplasty and drug-eluting stenting of the origin of the RCA.   In 2021, she presented with increased shortness of breath and palpitations.  She was found to be in atrial fibrillation RVR and was started on metoprolol and Eliquis.  Two days later, she presented to the ED with shortness of breath and was admitted.  Echocardiogram demonstrated severely reduced LV systolic function with an EF of 31 to 35%, mild to moderate aortic regurgitation.  She was started on guideline derived medical therapy and referred to the heart failure clinic.  In August, she underwent a stress test demonstrating a large sized infarct in the inferior wall with no evidence of ischemia.  Repeat echocardiogram demonstrated an EF of 43%.  Entresto was subsequently discontinued due to low blood pressure.    I last saw her on 3/10 at which time she was reporting fatigue as well as dyspnea with very mild exertion.  Given concerns regarding ischemia, she was scheduled for cardiac catheterization.  On 3/21, this demonstrated moderate to severe ischemic cardiomyopathy, moderate aortic stenosis, and severe three-vessel disease involving the left main and RCA.  CV surgery was consulted.  On 3/28, she underwent a CABG x4 (LIMA to the LAD, SVG to RCA, SVG to OM1, and  SVG to D1), aortic valve replacement, left atrial appendage closure, and biatrial cryo maze.  Postoperatively she developed some confusion which was mostly attributed to encephalopathy and withdrawal from ativan.  MRI demonstrated the presence of a possible small subacute infarct.  Medical therapy was recommended. Due to concern of abdominal distention, she underwent a CT of the abdomen and pelvis.  Suddenly this noted a lesion on her kidney for which urology was consulted.  Ultimately, follow-up was recommended in 4 to 6 weeks to discuss surgical management.  On 4/8, she was stable for discharge.  She is here today for follow-up.   On 4/12, the home health nurse called the office to report the patient was complaining of shortness of breath.  There were no other associated symptoms of weight gain or swelling.  I advised her to take an extra dose of Bumex for the next few days until her appointment today.  Since that time, she is getting along okay.  The increased doses of Bumex did not seem to make much of a difference.  She is still reporting shortness of breath on exertion.  She has some mild left lower extremity swelling but denies any pain.  She has some incisional soreness for which she is taking Tylenol.  Of note, she was discharged on Jardiance and was already taking Farxiga.  She denies any chest pain, palpitations, dizziness, syncope, bleeding difficulties or melena.  Apparently her  just had bypass surgery himself.  She has a scheduled follow-up with Dr. Lucas next month.    Past Medical History:   Diagnosis Date   • Acute diastolic (congestive) heart failure (HCC)    • Anxiety    • Aortic valve insufficiency    • Arthritis    • Atrial fibrillation (HCC) 07/06/2021   • CAD (coronary artery disease)     stents   • Cervical cancer (HCC)    • Current tear of meniscus    • Depression    • Dizziness    • Fatty liver    • GERD (gastroesophageal reflux disease)    • H/O blood clots    • H/O Clostridium  difficile infection    • Heart murmur    • History of transfusion     no reactions   • [place] (hard of hearing)    • Hyperlipidemia    • Hypertension    • IBS (irritable bowel syndrome)    • Incontinence of urine     wears pads   • Mitral prolapse    • Myocardial infarction (HCC)    • Obesity    • Peptic ulcer    • Pneumonia    • Sinus bradycardia    • Syncope    • Vitamin D deficiency        Past Surgical History:   Procedure Laterality Date   • APPENDECTOMY  1960   • BACK SURGERY     • CARDIAC CATHETERIZATION  06/10/2014    Dr. Murphy Horner   • CARDIAC CATHETERIZATION  11/13/2007    Dr. Murphy Horner   • CARDIAC CATHETERIZATION N/A 10/19/2004    Dr. Murphy Horner   • CARDIAC CATHETERIZATION N/A 07/15/2004    Dr. Gregorio Gonzales   • CARDIAC CATHETERIZATION  10/2003    Dr. Murphy Horner   • CARDIAC CATHETERIZATION N/A 3/21/2022    Procedure: Coronary angiography;  Surgeon: Murphy Horner MD;  Location:  [facility];  Service: Cardiology;  Laterality: N/A;   • CARDIAC CATHETERIZATION N/A 3/21/2022    Procedure: Right Heart Cath;  Surgeon: Murphy Horner MD;  Location:  [facility];  Service: Cardiology;  Laterality: N/A;   • CARDIAC CATHETERIZATION N/A 3/21/2022    Procedure: Left Heart Cath;  Surgeon: Murphy Horner MD;  Location:  [facility];  Service: Cardiology;  Laterality: N/A;   • CARDIAC CATHETERIZATION N/A 3/21/2022    Procedure: Left ventriculography;  Surgeon: Murphy Horner MD;  Location:  [facility];  Service: Cardiology;  Laterality: N/A;   • CHOLECYSTECTOMY  1998   • COLONOSCOPY N/A 07/2001    negative   • COLONOSCOPY N/A 02/28/2012    negative   • CORONARY ANGIOPLASTY WITH STENT PLACEMENT N/A 07/15/2004    Dr. Murphy Horner   • CORONARY ANGIOPLASTY WITH STENT PLACEMENT  03/2002    to RCA   • CORONARY ARTERY BYPASS GRAFT WITH AORTIC AND MITRAL VALVE REPAIR/REPLACEMENT N/A 3/28/2022    Procedure: DALTON STERNOTOMY CORONARY  ARTERY BYPASS GRAFTING TIMES 4 USING LEFT INTERNAL MAMMARY ARTERY AND LEFT GREATER SAPHENOUS VEIN GRAFT PER ENDOSCOPIC VEIN HARVESTING, RIGHT CORONARY ENDARTERECTOMY, AORTIC VALVE REPLACEMENT, MITRAL VALVE REPAIR, ATRICURE MAZE PROCEDURE, CLOSURE OF LEFT ATRIAL APPENDAGE AND PRP;  Surgeon: Jr Isaias Lynn MD;  Location: [facility];  Service: Cardiothoracic;   • CORONARY ARTERY BYPASS GRAFT WITH AORTIC AND MITRAL VALVE REPAIR/REPLACEMENT  3/28/2022    Procedure: ;  Surgeon: Jr Isaias Lynn MD;  Location: [facility];  Service: Cardiothoracic;;   • HYSTERECTOMY  1974   • UPPER GASTROINTESTINAL ENDOSCOPY N/A 2015    Mild Schatzki ring, hiatus hernia, non-bleeding erosive gastropathy, erythematous mucosa in the antrum, normal duodenum-Dr. Alex Gill       Social History     Socioeconomic History   • Marital status:    Tobacco Use   • Smoking status: Former Smoker     Packs/day: 1.00     Years: 20.00     Pack years: 20.00     Quit date:      Years since quittin.2   • Smokeless tobacco: Never Used   Vaping Use   • Vaping Use: Never used   Substance and Sexual Activity   • Alcohol use: Not Currently   • Drug use: Never   • Sexual activity: Defer       Family History   Problem Relation Age of Onset   • Heart disease Mother    • No Known Problems Father    • Heart disease Sister    • Heart disease Brother    • No Known Problems Maternal Grandmother    • No Known Problems Maternal Grandfather    • No Known Problems Paternal Grandmother    • No Known Problems Paternal Grandfather    • Malig Hyperthermia Neg Hx        Review of Systems   Constitutional: Negative.   Cardiovascular: Positive for dyspnea on exertion and leg swelling. Negative for chest pain, orthopnea, paroxysmal nocturnal dyspnea and syncope.   Respiratory: Negative.    Hematologic/Lymphatic: Negative for bleeding problem.   Musculoskeletal: Negative for falls.   Gastrointestinal: Negative for melena.   Neurological:  Negative for dizziness and light-headedness.       Allergies   Allergen Reactions   • Codeine Other (See Comments)     Chest pain .  Patient states she is allergic to codeine and tylenol when together but not separate.   • Penicillins Hives   • Ambien [Zolpidem Tartrate] Confusion         Current Outpatient Medications:   •  acetaminophen (TYLENOL) 500 MG tablet, Take 1,000 mg by mouth 3 (Three) Times a Day As Needed. PRN PAIN, Disp: , Rfl:   •  amiodarone (PACERONE) 200 MG tablet, Take 1 tablet by mouth Daily., Disp: 180 tablet, Rfl: 0  •  apixaban (ELIQUIS) 5 MG tablet tablet, Take 1 tablet by mouth Every 12 (Twelve) Hours., Disp: 60 tablet, Rfl: 11  •  atorvastatin (LIPITOR) 40 MG tablet, Take 1 tablet by mouth Every Night., Disp: 30 tablet, Rfl: 3  •  bumetanide (BUMEX) 1 MG tablet, Take 1 tablet by mouth Daily., Disp: 90 tablet, Rfl: 1  •  Cyanocobalamin (VITAMIN B-12 CR PO), Take 1,000 mg by mouth Daily., Disp: , Rfl:   •  Dapagliflozin Propanediol (Farxiga) 10 MG tablet, Take 10 mg by mouth Every Morning., Disp: , Rfl:   •  diphenhydrAMINE-acetaminophen (Tylenol PM Extra Strength)  MG tablet per tablet, Take 2 tablets by mouth Every Night., Disp: , Rfl:   •  esomeprazole (nexIUM) 40 MG capsule, Take 40 mg by mouth Every Morning Before Breakfast., Disp: , Rfl:   •  FLUoxetine (PROzac) 20 MG capsule, Take 20 mg by mouth Every Morning., Disp: , Rfl:   •  LORazepam (ATIVAN) 1 MG tablet, Take 1 mg by mouth every 8 (eight) hours as needed for anxiety., Disp: , Rfl:   •  Melatonin 10 MG tablet, Take 5-10 mg by mouth Every Night., Disp: , Rfl:   •  multivitamin with minerals tablet tablet, Take 1 tablet by mouth Daily., Disp: , Rfl:   •  nitroglycerin (NITROSTAT) 0.4 MG SL tablet, Place 1 tablet under the tongue Every 5 (Five) Minutes As Needed for Chest Pain. Take no more than 3 doses in 15 minutes., Disp: 100 tablet, Rfl: 3  •  O2 (OXYGEN), Inhale 2 L/min every night at bedtime., Disp: , Rfl:   •  ranolazine  "(RANEXA) 500 MG 12 hr tablet, TAKE 1 TABLET EVERY 12     HOURS, Disp: 180 tablet, Rfl: 2  •  vitamin C (ASCORBIC ACID) 500 MG tablet, Take 500 mg by mouth Daily., Disp: , Rfl:   •  Vitamin D, Ergocalciferol, 2000 units capsule, Take 2,000 Units by mouth Daily., Disp: , Rfl:   •  metoprolol succinate XL (TOPROL-XL) 50 MG 24 hr tablet, Take 1 tablet by mouth Daily., Disp: 90 tablet, Rfl: 0  No current facility-administered medications for this visit.      Objective:     Vitals:    04/14/22 1454   BP: 124/72   Pulse: 58   Weight: 74.4 kg (164 lb)   Height: 165.1 cm (65\")     Body mass index is 27.29 kg/m².    PHYSICAL EXAM:    Neck:      Vascular: No JVD.   Pulmonary:      Effort: Pulmonary effort is normal.      Breath sounds: Normal breath sounds.   Cardiovascular:      Normal rate. Regular rhythm.      Murmurs: There is a harsh midsystolic murmur at the URSB, radiating to the neck.      No gallop. No click. No rub.   Pulses:     Intact distal pulses.   Edema:     Peripheral edema (LLE) present.  Skin:               ECG 12 Lead    Date/Time: 4/14/2022 3:19 PM  Performed by: Pat Paul APRN  Authorized by: Pat Paul APRN   Comparison: compared with previous ECG from 4/8/2022  Similar to previous ECG  Rhythm: sinus rhythm  Ectopy: unifocal PVCs  Rate: normal  BPM: 58  T inversion: V1 and V2  Comments: Indication: CAD              Assessment:       Diagnosis Plan   1. Coronary artery disease of native heart with stable angina pectoris, unspecified vessel or lesion type ([facility])  ECG 12 Lead   2. S/P CABG (coronary artery bypass graft)     3. Nonrheumatic aortic valve stenosis     4. S/P AVR     5. HFrEF (heart failure with reduced ejection fraction) ([facility])     6. Paroxysmal atrial fibrillation ([facility])     7. Dyspnea on exertion  proBNP    Basic Metabolic Panel   8. Essential hypertension     9. TRACE (obstructive sleep apnea)  Ambulatory Referral to Sleep Medicine     Orders Placed This Encounter "   Procedures   • proBNP     Standing Status:   Future     Standing Expiration Date:   4/14/2023     Order Specific Question:   Release to patient     Answer:   Immediate   • Basic Metabolic Panel     Standing Status:   Future     Standing Expiration Date:   4/14/2023     Order Specific Question:   Release to patient     Answer:   Immediate   • Ambulatory Referral to Sleep Medicine     Referral Priority:   Routine     Referral Type:   Consultation     Referral Reason:   Specialty Services Required     Referred to Provider:   Mayur Og MD     Requested Specialty:   Sleep Medicine     Number of Visits Requested:   1   • ECG 12 Lead     This order was created via procedure documentation     Order Specific Question:   Release to patient     Answer:   Immediate          Plan:       1.  Coronary artery disease/aortic stenosis.  Status post CABG x 4 and aortic valve replacement.  She denies any symptoms of angina.  Given that she is already on Eliquis, I asked her to stop the aspirin.      2.  Ischemic cardiomyopathy.  Given her increased shortness of breath, I will order a proBNP and a BMP today.  We may need to increase her diuretics.  She was previously following with the heart failure clinic and was on GDMT.  However, several changes were made during the hospitalization.  She does not need to continue both Jardiance and Farxiga.  Since she was previously on the Farxiga, I have asked her to discontinue the Jardiance.  I am going to discontinue the metoprolol tartrate and start her on metoprolol succinate.  Depending on her lab results and how she tolerates all of the other medication changes, I will restart the spironolactone.  She was previously intolerant of Entresto.  However, I think we should consider trying losartan.      3.  Paroxysmal atrial fibrillation.  Status post left atrial appendage closure and biatrial cryo maze.  She is in sinus rhythm today.  She is anticoagulated on Eliquis.  I am going to  reduce the dose of amiodarone to once daily.  Likely this will be discontinued at her 1 month follow-up.      4.  Hypertension.  Her blood pressure looks great.  Continue current regimen.      Overall think she is stable.  I will call her with all of her lab results.  Otherwise, she will follow-up with Dr. Horner on 5/6/22.      As always, it has been a pleasure to participate in your patient's care.      Sincerely,         JOHNNIE Zuniga

## 2022-04-14 NOTE — HOME HEALTH
Patient sitting in chair upon arrival. Reported feeling more pain in chest this date. Patient reported being more short of breath this date. 1+ pitting edema noted left leg    ASSESSMENT: Patient dyspnic with minimal exertion this date. Held therapeutic exercises in standing due to dyspnea and upcoming appointment    PLAN FOR NEXT VISIT:  --Continue therapeutic exercises to improve muscle weakness  --Continue gait training to improve ambulation tolerance

## 2022-04-15 ENCOUNTER — TELEPHONE (OUTPATIENT)
Dept: CARDIOLOGY | Facility: CLINIC | Age: 82
End: 2022-04-15

## 2022-04-15 ENCOUNTER — HOME CARE VISIT (OUTPATIENT)
Dept: HOME HEALTH SERVICES | Facility: HOME HEALTHCARE | Age: 82
End: 2022-04-15

## 2022-04-15 VITALS
WEIGHT: 170 LBS | SYSTOLIC BLOOD PRESSURE: 114 MMHG | DIASTOLIC BLOOD PRESSURE: 70 MMHG | TEMPERATURE: 97.2 F | HEART RATE: 64 BPM | OXYGEN SATURATION: 96 % | BODY MASS INDEX: 28.29 KG/M2 | RESPIRATION RATE: 20 BRPM

## 2022-04-15 DIAGNOSIS — R06.09 DYSPNEA ON EXERTION: Primary | ICD-10-CM

## 2022-04-15 PROCEDURE — G0299 HHS/HOSPICE OF RN EA 15 MIN: HCPCS

## 2022-04-15 RX ORDER — BUMETANIDE 2 MG/1
2 TABLET ORAL DAILY
Qty: 30 TABLET | Refills: 3
Start: 2022-04-15 | End: 2022-05-17 | Stop reason: SDUPTHER

## 2022-04-15 RX ORDER — SPIRONOLACTONE 25 MG/1
25 TABLET ORAL DAILY
Qty: 90 TABLET | Refills: 0
Start: 2022-04-15 | End: 2022-08-08

## 2022-04-15 NOTE — TELEPHONE ENCOUNTER
I spoke with her and her daughter regarding her labs yesterday.  I would like to increase the Bumex to 2 mg daily and restart her spironolactone.  She will have repeat labs in 1 week.

## 2022-04-18 ENCOUNTER — READMISSION MANAGEMENT (OUTPATIENT)
Dept: CALL CENTER | Facility: HOSPITAL | Age: 82
End: 2022-04-18

## 2022-04-18 NOTE — OUTREACH NOTE
CT Surgery Week 1 Survey    Flowsheet Row Responses   [facility] patient discharged from? [place]   Does the patient have one of the following disease processes/diagnoses(primary or secondary)? Cardiothoracic surgery   Week 1 attempt successful? Yes   Call start time 1437   Call end time 1441   Discharge diagnosis Severe CAD with multiple stents & in-stent stenosis, aortic stenosis, mitral incompetence s/p CABG x4 utilizing LIMA/LSVG, AVR, MVr, MAZE, closure of left atrial appendage    Meds reviewed with patient/caregiver? Yes   Is the patient having any side effects they believe may be caused by any medication additions or changes? No   Does the patient have all medications related to this admission filled (includes all antibiotics, pain medications, cardiac medications, etc.) Yes   Comments regarding appointments CTS surgery follow up not until May   Does the patient have a primary care provider?  Yes   Does the patient have an appointment scheduled with their C/T surgeon? Yes   Has the patient kept scheduled appointments due by today? N/A   What is the Home health agency?  [facility]   Has home health visited the patient within 72 hours of discharge? Yes   What DME was ordered? Oxygen per [facility]    Has all DME been delivered? Yes   DME comments Uses O2 at night   Psychosocial issues? No   Did the patient receive a copy of their discharge instructions? Yes   Nursing interventions Reviewed instructions with patient   What is the patient's perception of their health status since discharge? Improving   Nursing interventions Nurse provided patient education   Is the patient/caregiver able to teach back normal signs of recovery? Nausea and lack of appetite, Pain or discomfort at incisional site, Depression or irritability, Constipation   Nursing interventions Reassured on normal signs of recovery   Is the patient /caregiver able to teach back basic post-op care? Continue use of incentive spirometry at least 1 week  post discharge, Keep incision areas clean, dry and protected, No tub bath, swimming, or hot tub until instructed by MD, Drive as instructed by MD in discharge instructions   Is the patient/caregiver able to teach back signs and symptoms of incisional infection? Increased redness, swelling or pain at the incisonal site, Increased drainage or bleeding, Incisional warmth, Pus or odor from incision, Fever   Is the patient/caregiver able to teach back steps to recovery at home? Set small, achievable goals for return to baseline health, Rest and rebuild strength, gradually increase activity, Eat a well-balance diet   Is the patient /caregiver able to teach back the importance of cardiac rehab? Yes   Nursing interventions Provided education on importance of cardiac rehab   If the patient is a current smoker, are they able to teach back resources for cessation? Not a smoker   Is the patient/caregiver able to teach back the hierarchy of who to call/visit for symptoms/problems? PCP, Specialist, Home health nurse, Urgent Care, ED, 911 Yes   Week 1 call completed? Yes            BRENDA LOUIE - Registered Nurse

## 2022-04-19 ENCOUNTER — HOME CARE VISIT (OUTPATIENT)
Dept: HOME HEALTH SERVICES | Facility: HOME HEALTHCARE | Age: 82
End: 2022-04-19

## 2022-04-19 VITALS
HEART RATE: 84 BPM | DIASTOLIC BLOOD PRESSURE: 68 MMHG | RESPIRATION RATE: 20 BRPM | SYSTOLIC BLOOD PRESSURE: 104 MMHG | OXYGEN SATURATION: 96 % | TEMPERATURE: 95.3 F

## 2022-04-19 VITALS
DIASTOLIC BLOOD PRESSURE: 68 MMHG | OXYGEN SATURATION: 99 % | SYSTOLIC BLOOD PRESSURE: 112 MMHG | RESPIRATION RATE: 18 BRPM | HEART RATE: 69 BPM | TEMPERATURE: 95.5 F

## 2022-04-19 PROCEDURE — G0151 HHCP-SERV OF PT,EA 15 MIN: HCPCS

## 2022-04-19 PROCEDURE — G0299 HHS/HOSPICE OF RN EA 15 MIN: HCPCS

## 2022-04-19 NOTE — HOME HEALTH
Patient reported having a good weekend. No falls reported.    ASSESSMENT: Patient with good participationt his date. Need to continue working to improve activity tolerance    PLAN FOR NEXT VISIT:  --Continue gait training to improve ambulation tolerance  --Continue therapeutic exercises to improve activity tolerance Smoking even a single puff increases the likelihood of a full relapse, withdrawal symptoms peak within 1-2 weeks, but can persist for months

## 2022-04-20 ENCOUNTER — LAB (OUTPATIENT)
Dept: LAB | Facility: HOSPITAL | Age: 82
End: 2022-04-20

## 2022-04-20 DIAGNOSIS — R06.09 DYSPNEA ON EXERTION: ICD-10-CM

## 2022-04-20 LAB
ANION GAP SERPL CALCULATED.3IONS-SCNC: 11.5 MMOL/L (ref 5–15)
BUN SERPL-MCNC: 17 MG/DL (ref 8–23)
BUN/CREAT SERPL: 18.5 (ref 7–25)
CALCIUM SPEC-SCNC: 9.5 MG/DL (ref 8.6–10.5)
CHLORIDE SERPL-SCNC: 98 MMOL/L (ref 98–107)
CO2 SERPL-SCNC: 31.5 MMOL/L (ref 22–29)
CREAT SERPL-MCNC: 0.92 MG/DL (ref 0.57–1)
EGFRCR SERPLBLD CKD-EPI 2021: 62.3 ML/MIN/1.73
GLUCOSE SERPL-MCNC: 130 MG/DL (ref 65–99)
POTASSIUM SERPL-SCNC: 4.3 MMOL/L (ref 3.5–5.2)
SODIUM SERPL-SCNC: 141 MMOL/L (ref 136–145)

## 2022-04-20 PROCEDURE — 36415 COLL VENOUS BLD VENIPUNCTURE: CPT

## 2022-04-20 PROCEDURE — 80048 BASIC METABOLIC PNL TOTAL CA: CPT

## 2022-04-20 PROCEDURE — G0180 MD CERTIFICATION HHA PATIENT: HCPCS | Performed by: THORACIC SURGERY (CARDIOTHORACIC VASCULAR SURGERY)

## 2022-04-21 ENCOUNTER — HOME CARE VISIT (OUTPATIENT)
Dept: HOME HEALTH SERVICES | Facility: HOME HEALTHCARE | Age: 82
End: 2022-04-21

## 2022-04-21 VITALS
TEMPERATURE: 97.9 F | SYSTOLIC BLOOD PRESSURE: 116 MMHG | DIASTOLIC BLOOD PRESSURE: 66 MMHG | HEART RATE: 72 BPM | OXYGEN SATURATION: 96 %

## 2022-04-21 PROCEDURE — G0157 HHC PT ASSISTANT EA 15: HCPCS

## 2022-04-21 NOTE — HOME HEALTH
Subjective: I am doing good    Wound- chest healing , some scabs note  Oxygen- uses 2 liters at night    Assessment: Patient able to manuever around home with rollator walker and cues for self pacing and deep breathing. She was able to complete HEP with O2 > 95% with tasks. Paitent re-education on consistency of walking program and HEP for improvements. Not sure of compliance for carryover. She fatigues easy with tasks and requires seated rest breaks.    Plan for next visit/Communication  gait training > 2 minutes  stretches  review HEP  transfers

## 2022-04-21 NOTE — TELEPHONE ENCOUNTER
Notified patient of results. Patient verbalized understanding.    Radha Cisse RN  [place] [facility] [place]

## 2022-04-21 NOTE — TELEPHONE ENCOUNTER
Please let her know that her labs are stable.  Her kidney function is normal.  Her electrolytes are within normal limits.  Continue current doses of medication.  She will keep her follow-up appointment with Dr. Horner in May.

## 2022-04-22 ENCOUNTER — HOME CARE VISIT (OUTPATIENT)
Dept: HOME HEALTH SERVICES | Facility: HOME HEALTHCARE | Age: 82
End: 2022-04-22

## 2022-04-22 VITALS
WEIGHT: 164.5 LBS | HEART RATE: 76 BPM | DIASTOLIC BLOOD PRESSURE: 72 MMHG | RESPIRATION RATE: 16 BRPM | TEMPERATURE: 95.6 F | BODY MASS INDEX: 27.37 KG/M2 | OXYGEN SATURATION: 95 % | SYSTOLIC BLOOD PRESSURE: 114 MMHG

## 2022-04-22 PROCEDURE — G0299 HHS/HOSPICE OF RN EA 15 MIN: HCPCS

## 2022-04-26 ENCOUNTER — HOME CARE VISIT (OUTPATIENT)
Dept: HOME HEALTH SERVICES | Facility: HOME HEALTHCARE | Age: 82
End: 2022-04-26

## 2022-04-26 VITALS
HEART RATE: 68 BPM | SYSTOLIC BLOOD PRESSURE: 118 MMHG | DIASTOLIC BLOOD PRESSURE: 74 MMHG | TEMPERATURE: 96.8 F | RESPIRATION RATE: 20 BRPM | OXYGEN SATURATION: 99 %

## 2022-04-26 VITALS
HEART RATE: 68 BPM | RESPIRATION RATE: 20 BRPM | DIASTOLIC BLOOD PRESSURE: 74 MMHG | OXYGEN SATURATION: 99 % | TEMPERATURE: 96.8 F | SYSTOLIC BLOOD PRESSURE: 118 MMHG

## 2022-04-26 PROCEDURE — G0151 HHCP-SERV OF PT,EA 15 MIN: HCPCS

## 2022-04-26 PROCEDURE — G0299 HHS/HOSPICE OF RN EA 15 MIN: HCPCS

## 2022-04-26 NOTE — HOME HEALTH
Patient up answering door upon arrival. Reported still being short of breath, but it is much improved. No swelling noted      ASSESSMENT: Patient progressing towards goals. Planned discharge next visit    PLAN FOR NEXT VISIT:  --Ensure independence with ambulation, stair negotiation  --Ensure independence with home exercise program

## 2022-04-26 NOTE — HOME HEALTH
PT SEEN BY  3/28-4/8 POST OP CABGX4 WITH L JESSA, AVR AND MVR AND MAZE AND CLOSURE OF L ATRIUM APPENDAGE. PAF POST OP ON ELIQUIS. PT ALSO WITH HX OF CHF, NEWLY DX RENAL MASS, ANXIETY /DEPRESSION HTN, HLD, IBS    DCH IF STABLE

## 2022-04-27 ENCOUNTER — READMISSION MANAGEMENT (OUTPATIENT)
Dept: CALL CENTER | Facility: HOSPITAL | Age: 82
End: 2022-04-27

## 2022-04-27 NOTE — OUTREACH NOTE
CT Surgery Week 2 Survey    Flowsheet Row Responses   [facility] patient discharged from? [place]   Does the patient have one of the following disease processes/diagnoses(primary or secondary)? Cardiothoracic surgery   Week 2 attempt successful? Yes   Call start time 1135   Call end time 1138   Discharge diagnosis Severe CAD with multiple stents & in-stent stenosis, aortic stenosis, mitral incompetence s/p CABG x4 utilizing LIMA/LSVG, AVR, MVr, MAZE, closure of left atrial appendage    Meds reviewed with patient/caregiver? Yes   Is the patient taking all medications as directed (includes completed medication regime)? Yes   Does the patient have an appointment scheduled with their C/T surgeon? Yes   Has the patient kept scheduled appointments due by today? Yes   What is the Home health agency?  [facility]   Has home health visited the patient within 72 hours of discharge? Yes   Home health comments Will be ending HH tomorrow   DME comments Uses O2 at night PRN    Psychosocial issues? No   Did the patient receive a copy of their discharge instructions? Yes   What is the patient's perception of their health status since discharge? Improving   Nursing interventions Nurse provided patient education   Is the patient /caregiver able to teach back basic post-op care? No tub bath, swimming, or hot tub until instructed by MD, Take showers only when approved by MD-sponge bathe until then, Lifting as instructed by MD in discharge instructions, Drive as instructed by MD in discharge instructions   Is the patient/caregiver able to teach back signs and symptoms of incisional infection? Increased redness, swelling or pain at the incisonal site, Increased drainage or bleeding, Incisional warmth, Pus or odor from incision, Fever   Is the patient/caregiver able to teach back steps to recovery at home? Eat a well-balance diet   Is the patient /caregiver able to teach back the importance of cardiac rehab? Yes   Nursing interventions  Provided education on importance of cardiac rehab   Is the patient/caregiver able to teach back the hierarchy of who to call/visit for symptoms/problems? PCP, Specialist, Home health nurse, Urgent Care, ED, 911 Yes   Week 2 call completed? Yes   Wrap up additional comments Pt improving.           ADITYA CALDWELL - Registered Nurse

## 2022-04-28 ENCOUNTER — HOME CARE VISIT (OUTPATIENT)
Dept: HOME HEALTH SERVICES | Facility: HOME HEALTHCARE | Age: 82
End: 2022-04-28

## 2022-04-28 VITALS
TEMPERATURE: 96.1 F | DIASTOLIC BLOOD PRESSURE: 68 MMHG | HEART RATE: 80 BPM | SYSTOLIC BLOOD PRESSURE: 116 MMHG | RESPIRATION RATE: 18 BRPM | OXYGEN SATURATION: 97 %

## 2022-04-28 PROCEDURE — G0151 HHCP-SERV OF PT,EA 15 MIN: HCPCS

## 2022-04-29 NOTE — HOME HEALTH
Patient up answering door upon arrival. Reported still doing all of her exercises. Discussed discharge due to meeting all goals. Patient agreeable.

## 2022-05-02 RX ORDER — DIGOXIN 125 MCG
TABLET ORAL
Qty: 36 TABLET | Refills: 2 | OUTPATIENT
Start: 2022-05-02

## 2022-05-03 ENCOUNTER — OFFICE VISIT (OUTPATIENT)
Dept: CARDIAC SURGERY | Facility: CLINIC | Age: 82
End: 2022-05-03

## 2022-05-03 VITALS
TEMPERATURE: 97.1 F | WEIGHT: 164.8 LBS | HEIGHT: 65 IN | OXYGEN SATURATION: 99 % | SYSTOLIC BLOOD PRESSURE: 136 MMHG | BODY MASS INDEX: 27.46 KG/M2 | HEART RATE: 78 BPM | DIASTOLIC BLOOD PRESSURE: 75 MMHG | RESPIRATION RATE: 20 BRPM

## 2022-05-03 DIAGNOSIS — Z95.2 S/P AVR: ICD-10-CM

## 2022-05-03 DIAGNOSIS — Z95.1 S/P CABG X 4: Primary | ICD-10-CM

## 2022-05-03 PROCEDURE — 99024 POSTOP FOLLOW-UP VISIT: CPT | Performed by: REGISTERED NURSE

## 2022-05-03 NOTE — PROGRESS NOTES
"..CARDIOVASCULAR SURGERY FOLLOW-UP PROGRESS NOTE    Chief Complaint: Post-Op Follow-Up      HPI:   Dear Zaid, Tho PAZ MD and colleagues:    It was nice to see Alice Mabry in follow up 5 weeks after surgery.  As you know, she is an 81 y.o. female with PMH significant for CAD, hx of STEMI s/p PCI 2014, persistent atrial fibrillation -- on eliquis,  moderate aortic valve stenosis, moderate mitral regurgitation, hypertension, and hyperlipidemia. Underwent CABG x4 utilizing LIMA/LSVG, AVR, MVr, MAZE, closure of left atrial appendage (please see op-not for full report) with Dr. Lynn on 3/28/2022.     She overall did well post-operatively. Patient did experience nausea for a few days along with Afib and junctional rhythm. On POD#7 she was planned for discharge the following day however, had an event of confusion which later resolved -- believed to have been related to patient not receiving her ativan as she takes at home.     Ms. Mabry presents to office today for follow-up. Appears well and has no acute complaints/concerns. Denies popping/clicking/rubbing in the sternum. Denies fever/chills & sign/symptoms of infection. Mid-sternal incision and LSVG healing nicely with no erythema/drainage/dehiscence present. She has followed up with her PCP & Cardiology APRN. Oral diuretics were increased by Cardiology APRN at her last visit. Scheduled to see Dr. Horner 5/6. She plans on starting cardiac rehab soon along side her  as he underwent bypass surgery four weeks before she did. Her last home health visit was this week.     From a surgical standpoint Ms. Mabry is progressing well. No need for scheduled follow-up visit in our office.               /75 (BP Location: Left arm, Patient Position: Sitting, Cuff Size: Adult)   Pulse 78   Temp 97.1 °F (36.2 °C)   Resp 20   Ht 165.1 cm (65\")   Wt 74.8 kg (164 lb 12.8 oz)   SpO2 99%   BMI 27.42 kg/m²   Heart:  regular rate and rhythm, S1, S2 normal, " no murmur, click, rub or gallop, no rub  Lungs:  clear to auscultation bilaterally  Extremities:  no edema  Incision(s):  mid chest healing well, no significant drainage, no dehiscence, no significant erythema, left leg healing well, no significant drainage, no dehiscence, no significant erythema, sternum stable    Assessment/Plan:     S/P CABG, AVR, mitral valve repair, Maze procedure and RADHA closure. Overall, she is doing well.    Post-op atrial fibrillation    No heavy lifting > 10 pounds for 1 more weeks  Keep incisions clean and dry  OK to drive if not taking narcotic pain medicine  OK to begin cardiac rehab  Follow-up as scheduled with cardiology  Follow-up as scheduled with PCP  Follow-up with CT surgery prn    Encouraged patient to call office with any questions or concerns as scheduled follow-up is not required at this time.   Instructions/restrictions verbally reviewed with patient -- verbalized understanding.  After visit summary provided to patient.    Thank you for allowing me to participate in the plan of care for your patient.  Best Regards,    JOHNNIE Wu  05/03/22  14:16 EDT   ..Electronically signed by JOHNNIE Kidd, 05/03/22, 2:27 PM EDT.

## 2022-05-03 NOTE — PATIENT INSTRUCTIONS
Weigh daily.  Take Lasix (furosemide) for weight gain of 3 lbs in 24 hours or 5 lbs in 72 hours, take potassium pill with every Lasix dose.       Continue lifting restriction of 10 lbs until 6 weeks and 50 lbs until 12 weeks from date of surgery. No excessive jarring motions or twisting motions until 12 weeks from date of surgery.     Prophylactic antibiotics before dental work and other surgeries lifelong with artificial valve, prosthesis, or grafting.     Fat and Cholesterol Restricted Eating Plan  Getting too much fat and cholesterol in your diet may cause health problems. Choosing the right foods helps keep your fat and cholesterol at normal levels. This can keep you from getting certain diseases.  Your doctor may recommend an eating plan that includes:  Total fat: ______% or less of total calories a day.  Saturated fat: ______% or less of total calories a day.  Cholesterol: less than _________mg a day.  Fiber: ______g a day.  What are tips for following this plan?  Meal planning  At meals, divide your plate into four equal parts:  Fill one-half of your plate with vegetables and green salads.  Fill one-fourth of your plate with whole grains.  Fill one-fourth of your plate with low-fat (lean) protein foods.  Eat fish that is high in omega-3 fats at least two times a week. This includes mackerel, tuna, sardines, and salmon.  Eat foods that are high in fiber, such as whole grains, beans, apples, broccoli, carrots, peas, and barley.  General tips       Work with your doctor to lose weight if you need to.  Avoid:  Foods with added sugar.  Fried foods.  Foods with partially hydrogenated oils.  Limit alcohol intake to no more than 1 drink a day for nonpregnant women and 2 drinks a day for men. One drink equals 12 oz of beer, 5 oz of wine, or 1½ oz of hard liquor.  Reading food labels  Check food labels for:  Trans fats.  Partially hydrogenated oils.  Saturated fat (g) in each serving.  Cholesterol (mg) in each  "serving.  Fiber (g) in each serving.  Choose foods with healthy fats, such as:  Monounsaturated fats.  Polyunsaturated fats.  Omega-3 fats.  Choose grain products that have whole grains. Look for the word \"whole\" as the first word in the ingredient list.  Cooking  Cook foods using low-fat methods. These include baking, boiling, grilling, and broiling.  Eat more home-cooked foods. Eat at restaurants and buffets less often.  Avoid cooking using saturated fats, such as butter, cream, palm oil, palm kernel oil, and coconut oil.  Recommended foods       Fruits  All fresh, canned (in natural juice), or frozen fruits.  Vegetables  Fresh or frozen vegetables (raw, steamed, roasted, or grilled). Green salads.  Grains  Whole grains, such as whole wheat or whole grain breads, crackers, cereals, and pasta. Unsweetened oatmeal, bulgur, barley, quinoa, or brown rice. Corn or whole wheat flour tortillas.  Meats and other protein foods  Ground beef (85% or leaner), grass-fed beef, or beef trimmed of fat. Skinless chicken or turkey. Ground chicken or turkey. Pork trimmed of fat. All fish and seafood. Egg whites. Dried beans, peas, or lentils. Unsalted nuts or seeds. Unsalted canned beans. Nut butters without added sugar or oil.  Dairy  Low-fat or nonfat dairy products, such as skim or 1% milk, 2% or reduced-fat cheeses, low-fat and fat-free ricotta or cottage cheese, or plain low-fat and nonfat yogurt.  Fats and oils  Tub margarine without trans fats. Light or reduced-fat mayonnaise and salad dressings. Avocado. Olive, canola, sesame, or safflower oils.  The items listed above may not be a complete list of foods and beverages you can eat. Contact a dietitian for more information.  Foods to avoid  Fruits  Canned fruit in heavy syrup. Fruit in cream or butter sauce. Fried fruit.  Vegetables  Vegetables cooked in cheese, cream, or butter sauce. Fried vegetables.  Grains  White bread. White pasta. White rice. Cornbread. Bagels, " pastries, and croissants. Crackers and snack foods that contain trans fat and hydrogenated oils.  Meats and other protein foods  Fatty cuts of meat. Ribs, chicken wings, lauren, sausage, bologna, salami, chitterlings, fatback, hot dogs, bratwurst, and packaged lunch meats. Liver and organ meats. Whole eggs and egg yolks. Chicken and turkey with skin. Fried meat.  Dairy  Whole or 2% milk, cream, half-and-half, and cream cheese. Whole milk cheeses. Whole-fat or sweetened yogurt. Full-fat cheeses. Nondairy creamers and whipped toppings. Processed cheese, cheese spreads, and cheese curds.  Beverages  Alcohol. Sugar-sweetened drinks such as sodas, lemonade, and fruit drinks.  Fats and oils  Butter, stick margarine, lard, shortening, ghee, or lauren fat. Coconut, palm kernel, and palm oils.  Sweets and desserts  Corn syrup, sugars, honey, and molasses. Candy. Jam and jelly. Syrup. Sweetened cereals. Cookies, pies, cakes, donuts, muffins, and ice cream.  The items listed above may not be a complete list of foods and beverages you should avoid. Contact a dietitian for more information.  Summary  Choosing the right foods helps keep your fat and cholesterol at normal levels. This can keep you from getting certain diseases.  At meals, fill one-half of your plate with vegetables and green salads.  Eat high-fiber foods, like whole grains, beans, apples, carrots, peas, and barley.  Limit added sugar, saturated fats, alcohol, and fried foods.  This information is not intended to replace advice given to you by your health care provider. Make sure you discuss any questions you have with your health care provider.  Document Revised: 08/21/2019 Document Reviewed: 09/04/2018  Elsevier Patient Education © 2020 [employer]

## 2022-05-05 ENCOUNTER — HOME CARE VISIT (OUTPATIENT)
Dept: HOME HEALTH SERVICES | Facility: HOME HEALTHCARE | Age: 82
End: 2022-05-05

## 2022-05-05 ENCOUNTER — READMISSION MANAGEMENT (OUTPATIENT)
Dept: CALL CENTER | Facility: HOSPITAL | Age: 82
End: 2022-05-05

## 2022-05-05 PROCEDURE — G0299 HHS/HOSPICE OF RN EA 15 MIN: HCPCS

## 2022-05-05 NOTE — OUTREACH NOTE
CT Surgery Week 3 Survey    Flowsheet Row Responses   [facility] patient discharged from? [place]   Does the patient have one of the following disease processes/diagnoses(primary or secondary)? Cardiothoracic surgery   Week 3 attempt successful? Yes   Call start time 1425   Call end time 1427   Discharge diagnosis Severe CAD with multiple stents & in-stent stenosis, aortic stenosis, mitral incompetence s/p CABG x4 utilizing LIMA/LSVG, AVR, MVr, MAZE, closure of left atrial appendage    Is patient permission given to speak with other caregiver? No   Meds reviewed with patient/caregiver? Yes   Is the patient taking all medications as directed (includes completed medication regime)? Yes   Does the patient have a primary care provider?  Yes   Does the patient have an appointment scheduled with their C/T surgeon? Yes   Has the patient kept scheduled appointments due by today? Yes   What is the Home health agency?  Patient reports graduated from .    DME comments Uses O2 at night PRN    Psychosocial issues? No   Did the patient receive a copy of their discharge instructions? Yes   What is the patient's perception of their health status since discharge? Improving   Is the patient/caregiver able to teach back signs and symptoms of incisional infection? Increased redness, swelling or pain at the incisonal site, Increased drainage or bleeding, Incisional warmth, Pus or odor from incision, Fever  [Reports wounds healing well. ]   If the patient is a current smoker, are they able to teach back resources for cessation? Not a smoker   Is the patient/caregiver able to teach back the hierarchy of who to call/visit for symptoms/problems? PCP, Specialist, Home health nurse, Urgent Care, ED, 911 Yes   Week 3 call completed? Yes   Wrap up additional comments Patient reports that she is improving every day.           GENO SAHA - Registered Nurse

## 2022-05-06 ENCOUNTER — OFFICE VISIT (OUTPATIENT)
Dept: CARDIOLOGY | Facility: CLINIC | Age: 82
End: 2022-05-06

## 2022-05-06 VITALS
WEIGHT: 165 LBS | TEMPERATURE: 96.8 F | RESPIRATION RATE: 20 BRPM | DIASTOLIC BLOOD PRESSURE: 70 MMHG | OXYGEN SATURATION: 98 % | HEART RATE: 76 BPM | SYSTOLIC BLOOD PRESSURE: 102 MMHG | BODY MASS INDEX: 27.46 KG/M2

## 2022-05-06 VITALS
SYSTOLIC BLOOD PRESSURE: 118 MMHG | DIASTOLIC BLOOD PRESSURE: 70 MMHG | BODY MASS INDEX: 26.66 KG/M2 | WEIGHT: 160 LBS | HEIGHT: 65 IN | HEART RATE: 70 BPM

## 2022-05-06 DIAGNOSIS — I48.0 PAROXYSMAL ATRIAL FIBRILLATION: ICD-10-CM

## 2022-05-06 DIAGNOSIS — I25.5 ISCHEMIC CARDIOMYOPATHY: ICD-10-CM

## 2022-05-06 DIAGNOSIS — Z95.2 S/P AVR: ICD-10-CM

## 2022-05-06 DIAGNOSIS — Z95.1 S/P CABG (CORONARY ARTERY BYPASS GRAFT): Primary | ICD-10-CM

## 2022-05-06 PROCEDURE — 99214 OFFICE O/P EST MOD 30 MIN: CPT | Performed by: INTERNAL MEDICINE

## 2022-05-06 PROCEDURE — 93000 ELECTROCARDIOGRAM COMPLETE: CPT | Performed by: INTERNAL MEDICINE

## 2022-05-06 NOTE — PROGRESS NOTES
Date of Office Visit: 22  Encounter Provider: Murphy Horner MD  Place of Service: [facility] CARDIOLOGY  Patient Name: Alice Mabry  :[id number]  [id number]    Chief Complaint   Patient presents with   • Follow-up     1 month   • Coronary Artery Disease   :     HPI: Alice Mabry is a 81 y.o. female in 2022 she presented with worsening angina and was found to have severe three-vessel disease she underwent a four-vessel bypass and an AVR they did a maze procedure and they ligated the left atrial appendage but she has persistent A. fib and went back into A. fib postoperatively and were taken on rate control and anticoagulation approach with her.  She is doing quite well feels like her breathing is pretty good she has a little bit of soreness at her incision a little bit on the left side no PND orthopnea no edema she is in sinus rhythm and evidently has been at her follow-up with Pat and then also today    Past Medical History:   Diagnosis Date   • Acute diastolic (congestive) heart failure (HCC)    • Anxiety    • Aortic valve insufficiency    • Arthritis    • Atrial fibrillation (HCC) 2021   • CAD (coronary artery disease)     stents   • Cervical cancer (HCC)    • Current tear of meniscus    • Depression    • Dizziness    • Fatty liver    • GERD (gastroesophageal reflux disease)    • H/O blood clots    • H/O Clostridium difficile infection    • Heart murmur    • History of transfusion     no reactions   • [place]hard of hearing[place]    • Hyperlipidemia    • Hypertension    • IBS (irritable bowel syndrome)    • Incontinence of urine     wears pads   • Mitral prolapse    • Myocardial infarction (HCC)    • Obesity    • Peptic ulcer    • Pneumonia    • Sinus bradycardia    • Syncope    • Vitamin D deficiency        Past Surgical History:   Procedure Laterality Date   • APPENDECTOMY     • BACK SURGERY     • CARDIAC CATHETERIZATION  06/10/2014    Dr. Murphy Horner    • CARDIAC CATHETERIZATION  11/13/2007    Dr. Murphy Horner   • CARDIAC CATHETERIZATION N/A 10/19/2004    Dr. Murphy Horner   • CARDIAC CATHETERIZATION N/A 07/15/2004    Dr. Gregorio Gonzales   • CARDIAC CATHETERIZATION  10/2003    Dr. Murphy Horner   • CARDIAC CATHETERIZATION N/A 3/21/2022    Procedure: Coronary angiography;  Surgeon: Murphy Horner MD;  Location: [facility] CATH INVASIVE LOC[facility];  Service: Cardiology;  Laterality: N/A;   • CARDIAC CATHETERIZATION N/A 3/21/2022    Procedure: Right Heart Cath;  Surgeon: Murphy Horner MD;  Location:  [facility] CATH INVASIVE LOCATION;  Service: Cardiology;  Laterality: N/A;   • CARDIAC CATHETERIZATION N/A 3/21/2022    Procedure: Left Heart Cath;  Surgeon: Murphy Horner MD;  Location:  [facility] CATH INVASIVE LOCATION;  Service: Cardiology;  Laterality: N/A;   • CARDIAC CATHETERIZATION N/A 3/21/2022    Procedure: Left ventriculography;  Surgeon: Murphy Horner MD;  Location: [facility] CATH INVASIVE LOCATION;  Service: Cardiology;  Laterality: N/A;   • CHOLECYSTECTOMY  1998   • COLONOSCOPY N/A 07/2001    negative   • COLONOSCOPY N/A 02/28/2012    negative   • CORONARY ANGIOPLASTY WITH STENT PLACEMENT N/A 07/15/2004    Dr. Murphy Horner   • CORONARY ANGIOPLASTY WITH STENT PLACEMENT  03/2002    to RCA   • CORONARY ARTERY BYPASS GRAFT WITH AORTIC AND MITRAL VALVE REPAIR/REPLACEMENT N/A 3/28/2022    Procedure: DALTON STERNOTOMY CORONARY ARTERY BYPASS GRAFTING TIMES 4 USING LEFT INTERNAL MAMMARY ARTERY AND LEFT GREATER SAPHENOUS VEIN GRAFT PER ENDOSCOPIC VEIN HARVESTING, RIGHT CORONARY ENDARTERECTOMY, AORTIC VALVE REPLACEMENT, MITRAL VALVE REPAIR, ATRICURE MAZE PROCEDURE, CLOSURE OF LEFT ATRIAL APPENDAGE AND PRP;  Surgeon: Jr Isaias Lynn MD;  Location: [facility];  Service: Cardiothoracic;   • CORONARY ARTERY BYPASS GRAFT WITH AORTIC AND MITRAL VALVE REPAIR/REPLACEMENT  3/28/2022    Procedure: ;  Surgeon: Jr Isaias Lynn MD;  Location: [facility];  Service:  Cardiothoracic;;   • HYSTERECTOMY  1974   • UPPER GASTROINTESTINAL ENDOSCOPY N/A 2015    Mild Schatzki ring, hiatus hernia, non-bleeding erosive gastropathy, erythematous mucosa in the antrum, normal duodenum-Dr. Alex Gill       Social History     Socioeconomic History   • Marital status:    Tobacco Use   • Smoking status: Former Smoker     Packs/day: 1.00     Years: 20.00     Pack years: 20.00     Quit date:      Years since quittin.3   • Smokeless tobacco: Never Used   Vaping Use   • Vaping Use: Never used   Substance and Sexual Activity   • Alcohol use: Not Currently   • Drug use: Never   • Sexual activity: Defer       Family History   Problem Relation Age of Onset   • Heart disease Mother    • No Known Problems Father    • Heart disease Sister    • Heart disease Brother    • No Known Problems Maternal Grandmother    • No Known Problems Maternal Grandfather    • No Known Problems Paternal Grandmother    • No Known Problems Paternal Grandfather    • Malig Hyperthermia Neg Hx        Review of Systems   Constitutional: Negative for decreased appetite, fever, malaise/fatigue and weight loss.   HENT: Negative for nosebleeds.    Eyes: Negative for double vision.   Cardiovascular: Negative for chest pain, claudication, cyanosis, dyspnea on exertion, irregular heartbeat, leg swelling, near-syncope, orthopnea, palpitations, paroxysmal nocturnal dyspnea and syncope.   Respiratory: Negative for cough, hemoptysis and shortness of breath.    Hematologic/Lymphatic: Negative for bleeding problem.   Skin: Negative for rash.   Musculoskeletal: Negative for falls and myalgias.   Gastrointestinal: Negative for hematochezia, jaundice, melena, nausea and vomiting.   Genitourinary: Negative for hematuria.   Neurological: Negative for dizziness and seizures.   Psychiatric/Behavioral: Negative for altered mental status and memory loss.       Allergies   Allergen Reactions   • Codeine Other (See Comments)      Chest pain .  Patient states she is allergic to codeine and tylenol when together but not separate.   • Penicillins Hives   • Ambien [Zolpidem Tartrate] Confusion         Current Outpatient Medications:   •  acetaminophen (TYLENOL) 500 MG tablet, Take 1,000 mg by mouth 3 (Three) Times a Day As Needed. PRN PAIN, Disp: , Rfl:   •  amiodarone (PACERONE) 200 MG tablet, Take 1 tablet by mouth Daily., Disp: 180 tablet, Rfl: 0  •  apixaban (ELIQUIS) 5 MG tablet tablet, Take 1 tablet by mouth Every 12 (Twelve) Hours., Disp: 60 tablet, Rfl: 11  •  atorvastatin (LIPITOR) 40 MG tablet, Take 1 tablet by mouth Every Night., Disp: 30 tablet, Rfl: 3  •  bumetanide (BUMEX) 2 MG tablet, Take 1 tablet by mouth Daily., Disp: 30 tablet, Rfl: 3  •  Cyanocobalamin (VITAMIN B-12 CR PO), Take 2,500 mcg by mouth Daily., Disp: , Rfl:   •  Dapagliflozin Propanediol (Farxiga) 10 MG tablet, Take 10 mg by mouth Every Morning., Disp: , Rfl:   •  diphenhydrAMINE-acetaminophen (Tylenol PM Extra Strength)  MG tablet per tablet, Take 2 tablets by mouth Every Night., Disp: , Rfl:   •  esomeprazole (nexIUM) 40 MG capsule, Take 40 mg by mouth Every Morning Before Breakfast., Disp: , Rfl:   •  FLUoxetine (PROzac) 20 MG capsule, Take 20 mg by mouth Every Morning., Disp: , Rfl:   •  LORazepam (ATIVAN) 1 MG tablet, Take 1 mg by mouth every 8 (eight) hours as needed for anxiety., Disp: , Rfl:   •  Melatonin 10 MG tablet, Take 5-10 mg by mouth Every Night., Disp: , Rfl:   •  metoprolol succinate XL (TOPROL-XL) 50 MG 24 hr tablet, Take 1 tablet by mouth Daily., Disp: 90 tablet, Rfl: 0  •  multivitamin with minerals tablet tablet, Take 1 tablet by mouth Daily., Disp: , Rfl:   •  nitroglycerin (NITROSTAT) 0.4 MG SL tablet, Place 1 tablet under the tongue Every 5 (Five) Minutes As Needed for Chest Pain. Take no more than 3 doses in 15 minutes., Disp: 100 tablet, Rfl: 3  •  O2 (OXYGEN), Inhale 2 L/min every night at bedtime., Disp: , Rfl:   •  spironolactone  "(ALDACTONE) 25 MG tablet, Take 1 tablet by mouth Daily., Disp: 90 tablet, Rfl: 0  •  vitamin C (ASCORBIC ACID) 500 MG tablet, Take 500 mg by mouth Daily., Disp: , Rfl:   •  Vitamin D, Ergocalciferol, 2000 units capsule, Take 2,000 Units by mouth Daily., Disp: , Rfl:       Objective:     Vitals:    05/06/22 1503   BP: 118/70   Pulse: 70   Weight: 72.6 kg (160 lb)   Height: 165.1 cm (65\")     Body mass index is 26.63 kg/m².    Constitutional:       Appearance: Well-developed.   Eyes:      General: No scleral icterus.  HENT:      Head: Normocephalic.   Neck:      Thyroid: No thyromegaly.      Vascular: No JVD.      Lymphadenopathy: No cervical adenopathy.   Pulmonary:      Effort: Pulmonary effort is normal.      Breath sounds: Normal breath sounds. No wheezing. No rales.   Cardiovascular:      Normal rate. Regular rhythm.      No gallop.   Edema:     Peripheral edema absent.   Abdominal:      Palpations: Abdomen is soft.      Tenderness: There is no abdominal tenderness.   Musculoskeletal: Normal range of motion. Skin:     General: Skin is warm and dry.      Findings: No rash.   Neurological:      Mental Status: Alert and oriented to person, place, and time.           ECG 12 Lead    Date/Time: 5/6/2022 3:45 PM  Performed by: Murphy Horner MD  Authorized by: Murphy Horner MD   Comparison: compared with previous ECG   Similar to previous ECG  Rhythm: sinus rhythm  Other findings: non-specific ST-T wave changes    Clinical impression: abnormal EKG             Assessment:       Diagnosis Plan   1. S/P CABG (coronary artery bypass graft)     2. S/P AVR     3. Ischemic cardiomyopathy     4. Paroxysmal atrial fibrillation (HCC)            Plan:       I think she is doing very well after her bypass I will be interested to see if her LV function is better we can get an echo on her and stopping her Ranexa does not know that she needs it anymore she is in sinus rhythm 70 keep the amiodarone going a little longer and see if " we can keep her in sinus rhythm although I do not really want to keep her on amiodarone long-term I will have her come back and see me in 6 months and we will see what her rhythm is at that point    Return in about 6 months (around 11/6/2022).     As always, it has been a pleasure to participate in your patient's care.      Sincerely,       Murphy Horner MD

## 2022-05-10 ENCOUNTER — TELEPHONE (OUTPATIENT)
Dept: CARDIOLOGY | Facility: CLINIC | Age: 82
End: 2022-05-10

## 2022-05-10 NOTE — TELEPHONE ENCOUNTER
Pt called and left voicemail stating that she was seen by  05/06/22. He ordered a ECHO, and it has not be scheduled.     Can we please make sure this done? Thanks     She can be reached at [phone] or [phone]    Thanks

## 2022-05-17 ENCOUNTER — READMISSION MANAGEMENT (OUTPATIENT)
Dept: CALL CENTER | Facility: HOSPITAL | Age: 82
End: 2022-05-17

## 2022-05-17 RX ORDER — BUMETANIDE 2 MG/1
2 TABLET ORAL DAILY
Qty: 90 TABLET | Refills: 2 | Status: SHIPPED | OUTPATIENT
Start: 2022-05-17 | End: 2023-02-16

## 2022-05-17 NOTE — OUTREACH NOTE
CT Surgery Week 3 Survey    Flowsheet Row Responses   [facility] patient discharged from? [place]   Does the patient have one of the following disease processes/diagnoses(primary or secondary)? Cardiothoracic surgery   Call start time 1111   Call end time 1114   Discharge diagnosis Severe CAD with multiple stents & in-stent stenosis, aortic stenosis, mitral incompetence s/p CABG x4 utilizing LIMA/LSVG, AVR, MVr, MAZE, closure of left atrial appendage    Is patient permission given to speak with other caregiver? No   Meds reviewed with patient/caregiver? Yes   Is the patient taking all medications as directed (includes completed medication regime)? Yes   Has the patient kept scheduled appointments due by today? Yes   Psychosocial issues? No   What is the patient's perception of their health status since discharge? Improving   Is the patient/caregiver able to teach back steps to recovery at home? Rest and rebuild strength, gradually increase activity, Eat a well-balance diet, Set small, achievable goals for return to baseline health   If the patient is a current smoker, are they able to teach back resources for cessation? Not a smoker   Is the patient/caregiver able to teach back the hierarchy of who to call/visit for symptoms/problems? PCP, Specialist, Home health nurse, Urgent Care, ED, 911 Yes   Is the patient interested in additional calls from an ambulatory ?  NOTE:  applies to high risk patients requiring additional follow-up. No   Wrap up additional comments Patient reports that she continues to do well. Denies any new concerns or questions today.           GENO SAHA - Registered Nurse

## 2022-05-23 ENCOUNTER — TELEPHONE (OUTPATIENT)
Dept: CARDIAC REHAB | Facility: HOSPITAL | Age: 82
End: 2022-05-23

## 2022-05-23 ENCOUNTER — APPOINTMENT (OUTPATIENT)
Dept: CARDIAC REHAB | Facility: HOSPITAL | Age: 82
End: 2022-05-23

## 2022-05-23 NOTE — TELEPHONE ENCOUNTER
Was expecting patient for initial assessment for cardiac rehab. Pt says she left a message on Friday, May 20 with our department that she was sick. I encouraged her to see her PCP, but pt says she isn't running a fever. She has had diarrhea and feels weak. Discussed ways to replenish her fluids. She says her diarrhea is getting better though. Encouraged her again to call her doctor is she isn't much better soon. She will call us to reschedule her cardiac rehab appt.

## 2022-06-03 ENCOUNTER — APPOINTMENT (OUTPATIENT)
Dept: SLEEP MEDICINE | Facility: HOSPITAL | Age: 82
End: 2022-06-03

## 2022-06-06 ENCOUNTER — HOSPITAL ENCOUNTER (OUTPATIENT)
Dept: CARDIOLOGY | Facility: HOSPITAL | Age: 82
Discharge: HOME | End: 2022-06-06
Admitting: INTERNAL MEDICINE

## 2022-06-06 DIAGNOSIS — Z95.2 S/P AVR: ICD-10-CM

## 2022-06-06 DIAGNOSIS — Z95.1 S/P CABG (CORONARY ARTERY BYPASS GRAFT): ICD-10-CM

## 2022-06-06 DIAGNOSIS — I25.5 ISCHEMIC CARDIOMYOPATHY: ICD-10-CM

## 2022-06-06 DIAGNOSIS — I48.0 PAROXYSMAL ATRIAL FIBRILLATION: ICD-10-CM

## 2022-06-06 PROCEDURE — 93306 TTE W/DOPPLER COMPLETE: CPT

## 2022-06-06 PROCEDURE — 93306 TTE W/DOPPLER COMPLETE: CPT | Performed by: INTERNAL MEDICINE

## 2022-06-07 LAB
AORTIC ARCH: 2.3 CM
AORTIC DIMENSIONLESS INDEX: 0.4 (DI)
ASCENDING AORTA: 4 CM
BH CV ECHO AV AORTIC VALVE AT ACCEL TIME CALCULATED: NORMAL MSEC
BH CV ECHO MEAS - ACS: 1.39 CM
BH CV ECHO MEAS - AO MAX PG: 22.5 MMHG
BH CV ECHO MEAS - AO MEAN PG: 11.2 MMHG
BH CV ECHO MEAS - AO ROOT DIAM: 2.9 CM
BH CV ECHO MEAS - AO V2 MAX: 237 CM/SEC
BH CV ECHO MEAS - AO V2 VTI: 43.5 CM
BH CV ECHO MEAS - AT: 77 SEC
BH CV ECHO MEAS - AVA(I,D): 1.18 CM2
BH CV ECHO MEAS - EDV(CUBED): 167.3 ML
BH CV ECHO MEAS - EDV(MOD-SP2): 110 ML
BH CV ECHO MEAS - EDV(MOD-SP4): 113 ML
BH CV ECHO MEAS - EF(MOD-BP): 55.7 %
BH CV ECHO MEAS - EF(MOD-SP2): 52.7 %
BH CV ECHO MEAS - EF(MOD-SP4): 61.1 %
BH CV ECHO MEAS - ESV(CUBED): 59.1 ML
BH CV ECHO MEAS - ESV(MOD-SP2): 52 ML
BH CV ECHO MEAS - ESV(MOD-SP4): 44 ML
BH CV ECHO MEAS - FS: 29.3 %
BH CV ECHO MEAS - IVS/LVPW: 1.12 CM
BH CV ECHO MEAS - IVSD: 1.12 CM
BH CV ECHO MEAS - LAT PEAK E' VEL: 5.8 CM/SEC
BH CV ECHO MEAS - LV DIASTOLIC VOL/BSA (35-75): 66.5 CM2
BH CV ECHO MEAS - LV MASS(C)D: 230.3 GRAMS
BH CV ECHO MEAS - LV MAX PG: 2.25 MMHG
BH CV ECHO MEAS - LV MEAN PG: 1.29 MMHG
BH CV ECHO MEAS - LV SYSTOLIC VOL/BSA (12-30): 25.9 CM2
BH CV ECHO MEAS - LV V1 MAX: 75 CM/SEC
BH CV ECHO MEAS - LV V1 VTI: 15.9 CM
BH CV ECHO MEAS - LVIDD: 5.5 CM
BH CV ECHO MEAS - LVIDS: 3.9 CM
BH CV ECHO MEAS - LVOT AREA: 3.2 CM2
BH CV ECHO MEAS - LVOT DIAM: 2.03 CM
BH CV ECHO MEAS - LVPWD: 1 CM
BH CV ECHO MEAS - MED PEAK E' VEL: 3.6 CM/SEC
BH CV ECHO MEAS - MR MAX PG: 129.1 MMHG
BH CV ECHO MEAS - MR MAX VEL: 568 CM/SEC
BH CV ECHO MEAS - MR MEAN PG: 85.1 MMHG
BH CV ECHO MEAS - MR MEAN VEL: 434 CM/SEC
BH CV ECHO MEAS - MR VTI: 196 CM
BH CV ECHO MEAS - MV A DUR: 0.12 SEC
BH CV ECHO MEAS - MV A MAX VEL: 40.7 CM/SEC
BH CV ECHO MEAS - MV DEC SLOPE: 487.9 CM/SEC2
BH CV ECHO MEAS - MV DEC TIME: 0.26 MSEC
BH CV ECHO MEAS - MV E MAX VEL: 118 CM/SEC
BH CV ECHO MEAS - MV E/A: 2.9
BH CV ECHO MEAS - MV MAX PG: 10.1 MMHG
BH CV ECHO MEAS - MV MEAN PG: 2.8 MMHG
BH CV ECHO MEAS - MV P1/2T: 91.3 MSEC
BH CV ECHO MEAS - MV V2 VTI: 40.5 CM
BH CV ECHO MEAS - MVA(P1/2T): 2.41 CM2
BH CV ECHO MEAS - MVA(VTI): 1.27 CM2
BH CV ECHO MEAS - PA ACC TIME: 0.06 SEC
BH CV ECHO MEAS - PA PR(ACCEL): 51.7 MMHG
BH CV ECHO MEAS - PA V2 MAX: 76 CM/SEC
BH CV ECHO MEAS - PI END-D VEL: 101.8 CM/SEC
BH CV ECHO MEAS - PULM A REVS DUR: 0.04 SEC
BH CV ECHO MEAS - PULM A REVS VEL: 26.1 CM/SEC
BH CV ECHO MEAS - PULM DIAS VEL: 69.8 CM/SEC
BH CV ECHO MEAS - PULM S/D: 0.56
BH CV ECHO MEAS - PULM SYS VEL: 39 CM/SEC
BH CV ECHO MEAS - QP/QS: 0.76
BH CV ECHO MEAS - RAP SYSTOLE: 3 MMHG
BH CV ECHO MEAS - RV MAX PG: 1.02 MMHG
BH CV ECHO MEAS - RV V1 MAX: 50.6 CM/SEC
BH CV ECHO MEAS - RV V1 VTI: 10.1 CM
BH CV ECHO MEAS - RVOT DIAM: 2.22 CM
BH CV ECHO MEAS - RVSP: 32.5 MMHG
BH CV ECHO MEAS - SI(MOD-SP2): 34.1 ML/M2
BH CV ECHO MEAS - SI(MOD-SP4): 40.6 ML/M2
BH CV ECHO MEAS - SUP REN AO DIAM: 2.1 CM
BH CV ECHO MEAS - SV(LVOT): 51.3 ML
BH CV ECHO MEAS - SV(MOD-SP2): 58 ML
BH CV ECHO MEAS - SV(MOD-SP4): 69 ML
BH CV ECHO MEAS - SV(RVOT): 39.2 ML
BH CV ECHO MEAS - TAPSE (>1.6): 1.57 CM
BH CV ECHO MEAS - TR MAX PG: 29.5 MMHG
BH CV ECHO MEAS - TR MAX VEL: 271.5 CM/SEC
BH CV ECHO MEASUREMENTS AVERAGE E/E' RATIO: 25.11
BH CV XLRA - RV BASE: 3.3 CM
BH CV XLRA - RV LENGTH: 7.3 CM
BH CV XLRA - RV MID: 2.9 CM
BH CV XLRA - TDI S': 8.8 CM/SEC
LEFT ATRIUM VOLUME INDEX: 53.2 ML/M2
LV EF 2D ECHO EST: 55 %
MAXIMAL PREDICTED HEART RATE: 138 BPM
SINUS: 2.7 CM
STJ: 3.7 CM
STRESS TARGET HR: 117 BPM

## 2022-06-08 ENCOUNTER — TELEPHONE (OUTPATIENT)
Dept: CARDIOLOGY | Facility: CLINIC | Age: 82
End: 2022-06-08

## 2022-06-08 NOTE — TELEPHONE ENCOUNTER
Please let her know that her echocardiogram is stable.  Her heart is pumping well with an ejection fraction of 55%.  The mitral valve appears stable with a moderate amount of leakiness.  I would not recommend any changes.

## 2022-06-08 NOTE — TELEPHONE ENCOUNTER
Notified patient of results. Patient verbalized understanding.    Radha Cisse RN  Triage [facility]

## 2022-07-28 RX ORDER — METOPROLOL SUCCINATE 50 MG/1
TABLET, EXTENDED RELEASE ORAL
Qty: 30 TABLET | OUTPATIENT
Start: 2022-07-28

## 2022-08-08 RX ORDER — ATORVASTATIN CALCIUM 40 MG/1
TABLET, FILM COATED ORAL
Qty: 30 TABLET | Refills: 3 | OUTPATIENT
Start: 2022-08-08

## 2022-08-08 RX ORDER — SPIRONOLACTONE 25 MG/1
TABLET ORAL
Qty: 30 TABLET | Refills: 5 | Status: SHIPPED | OUTPATIENT
Start: 2022-08-08

## 2022-08-08 RX ORDER — APIXABAN 5 MG/1
TABLET, FILM COATED ORAL
Qty: 60 TABLET | Refills: 11 | Status: ON HOLD | OUTPATIENT
Start: 2022-08-08 | End: 2022-11-04 | Stop reason: SDUPTHER

## 2022-08-22 RX ORDER — METOPROLOL SUCCINATE 50 MG/1
TABLET, EXTENDED RELEASE ORAL
Qty: 30 TABLET | OUTPATIENT
Start: 2022-08-22

## 2022-08-23 RX ORDER — METOPROLOL SUCCINATE 50 MG/1
50 TABLET, EXTENDED RELEASE ORAL DAILY
Qty: 90 TABLET | Refills: 2 | Status: SHIPPED | OUTPATIENT
Start: 2022-08-23

## 2022-10-20 ENCOUNTER — PRE-ADMISSION TESTING (OUTPATIENT)
Dept: PREADMISSION TESTING | Facility: HOSPITAL | Age: 82
End: 2022-10-20

## 2022-10-20 VITALS
BODY MASS INDEX: 27.81 KG/M2 | WEIGHT: 166.9 LBS | HEART RATE: 62 BPM | SYSTOLIC BLOOD PRESSURE: 122 MMHG | DIASTOLIC BLOOD PRESSURE: 7 MMHG | RESPIRATION RATE: 20 BRPM | HEIGHT: 65 IN | OXYGEN SATURATION: 94 % | TEMPERATURE: 97.7 F

## 2022-10-20 LAB
ABO GROUP BLD: NORMAL
ANION GAP SERPL CALCULATED.3IONS-SCNC: 7.7 MMOL/L (ref 5–15)
BLD GP AB SCN SERPL QL: POSITIVE
BUN SERPL-MCNC: 16 MG/DL (ref 8–23)
BUN/CREAT SERPL: 16.8 (ref 7–25)
CALCIUM SPEC-SCNC: 9.1 MG/DL (ref 8.6–10.5)
CHLORIDE SERPL-SCNC: 98 MMOL/L (ref 98–107)
CO2 SERPL-SCNC: 30.3 MMOL/L (ref 22–29)
CREAT SERPL-MCNC: 0.95 MG/DL (ref 0.57–1)
DEPRECATED RDW RBC AUTO: 51.6 FL (ref 37–54)
EGFRCR SERPLBLD CKD-EPI 2021: 59.9 ML/MIN/1.73
ERYTHROCYTE [DISTWIDTH] IN BLOOD BY AUTOMATED COUNT: 15.3 % (ref 12.3–15.4)
GLUCOSE SERPL-MCNC: 96 MG/DL (ref 65–99)
HCT VFR BLD AUTO: 40.5 % (ref 34–46.6)
HGB BLD-MCNC: 12.5 G/DL (ref 12–15.9)
MCH RBC QN AUTO: 28.5 PG (ref 26.6–33)
MCHC RBC AUTO-ENTMCNC: 30.9 G/DL (ref 31.5–35.7)
MCV RBC AUTO: 92.3 FL (ref 79–97)
NONSPECIFIC ANTIBODY: NORMAL
PLATELET # BLD AUTO: 207 10*3/MM3 (ref 140–450)
PMV BLD AUTO: 9.8 FL (ref 6–12)
POTASSIUM SERPL-SCNC: 4.2 MMOL/L (ref 3.5–5.2)
RBC # BLD AUTO: 4.39 10*6/MM3 (ref 3.77–5.28)
RH BLD: POSITIVE
SODIUM SERPL-SCNC: 136 MMOL/L (ref 136–145)
T&S EXPIRATION DATE: NORMAL
WBC NRBC COR # BLD: 5.98 10*3/MM3 (ref 3.4–10.8)

## 2022-10-20 PROCEDURE — 80048 BASIC METABOLIC PNL TOTAL CA: CPT

## 2022-10-20 PROCEDURE — 86922 COMPATIBILITY TEST ANTIGLOB: CPT

## 2022-10-20 PROCEDURE — 36415 COLL VENOUS BLD VENIPUNCTURE: CPT

## 2022-10-20 PROCEDURE — 86850 RBC ANTIBODY SCREEN: CPT

## 2022-10-20 PROCEDURE — 86920 COMPATIBILITY TEST SPIN: CPT

## 2022-10-20 PROCEDURE — 86900 BLOOD TYPING SEROLOGIC ABO: CPT

## 2022-10-20 PROCEDURE — 86870 RBC ANTIBODY IDENTIFICATION: CPT

## 2022-10-20 PROCEDURE — 86901 BLOOD TYPING SEROLOGIC RH(D): CPT

## 2022-10-20 PROCEDURE — 85027 COMPLETE CBC AUTOMATED: CPT

## 2022-10-20 RX ORDER — OMEPRAZOLE 40 MG/1
40 CAPSULE, DELAYED RELEASE ORAL DAILY
COMMUNITY

## 2022-10-20 NOTE — DISCHARGE INSTRUCTIONS
Take the following medications the morning of surgery with a small sip of water:  FLUOXETINE, METOPROLOL, OMEPRAZOLE    ARRIVAL TIME 0600 ON 11/3/22    FOLLOW MD INSTRUCTIONS REGARDING ELIQUIS       If you are on prescription narcotic pain medication to control your pain you may also take that medication the morning of surgery.    General Instructions:  Do not eat or drink anything after midnight the night before surgery.  Infants may have breast milk up to four hours before surgery.  Infants drinking formula may drink formula up to six hours before surgery.   Patients who avoid smoking, chewing tobacco and alcohol for 4 weeks prior to surgery have a reduced risk of post-operative complications.  Quit smoking as many days before surgery as you can.  Do not smoke, use chewing tobacco or drink alcohol the day of surgery.   If applicable bring your C-PAP/ BI-PAP machine.  Bring any papers given to you in the doctor’s office.  Wear clean comfortable clothes.  Do not wear contact lenses, false eyelashes or make-up.  Bring a case for your glasses.   Bring crutches or walker if applicable.  Remove all piercings.  Leave jewelry and any other valuables at home.  Hair extensions with metal clips must be removed prior to surgery.  The Pre-Admission Testing nurse will instruct you to bring medications if unable to obtain an accurate list in Pre-Admission Testing.        If you were given a blood bank ID arm band remember to bring it with you the day of surgery.    Preventing a Surgical Site Infection:  For 2 to 3 days before surgery, avoid shaving with a razor because the razor can irritate skin and make it easier to develop an infection.    Any areas of open skin can increase the risk of a post-operative wound infection by allowing bacteria to enter and travel throughout the body.  Notify your surgeon if you have any skin wounds / rashes even if it is not near the expected surgical site.  The area will need assessed to  determine if surgery should be delayed until it is healed.  The night prior to surgery shower using a fresh bar of anti-bacterial soap (such as Dial) and clean washcloth.  Sleep in a clean bed with clean clothing.  Do not allow pets to sleep with you.  Shower on the morning of surgery using a fresh bar of anti-bacterial soap (such as Dial) and clean washcloth.  Dry with a clean towel and dress in clean clothing.  Ask your surgeon if you will be receiving antibiotics prior to surgery.  Make sure you, your family, and all healthcare providers clean their hands with soap and water or an alcohol based hand  before caring for you or your wound.    Day of surgery:  Your arrival time is approximately two hours before your scheduled surgery time.  Upon arrival, a Pre-op nurse and Anesthesiologist will review your health history, obtain vital signs, and answer questions you may have.  The only belongings needed at this time will be your home medications and if applicable your C-PAP/BI-PAP machine.  A Pre-op nurse will start an IV and you may receive medication in preparation for surgery, including something to help you relax.      Please be aware that surgery does come with discomfort.  We want to make every effort to control your discomfort so please discuss any uncontrolled symptoms with your nurse.   Your doctor will most likely have prescribed pain medications.      If you are going home after surgery you will receive individualized written care instructions before being discharged.  A responsible adult must drive you to and from the hospital on the day of your surgery and stay with you for 24 hours.  Discharge prescriptions can be filled by the hospital pharmacy during regular pharmacy hours.  If you are having surgery late in the day/evening your prescription may be e-prescribed to your pharmacy.  Please verify your pharmacy hours or chose a 24 hour pharmacy to avoid not having access to your prescription  because your pharmacy has closed for the day.    If you are staying overnight following surgery, you will be transported to your hospital room following the recovery period.  [facility] has all private rooms.    If you have any questions please call Pre-Admission Testing at [phone].  Deductibles and co-payments are collected on the day of service. Please be prepared to pay the required co-pay, deductible or deposit on the day of service as defined by your plan.    Call your surgeon immediately if you experience any of the following symptoms:  Sore Throat  Shortness of Breath or difficulty breathing  Cough  Chills  Body soreness or muscle pain  Headache  Fever  New loss of taste or smell  Do not arrive for your surgery ill.  Your procedure will need to be rescheduled to another time.  You will need to call your physician before the day of surgery to avoid any unnecessary exposure to hospital staff as well as other patients.

## 2022-10-25 LAB
BH BB BLOOD EXPIRATION DATE: NORMAL
BH BB BLOOD EXPIRATION DATE: NORMAL
BH BB BLOOD TYPE BARCODE: 6200
BH BB BLOOD TYPE BARCODE: 6200
BH BB DISPENSE STATUS: NORMAL
BH BB DISPENSE STATUS: NORMAL
BH BB PRODUCT CODE: NORMAL
BH BB PRODUCT CODE: NORMAL
BH BB UNIT NUMBER: NORMAL
BH BB UNIT NUMBER: NORMAL
CROSSMATCH INTERPRETATION: NORMAL
CROSSMATCH INTERPRETATION: NORMAL
UNIT  ABO: NORMAL
UNIT  ABO: NORMAL
UNIT  RH: NORMAL
UNIT  RH: NORMAL

## 2022-11-03 ENCOUNTER — ANESTHESIA (OUTPATIENT)
Dept: PERIOP | Facility: HOSPITAL | Age: 82
End: 2022-11-03

## 2022-11-03 ENCOUNTER — ANESTHESIA EVENT (OUTPATIENT)
Dept: PERIOP | Facility: HOSPITAL | Age: 82
End: 2022-11-03

## 2022-11-03 ENCOUNTER — HOSPITAL ENCOUNTER (OUTPATIENT)
Facility: HOSPITAL | Age: 82
Discharge: HOME | End: 2022-11-04
Attending: UROLOGY | Admitting: UROLOGY

## 2022-11-03 DIAGNOSIS — N28.89 RIGHT RENAL MASS: Primary | ICD-10-CM

## 2022-11-03 DIAGNOSIS — N28.89 RIGHT KIDNEY MASS: ICD-10-CM

## 2022-11-03 LAB
ANION GAP SERPL CALCULATED.3IONS-SCNC: 8.2 MMOL/L (ref 5–15)
BUN SERPL-MCNC: 20 MG/DL (ref 8–23)
BUN/CREAT SERPL: 22.7 (ref 7–25)
CALCIUM SPEC-SCNC: 8.7 MG/DL (ref 8.6–10.5)
CHLORIDE SERPL-SCNC: 107 MMOL/L (ref 98–107)
CO2 SERPL-SCNC: 22.8 MMOL/L (ref 22–29)
CREAT SERPL-MCNC: 0.88 MG/DL (ref 0.57–1)
DEPRECATED RDW RBC AUTO: 51.9 FL (ref 37–54)
EGFRCR SERPLBLD CKD-EPI 2021: 65.7 ML/MIN/1.73
ERYTHROCYTE [DISTWIDTH] IN BLOOD BY AUTOMATED COUNT: 15.2 % (ref 12.3–15.4)
GLUCOSE BLDC GLUCOMTR-MCNC: 193 MG/DL (ref 70–130)
GLUCOSE SERPL-MCNC: 196 MG/DL (ref 65–99)
HCT VFR BLD AUTO: 37.3 % (ref 34–46.6)
HGB BLD-MCNC: 11.6 G/DL (ref 12–15.9)
MCH RBC QN AUTO: 28.9 PG (ref 26.6–33)
MCHC RBC AUTO-ENTMCNC: 31.1 G/DL (ref 31.5–35.7)
MCV RBC AUTO: 92.8 FL (ref 79–97)
PLATELET # BLD AUTO: 217 10*3/MM3 (ref 140–450)
PMV BLD AUTO: 9.9 FL (ref 6–12)
POTASSIUM SERPL-SCNC: 5 MMOL/L (ref 3.5–5.2)
RBC # BLD AUTO: 4.02 10*6/MM3 (ref 3.77–5.28)
SODIUM SERPL-SCNC: 138 MMOL/L (ref 136–145)
WBC NRBC COR # BLD: 11.61 10*3/MM3 (ref 3.4–10.8)

## 2022-11-03 PROCEDURE — 25010000002 CEFTRIAXONE PER 250 MG: Performed by: UROLOGY

## 2022-11-03 PROCEDURE — 25010000002 MAGNESIUM SULFATE PER 500 MG OF MAGNESIUM: Performed by: STUDENT IN AN ORGANIZED HEALTH CARE EDUCATION/TRAINING PROGRAM

## 2022-11-03 PROCEDURE — 25010000002 FENTANYL CITRATE (PF) 100 MCG/2ML SOLUTION: Performed by: STUDENT IN AN ORGANIZED HEALTH CARE EDUCATION/TRAINING PROGRAM

## 2022-11-03 PROCEDURE — 88331 PATH CONSLTJ SURG 1 BLK 1SPC: CPT | Performed by: UROLOGY

## 2022-11-03 PROCEDURE — 25010000002 FENTANYL CITRATE (PF) 50 MCG/ML SOLUTION: Performed by: ANESTHESIOLOGY

## 2022-11-03 PROCEDURE — 25010000002 PROPOFOL 10 MG/ML EMULSION: Performed by: STUDENT IN AN ORGANIZED HEALTH CARE EDUCATION/TRAINING PROGRAM

## 2022-11-03 PROCEDURE — A9270 NON-COVERED ITEM OR SERVICE: HCPCS | Performed by: UROLOGY

## 2022-11-03 PROCEDURE — 88342 IMHCHEM/IMCYTCHM 1ST ANTB: CPT | Performed by: UROLOGY

## 2022-11-03 PROCEDURE — 25010000002 DEXAMETHASONE SODIUM PHOSPHATE 20 MG/5ML SOLUTION: Performed by: STUDENT IN AN ORGANIZED HEALTH CARE EDUCATION/TRAINING PROGRAM

## 2022-11-03 PROCEDURE — 80048 BASIC METABOLIC PNL TOTAL CA: CPT | Performed by: UROLOGY

## 2022-11-03 PROCEDURE — 25010000002 FENTANYL CITRATE (PF) 50 MCG/ML SOLUTION: Performed by: STUDENT IN AN ORGANIZED HEALTH CARE EDUCATION/TRAINING PROGRAM

## 2022-11-03 PROCEDURE — 88341 IMHCHEM/IMCYTCHM EA ADD ANTB: CPT | Performed by: UROLOGY

## 2022-11-03 PROCEDURE — 88307 TISSUE EXAM BY PATHOLOGIST: CPT | Performed by: UROLOGY

## 2022-11-03 PROCEDURE — 25010000002 ONDANSETRON PER 1 MG: Performed by: STUDENT IN AN ORGANIZED HEALTH CARE EDUCATION/TRAINING PROGRAM

## 2022-11-03 PROCEDURE — 63710000001 MELATONIN 5 MG TABLET: Performed by: UROLOGY

## 2022-11-03 PROCEDURE — 25010000002 KETOROLAC TROMETHAMINE PER 15 MG: Performed by: UROLOGY

## 2022-11-03 PROCEDURE — G0378 HOSPITAL OBSERVATION PER HR: HCPCS

## 2022-11-03 PROCEDURE — 82962 GLUCOSE BLOOD TEST: CPT

## 2022-11-03 PROCEDURE — 63710000001 SENNOSIDES-DOCUSATE 8.6-50 MG TABLET: Performed by: UROLOGY

## 2022-11-03 PROCEDURE — 0 LIDOCAINE 1 % SOLUTION: Performed by: UROLOGY

## 2022-11-03 PROCEDURE — 25010000002 HYDROMORPHONE PER 4 MG: Performed by: STUDENT IN AN ORGANIZED HEALTH CARE EDUCATION/TRAINING PROGRAM

## 2022-11-03 PROCEDURE — 85027 COMPLETE CBC AUTOMATED: CPT | Performed by: UROLOGY

## 2022-11-03 DEVICE — HORIZON TI ML 6 CLIPS/CART
Type: IMPLANTABLE DEVICE | Site: FLANK | Status: FUNCTIONAL
Brand: WECK

## 2022-11-03 DEVICE — FLOSEAL HEMOSTATIC MATRIX, 10ML
Type: IMPLANTABLE DEVICE | Site: FLANK | Status: FUNCTIONAL
Brand: FLOSEAL HEMOSTATIC MATRIX

## 2022-11-03 DEVICE — LARGE LIGATION CLIPS 6 CLIPS/CART
Type: IMPLANTABLE DEVICE | Site: FLANK | Status: FUNCTIONAL
Brand: VAS-Q-CLIP

## 2022-11-03 DEVICE — KNOTLESS TISSUE CONTROL DEVICE, UNDYED UNIDIRECTIONAL (ANTIBACTERIAL) SYNTHETIC ABSORBABLE DEVICE
Type: IMPLANTABLE DEVICE | Site: FLANK | Status: FUNCTIONAL
Brand: STRATAFIX

## 2022-11-03 RX ORDER — AZITHROMYCIN 250 MG/1
250 TABLET, FILM COATED ORAL DAILY
COMMUNITY
End: 2023-02-22 | Stop reason: ALTCHOICE

## 2022-11-03 RX ORDER — SODIUM CHLORIDE 9 MG/ML
100 INJECTION, SOLUTION INTRAVENOUS CONTINUOUS
Status: DISCONTINUED | OUTPATIENT
Start: 2022-11-03 | End: 2022-11-04 | Stop reason: HOSPADM

## 2022-11-03 RX ORDER — MIDAZOLAM HYDROCHLORIDE 1 MG/ML
0.5 INJECTION INTRAMUSCULAR; INTRAVENOUS
Status: DISCONTINUED | OUTPATIENT
Start: 2022-11-03 | End: 2022-11-03 | Stop reason: HOSPADM

## 2022-11-03 RX ORDER — FLUOXETINE HYDROCHLORIDE 20 MG/1
20 CAPSULE ORAL EVERY MORNING
Status: DISCONTINUED | OUTPATIENT
Start: 2022-11-03 | End: 2022-11-04 | Stop reason: HOSPADM

## 2022-11-03 RX ORDER — HYDROMORPHONE HYDROCHLORIDE 1 MG/ML
0.5 INJECTION, SOLUTION INTRAMUSCULAR; INTRAVENOUS; SUBCUTANEOUS
Status: DISCONTINUED | OUTPATIENT
Start: 2022-11-03 | End: 2022-11-03 | Stop reason: HOSPADM

## 2022-11-03 RX ORDER — ONDANSETRON 2 MG/ML
INJECTION INTRAMUSCULAR; INTRAVENOUS AS NEEDED
Status: DISCONTINUED | OUTPATIENT
Start: 2022-11-03 | End: 2022-11-03 | Stop reason: SURG

## 2022-11-03 RX ORDER — DEXAMETHASONE SODIUM PHOSPHATE 4 MG/ML
INJECTION, SOLUTION INTRA-ARTICULAR; INTRALESIONAL; INTRAMUSCULAR; INTRAVENOUS; SOFT TISSUE AS NEEDED
Status: DISCONTINUED | OUTPATIENT
Start: 2022-11-03 | End: 2022-11-03 | Stop reason: SURG

## 2022-11-03 RX ORDER — EPHEDRINE SULFATE 50 MG/ML
INJECTION INTRAVENOUS AS NEEDED
Status: DISCONTINUED | OUTPATIENT
Start: 2022-11-03 | End: 2022-11-03 | Stop reason: SURG

## 2022-11-03 RX ORDER — NITROGLYCERIN 0.4 MG/1
0.4 TABLET SUBLINGUAL
Status: DISCONTINUED | OUTPATIENT
Start: 2022-11-03 | End: 2022-11-04 | Stop reason: HOSPADM

## 2022-11-03 RX ORDER — FENTANYL CITRATE 50 UG/ML
50 INJECTION, SOLUTION INTRAMUSCULAR; INTRAVENOUS
Status: DISCONTINUED | OUTPATIENT
Start: 2022-11-03 | End: 2022-11-03 | Stop reason: HOSPADM

## 2022-11-03 RX ORDER — DIPHENHYDRAMINE HCL 25 MG
25 CAPSULE ORAL
Status: DISCONTINUED | OUTPATIENT
Start: 2022-11-03 | End: 2022-11-03 | Stop reason: HOSPADM

## 2022-11-03 RX ORDER — ROCURONIUM BROMIDE 10 MG/ML
INJECTION, SOLUTION INTRAVENOUS AS NEEDED
Status: DISCONTINUED | OUTPATIENT
Start: 2022-11-03 | End: 2022-11-03 | Stop reason: SURG

## 2022-11-03 RX ORDER — ONDANSETRON 2 MG/ML
4 INJECTION INTRAMUSCULAR; INTRAVENOUS ONCE AS NEEDED
Status: DISCONTINUED | OUTPATIENT
Start: 2022-11-03 | End: 2022-11-03 | Stop reason: HOSPADM

## 2022-11-03 RX ORDER — SPIRONOLACTONE 25 MG/1
12.5 TABLET ORAL DAILY
Status: DISCONTINUED | OUTPATIENT
Start: 2022-11-03 | End: 2022-11-04 | Stop reason: HOSPADM

## 2022-11-03 RX ORDER — FLUMAZENIL 0.1 MG/ML
0.2 INJECTION INTRAVENOUS AS NEEDED
Status: DISCONTINUED | OUTPATIENT
Start: 2022-11-03 | End: 2022-11-03 | Stop reason: HOSPADM

## 2022-11-03 RX ORDER — MAGNESIUM HYDROXIDE 1200 MG/15ML
LIQUID ORAL AS NEEDED
Status: DISCONTINUED | OUTPATIENT
Start: 2022-11-03 | End: 2022-11-03 | Stop reason: HOSPADM

## 2022-11-03 RX ORDER — FAMOTIDINE 10 MG/ML
20 INJECTION, SOLUTION INTRAVENOUS ONCE
Status: COMPLETED | OUTPATIENT
Start: 2022-11-03 | End: 2022-11-03

## 2022-11-03 RX ORDER — PROMETHAZINE HYDROCHLORIDE 25 MG/1
25 TABLET ORAL ONCE AS NEEDED
Status: DISCONTINUED | OUTPATIENT
Start: 2022-11-03 | End: 2022-11-03 | Stop reason: HOSPADM

## 2022-11-03 RX ORDER — DIPHENHYDRAMINE HYDROCHLORIDE 50 MG/ML
12.5 INJECTION INTRAMUSCULAR; INTRAVENOUS
Status: DISCONTINUED | OUTPATIENT
Start: 2022-11-03 | End: 2022-11-03 | Stop reason: HOSPADM

## 2022-11-03 RX ORDER — NALOXONE HCL 0.4 MG/ML
0.1 VIAL (ML) INJECTION
Status: DISCONTINUED | OUTPATIENT
Start: 2022-11-03 | End: 2022-11-04 | Stop reason: HOSPADM

## 2022-11-03 RX ORDER — PROMETHAZINE HYDROCHLORIDE 25 MG/1
25 SUPPOSITORY RECTAL ONCE AS NEEDED
Status: DISCONTINUED | OUTPATIENT
Start: 2022-11-03 | End: 2022-11-03 | Stop reason: HOSPADM

## 2022-11-03 RX ORDER — AMOXICILLIN 250 MG
2 CAPSULE ORAL 2 TIMES DAILY
Status: DISCONTINUED | OUTPATIENT
Start: 2022-11-03 | End: 2022-11-04 | Stop reason: HOSPADM

## 2022-11-03 RX ORDER — SODIUM CHLORIDE 0.9 % (FLUSH) 0.9 %
3-10 SYRINGE (ML) INJECTION AS NEEDED
Status: DISCONTINUED | OUTPATIENT
Start: 2022-11-03 | End: 2022-11-03 | Stop reason: HOSPADM

## 2022-11-03 RX ORDER — LABETALOL HYDROCHLORIDE 5 MG/ML
5 INJECTION, SOLUTION INTRAVENOUS
Status: DISCONTINUED | OUTPATIENT
Start: 2022-11-03 | End: 2022-11-03 | Stop reason: HOSPADM

## 2022-11-03 RX ORDER — PANTOPRAZOLE SODIUM 40 MG/1
40 TABLET, DELAYED RELEASE ORAL EVERY MORNING
Status: DISCONTINUED | OUTPATIENT
Start: 2022-11-03 | End: 2022-11-04 | Stop reason: HOSPADM

## 2022-11-03 RX ORDER — SODIUM CHLORIDE 0.9 % (FLUSH) 0.9 %
3 SYRINGE (ML) INJECTION EVERY 12 HOURS SCHEDULED
Status: DISCONTINUED | OUTPATIENT
Start: 2022-11-03 | End: 2022-11-03 | Stop reason: HOSPADM

## 2022-11-03 RX ORDER — KETOROLAC TROMETHAMINE 15 MG/ML
15 INJECTION, SOLUTION INTRAMUSCULAR; INTRAVENOUS EVERY 6 HOURS
Status: DISCONTINUED | OUTPATIENT
Start: 2022-11-03 | End: 2022-11-04 | Stop reason: HOSPADM

## 2022-11-03 RX ORDER — LIDOCAINE HYDROCHLORIDE 10 MG/ML
0.5 INJECTION, SOLUTION EPIDURAL; INFILTRATION; INTRACAUDAL; PERINEURAL ONCE AS NEEDED
Status: DISCONTINUED | OUTPATIENT
Start: 2022-11-03 | End: 2022-11-03 | Stop reason: HOSPADM

## 2022-11-03 RX ORDER — HYDRALAZINE HYDROCHLORIDE 20 MG/ML
5 INJECTION INTRAMUSCULAR; INTRAVENOUS
Status: DISCONTINUED | OUTPATIENT
Start: 2022-11-03 | End: 2022-11-03 | Stop reason: HOSPADM

## 2022-11-03 RX ORDER — FENTANYL CITRATE 50 UG/ML
INJECTION, SOLUTION INTRAMUSCULAR; INTRAVENOUS AS NEEDED
Status: COMPLETED | OUTPATIENT
Start: 2022-11-03 | End: 2022-11-03

## 2022-11-03 RX ORDER — LIDOCAINE HYDROCHLORIDE 20 MG/ML
INJECTION, SOLUTION INFILTRATION; PERINEURAL AS NEEDED
Status: DISCONTINUED | OUTPATIENT
Start: 2022-11-03 | End: 2022-11-03 | Stop reason: SURG

## 2022-11-03 RX ORDER — OXYCODONE HYDROCHLORIDE AND ACETAMINOPHEN 5; 325 MG/1; MG/1
1 TABLET ORAL EVERY 4 HOURS PRN
Status: DISCONTINUED | OUTPATIENT
Start: 2022-11-03 | End: 2022-11-04 | Stop reason: HOSPADM

## 2022-11-03 RX ORDER — EPHEDRINE SULFATE 50 MG/ML
5 INJECTION, SOLUTION INTRAVENOUS ONCE AS NEEDED
Status: DISCONTINUED | OUTPATIENT
Start: 2022-11-03 | End: 2022-11-03 | Stop reason: HOSPADM

## 2022-11-03 RX ORDER — IBUPROFEN 600 MG/1
600 TABLET ORAL ONCE AS NEEDED
Status: DISCONTINUED | OUTPATIENT
Start: 2022-11-03 | End: 2022-11-03 | Stop reason: HOSPADM

## 2022-11-03 RX ORDER — CHOLECALCIFEROL (VITAMIN D3) 125 MCG
10 CAPSULE ORAL NIGHTLY
Status: DISCONTINUED | OUTPATIENT
Start: 2022-11-03 | End: 2022-11-04 | Stop reason: HOSPADM

## 2022-11-03 RX ORDER — ONDANSETRON 4 MG/1
4 TABLET, FILM COATED ORAL EVERY 6 HOURS PRN
Status: DISCONTINUED | OUTPATIENT
Start: 2022-11-03 | End: 2022-11-04 | Stop reason: HOSPADM

## 2022-11-03 RX ORDER — NALOXONE HCL 0.4 MG/ML
0.2 VIAL (ML) INJECTION AS NEEDED
Status: DISCONTINUED | OUTPATIENT
Start: 2022-11-03 | End: 2022-11-03 | Stop reason: HOSPADM

## 2022-11-03 RX ORDER — SODIUM CHLORIDE, SODIUM LACTATE, POTASSIUM CHLORIDE, CALCIUM CHLORIDE 600; 310; 30; 20 MG/100ML; MG/100ML; MG/100ML; MG/100ML
9 INJECTION, SOLUTION INTRAVENOUS CONTINUOUS
Status: DISCONTINUED | OUTPATIENT
Start: 2022-11-03 | End: 2022-11-03

## 2022-11-03 RX ORDER — ONDANSETRON 2 MG/ML
4 INJECTION INTRAMUSCULAR; INTRAVENOUS EVERY 6 HOURS PRN
Status: DISCONTINUED | OUTPATIENT
Start: 2022-11-03 | End: 2022-11-04 | Stop reason: HOSPADM

## 2022-11-03 RX ORDER — HYDROMORPHONE HYDROCHLORIDE 1 MG/ML
0.5 INJECTION, SOLUTION INTRAMUSCULAR; INTRAVENOUS; SUBCUTANEOUS
Status: DISCONTINUED | OUTPATIENT
Start: 2022-11-03 | End: 2022-11-04 | Stop reason: HOSPADM

## 2022-11-03 RX ORDER — MAGNESIUM SULFATE HEPTAHYDRATE 500 MG/ML
INJECTION, SOLUTION INTRAMUSCULAR; INTRAVENOUS AS NEEDED
Status: DISCONTINUED | OUTPATIENT
Start: 2022-11-03 | End: 2022-11-03 | Stop reason: SURG

## 2022-11-03 RX ORDER — LIDOCAINE HYDROCHLORIDE 10 MG/ML
INJECTION, SOLUTION INFILTRATION; PERINEURAL AS NEEDED
Status: DISCONTINUED | OUTPATIENT
Start: 2022-11-03 | End: 2022-11-03 | Stop reason: HOSPADM

## 2022-11-03 RX ORDER — BISACODYL 10 MG
10 SUPPOSITORY, RECTAL RECTAL DAILY
Status: DISCONTINUED | OUTPATIENT
Start: 2022-11-03 | End: 2022-11-04 | Stop reason: HOSPADM

## 2022-11-03 RX ORDER — SODIUM CHLORIDE 9 MG/ML
9 INJECTION, SOLUTION INTRAVENOUS CONTINUOUS
Status: DISCONTINUED | OUTPATIENT
Start: 2022-11-03 | End: 2022-11-03

## 2022-11-03 RX ORDER — BUMETANIDE 2 MG/1
2 TABLET ORAL DAILY
Status: DISCONTINUED | OUTPATIENT
Start: 2022-11-03 | End: 2022-11-04 | Stop reason: HOSPADM

## 2022-11-03 RX ORDER — FENTANYL CITRATE 50 UG/ML
INJECTION, SOLUTION INTRAMUSCULAR; INTRAVENOUS AS NEEDED
Status: DISCONTINUED | OUTPATIENT
Start: 2022-11-03 | End: 2022-11-03 | Stop reason: SURG

## 2022-11-03 RX ORDER — PROPOFOL 10 MG/ML
VIAL (ML) INTRAVENOUS AS NEEDED
Status: DISCONTINUED | OUTPATIENT
Start: 2022-11-03 | End: 2022-11-03 | Stop reason: SURG

## 2022-11-03 RX ORDER — METOPROLOL SUCCINATE 50 MG/1
50 TABLET, EXTENDED RELEASE ORAL DAILY
Status: DISCONTINUED | OUTPATIENT
Start: 2022-11-03 | End: 2022-11-04 | Stop reason: HOSPADM

## 2022-11-03 RX ADMIN — CEFTRIAXONE SODIUM 1 G: 1 INJECTION, POWDER, FOR SOLUTION INTRAMUSCULAR; INTRAVENOUS at 07:49

## 2022-11-03 RX ADMIN — ROCURONIUM BROMIDE 10 MG: 10 INJECTION, SOLUTION INTRAVENOUS at 09:08

## 2022-11-03 RX ADMIN — FENTANYL CITRATE 25 MCG: 50 INJECTION, SOLUTION INTRAMUSCULAR; INTRAVENOUS at 10:18

## 2022-11-03 RX ADMIN — ROCURONIUM BROMIDE 10 MG: 10 INJECTION, SOLUTION INTRAVENOUS at 08:50

## 2022-11-03 RX ADMIN — SODIUM CHLORIDE 100 ML/HR: 9 INJECTION, SOLUTION INTRAVENOUS at 16:32

## 2022-11-03 RX ADMIN — SODIUM CHLORIDE 100 ML/HR: 9 INJECTION, SOLUTION INTRAVENOUS at 23:22

## 2022-11-03 RX ADMIN — SUGAMMADEX 200 MG: 100 INJECTION, SOLUTION INTRAVENOUS at 10:12

## 2022-11-03 RX ADMIN — FENTANYL CITRATE 25 MCG: 50 INJECTION, SOLUTION INTRAMUSCULAR; INTRAVENOUS at 10:24

## 2022-11-03 RX ADMIN — ROCURONIUM BROMIDE 50 MG: 10 INJECTION, SOLUTION INTRAVENOUS at 08:04

## 2022-11-03 RX ADMIN — HYDROMORPHONE HYDROCHLORIDE 0.5 MG: 1 INJECTION, SOLUTION INTRAMUSCULAR; INTRAVENOUS; SUBCUTANEOUS at 11:22

## 2022-11-03 RX ADMIN — FAMOTIDINE 20 MG: 10 INJECTION INTRAVENOUS at 07:13

## 2022-11-03 RX ADMIN — Medication 10 MG: at 20:48

## 2022-11-03 RX ADMIN — LIDOCAINE HYDROCHLORIDE 100 MG: 20 INJECTION, SOLUTION INFILTRATION; PERINEURAL at 08:03

## 2022-11-03 RX ADMIN — FENTANYL CITRATE 50 MCG: 50 INJECTION INTRAMUSCULAR; INTRAVENOUS at 10:48

## 2022-11-03 RX ADMIN — KETOROLAC TROMETHAMINE 15 MG: 15 INJECTION, SOLUTION INTRAMUSCULAR; INTRAVENOUS at 23:20

## 2022-11-03 RX ADMIN — EPHEDRINE SULFATE 5 MG: 50 INJECTION INTRAVENOUS at 08:15

## 2022-11-03 RX ADMIN — ROCURONIUM BROMIDE 20 MG: 10 INJECTION, SOLUTION INTRAVENOUS at 09:23

## 2022-11-03 RX ADMIN — PROPOFOL 100 MG: 10 INJECTION, EMULSION INTRAVENOUS at 08:03

## 2022-11-03 RX ADMIN — DEXAMETHASONE SODIUM PHOSPHATE 4 MG: 4 INJECTION, SOLUTION INTRAMUSCULAR; INTRAVENOUS at 08:22

## 2022-11-03 RX ADMIN — SODIUM CHLORIDE 9 ML: 9 INJECTION, SOLUTION INTRAVENOUS at 07:38

## 2022-11-03 RX ADMIN — KETOROLAC TROMETHAMINE 15 MG: 15 INJECTION, SOLUTION INTRAMUSCULAR; INTRAVENOUS at 16:32

## 2022-11-03 RX ADMIN — SODIUM CHLORIDE, POTASSIUM CHLORIDE, SODIUM LACTATE AND CALCIUM CHLORIDE 9 ML/HR: 600; 310; 30; 20 INJECTION, SOLUTION INTRAVENOUS at 07:13

## 2022-11-03 RX ADMIN — DOCUSATE SODIUM 50MG AND SENNOSIDES 8.6MG 2 TABLET: 8.6; 5 TABLET, FILM COATED ORAL at 20:48

## 2022-11-03 RX ADMIN — ONDANSETRON 4 MG: 2 INJECTION INTRAMUSCULAR; INTRAVENOUS at 09:52

## 2022-11-03 RX ADMIN — FENTANYL CITRATE 25 MCG: 50 INJECTION, SOLUTION INTRAMUSCULAR; INTRAVENOUS at 08:28

## 2022-11-03 RX ADMIN — MAGNESIUM SULFATE HEPTAHYDRATE 1 G: 500 INJECTION, SOLUTION INTRAMUSCULAR; INTRAVENOUS at 08:52

## 2022-11-03 NOTE — ANESTHESIA PREPROCEDURE EVALUATION
Anesthesia Evaluation     Patient summary reviewed and Nursing notes reviewed   NPO Solid Status: > 8 hours  NPO Liquid Status: > 6 hours           Airway   Mallampati: II  TM distance: >3 FB  Neck ROM: full  No difficulty expected  Dental    (+) edentulous, upper dentures and lower dentures    Pulmonary - normal exam   (+) a smoker Former, shortness of breath,   Cardiovascular     ECG reviewed  Rhythm: regular  Rate: normal    (+) hypertension 2 medications or greater, valvular problems/murmurs AS and MR, past MI  >12 months, CAD, cardiac stents Drug eluting stent more than 12 months ago dysrhythmias Paroxysmal Atrial Fib, angina, CHF , DVT, hyperlipidemia,     ROS comment: Hx CAD with MI in 2004 and stents in 2004, 2007 and 2014, NICM with EF 30%.then. Hx PAF, AS, MR and MI, s/p CABG x4 with AVR, MV repair and Maze procedure in 3/22/EF 55%, mod MR, bioprosthetic aortic valve by ECHO 6/22  PE comment: NSR presently, no murmurs heard    Neuro/Psych  (+) CVA, dizziness/light headedness, psychiatric history Anxiety and Depression,      ROS Comment: CVA on imaging only, no symptoms or residuals  GI/Hepatic/Renal/Endo    (+)  GERD,  liver disease fatty liver disease, diabetes mellitus type 2,     ROS Comment: Right kidney mass    Musculoskeletal         ROS comment: Hx lumbar discectomy  Abdominal  - normal exam   Substance History      OB/GYN          Other   arthritis,    history of cancer      Other Comment: Hx cervical CA                Anesthesia Plan    ASA 4     general     (Art line recommended due to significant heart hx)  intravenous induction     Anesthetic plan, risks, benefits, and alternatives have been provided, discussed and informed consent has been obtained with: patient.    Plan discussed with CRNA.        CODE STATUS:

## 2022-11-03 NOTE — ANESTHESIA PROCEDURE NOTES
Airway  Urgency: elective    Date/Time: 11/3/2022 8:08 AM  Airway not difficult    General Information and Staff    Patient location during procedure: OR  Anesthesiologist: Rick Moreira MD  CRNA/CAA: Mayur Babcock CRNA    Indications and Patient Condition  Indications for airway management: airway protection    Preoxygenated: yes  MILS not maintained throughout  Mask difficulty assessment: 2 - vent by mask + OA or adjuvant +/- NMBA    Final Airway Details  Final airway type: endotracheal airway      Successful airway: ETT  Cuffed: yes   Successful intubation technique: direct laryngoscopy  Facilitating devices/methods: anterior pressure/BURP  Endotracheal tube insertion site: oral  Blade: Beck  Blade size: 3  ETT size (mm): 7.0  Cormack-Lehane Classification: grade IIa - partial view of glottis  Placement verified by: chest auscultation and capnometry   Cuff volume (mL): 8  Measured from: lips  ETT/EBT  to lips (cm): 21  Number of attempts at approach: 1  Assessment: lips, teeth, and gum same as pre-op and atraumatic intubation

## 2022-11-03 NOTE — OP NOTE
Operative Report    [facility]    Patient: Alice Mabry  Age:      81 y.o.  :     1940  Sex:      female    Medical Record:  [id number]    Date of Operation/Procedure:  11/3/2022    Pre-op Diagnosis:   Right renal mass    Post-Op Diagnosis Codes:   Same    Pre-operative Diagnosis Free Text:  * No pre-op diagnosis entered *     Name of Operation/Procedure:  Procedure(s) and Anesthesia Type:     * RIGHT ROBOTIC PARTIAL NEPHRECTOMY - General    Findings/Complications:  No complications.    Description of procedure: After informed consent was obtained the patient was taken to the operating and general anesthesia was induced. She was placed in right lateral position. A England catheter was placed. She was prepped and draped in a standard fashion. A timeout was performed and all team members were in agreement. A Veress needle was passed in the right upper quadrant. This was used for insufflation. Once 4-quarter insufflation was observed the procedure was commenced. A periumbilical incision was made and this was used for the 8 mm camera port. Once the camera was inserted the peritoneal cavity was carefully examined. There were no adhesions in the right upper quadrant. The additional robotic ports were placed as was a 5 mm port near the xiphoid process used as a liver retractor. Once all ports were in place the peritoneum overlying the kidney was opened. The kidney was carefully inspected, and after dissecting off surrounding fat a small renal mass corresponding to the one on CT scan was seen on the posterior aspect of the lower pole of the right kidney. The lower pole attachments were divided, and the kidney was rotated medially. The renal mass was circumscribed along the renal capsule. Thereafter we excised the renal mass. Care was taken to divide deeply to ensure a negative margin. Once the mass was excised it was placed in an Endo Catch bag. We then closed the defect. A 3-0 V-Loc suture was passed through  the capsule, along the base of the defect, and through the opposite capsule. Once this was tightened down, appropriate hemostasis was seen. The capsule was then reinforced using 2-0 Vicryl on SH needles. Weck sliders were used to allow the appropriate tension. FloSeal was placed over the tumor defect. A retroperitoneal drain was placed through the lateral robotic port. The abdomen was then desufflated. The small renal mass was easily brought through the 12 mm assistant port. The skin was then closed using 4-0 Vicryl suture. The patient remained stable throughout the procedure. She was transferred to the recovery room in stable condition.     Estimated Blood Loss: 200 mL    Specimens:   Order Name Source Comment Collection Info Order Time   PREPARE RBC Other Patient has positive antibody screen  10/20/2022  2:21 PM     When to Transfuse?   Hold          Indication for Transfusion   Surgery          Surgery Date   11/3/2022        TISSUE PATHOLOGY EXAM Kidney, Right  Collected By: Davy Silva Jr., MD 11/3/2022  9:14 AM     Release to patient   Routine Release            Fluids/Drains: peritoneal NAHED    Davy Silva Jr., MD  11/3/2022  10:19 EDT

## 2022-11-03 NOTE — H&P
FIRST UROLOGY CONSULT      Patient Identification:  NAME:  Alice Mabry  Age:  81 y.o.   Sex:  female   :  1940   MRN:  [id number]       Chief complaint: Right renal mass    History of present illness:  This is a 81 year old woman with a history of a right renal mass who presents for right robotic partial nephrectomy. We discussed the risks of the procedure including bleeding, infection, damage to surrounding structures, pain, need to perform an open procedure, recurrence of disease, need for dialysis, thromboembolism, MI, stroke, coma, death. She understands these risks and would like to proceed.      Past medical history:  Past Medical History:   Diagnosis Date   • Acute diastolic (congestive) heart failure (HCC)    • Anxiety    • Aortic valve stenosis     HX CABG, AVR, MVR 3/2022   • Arthritis    • CAD (coronary artery disease)     stents   • Depression    • Dizziness    • Fatty liver    • GERD (gastroesophageal reflux disease)    • H/O blood clots    • H/O Clostridium difficile infection    • Heart murmur    • History of atrial fibrillation    • History of cervical cancer     HX HYSTERECTOMY   • History of MI (myocardial infarction)    • History of transfusion     no reactions   • [place] (hard of hearing)    • Hyperlipidemia    • Hypertension    • IBS (irritable bowel syndrome)    • Incontinence of urine     wears pads   • Renal mass, right        Past surgical history:  Past Surgical History:   Procedure Laterality Date   • APPENDECTOMY  1960   • CARDIAC CATHETERIZATION  06/10/2014    Dr. Murphy Horner   • CARDIAC CATHETERIZATION  2007    Dr. Murphy Horner   • CARDIAC CATHETERIZATION N/A 10/19/2004    Dr. Murphy Horner   • CARDIAC CATHETERIZATION N/A 07/15/2004    Dr. Gregorio Gonzales   • CARDIAC CATHETERIZATION  10/2003    Dr. Murphy Horner   • CARDIAC CATHETERIZATION N/A 2022    Procedure: Coronary angiography;  Surgeon: Murphy Horner MD;  Location: [facility] INVASIVE  LOC[facility];  Service: Cardiology;  Laterality: N/A;   • CARDIAC CATHETERIZATION N/A 03/21/2022    Procedure: Right Heart Cath;  Surgeon: Murphy Horner MD;  Location[facility] CATH [facility]VASIVE LOC[facility];  Service: Cardiology;  Laterality: N/A;   • CARDIAC CATHETERIZATION N/A 03/21/2022    Procedure: Left Heart Cath;  Surgeon: Murphy Horner MD;  [facility] C[facility]IVE [facility];  Service: Cardiology;  Laterality: N/A;   • CARDIAC CATHETERIZATION N/A 03/21/2022    Procedure: Left ventriculography;  Surgeon: Murphy Horner MD;  Location:  [facility] CATH INVASIVE LOCATION;  Service: Cardiology;  Laterality: N/A;   • CHOLECYSTECTOMY  1998   • COLONOSCOPY N/A 07/2001    negative   • COLONOSCOPY N/A 02/28/2012    negative   • CORONARY ANGIOPLASTY WITH STENT PLACEMENT N/A 07/15/2004    Dr. Murphy Horner   • CORONARY ANGIOPLASTY WITH STENT PLACEMENT  03/2002    to RCA   • CORONARY ARTERY BYPASS GRAFT WITH AORTIC AND MITRAL VALVE REPAIR/REPLACEMENT N/A 03/28/2022    Procedure: DALTON STERNOTOMY CORONARY ARTERY BYPASS GRAFTING TIMES 4 USING LEFT INTERNAL MAMMARY ARTERY AND LEFT GREATER SAPHENOUS VEIN GRAFT PER ENDOSCOPIC VEIN HARVESTING, RIGHT CORONARY ENDARTERECTOMY, AORTIC VALVE REPLACEMENT, MITRAL VALVE REPAIR, ATRICURE MAZE PROCEDURE, CLOSURE OF LEFT ATRIAL APPENDAGE AND PRP;  Surgeon: Jr Isaias Lynn MD;  Location: [facility];  Service: Cardiothoracic;   • CORONARY ARTERY BYPASS GRAFT WITH AORTIC AND MITRAL VALVE REPAIR/REPLACEMENT  03/28/2022    Procedure: ;  Surgeon: Jr Isaias Lynn MD;  Location: [facility];  Service: Cardiothoracic;;   • HYSTERECTOMY  1974   • LUMBAR DISCECTOMY     • UPPER GASTROINTESTINAL ENDOSCOPY N/A 04/20/2015    Mild Schatzki ring, hiatus hernia, non-bleeding erosive gastropathy, erythematous mucosa in the antrum, normal duodenum-Dr. Alex Gill       Allergies:  Codeine, Penicillins, and Ambien [zolpidem tartrate]    Home medications:  Medications Prior to Admission    Medication Sig Dispense Refill Last Dose   • acetaminophen (TYLENOL) 500 MG tablet Take 1,000 mg by mouth 3 (Three) Times a Day As Needed. PRN PAIN      • bumetanide (BUMEX) 2 MG tablet Take 1 tablet by mouth Daily. 90 tablet 2    • Dapagliflozin Propanediol (Farxiga) 10 MG tablet Take 10 mg by mouth Every Morning.      • diphenhydrAMINE-acetaminophen (Tylenol PM Extra Strength)  MG tablet per tablet Take 2 tablets by mouth At Night As Needed.      • Eliquis 5 MG tablet tablet TAKE ONE TABLET BY MOUTH EVERY 12 HOURS (Patient taking differently: Take 1 tablet by mouth 2 (Two) Times a Day. TO HOLD 3 DAYS PRIOR TO OR PER MD INSTRUCTIONS) 60 tablet 11    • FLUoxetine (PROzac) 20 MG capsule Take 20 mg by mouth Every Morning.      • LORazepam (ATIVAN) 1 MG tablet Take 1 mg by mouth every 8 (eight) hours as needed for anxiety.      • Melatonin 10 MG tablet Take 1 tablet by mouth Every Night.      • metoprolol succinate XL (TOPROL-XL) 50 MG 24 hr tablet Take 1 tablet by mouth Daily. 90 tablet 2    • nitroglycerin (NITROSTAT) 0.4 MG SL tablet Place 1 tablet under the tongue Every 5 (Five) Minutes As Needed for Chest Pain. Take no more than 3 doses in 15 minutes. 100 tablet 3    • omeprazole (priLOSEC) 40 MG capsule Take 1 capsule by mouth Daily.      • spironolactone (ALDACTONE) 25 MG tablet TAKE 1/2 TABLET BY MOUTH DAILY (Patient taking differently: Take 12.5 mg by mouth Daily.) 30 tablet 5    • vitamin C (ASCORBIC ACID) 500 MG tablet Take 1 tablet by mouth Daily. TO HOLD 1 WEEK PRIOR TO OR      • Vitamin D, Ergocalciferol, 2000 units capsule Take 2,000 Units by mouth Daily. TO HOLD 1 WEEK PRIOR TO OR           Hospital medications:  cefTRIAXone, 1 g, Intravenous, Once             Family history:  Family History   Problem Relation Age of Onset   • Heart disease Mother    • No Known Problems Father    • Heart disease Sister    • Heart disease Brother    • No Known Problems Maternal Grandmother    • No Known Problems  Maternal Grandfather    • No Known Problems Paternal Grandmother    • No Known Problems Paternal Grandfather    • Malig Hyperthermia Neg Hx        Social history:  Social History     Tobacco Use   • Smoking status: Former     Packs/day: 1.00     Years: 20.00     Pack years: 20.00     Types: Cigarettes     Quit date:      Years since quittin.8   • Smokeless tobacco: Never   Vaping Use   • Vaping Use: Never used   Substance Use Topics   • Alcohol use: Not Currently   • Drug use: Never       Review of systems:      Positive for:  As per HPI  Negative for:  As per HPI    Objective:  TMax 24 hours:   No data recorded.      Vitals Ranges:        Intake/Output Last 3 shifts:  No intake/output data recorded.     Physical Exam:    General Appearance:    Alert, cooperative, NAD                                       Neuro/Psych:   Orientation intact, mood/affect pleasant, no focal findings       Results review:   I reviewed the patient's new clinical results.    Data review:  Lab Results (last 24 hours)     ** No results found for the last 24 hours. **           Imaging:  Imaging Results (Last 24 Hours)     ** No results found for the last 24 hours. **             Assessment:       Right renal mass        Plan:     Robotic right partial nephrectomy    Davy Silva Jr., MD  22  06:26 EDT

## 2022-11-03 NOTE — ANESTHESIA PROCEDURE NOTES
Arterial Line      Patient reassessed immediately prior to procedure    Patient location during procedure: pre-op  Start time: 11/3/2022 7:15 AM  Stop Time:11/3/2022 7:20 AM       Line placed for hemodynamic monitoring and ABGs/Labs/ISTAT.  Performed By   Anesthesiologist: Rick Moreira MD   Preanesthetic Checklist  Completed: patient identified, IV checked, site marked, risks and benefits discussed, surgical consent, monitors and equipment checked, pre-op evaluation and timeout performed  Arterial Line Prep    Sterile Tech: cap, gloves and mask  Prep: ChloraPrep  Patient monitoring: blood pressure monitoring, continuous pulse oximetry and EKG  Arterial Line Procedure   Laterality:left  Location:  radial artery  Catheter size: 20 G   Guidance: ultrasound guided  PROCEDURE NOTE/ULTRASOUND INTERPRETATION.  Using ultrasound guidance the potential vascular sites for insertion of the catheter were visualized to determine the patency of the vessel to be used for vascular access.  After selecting the appropriate site for insertion, the needle was visualized under ultrasound being inserted into the radial artery, followed by ultrasound confirmation of wire and catheter placement. There were no abnormalities seen on ultrasound; an image was taken; and the patient tolerated the procedure with no complications.   Number of attempts: 2  Successful placement: yes   Post Assessment   Dressing Type: biopatch applied, occlusive dressing applied, secured with tape and wrist guard applied.   Complications no  Circ/Move/Sens Assessment: normal and unchanged.   Patient Tolerance: patient tolerated the procedure well with no apparent complications  Additional Notes  No US images taken

## 2022-11-03 NOTE — ANESTHESIA POSTPROCEDURE EVALUATION
Patient: Alice Mabry    Procedure Summary     Date: 11/03/22 Room / Location: [facility] OR  / [facility] MAIN OR    Anesthesia Start: 0756 Anesthesia Stop: 1026    Procedure: RIGHT ROBOTIC PARTIAL NEPHRECTOMY (Right: Abdomen) Diagnosis:     Surgeons: Davy Silva Jr., MD Provider: Rick Moreira MD    Anesthesia Type: general ASA Status: 4          Anesthesia Type: general    Vitals  Vitals Value Taken Time   /67 11/03/22 1136   Temp 36.9 °C (98.5 °F) 11/03/22 1022   Pulse 64 11/03/22 1150   Resp 16 11/03/22 1135   SpO2 93 % 11/03/22 1150   Vitals shown include unvalidated device data.        Post Anesthesia Care and Evaluation    Patient location during evaluation: PACU  Patient participation: complete - patient participated  Level of consciousness: awake and alert  Pain management: adequate    Airway patency: patent  Anesthetic complications: No anesthetic complications    Cardiovascular status: acceptable  Respiratory status: acceptable  Hydration status: acceptable    Comments: --------------------            11/03/22               1135     --------------------   BP:       115/67     Pulse:      62       Resp:       16       Temp:                SpO2:      93%      --------------------

## 2022-11-04 VITALS
BODY MASS INDEX: 27.29 KG/M2 | HEART RATE: 80 BPM | DIASTOLIC BLOOD PRESSURE: 64 MMHG | RESPIRATION RATE: 16 BRPM | WEIGHT: 163.8 LBS | SYSTOLIC BLOOD PRESSURE: 107 MMHG | OXYGEN SATURATION: 94 % | HEIGHT: 65 IN | TEMPERATURE: 98.1 F

## 2022-11-04 LAB
ANION GAP SERPL CALCULATED.3IONS-SCNC: 8.6 MMOL/L (ref 5–15)
BH BB BLOOD EXPIRATION DATE: NORMAL
BH BB BLOOD EXPIRATION DATE: NORMAL
BH BB BLOOD TYPE BARCODE: 8400
BH BB BLOOD TYPE BARCODE: 8400
BH BB DISPENSE STATUS: NORMAL
BH BB DISPENSE STATUS: NORMAL
BH BB PRODUCT CODE: NORMAL
BH BB PRODUCT CODE: NORMAL
BH BB UNIT NUMBER: NORMAL
BH BB UNIT NUMBER: NORMAL
BUN SERPL-MCNC: 23 MG/DL (ref 8–23)
BUN/CREAT SERPL: 23.7 (ref 7–25)
CALCIUM SPEC-SCNC: 8.6 MG/DL (ref 8.6–10.5)
CHLORIDE SERPL-SCNC: 106 MMOL/L (ref 98–107)
CO2 SERPL-SCNC: 24.4 MMOL/L (ref 22–29)
CREAT SERPL-MCNC: 0.97 MG/DL (ref 0.57–1)
CROSSMATCH INTERPRETATION: NORMAL
CROSSMATCH INTERPRETATION: NORMAL
DEPRECATED RDW RBC AUTO: 48.5 FL (ref 37–54)
EGFRCR SERPLBLD CKD-EPI 2021: 58.5 ML/MIN/1.73
ERYTHROCYTE [DISTWIDTH] IN BLOOD BY AUTOMATED COUNT: 14.9 % (ref 12.3–15.4)
GLUCOSE SERPL-MCNC: 95 MG/DL (ref 65–99)
HCT VFR BLD AUTO: 32.7 % (ref 34–46.6)
HGB BLD-MCNC: 10.7 G/DL (ref 12–15.9)
MCH RBC QN AUTO: 29.2 PG (ref 26.6–33)
MCHC RBC AUTO-ENTMCNC: 32.7 G/DL (ref 31.5–35.7)
MCV RBC AUTO: 89.1 FL (ref 79–97)
PLATELET # BLD AUTO: 191 10*3/MM3 (ref 140–450)
PMV BLD AUTO: 9.9 FL (ref 6–12)
POTASSIUM SERPL-SCNC: 4.7 MMOL/L (ref 3.5–5.2)
RBC # BLD AUTO: 3.67 10*6/MM3 (ref 3.77–5.28)
SODIUM SERPL-SCNC: 139 MMOL/L (ref 136–145)
UNIT  ABO: NORMAL
UNIT  ABO: NORMAL
UNIT  RH: NORMAL
UNIT  RH: NORMAL
WBC NRBC COR # BLD: 7.24 10*3/MM3 (ref 3.4–10.8)

## 2022-11-04 PROCEDURE — A9270 NON-COVERED ITEM OR SERVICE: HCPCS | Performed by: UROLOGY

## 2022-11-04 PROCEDURE — 80048 BASIC METABOLIC PNL TOTAL CA: CPT | Performed by: UROLOGY

## 2022-11-04 PROCEDURE — 63710000001 SENNOSIDES-DOCUSATE 8.6-50 MG TABLET: Performed by: UROLOGY

## 2022-11-04 PROCEDURE — 63710000001 SPIRONOLACTONE 25 MG TABLET: Performed by: UROLOGY

## 2022-11-04 PROCEDURE — 63710000001 FLUOXETINE 20 MG CAPSULE: Performed by: UROLOGY

## 2022-11-04 PROCEDURE — G0378 HOSPITAL OBSERVATION PER HR: HCPCS

## 2022-11-04 PROCEDURE — 85027 COMPLETE CBC AUTOMATED: CPT | Performed by: UROLOGY

## 2022-11-04 PROCEDURE — 63710000001 BUMETANIDE 2 MG TABLET: Performed by: UROLOGY

## 2022-11-04 PROCEDURE — 63710000001 PANTOPRAZOLE 40 MG TABLET DELAYED-RELEASE: Performed by: UROLOGY

## 2022-11-04 PROCEDURE — 25010000002 KETOROLAC TROMETHAMINE PER 15 MG: Performed by: UROLOGY

## 2022-11-04 RX ORDER — HYDROCODONE BITARTRATE AND ACETAMINOPHEN 5; 325 MG/1; MG/1
1 TABLET ORAL EVERY 6 HOURS PRN
Qty: 8 TABLET | Refills: 0 | Status: SHIPPED | OUTPATIENT
Start: 2022-11-04 | End: 2023-02-22 | Stop reason: ALTCHOICE

## 2022-11-04 RX ORDER — DOCUSATE SODIUM 250 MG
250 CAPSULE ORAL 2 TIMES DAILY PRN
Qty: 30 CAPSULE | Refills: 1 | Status: SHIPPED | OUTPATIENT
Start: 2022-11-04 | End: 2023-02-22 | Stop reason: ALTCHOICE

## 2022-11-04 RX ADMIN — BUMETANIDE 2 MG: 2 TABLET ORAL at 10:47

## 2022-11-04 RX ADMIN — DOCUSATE SODIUM 50MG AND SENNOSIDES 8.6MG 2 TABLET: 8.6; 5 TABLET, FILM COATED ORAL at 10:43

## 2022-11-04 RX ADMIN — SPIRONOLACTONE 12.5 MG: 25 TABLET ORAL at 10:47

## 2022-11-04 RX ADMIN — FLUOXETINE HYDROCHLORIDE 20 MG: 20 CAPSULE ORAL at 06:02

## 2022-11-04 RX ADMIN — PANTOPRAZOLE SODIUM 40 MG: 40 TABLET, DELAYED RELEASE ORAL at 06:02

## 2022-11-04 RX ADMIN — KETOROLAC TROMETHAMINE 15 MG: 15 INJECTION, SOLUTION INTRAMUSCULAR; INTRAVENOUS at 04:56

## 2022-11-04 NOTE — DISCHARGE SUMMARY
Date of admission:  11/3/2022   Date of discharge:   11/4/2022  Admitting diagnosis:  Right renal mass  Discharge diagnosis:  Same    Procedures:  Right robotic partial nephrectomy    Hospital course:  The patient was taken to the OR for the above procedure on the date of admission. Postoperatively, her diet and activity were advanced without difficulty. She remained afebrile and hemodynamically stable throughout the admission. She ambulated, and her pain was well controlled on oral medications. She was cleared for discharge on POD1.    Discharge medications:       Discharge Medications      New Medications      Instructions Start Date   docusate sodium 250 MG capsule  Commonly known as: COLACE   250 mg, Oral, 2 Times Daily PRN      HYDROcodone-acetaminophen 5-325 MG per tablet  Commonly known as: NORCO   1 tablet, Oral, Every 6 Hours PRN         Changes to Medications      Instructions Start Date   apixaban 5 MG tablet tablet  Commonly known as: Eliquis  What changed:   · how much to take  · These instructions start on November 7, 2022. If you are unsure what to do until then, ask your doctor or other care provider.   5 mg, Oral, Every 12 Hours   Start Date: November 7, 2022        Continue These Medications      Instructions Start Date   acetaminophen 500 MG tablet  Commonly known as: TYLENOL   1,000 mg, Oral, 3 Times Daily PRN, PRN PAIN      azithromycin 250 MG tablet  Commonly known as: ZITHROMAX   250 mg, Oral, Daily      bumetanide 2 MG tablet  Commonly known as: BUMEX   2 mg, Oral, Daily      Farxiga 10 MG tablet  Generic drug: dapagliflozin Propanediol   10 mg, Oral, Every Morning      FLUoxetine 20 MG capsule  Commonly known as: PROzac   20 mg, Oral, Every Morning      LORazepam 1 MG tablet  Commonly known as: ATIVAN   1 mg, Oral, Every 8 Hours PRN      Melatonin 10 MG tablet   10 mg, Oral, Nightly      metoprolol succinate XL 50 MG 24 hr tablet  Commonly known as: TOPROL-XL   50 mg, Oral, Daily       nitroglycerin 0.4 MG SL tablet  Commonly known as: NITROSTAT   0.4 mg, Sublingual, Every 5 Minutes PRN, Take no more than 3 doses in 15 minutes.       omeprazole 40 MG capsule  Commonly known as: priLOSEC   40 mg, Oral, Daily      spironolactone 25 MG tablet  Commonly known as: ALDACTONE   TAKE 1/2 TABLET BY MOUTH DAILY      Tylenol PM Extra Strength  MG tablet per tablet  Generic drug: diphenhydrAMINE-acetaminophen   2 tablets, Oral, Nightly PRN      vitamin C 500 MG tablet  Commonly known as: ASCORBIC ACID   500 mg, Oral, Daily, TO HOLD 1 WEEK PRIOR TO OR      Vitamin D (Ergocalciferol) 50 MCG (2000 UT) capsule   2,000 Units, Oral, Daily, TO HOLD 1 WEEK PRIOR TO OR              ROV:  1 week

## 2022-11-04 NOTE — CASE MANAGEMENT/SOCIAL WORK
Case Management Discharge Note      Final Note: Home         Selected Continued Care - Admitted Since 11/3/2022     Destination    No services have been selected for the patient.              Durable Medical Equipment    No services have been selected for the patient.              Dialysis/Infusion    No services have been selected for the patient.              Home Medical Care    No services have been selected for the patient.              Therapy    No services have been selected for the patient.              Community Resources    No services have been selected for the patient.              Community & DME    No services have been selected for the patient.                  Transportation Services  Private: Car    Final Discharge Disposition Code: 01 - home or self-care

## 2022-11-04 NOTE — PLAN OF CARE
Goal Outcome Evaluation:           Progress: improving  Outcome Evaluation: Afebrile, x5 Lap sites w/ dermabond, x1 NAHED w/ sero sang output, F/C in place due to be removed in 0600, ivf infusing, pain well controlled w/ scheduled toradol, scds on, ambulated in ca w/ staff, room air, advanced to FLD, axox4, will continue to monitor

## 2022-11-04 NOTE — PLAN OF CARE
Goal Outcome Evaluation:  Plan of Care Reviewed With: patient        Progress: improving  Outcome Evaluation: vss, afebrile, lap sites x5 w/ dermabond, NAHED x1 w/ serosang output, F/C in place, pain well controlled w/ scheduled toradol, scds on , ambulated in halls w/ staff, room air, advanced to FLD, axox4, will continue to monitor

## 2022-11-04 NOTE — NURSING NOTE
VSS, voiding without issue, NAHED drain removed per order, lap x 5 are CDI and approximated w/ dermabond, on room air, ambulated in halls, up w/ standby assist, meds via meds to beds. Educated pt on post op care, when to seek help, new meds, to resume Eliquis on November 7th, and pt states MD office will call to schedule an appointment. Tolerating diet, no nausea, pt denies pain.  is taking her home and she is eager to go.

## 2022-11-11 LAB
LAB AP CASE REPORT: NORMAL
LAB AP SPECIAL STAINS: NORMAL
LAB AP SYNOPTIC CHECKLIST: NORMAL
Lab: NORMAL
PATH REPORT.FINAL DX SPEC: NORMAL
PATH REPORT.GROSS SPEC: NORMAL

## 2022-11-28 ENCOUNTER — OFFICE VISIT (OUTPATIENT)
Dept: CARDIOLOGY | Facility: CLINIC | Age: 82
End: 2022-11-28

## 2022-11-28 VITALS
DIASTOLIC BLOOD PRESSURE: 78 MMHG | HEIGHT: 65 IN | WEIGHT: 161 LBS | BODY MASS INDEX: 26.82 KG/M2 | RESPIRATION RATE: 17 BRPM | OXYGEN SATURATION: 98 % | HEART RATE: 60 BPM | SYSTOLIC BLOOD PRESSURE: 118 MMHG

## 2022-11-28 DIAGNOSIS — I25.10 CORONARY ARTERY DISEASE INVOLVING NATIVE CORONARY ARTERY OF NATIVE HEART WITHOUT ANGINA PECTORIS: Primary | ICD-10-CM

## 2022-11-28 DIAGNOSIS — I34.0 MITRAL VALVE INSUFFICIENCY, UNSPECIFIED ETIOLOGY: ICD-10-CM

## 2022-11-28 DIAGNOSIS — I35.0 NONRHEUMATIC AORTIC VALVE STENOSIS: ICD-10-CM

## 2022-11-28 DIAGNOSIS — Z95.1 S/P CABG (CORONARY ARTERY BYPASS GRAFT): ICD-10-CM

## 2022-11-28 DIAGNOSIS — I48.0 PAROXYSMAL ATRIAL FIBRILLATION: ICD-10-CM

## 2022-11-28 DIAGNOSIS — Z95.2 S/P AVR: ICD-10-CM

## 2022-11-28 PROCEDURE — 93000 ELECTROCARDIOGRAM COMPLETE: CPT | Performed by: NURSE PRACTITIONER

## 2022-11-28 PROCEDURE — 99214 OFFICE O/P EST MOD 30 MIN: CPT | Performed by: NURSE PRACTITIONER

## 2022-11-28 RX ORDER — ESOMEPRAZOLE MAGNESIUM 40 MG/1
40 CAPSULE, DELAYED RELEASE ORAL
COMMUNITY
End: 2023-02-22 | Stop reason: ALTCHOICE

## 2022-11-28 RX ORDER — AMIODARONE HYDROCHLORIDE 200 MG/1
200 TABLET ORAL DAILY
COMMUNITY
End: 2023-02-22 | Stop reason: ALTCHOICE

## 2022-11-28 RX ORDER — PREDNISONE 20 MG/1
TABLET ORAL
COMMUNITY
Start: 2022-11-14 | End: 2023-02-22 | Stop reason: ALTCHOICE

## 2022-11-28 RX ORDER — RANOLAZINE 500 MG/1
500 TABLET, EXTENDED RELEASE ORAL
COMMUNITY
End: 2022-11-28 | Stop reason: ALTCHOICE

## 2022-11-28 RX ORDER — DAPAGLIFLOZIN 10 MG/1
10 TABLET, FILM COATED ORAL EVERY MORNING
Qty: 90 TABLET | Refills: 3 | Status: SHIPPED | OUTPATIENT
Start: 2022-11-28

## 2022-11-28 RX ORDER — DEXTROMETHORPHAN HYDROBROMIDE AND PROMETHAZINE HYDROCHLORIDE 15; 6.25 MG/5ML; MG/5ML
5 SYRUP ORAL
COMMUNITY
Start: 2022-11-14 | End: 2023-02-22

## 2022-11-28 RX ORDER — ATORVASTATIN CALCIUM 40 MG/1
40 TABLET, FILM COATED ORAL DAILY
COMMUNITY
End: 2023-02-22 | Stop reason: ALTCHOICE

## 2022-11-28 NOTE — PROGRESS NOTES
Date of Office Visit: 2022  Encounter Provider: JOHNNIE Sadler  Place of Service: [facility] CARDIOLOGY  Patient Name: Alice Mabry  :1940    Chief Complaint   Patient presents with   • Coronary Artery Disease   :     HPI: Alice Mabry is a 82 y.o. female.  She is a patient of Dr. Horner's whom we follow for the management of hypertension, hyperlipidemia, and coronary artery disease.  In , she suffered inferior STEMI for which she underwent angioplasty and drug-eluting stenting of the RCA.  At that time, she had an ischemic cardiomyopathy with an EF of 30%.  In , she underwent PCI of the LAD.  In , she underwent angioplasty and drug-eluting stenting of the origin of the RCA.              In 2021, she presented with increased shortness of breath and palpitations.  She was found to be in atrial fibrillation RVR and was started on metoprolol and Eliquis.  Two days later, she presented to the ED with shortness of breath and was admitted.  Echocardiogram demonstrated severely reduced LV systolic function with an EF of 31 to 35%, mild to moderate aortic regurgitation.  She was started on guideline derived medical therapy and referred to the heart failure clinic.  In August, she underwent a stress test demonstrating a large sized infarct in the inferior wall with no evidence of ischemia.  Repeat echocardiogram demonstrated an EF of 43%.  Entresto was subsequently discontinued due to low blood pressure.    In March of this year, she underwent a CABG x 4 (LIMA to the LAD, SVG to RCA, SVG to OM1, and SVG to D1), aortic valve replacement, left atrial appendage closure, and biatrial cryo maze.  Postoperatively she converted back to atrial fibrillation.  Since that time, we have taken the rate control and anticoagulation approach.   She was last seen in the office by Dr. Horner in May at which time she was doing well.  She was actually in sinus rhythm.   He discontinued the Ranexa and ordered an echocardiogram.  Otherwise, she was advised to follow-up in 6 months.  On 6/7, echocardiogram demonstrated normal LV function, a surgical mitral valve repair with moderate mitral regurgitation.   Overall, there has not been a significant change.  She has been struggling with a sinus infection which caused her to miss Thanksgiving with her family.  She reports chronic shortness of breath which is unchanged.  She also reports occasional lightheadedness.  She denies any chest pain, palpitations, edema, syncope, bleeding difficulties or melena.  On 11/3, she underwent a right partial nephrectomy.    Past Medical History:   Diagnosis Date   • Acute diastolic (congestive) heart failure (HCC)    • Anxiety    • Aortic valve stenosis     HX CABG, AVR, MVR 3/2022   • Arthritis    • CAD (coronary artery disease)     stents   • Depression    • Dizziness    • Fatty liver    • GERD (gastroesophageal reflux disease)    • H/O blood clots    • H/O Clostridium difficile infection    • Heart murmur    • History of atrial fibrillation    • History of cervical cancer     HX HYSTERECTOMY   • History of MI (myocardial infarction) 1999   • History of transfusion     no reactions   • [place] (hard of hearing)    • Hyperlipidemia    • Hypertension    • IBS (irritable bowel syndrome)    • Incontinence of urine     wears pads   • Renal mass, right        Past Surgical History:   Procedure Laterality Date   • APPENDECTOMY  1960   • CARDIAC CATHETERIZATION  06/10/2014    Dr. Murphy Horner   • CARDIAC CATHETERIZATION  11/13/2007    Dr. Murphy Horner   • CARDIAC CATHETERIZATION N/A 10/19/2004    Dr. Murphy Horner   • CARDIAC CATHETERIZATION N/A 07/15/2004    Dr. Gregorio Gonzales   • CARDIAC CATHETERIZATION  10/2003    Dr. Murphy Horner   • CARDIAC CATHETERIZATION N/A 03/21/2022    Procedure: Coronary angiography;  Surgeon: Murphy Horner MD;  Location: [facility] CATH INVASIVE LOCATION;  Service: Cardiology;   Laterality: N/A;   • CARDIAC CATHETERIZATION N/A 03/21/2022    Procedure: Right Heart Cath;  Surgeon: Murphy Horner MD;  [facility];  Service: Cardiology;  Laterality: N/A;   • CARDIAC CATHETERIZATION N/A 03/21/2022    Procedure: Left Heart Cath;  Surgeon: Murphy Horner MD;  [facility];  Service: Cardiology;  Laterality: N/A;   • CARDIAC CATHETERIZATION N/A 03/21/2022    Procedure: Left ventriculography;  Surgeon: Murphy Horner MD;  [facility];  Service: Cardiology;  Laterality: N/A;   • CHOLECYSTECTOMY  1998   • COLONOSCOPY N/A 07/2001    negative   • COLONOSCOPY N/A 02/28/2012    negative   • CORONARY ANGIOPLASTY WITH STENT PLACEMENT N/A 07/15/2004    Dr. Murphy Horner   • CORONARY ANGIOPLASTY WITH STENT PLACEMENT  03/2002    to RCA   • CORONARY ARTERY BYPASS GRAFT WITH AORTIC AND MITRAL VALVE REPAIR/REPLACEMENT N/A 03/28/2022    Procedure: DALTON STERNOTOMY CORONARY ARTERY BYPASS GRAFTING TIMES 4 USING LEFT INTERNAL MAMMARY ARTERY AND LEFT GREATER SAPHENOUS VEIN GRAFT PER ENDOSCOPIC VEIN HARVESTING, RIGHT CORONARY ENDARTERECTOMY, AORTIC VALVE REPLACEMENT, MITRAL VALVE REPAIR, ATRICURE MAZE PROCEDURE, CLOSURE OF LEFT ATRIAL APPENDAGE AND PRP;  Surgeon: Jr Isaias Lynn MD;  Location: [facility];  Service: Cardiothoracic;   • CORONARY ARTERY BYPASS GRAFT WITH AORTIC AND MITRAL VALVE REPAIR/REPLACEMENT  03/28/2022    Procedure: ;  Surgeon: Jr Isaias Lynn MD;  Location: [facility];  Service: Cardiothoracic;;   • HYSTERECTOMY  1974   • LUMBAR DISCECTOMY     • UPPER GASTROINTESTINAL ENDOSCOPY N/A 04/20/2015    Mild Schatzki ring, hiatus hernia, non-bleeding erosive gastropathy, erythematous mucosa in the antrum, normal duodenum-Dr. Alex Gill       Social History     Socioeconomic History   • Marital status:    Tobacco Use   • Smoking status: Former     Packs/day: 1.00     Years: 20.00     Pack years:  20.00     Types: Cigarettes     Quit date:      Years since quittin.9   • Smokeless tobacco: Never   Vaping Use   • Vaping Use: Never used   Substance and Sexual Activity   • Alcohol use: Not Currently   • Drug use: Never   • Sexual activity: Defer       Family History   Problem Relation Age of Onset   • Heart disease Mother    • No Known Problems Father    • Heart disease Sister    • Heart disease Brother    • No Known Problems Maternal Grandmother    • No Known Problems Maternal Grandfather    • No Known Problems Paternal Grandmother    • No Known Problems Paternal Grandfather    • Malig Hyperthermia Neg Hx        Review of Systems   Constitutional: Positive for malaise/fatigue.   Cardiovascular: Positive for dyspnea on exertion. Negative for chest pain, leg swelling, orthopnea, paroxysmal nocturnal dyspnea and syncope.   Respiratory: Positive for shortness of breath.    Hematologic/Lymphatic: Negative for bleeding problem.   Musculoskeletal: Negative for falls.   Gastrointestinal: Negative for melena.   Neurological: Positive for light-headedness. Negative for dizziness.       Allergies   Allergen Reactions   • Codeine Other (See Comments)     Chest pain .  Patient states she is allergic to codeine and tylenol when together but not separate.   • Penicillins Hives   • Ambien [Zolpidem Tartrate] Confusion         Current Outpatient Medications:   •  acetaminophen (TYLENOL) 500 MG tablet, Take 1,000 mg by mouth 3 (Three) Times a Day As Needed. PRN PAIN, Disp: , Rfl:   •  amiodarone (PACERONE) 200 MG tablet, Take 200 mg by mouth Daily., Disp: , Rfl:   •  apixaban (Eliquis) 5 MG tablet tablet, Take 1 tablet by mouth Every 12 (Twelve) Hours., Disp: 1 tablet, Rfl: 0  •  atorvastatin (LIPITOR) 40 MG tablet, Take 40 mg by mouth Daily., Disp: , Rfl:   •  azithromycin (ZITHROMAX) 250 MG tablet, Take 1 tablet by mouth Daily., Disp: , Rfl:   •  bumetanide (BUMEX) 2 MG tablet, Take 1 tablet by mouth Daily., Disp: 90  tablet, Rfl: 2  •  Cyanocobalamin ER 1000 MCG tablet controlled-release, Take 1,000,000 mcg by mouth Daily., Disp: , Rfl:   •  dapagliflozin Propanediol (Farxiga) 10 MG tablet, Take 10 mg by mouth Every Morning., Disp: 90 tablet, Rfl: 3  •  diphenhydrAMINE-acetaminophen (Tylenol PM Extra Strength)  MG tablet per tablet, Take 2 tablets by mouth At Night As Needed., Disp: , Rfl:   •  docusate sodium (COLACE) 250 MG capsule, Take 1 capsule by mouth 2 (Two) Times a Day As Needed for Constipation., Disp: 30 capsule, Rfl: 1  •  esomeprazole (nexIUM) 40 MG capsule, Take 40 mg by mouth Every Morning Before Breakfast., Disp: , Rfl:   •  FLUoxetine (PROzac) 20 MG capsule, Take 20 mg by mouth Every Morning., Disp: , Rfl:   •  LORazepam (ATIVAN) 1 MG tablet, Take 1 mg by mouth every 8 (eight) hours as needed for anxiety., Disp: , Rfl:   •  Melatonin 10 MG tablet, Take 1 tablet by mouth Every Night., Disp: , Rfl:   •  metoprolol succinate XL (TOPROL-XL) 50 MG 24 hr tablet, Take 1 tablet by mouth Daily., Disp: 90 tablet, Rfl: 2  •  nitroglycerin (NITROSTAT) 0.4 MG SL tablet, Place 1 tablet under the tongue Every 5 (Five) Minutes As Needed for Chest Pain. Take no more than 3 doses in 15 minutes., Disp: 100 tablet, Rfl: 3  •  predniSONE (DELTASONE) 20 MG tablet, , Disp: , Rfl:   •  promethazine-dextromethorphan (PROMETHAZINE-DM) 6.25-15 MG/5ML syrup, Take 5 mL by mouth., Disp: , Rfl:   •  spironolactone (ALDACTONE) 25 MG tablet, TAKE 1/2 TABLET BY MOUTH DAILY (Patient taking differently: Take 12.5 mg by mouth Daily.), Disp: 30 tablet, Rfl: 5  •  vitamin C (ASCORBIC ACID) 500 MG tablet, Take 1 tablet by mouth Daily. TO HOLD 1 WEEK PRIOR TO OR, Disp: , Rfl:   •  Vitamin D, Ergocalciferol, 2000 units capsule, Take 2,000 Units by mouth Daily. TO HOLD 1 WEEK PRIOR TO OR, Disp: , Rfl:   •  HYDROcodone-acetaminophen (NORCO) 5-325 MG per tablet, Take 1 tablet by mouth Every 6 (Six) Hours As Needed (Pain)., Disp: 8 tablet, Rfl: 0  •   "omeprazole (priLOSEC) 40 MG capsule, Take 1 capsule by mouth Daily., Disp: , Rfl:       Objective:     Vitals:    11/28/22 1453   BP: 118/78   BP Location: Right arm   Patient Position: Sitting   Cuff Size: Adult   Pulse: 60   Resp: 17   SpO2: 98%   Weight: 73 kg (161 lb)   Height: 165.1 cm (65\")     Body mass index is 26.79 kg/m².    PHYSICAL EXAM:    Neck:      Vascular: No JVD.   Pulmonary:      Effort: Pulmonary effort is normal.      Breath sounds: Normal breath sounds.   Cardiovascular:      Normal rate. Regular rhythm.      Murmurs: There is a systolic murmur.      No gallop. No click. No rub.   Pulses:     Intact distal pulses.           ECG 12 Lead    Date/Time: 11/28/2022 3:08 PM  Performed by: Pat Paul APRN  Authorized by: Pat Paul APRN   Comparison: compared with previous ECG from 5/6/2022  Similar to previous ECG  Rhythm: sinus rhythm  Rate: normal  BPM: 61                Assessment:       Diagnosis Plan   1. Coronary artery disease involving native coronary artery of native heart without angina pectoris  ECG 12 Lead      2. S/P CABG (coronary artery bypass graft)        3. Nonrheumatic aortic valve stenosis        4. S/P AVR        5. Mitral valve insufficiency, unspecified etiology        6. Paroxysmal atrial fibrillation (HCC)          Orders Placed This Encounter   Procedures   • ECG 12 Lead     This order was created via procedure documentation     Order Specific Question:   Release to patient     Answer:   Routine Release          Plan:       1.  Coronary artery disease.  Status post CABG x 4 in March of this year.  She denies any symptoms of angina.    Continue atorvastatin.      2.  Aortic stenosis/mitral insufficiency.  Status post AVR and mitral valve repair in March of this year.          2.  Ischemic cardiomyopathy.  Echocardiogram from June demonstrated normal LV function.  She appears euvolemic on exam.  Continue GDMT including Toprol, spironolactone, and Farxiga.  "         3.  Paroxysmal atrial fibrillation.  Status post left atrial appendage closure and biatrial cryo maze in March of this year.  She is maintaining sinus rhythm on amiodarone.  Dr. Horner has noted that we will not continue this long-term.  She is anticoagulated on Eliquis and rate controlled with metoprolol.          4.  Hypertension.  Her blood pressure is stable.  Continue current regimen.         Overall, I think she stable.  I am not recommending any changes today, and she will follow-up with Dr. Horner in 6 months      As always, it has been a pleasure to participate in your patient's care.      Sincerely,         JOHNNIE Zuniga

## 2022-12-27 RX ORDER — ATORVASTATIN CALCIUM 80 MG/1
TABLET, FILM COATED ORAL
Qty: 90 TABLET | Refills: 3 | Status: SHIPPED | OUTPATIENT
Start: 2022-12-27

## 2023-01-01 ENCOUNTER — HOSPITAL ENCOUNTER (INPATIENT)
Facility: HOSPITAL | Age: 83
LOS: 1 days | DRG: 296 | End: 2023-12-21
Attending: EMERGENCY MEDICINE | Admitting: INTERNAL MEDICINE
Payer: MEDICARE

## 2023-01-01 ENCOUNTER — APPOINTMENT (OUTPATIENT)
Dept: GENERAL RADIOLOGY | Facility: HOSPITAL | Age: 83
DRG: 296 | End: 2023-01-01
Payer: MEDICARE

## 2023-01-01 VITALS
OXYGEN SATURATION: 85 % | RESPIRATION RATE: 18 BRPM | BODY MASS INDEX: 35.76 KG/M2 | DIASTOLIC BLOOD PRESSURE: 31 MMHG | WEIGHT: 209.44 LBS | SYSTOLIC BLOOD PRESSURE: 75 MMHG | TEMPERATURE: 100.8 F | HEIGHT: 64 IN

## 2023-01-01 DIAGNOSIS — I46.9 PEA (PULSELESS ELECTRICAL ACTIVITY): ICD-10-CM

## 2023-01-01 DIAGNOSIS — Z86.79 HISTORY OF CORONARY ARTERY DISEASE: ICD-10-CM

## 2023-01-01 DIAGNOSIS — I46.9 CARDIAC ARREST: Primary | ICD-10-CM

## 2023-01-01 DIAGNOSIS — Z86.79 HISTORY OF HYPERTENSION: ICD-10-CM

## 2023-01-01 DIAGNOSIS — Z86.79 HISTORY OF SICK SINUS SYNDROME: ICD-10-CM

## 2023-01-01 DIAGNOSIS — Z86.79 HISTORY OF ATRIAL FIBRILLATION: ICD-10-CM

## 2023-01-01 LAB
ALBUMIN SERPL-MCNC: 2.8 G/DL (ref 3.5–5.2)
ALBUMIN/GLOB SERPL: 1.1 G/DL
ALP SERPL-CCNC: 95 U/L (ref 39–117)
ALT SERPL W P-5'-P-CCNC: 77 U/L (ref 1–33)
ANION GAP SERPL CALCULATED.3IONS-SCNC: 25.6 MMOL/L (ref 5–15)
APTT PPP: 35.6 SECONDS (ref 22.7–35.4)
APTT PPP: 40.2 SECONDS (ref 22.7–35.4)
ARTERIAL PATENCY WRIST A: ABNORMAL
ARTERIAL PATENCY WRIST A: POSITIVE
ARTERIAL PATENCY WRIST A: POSITIVE
AST SERPL-CCNC: 198 U/L (ref 1–32)
ATMOSPHERIC PRESS: 758.5 MMHG
ATMOSPHERIC PRESS: 758.5 MMHG
ATMOSPHERIC PRESS: 759.2 MMHG
B PARAPERT DNA SPEC QL NAA+PROBE: NOT DETECTED
B PERT DNA SPEC QL NAA+PROBE: NOT DETECTED
BASE EXCESS BLDA CALC-SCNC: -11.6 MMOL/L (ref 0–2)
BASE EXCESS BLDA CALC-SCNC: -15.7 MMOL/L (ref 0–2)
BASE EXCESS BLDA CALC-SCNC: -24.8 MMOL/L (ref 0–2)
BASOPHILS # BLD AUTO: 0.03 10*3/MM3 (ref 0–0.2)
BASOPHILS NFR BLD AUTO: 0.2 % (ref 0–1.5)
BDY SITE: ABNORMAL
BILIRUB SERPL-MCNC: 0.4 MG/DL (ref 0–1.2)
BUN SERPL-MCNC: 13 MG/DL (ref 8–23)
BUN/CREAT SERPL: 9 (ref 7–25)
C PNEUM DNA NPH QL NAA+NON-PROBE: NOT DETECTED
CA-I BLDA-SCNC: 1.02 MMOL/L (ref 2.2–2.55)
CALCIUM SPEC-SCNC: 8.1 MG/DL (ref 8.6–10.5)
CHLORIDE BLDA-SCNC: 106 MMOL/L (ref 98–107)
CHLORIDE SERPL-SCNC: 100 MMOL/L (ref 98–107)
CO2 BLDA-SCNC: 14 MMOL/L (ref 23–27)
CO2 BLDA-SCNC: 8.4 MMOL/L (ref 23–27)
CO2 SERPL-SCNC: 18.4 MMOL/L (ref 22–29)
CREAT SERPL-MCNC: 1.44 MG/DL (ref 0.57–1)
D-LACTATE SERPL-SCNC: 12.3 MMOL/L (ref 0.5–2)
D-LACTATE SERPL-SCNC: 12.9 MMOL/L (ref 0.5–2)
D-LACTATE SERPL-SCNC: 14.9 MMOL/L (ref 0.5–2)
DEPRECATED RDW RBC AUTO: 53.2 FL (ref 37–54)
EGFRCR SERPLBLD CKD-EPI 2021: 35.9 ML/MIN/1.73
EOSINOPHIL # BLD AUTO: 0.06 10*3/MM3 (ref 0–0.4)
EOSINOPHIL NFR BLD AUTO: 0.5 % (ref 0.3–6.2)
ERYTHROCYTE [DISTWIDTH] IN BLOOD BY AUTOMATED COUNT: 16.3 % (ref 12.3–15.4)
FLUAV H1 2009 PAND RNA NPH QL NAA+PROBE: DETECTED
FLUBV RNA ISLT QL NAA+PROBE: NOT DETECTED
GEN 5 2HR TROPONIN T REFLEX: 2261 NG/L
GLOBULIN UR ELPH-MCNC: 2.6 GM/DL
GLUCOSE BLDC GLUCOMTR-MCNC: 236 MG/DL (ref 70–130)
GLUCOSE BLDC GLUCOMTR-MCNC: 264 MG/DL (ref 70–130)
GLUCOSE SERPL-MCNC: 237 MG/DL (ref 65–99)
HADV DNA SPEC NAA+PROBE: NOT DETECTED
HCO3 BLDA-SCNC: 12.8 MMOL/L (ref 22–28)
HCO3 BLDA-SCNC: 17.7 MMOL/L (ref 22–28)
HCO3 BLDA-SCNC: 7.3 MMOL/L (ref 22–28)
HCOV 229E RNA SPEC QL NAA+PROBE: NOT DETECTED
HCOV HKU1 RNA SPEC QL NAA+PROBE: NOT DETECTED
HCOV NL63 RNA SPEC QL NAA+PROBE: NOT DETECTED
HCOV OC43 RNA SPEC QL NAA+PROBE: NOT DETECTED
HCT VFR BLD AUTO: 32.6 % (ref 34–46.6)
HEMODILUTION: NO
HGB BLD-MCNC: 10 G/DL (ref 12–15.9)
HMPV RNA NPH QL NAA+NON-PROBE: NOT DETECTED
HPIV1 RNA ISLT QL NAA+PROBE: NOT DETECTED
HPIV2 RNA SPEC QL NAA+PROBE: NOT DETECTED
HPIV3 RNA NPH QL NAA+PROBE: NOT DETECTED
HPIV4 P GENE NPH QL NAA+PROBE: NOT DETECTED
IMM GRANULOCYTES # BLD AUTO: 0.18 10*3/MM3 (ref 0–0.05)
IMM GRANULOCYTES NFR BLD AUTO: 1.5 % (ref 0–0.5)
INHALED O2 CONCENTRATION: 100 %
INHALED O2 CONCENTRATION: 100 %
INHALED O2 CONCENTRATION: 50 %
INR PPP: 1.52 (ref 0.9–1.1)
LYMPHOCYTES # BLD AUTO: 5.67 10*3/MM3 (ref 0.7–3.1)
LYMPHOCYTES NFR BLD AUTO: 46.6 % (ref 19.6–45.3)
Lab: ABNORMAL
M PNEUMO IGG SER IA-ACNC: NOT DETECTED
MAGNESIUM SERPL-MCNC: 2.3 MG/DL (ref 1.6–2.4)
MCH RBC QN AUTO: 27.2 PG (ref 26.6–33)
MCHC RBC AUTO-ENTMCNC: 30.7 G/DL (ref 31.5–35.7)
MCV RBC AUTO: 88.8 FL (ref 79–97)
MODALITY: ABNORMAL
MONOCYTES # BLD AUTO: 0.52 10*3/MM3 (ref 0.1–0.9)
MONOCYTES NFR BLD AUTO: 4.3 % (ref 5–12)
NEUTROPHILS NFR BLD AUTO: 46.9 % (ref 42.7–76)
NEUTROPHILS NFR BLD AUTO: 5.7 10*3/MM3 (ref 1.7–7)
NOTIFIED WHO: ABNORMAL
NRBC BLD AUTO-RTO: 0.1 /100 WBC (ref 0–0.2)
NT-PROBNP SERPL-MCNC: 1080 PG/ML (ref 0–1800)
O2 A-A PPRESDIFF RESPIRATORY: 0.2 MMHG
O2 A-A PPRESDIFF RESPIRATORY: 0.2 MMHG
O2 A-A PPRESDIFF RESPIRATORY: 0.4 MMHG
PCO2 BLDA: 36.1 MM HG (ref 35–45)
PCO2 BLDA: 39.1 MM HG (ref 35–45)
PCO2 BLDA: 53.6 MM HG (ref 35–45)
PEEP RESPIRATORY: 5 CM[H2O]
PH BLDA: 6.91 PH UNITS (ref 7.35–7.45)
PH BLDA: 7.12 PH UNITS (ref 7.35–7.45)
PH BLDA: 7.13 PH UNITS (ref 7.35–7.45)
PLATELET # BLD AUTO: 154 10*3/MM3 (ref 140–450)
PMV BLD AUTO: 10.3 FL (ref 6–12)
PO2 BLDA: 128.4 MM HG (ref 80–100)
PO2 BLDA: 300.6 MM HG (ref 80–100)
PO2 BLDA: 78.5 MM HG (ref 80–100)
POTASSIUM BLDA-SCNC: 3.2 MMOL/L (ref 3.5–5.2)
POTASSIUM SERPL-SCNC: 3.1 MMOL/L (ref 3.5–5.2)
PROCALCITONIN SERPL-MCNC: 0.23 NG/ML (ref 0–0.25)
PROT SERPL-MCNC: 5.4 G/DL (ref 6–8.5)
PROTHROMBIN TIME: 18.6 SECONDS (ref 11.7–14.2)
PSV: 12 CMH2O
RBC # BLD AUTO: 3.67 10*6/MM3 (ref 3.77–5.28)
READ BACK: YES
RHINOVIRUS RNA SPEC NAA+PROBE: NOT DETECTED
RSV RNA NPH QL NAA+NON-PROBE: NOT DETECTED
SAO2 % BLDCOA: 83.7 % (ref 92–98.5)
SAO2 % BLDCOA: 97.5 % (ref 92–98.5)
SAO2 % BLDCOA: 99.8 % (ref 92–98.5)
SARS-COV-2 RNA NPH QL NAA+NON-PROBE: NOT DETECTED
SET MECH RESP RATE: 20
SET MECH RESP RATE: 24
SET MECH RESP RATE: 24
SODIUM BLD-SCNC: 142 MMOL/L (ref 136–145)
SODIUM SERPL-SCNC: 144 MMOL/L (ref 136–145)
TOTAL RATE: 24 BREATHS/MINUTE
TOTAL RATE: 25 BREATHS/MINUTE
TOTAL RATE: 33 BREATHS/MINUTE
TROPONIN T DELTA: 2169 NG/L
TROPONIN T SERPL HS-MCNC: 92 NG/L
VENTILATOR MODE: ABNORMAL
VENTILATOR MODE: AC
VENTILATOR MODE: AC
VT ON VENT VENT: 450 ML
VT ON VENT VENT: 487 ML
VT ON VENT VENT: 546 ML
WBC NRBC COR # BLD AUTO: 12.16 10*3/MM3 (ref 3.4–10.8)

## 2023-01-01 PROCEDURE — 5A12012 PERFORMANCE OF CARDIAC OUTPUT, SINGLE, MANUAL: ICD-10-PCS | Performed by: EMERGENCY MEDICINE

## 2023-01-01 PROCEDURE — 83880 ASSAY OF NATRIURETIC PEPTIDE: CPT | Performed by: EMERGENCY MEDICINE

## 2023-01-01 PROCEDURE — 93010 ELECTROCARDIOGRAM REPORT: CPT | Performed by: INTERNAL MEDICINE

## 2023-01-01 PROCEDURE — 25010000002 PIPERACILLIN SOD-TAZOBACTAM PER 1 G: Performed by: INTERNAL MEDICINE

## 2023-01-01 PROCEDURE — 82948 REAGENT STRIP/BLOOD GLUCOSE: CPT

## 2023-01-01 PROCEDURE — 83605 ASSAY OF LACTIC ACID: CPT

## 2023-01-01 PROCEDURE — 94799 UNLISTED PULMONARY SVC/PX: CPT

## 2023-01-01 PROCEDURE — 63710000001 INSULIN REGULAR HUMAN PER 5 UNITS: Performed by: INTERNAL MEDICINE

## 2023-01-01 PROCEDURE — 25010000002 LEVETRIRACETAM PER 10 MG: Performed by: INTERNAL MEDICINE

## 2023-01-01 PROCEDURE — 85610 PROTHROMBIN TIME: CPT | Performed by: EMERGENCY MEDICINE

## 2023-01-01 PROCEDURE — 93005 ELECTROCARDIOGRAM TRACING: CPT | Performed by: INTERNAL MEDICINE

## 2023-01-01 PROCEDURE — 36415 COLL VENOUS BLD VENIPUNCTURE: CPT

## 2023-01-01 PROCEDURE — 25010000002 EPINEPHRINE 1 MG/10ML SOLUTION PREFILLED SYRINGE: Performed by: EMERGENCY MEDICINE

## 2023-01-01 PROCEDURE — 36600 WITHDRAWAL OF ARTERIAL BLOOD: CPT

## 2023-01-01 PROCEDURE — 25810000003 SODIUM CHLORIDE 0.9 % SOLUTION: Performed by: EMERGENCY MEDICINE

## 2023-01-01 PROCEDURE — 25010000002 MIDAZOLAM PER 1 MG: Performed by: INTERNAL MEDICINE

## 2023-01-01 PROCEDURE — 82330 ASSAY OF CALCIUM: CPT

## 2023-01-01 PROCEDURE — 85730 THROMBOPLASTIN TIME PARTIAL: CPT | Performed by: INTERNAL MEDICINE

## 2023-01-01 PROCEDURE — 99291 CRITICAL CARE FIRST HOUR: CPT

## 2023-01-01 PROCEDURE — 25010000002 EPINEPHRINE 1 MG/ML SOLUTION: Performed by: EMERGENCY MEDICINE

## 2023-01-01 PROCEDURE — 99222 1ST HOSP IP/OBS MODERATE 55: CPT | Performed by: INTERNAL MEDICINE

## 2023-01-01 PROCEDURE — 82803 BLOOD GASES ANY COMBINATION: CPT

## 2023-01-01 PROCEDURE — 0BH17EZ INSERTION OF ENDOTRACHEAL AIRWAY INTO TRACHEA, VIA NATURAL OR ARTIFICIAL OPENING: ICD-10-PCS | Performed by: EMERGENCY MEDICINE

## 2023-01-01 PROCEDURE — 93005 ELECTROCARDIOGRAM TRACING: CPT | Performed by: EMERGENCY MEDICINE

## 2023-01-01 PROCEDURE — 0202U NFCT DS 22 TRGT SARS-COV-2: CPT | Performed by: EMERGENCY MEDICINE

## 2023-01-01 PROCEDURE — 25010000002 PHENYLEPHRINE 10 MG/ML SOLUTION: Performed by: INTERNAL MEDICINE

## 2023-01-01 PROCEDURE — 92950 HEART/LUNG RESUSCITATION CPR: CPT

## 2023-01-01 PROCEDURE — 80051 ELECTROLYTE PANEL: CPT

## 2023-01-01 PROCEDURE — 31500 INSERT EMERGENCY AIRWAY: CPT

## 2023-01-01 PROCEDURE — 71045 X-RAY EXAM CHEST 1 VIEW: CPT

## 2023-01-01 PROCEDURE — 5A1935Z RESPIRATORY VENTILATION, LESS THAN 24 CONSECUTIVE HOURS: ICD-10-PCS | Performed by: INTERNAL MEDICINE

## 2023-01-01 PROCEDURE — 80053 COMPREHEN METABOLIC PANEL: CPT | Performed by: EMERGENCY MEDICINE

## 2023-01-01 PROCEDURE — 25810000003 SODIUM CHLORIDE 0.9 % SOLUTION: Performed by: INTERNAL MEDICINE

## 2023-01-01 PROCEDURE — 83735 ASSAY OF MAGNESIUM: CPT | Performed by: EMERGENCY MEDICINE

## 2023-01-01 PROCEDURE — 83605 ASSAY OF LACTIC ACID: CPT | Performed by: EMERGENCY MEDICINE

## 2023-01-01 PROCEDURE — 85730 THROMBOPLASTIN TIME PARTIAL: CPT | Performed by: EMERGENCY MEDICINE

## 2023-01-01 PROCEDURE — 84145 PROCALCITONIN (PCT): CPT | Performed by: EMERGENCY MEDICINE

## 2023-01-01 PROCEDURE — 94002 VENT MGMT INPAT INIT DAY: CPT

## 2023-01-01 PROCEDURE — 84484 ASSAY OF TROPONIN QUANT: CPT | Performed by: EMERGENCY MEDICINE

## 2023-01-01 PROCEDURE — 85025 COMPLETE CBC W/AUTO DIFF WBC: CPT | Performed by: EMERGENCY MEDICINE

## 2023-01-01 RX ORDER — SODIUM CHLORIDE 0.9 % (FLUSH) 0.9 %
10 SYRINGE (ML) INJECTION EVERY 12 HOURS SCHEDULED
Status: DISCONTINUED | OUTPATIENT
Start: 2023-01-01 | End: 2023-12-21 | Stop reason: HOSPADM

## 2023-01-01 RX ORDER — PANTOPRAZOLE SODIUM 40 MG/10ML
40 INJECTION, POWDER, LYOPHILIZED, FOR SOLUTION INTRAVENOUS
Status: DISCONTINUED | OUTPATIENT
Start: 2023-01-01 | End: 2023-12-21 | Stop reason: HOSPADM

## 2023-01-01 RX ORDER — POLYETHYLENE GLYCOL 3350 17 G/17G
17 POWDER, FOR SOLUTION ORAL DAILY PRN
Status: DISCONTINUED | OUTPATIENT
Start: 2023-01-01 | End: 2023-12-21 | Stop reason: HOSPADM

## 2023-01-01 RX ORDER — HEPARIN SODIUM 5000 [USP'U]/ML
30-60 INJECTION, SOLUTION INTRAVENOUS; SUBCUTANEOUS EVERY 6 HOURS PRN
Status: DISCONTINUED | OUTPATIENT
Start: 2023-01-01 | End: 2023-12-21 | Stop reason: HOSPADM

## 2023-01-01 RX ORDER — NOREPINEPHRINE BITARTRATE 0.03 MG/ML
.02-.3 INJECTION, SOLUTION INTRAVENOUS
Status: DISCONTINUED | OUTPATIENT
Start: 2023-01-01 | End: 2023-01-01

## 2023-01-01 RX ORDER — SODIUM CHLORIDE 9 MG/ML
40 INJECTION, SOLUTION INTRAVENOUS AS NEEDED
Status: DISCONTINUED | OUTPATIENT
Start: 2023-01-01 | End: 2023-12-21 | Stop reason: HOSPADM

## 2023-01-01 RX ORDER — ALBUTEROL SULFATE 2.5 MG/3ML
2.5 SOLUTION RESPIRATORY (INHALATION) EVERY 4 HOURS PRN
Status: DISCONTINUED | OUTPATIENT
Start: 2023-01-01 | End: 2023-12-21 | Stop reason: HOSPADM

## 2023-01-01 RX ORDER — PHENYLEPHRINE HCL IN 0.9% NACL 0.5 MG/5ML
.5-3 SYRINGE (ML) INTRAVENOUS
Status: DISCONTINUED | OUTPATIENT
Start: 2023-01-01 | End: 2023-12-21 | Stop reason: HOSPADM

## 2023-01-01 RX ORDER — NOREPINEPHRINE BITARTRATE 0.03 MG/ML
.02-.3 INJECTION, SOLUTION INTRAVENOUS
Status: DISCONTINUED | OUTPATIENT
Start: 2023-01-01 | End: 2023-12-21 | Stop reason: HOSPADM

## 2023-01-01 RX ORDER — ACETAMINOPHEN 325 MG/1
650 TABLET ORAL EVERY 6 HOURS PRN
Status: DISCONTINUED | OUTPATIENT
Start: 2023-01-01 | End: 2023-12-21 | Stop reason: HOSPADM

## 2023-01-01 RX ORDER — HEPARIN SODIUM 10000 [USP'U]/100ML
12 INJECTION, SOLUTION INTRAVENOUS
Status: DISCONTINUED | OUTPATIENT
Start: 2023-01-01 | End: 2023-12-21 | Stop reason: HOSPADM

## 2023-01-01 RX ORDER — AMOXICILLIN 250 MG
2 CAPSULE ORAL 2 TIMES DAILY
Status: DISCONTINUED | OUTPATIENT
Start: 2023-01-01 | End: 2023-12-21 | Stop reason: HOSPADM

## 2023-01-01 RX ORDER — SODIUM CHLORIDE 0.9 % (FLUSH) 0.9 %
10 SYRINGE (ML) INJECTION AS NEEDED
Status: DISCONTINUED | OUTPATIENT
Start: 2023-01-01 | End: 2023-12-21 | Stop reason: HOSPADM

## 2023-01-01 RX ORDER — LEVETIRACETAM 500 MG/5ML
1000 INJECTION, SOLUTION, CONCENTRATE INTRAVENOUS EVERY 12 HOURS SCHEDULED
Status: DISCONTINUED | OUTPATIENT
Start: 2023-12-21 | End: 2023-12-21 | Stop reason: HOSPADM

## 2023-01-01 RX ORDER — DEXTROSE MONOHYDRATE 25 G/50ML
25 INJECTION, SOLUTION INTRAVENOUS
Status: DISCONTINUED | OUTPATIENT
Start: 2023-01-01 | End: 2023-12-21 | Stop reason: HOSPADM

## 2023-01-01 RX ORDER — MIDAZOLAM HYDROCHLORIDE 1 MG/ML
2 INJECTION INTRAMUSCULAR; INTRAVENOUS
Status: DISCONTINUED | OUTPATIENT
Start: 2023-01-01 | End: 2023-12-21 | Stop reason: HOSPADM

## 2023-01-01 RX ORDER — NICOTINE POLACRILEX 4 MG
15 LOZENGE BUCCAL
Status: DISCONTINUED | OUTPATIENT
Start: 2023-01-01 | End: 2023-12-21 | Stop reason: HOSPADM

## 2023-01-01 RX ORDER — IBUPROFEN 600 MG/1
1 TABLET ORAL
Status: DISCONTINUED | OUTPATIENT
Start: 2023-01-01 | End: 2023-12-21 | Stop reason: HOSPADM

## 2023-01-01 RX ORDER — FENTANYL CITRATE 50 UG/ML
50 INJECTION, SOLUTION INTRAMUSCULAR; INTRAVENOUS
Status: DISCONTINUED | OUTPATIENT
Start: 2023-01-01 | End: 2023-12-21 | Stop reason: HOSPADM

## 2023-01-01 RX ORDER — LEVETIRACETAM 500 MG/5ML
1000 INJECTION, SOLUTION, CONCENTRATE INTRAVENOUS ONCE
Status: COMPLETED | OUTPATIENT
Start: 2023-01-01 | End: 2023-01-01

## 2023-01-01 RX ORDER — NITROGLYCERIN 0.4 MG/1
0.4 TABLET SUBLINGUAL
Status: DISCONTINUED | OUTPATIENT
Start: 2023-01-01 | End: 2023-12-21 | Stop reason: HOSPADM

## 2023-01-01 RX ORDER — BISACODYL 5 MG/1
5 TABLET, DELAYED RELEASE ORAL DAILY PRN
Status: DISCONTINUED | OUTPATIENT
Start: 2023-01-01 | End: 2023-12-21 | Stop reason: HOSPADM

## 2023-01-01 RX ORDER — CHLORHEXIDINE GLUCONATE ORAL RINSE 1.2 MG/ML
15 SOLUTION DENTAL EVERY 12 HOURS SCHEDULED
Status: DISCONTINUED | OUTPATIENT
Start: 2023-01-01 | End: 2023-12-21 | Stop reason: HOSPADM

## 2023-01-01 RX ORDER — BISACODYL 10 MG
10 SUPPOSITORY, RECTAL RECTAL DAILY PRN
Status: DISCONTINUED | OUTPATIENT
Start: 2023-01-01 | End: 2023-12-21 | Stop reason: HOSPADM

## 2023-01-01 RX ADMIN — LEVETIRACETAM 1000 MG: 500 INJECTION, SOLUTION INTRAVENOUS at 20:10

## 2023-01-01 RX ADMIN — EPINEPHRINE 1 MG: 0.1 INJECTION INTRACARDIAC; INTRAVENOUS at 16:46

## 2023-01-01 RX ADMIN — INSULIN HUMAN 6 UNITS: 100 INJECTION, SOLUTION PARENTERAL at 20:57

## 2023-01-01 RX ADMIN — EPINEPHRINE 1 MG: 0.1 INJECTION INTRACARDIAC; INTRAVENOUS at 16:35

## 2023-01-01 RX ADMIN — Medication 0.2 MCG/KG/MIN: at 17:02

## 2023-01-01 RX ADMIN — PHENYLEPHRINE HYDROCHLORIDE 0.5 MCG/KG/MIN: 50 INJECTION INTRAVENOUS at 21:05

## 2023-01-01 RX ADMIN — CHLORHEXIDINE GLUCONATE 15 ML: 1.2 RINSE ORAL at 21:06

## 2023-01-01 RX ADMIN — SODIUM BICARBONATE 50 MEQ: 84 INJECTION, SOLUTION INTRAVENOUS at 16:40

## 2023-01-01 RX ADMIN — EPINEPHRINE 1 MG: 0.1 INJECTION INTRACARDIAC; INTRAVENOUS at 16:43

## 2023-01-01 RX ADMIN — Medication 0.02 MCG/KG/MIN: at 17:26

## 2023-01-01 RX ADMIN — PANTOPRAZOLE SODIUM 40 MG: 40 INJECTION, POWDER, FOR SOLUTION INTRAVENOUS at 20:08

## 2023-01-01 RX ADMIN — EPINEPHRINE 1 MG: 0.1 INJECTION INTRACARDIAC; INTRAVENOUS at 16:38

## 2023-01-01 RX ADMIN — SODIUM BICARBONATE 50 MEQ: 84 INJECTION, SOLUTION INTRAVENOUS at 16:49

## 2023-01-01 RX ADMIN — PIPERACILLIN SODIUM AND TAZOBACTAM SODIUM 3.38 G: 3; .375 INJECTION, SOLUTION INTRAVENOUS at 20:10

## 2023-01-01 RX ADMIN — Medication 10 ML: at 21:05

## 2023-01-01 RX ADMIN — SODIUM CHLORIDE 1000 ML: 9 INJECTION, SOLUTION INTRAVENOUS at 17:08

## 2023-01-01 RX ADMIN — Medication: at 16:57

## 2023-02-16 ENCOUNTER — TELEPHONE (OUTPATIENT)
Dept: CARDIOLOGY | Facility: CLINIC | Age: 83
End: 2023-02-16
Payer: MEDICARE

## 2023-02-16 RX ORDER — BUMETANIDE 2 MG/1
TABLET ORAL
Qty: 90 TABLET | Refills: 2 | Status: SHIPPED | OUTPATIENT
Start: 2023-02-16 | End: 2023-03-14 | Stop reason: SDUPTHER

## 2023-02-16 NOTE — TELEPHONE ENCOUNTER
Patient needing to have cataract surgery in both eyes.  Patient is on Eliquis   Please advise  Fax to Attn: Elli [phone]

## 2023-02-22 ENCOUNTER — OFFICE VISIT (OUTPATIENT)
Dept: CARDIOLOGY | Facility: CLINIC | Age: 83
End: 2023-02-22
Payer: MEDICARE

## 2023-02-22 ENCOUNTER — HOSPITAL ENCOUNTER (OUTPATIENT)
Dept: CARDIOLOGY | Facility: HOSPITAL | Age: 83
Discharge: HOME | End: 2023-02-22
Admitting: NURSE PRACTITIONER
Payer: MEDICARE

## 2023-02-22 ENCOUNTER — TELEPHONE (OUTPATIENT)
Dept: CARDIOLOGY | Facility: CLINIC | Age: 83
End: 2023-02-22

## 2023-02-22 VITALS
WEIGHT: 166 LBS | BODY MASS INDEX: 27.66 KG/M2 | DIASTOLIC BLOOD PRESSURE: 90 MMHG | SYSTOLIC BLOOD PRESSURE: 110 MMHG | HEART RATE: 95 BPM | HEIGHT: 65 IN

## 2023-02-22 DIAGNOSIS — I48.0 PAROXYSMAL ATRIAL FIBRILLATION: Primary | ICD-10-CM

## 2023-02-22 DIAGNOSIS — I10 ESSENTIAL HYPERTENSION: ICD-10-CM

## 2023-02-22 DIAGNOSIS — I25.10 CORONARY ARTERY DISEASE INVOLVING NATIVE CORONARY ARTERY OF NATIVE HEART WITHOUT ANGINA PECTORIS: ICD-10-CM

## 2023-02-22 DIAGNOSIS — I25.5 ISCHEMIC CARDIOMYOPATHY: ICD-10-CM

## 2023-02-22 DIAGNOSIS — I48.0 PAROXYSMAL ATRIAL FIBRILLATION: ICD-10-CM

## 2023-02-22 LAB
ALBUMIN SERPL-MCNC: 4.5 G/DL (ref 3.5–5.2)
ALBUMIN/GLOB SERPL: 1.4 G/DL
ALP SERPL-CCNC: 69 U/L (ref 39–117)
ALT SERPL W P-5'-P-CCNC: 28 U/L (ref 1–33)
ANION GAP SERPL CALCULATED.3IONS-SCNC: 11.1 MMOL/L (ref 5–15)
AST SERPL-CCNC: 33 U/L (ref 1–32)
BILIRUB SERPL-MCNC: 0.4 MG/DL (ref 0–1.2)
BUN SERPL-MCNC: 25 MG/DL (ref 8–23)
BUN/CREAT SERPL: 23.4 (ref 7–25)
CALCIUM SPEC-SCNC: 9.4 MG/DL (ref 8.6–10.5)
CHLORIDE SERPL-SCNC: 104 MMOL/L (ref 98–107)
CO2 SERPL-SCNC: 23.9 MMOL/L (ref 22–29)
CREAT SERPL-MCNC: 1.07 MG/DL (ref 0.57–1)
DEPRECATED RDW RBC AUTO: 47.3 FL (ref 37–54)
EGFRCR SERPLBLD CKD-EPI 2021: 51.6 ML/MIN/1.73
ERYTHROCYTE [DISTWIDTH] IN BLOOD BY AUTOMATED COUNT: 15.5 % (ref 12.3–15.4)
GLOBULIN UR ELPH-MCNC: 3.2 GM/DL
GLUCOSE SERPL-MCNC: 108 MG/DL (ref 65–99)
HCT VFR BLD AUTO: 41.4 % (ref 34–46.6)
HGB BLD-MCNC: 13.2 G/DL (ref 12–15.9)
MAGNESIUM SERPL-MCNC: 2.4 MG/DL (ref 1.6–2.4)
MCH RBC QN AUTO: 27 PG (ref 26.6–33)
MCHC RBC AUTO-ENTMCNC: 31.9 G/DL (ref 31.5–35.7)
MCV RBC AUTO: 84.7 FL (ref 79–97)
PLATELET # BLD AUTO: 179 10*3/MM3 (ref 140–450)
PMV BLD AUTO: 9.6 FL (ref 6–12)
POTASSIUM SERPL-SCNC: 3.9 MMOL/L (ref 3.5–5.2)
PROT SERPL-MCNC: 7.7 G/DL (ref 6–8.5)
RBC # BLD AUTO: 4.89 10*6/MM3 (ref 3.77–5.28)
SODIUM SERPL-SCNC: 139 MMOL/L (ref 136–145)
TSH SERPL DL<=0.05 MIU/L-ACNC: 2.72 UIU/ML (ref 0.27–4.2)
WBC NRBC COR # BLD: 6.29 10*3/MM3 (ref 3.4–10.8)

## 2023-02-22 PROCEDURE — 99214 OFFICE O/P EST MOD 30 MIN: CPT | Performed by: NURSE PRACTITIONER

## 2023-02-22 PROCEDURE — 93000 ELECTROCARDIOGRAM COMPLETE: CPT | Performed by: NURSE PRACTITIONER

## 2023-02-22 PROCEDURE — 36415 COLL VENOUS BLD VENIPUNCTURE: CPT

## 2023-02-22 PROCEDURE — 85027 COMPLETE CBC AUTOMATED: CPT | Performed by: NURSE PRACTITIONER

## 2023-02-22 PROCEDURE — 83735 ASSAY OF MAGNESIUM: CPT | Performed by: NURSE PRACTITIONER

## 2023-02-22 PROCEDURE — 80053 COMPREHEN METABOLIC PANEL: CPT | Performed by: NURSE PRACTITIONER

## 2023-02-22 PROCEDURE — 84443 ASSAY THYROID STIM HORMONE: CPT | Performed by: NURSE PRACTITIONER

## 2023-02-22 RX ORDER — AMIODARONE HYDROCHLORIDE 200 MG/1
200 TABLET ORAL 2 TIMES DAILY
Qty: 90 TABLET | Refills: 1 | Status: SHIPPED | OUTPATIENT
Start: 2023-02-22 | End: 2023-03-08 | Stop reason: SDUPTHER

## 2023-02-22 NOTE — TELEPHONE ENCOUNTER
Caller: YULIANAMARIANNE    Relationship: SELF    Best call back number: [phone]    What is the best time to reach you: ANY        What was the call regarding: PT CALLED IN TO LET DR. DE LA FUENTE KNOW THAT SHE WENT BACK INTO AFIB FOR THE FIRST TIME SINCE MARCH 2022.  SHE STATED FOR THE LAST THREE DAYS HER HEART RATE HAS BEEN JUMPING BETWEEN 105-125.    Do you require a callback: YES

## 2023-02-22 NOTE — PROGRESS NOTES
[facility] Follow Up Office Note     Encounter Date:23  Patient:Alice Mabry  :1940  MRN:[id number]      Chief Complaint: No chief complaint on file.        History of Presenting Illness:        Alice Mabry is a 82 y.o. female who is here for follow-up.  She is a patient of Dr Horner.    Patient has past medical history significant for coronary artery disease status post CABG, AI and AAS status post AVR, persistent atrial fibrillation on chronic anticoagulation, mixed hyperlipidemia, essential hypertension, ischemic cardiomyopathy with recovered LVEF.    Patient was found to have severe three-vessel coronary artery disease in 2022 and subsequently underwent four-vessel bypass and AVR as well as maze procedure and left atrial appendage ligation.  Unfortunately, patient went back into a fib postoperatively.  We have taken a rate control and anticoagulation approach with her.    Patient was seen in the office in 2022 by JOHNNIE Zuniga.  At this time she was felt to be stable from a cardiac perspective.  She was noted to have chronic shortness of breath.  She was noted to be back in sinus rhythm at this time.    Patient reports for several days she is feeling tired.  She took her pulse and it was up over 120.  She can feel her heart flip-flop.  She denies chest pain, lower extremity edema, orthopnea.  She reports she has been off amiodarone for over a year.  She denies bleeding issues on Eliquis.    Review of Systems:  Review of Systems   Constitutional: Positive for malaise/fatigue.   Cardiovascular: Positive for dyspnea on exertion. Negative for chest pain, leg swelling, orthopnea and palpitations.   Respiratory: Negative for shortness of breath.        Current Outpatient Medications on File Prior to Visit   Medication Sig Dispense Refill   • acetaminophen (TYLENOL) 500 MG tablet Take 1,000 mg by mouth 3 (Three) Times a Day As Needed. PRN PAIN     • apixaban  (Eliquis) 5 MG tablet tablet Take 1 tablet by mouth Every 12 (Twelve) Hours. 1 tablet 0   • atorvastatin (LIPITOR) 80 MG tablet TAKE 1 TABLET DAILY 90 tablet 3   • bumetanide (BUMEX) 2 MG tablet TAKE ONE TABLET BY MOUTH DAILY 90 tablet 2   • dapagliflozin Propanediol (Farxiga) 10 MG tablet Take 10 mg by mouth Every Morning. 90 tablet 3   • FLUoxetine (PROzac) 20 MG capsule Take 20 mg by mouth Every Morning.     • LORazepam (ATIVAN) 1 MG tablet Take 1 mg by mouth every 8 (eight) hours as needed for anxiety.     • Melatonin 10 MG tablet Take 1 tablet by mouth Every Night.     • metoprolol succinate XL (TOPROL-XL) 50 MG 24 hr tablet Take 1 tablet by mouth Daily. 90 tablet 2   • nitroglycerin (NITROSTAT) 0.4 MG SL tablet Place 1 tablet under the tongue Every 5 (Five) Minutes As Needed for Chest Pain. Take no more than 3 doses in 15 minutes. 100 tablet 3   • omeprazole (priLOSEC) 40 MG capsule Take 1 capsule by mouth Daily.     • spironolactone (ALDACTONE) 25 MG tablet TAKE 1/2 TABLET BY MOUTH DAILY (Patient taking differently: Take 12.5 mg by mouth Daily.) 30 tablet 5   • vitamin C (ASCORBIC ACID) 500 MG tablet Take 1 tablet by mouth Daily. TO HOLD 1 WEEK PRIOR TO OR     • Vitamin D, Ergocalciferol, 2000 units capsule Take 2,000 Units by mouth Daily. TO HOLD 1 WEEK PRIOR TO OR     • [DISCONTINUED] amiodarone (PACERONE) 200 MG tablet Take 200 mg by mouth Daily.     • [DISCONTINUED] atorvastatin (LIPITOR) 40 MG tablet Take 40 mg by mouth Daily.     • [DISCONTINUED] azithromycin (ZITHROMAX) 250 MG tablet Take 1 tablet by mouth Daily.     • [DISCONTINUED] Cyanocobalamin ER 1000 MCG tablet controlled-release Take 1,000,000 mcg by mouth Daily.     • [DISCONTINUED] diphenhydrAMINE-acetaminophen (Tylenol PM Extra Strength)  MG tablet per tablet Take 2 tablets by mouth At Night As Needed.     • [DISCONTINUED] docusate sodium (COLACE) 250 MG capsule Take 1 capsule by mouth 2 (Two) Times a Day As Needed for Constipation. 30  capsule 1   • [DISCONTINUED] esomeprazole (nexIUM) 40 MG capsule Take 40 mg by mouth Every Morning Before Breakfast.     • [DISCONTINUED] HYDROcodone-acetaminophen (NORCO) 5-325 MG per tablet Take 1 tablet by mouth Every 6 (Six) Hours As Needed (Pain). 8 tablet 0   • [DISCONTINUED] predniSONE (DELTASONE) 20 MG tablet      • [DISCONTINUED] promethazine-dextromethorphan (PROMETHAZINE-DM) 6.25-15 MG/5ML syrup Take 5 mL by mouth.       No current facility-administered medications on file prior to visit.       Allergies   Allergen Reactions   • Codeine Other (See Comments)     Chest pain .  Patient states she is allergic to codeine and tylenol when together but not separate.   • Penicillins Hives   • Ambien [Zolpidem Tartrate] Confusion       Past Medical History:   Diagnosis Date   • Acute diastolic (congestive) heart failure (HCC)    • Anxiety    • Aortic valve stenosis     HX CABG, AVR, MVR 3/2022   • Arthritis    • CAD (coronary artery disease)     stents   • Depression    • Dizziness    • Fatty liver    • GERD (gastroesophageal reflux disease)    • H/O blood clots    • H/O Clostridium difficile infection    • Heart murmur    • History of atrial fibrillation    • History of cervical cancer     HX HYSTERECTOMY   • History of MI (myocardial infarction) 1999   • History of transfusion     no reactions   • [place] (hard of hearing)    • Hyperlipidemia    • Hypertension    • IBS (irritable bowel syndrome)    • Incontinence of urine     wears pads   • Renal mass, right        Past Surgical History:   Procedure Laterality Date   • APPENDECTOMY  1960   • CARDIAC CATHETERIZATION  06/10/2014    Dr. Murphy Horner   • CARDIAC CATHETERIZATION  11/13/2007    Dr. Murphy Horner   • CARDIAC CATHETERIZATION N/A 10/19/2004    Dr. Murphy Horner   • CARDIAC CATHETERIZATION N/A 07/15/2004    Dr. Gregorio Gonzales   • CARDIAC CATHETERIZATION  10/2003    Dr. Murphy Horner   • CARDIAC CATHETERIZATION N/A 03/21/2022    Procedure: Coronary  angiography;  Surgeon: Murphy Horner MD;  Location: [facility] CATH INVASIVE LOCATION;  Service: Cardiology;  Laterality: N/A;   • CARDIAC CATHETERIZATION N/A 03/21/2022    Procedure: Right Heart Cath;  Surgeon: Murphy oHrner MD;  [facility];  Service: Cardiology;  Laterality: N/A;   • CARDIAC CATHETERIZATION N/A 03/21/2022    Procedure: Left Heart Cath;  Surgeon: Murphy Horner MD;  [facility];  Service: Cardiology;  Laterality: N/A;   • CARDIAC CATHETERIZATION N/A 03/21/2022    Procedure: Left ventriculography;  Surgeon: Murphy Horner MD;  [facility];  Service: Cardiology;  Laterality: N/A;   • CHOLECYSTECTOMY  1998   • COLONOSCOPY N/A 07/2001    negative   • COLONOSCOPY N/A 02/28/2012    negative   • CORONARY ANGIOPLASTY WITH STENT PLACEMENT N/A 07/15/2004    Dr. Murphy Horner   • CORONARY ANGIOPLASTY WITH STENT PLACEMENT  03/2002    to RCA   • CORONARY ARTERY BYPASS GRAFT WITH AORTIC AND MITRAL VALVE REPAIR/REPLACEMENT N/A 03/28/2022    Procedure: DALTON STERNOTOMY CORONARY ARTERY BYPASS GRAFTING TIMES 4 USING LEFT INTERNAL MAMMARY ARTERY AND LEFT GREATER SAPHENOUS VEIN GRAFT PER ENDOSCOPIC VEIN HARVESTING, RIGHT CORONARY ENDARTERECTOMY, AORTIC VALVE REPLACEMENT, MITRAL VALVE REPAIR, ATRICURE MAZE PROCEDURE, CLOSURE OF LEFT ATRIAL APPENDAGE AND PRP;  Surgeon: Jr Isaias Lynn MD;  Location: [facility];  Service: Cardiothoracic;   • CORONARY ARTERY BYPASS GRAFT WITH AORTIC AND MITRAL VALVE REPAIR/REPLACEMENT  03/28/2022    Procedure: ;  Surgeon: Jr Isaias Lynn MD;  Location: [facility];  Service: Cardiothoracic;;   • HYSTERECTOMY  1974   • LUMBAR DISCECTOMY     • NEPHRECTOMY PARTIAL Right 11/3/2022    Procedure: RIGHT ROBOTIC PARTIAL NEPHRECTOMY;  Surgeon: Davy Silva Jr., MD;  Location: [facility] OR;  Service: Robotics - [facility];  Laterality: Right;   • UPPER GASTROINTESTINAL ENDOSCOPY N/A  "2015    Mild Schatzki ring, hiatus hernia, non-bleeding erosive gastropathy, erythematous mucosa in the antrum, normal duodenum-Dr. Alex Gill       Social History     Socioeconomic History   • Marital status:    Tobacco Use   • Smoking status: Former     Packs/day: 1.00     Years: 20.00     Pack years: 20.00     Types: Cigarettes     Quit date:      Years since quittin.1   • Smokeless tobacco: Never   Vaping Use   • Vaping Use: Never used   Substance and Sexual Activity   • Alcohol use: Not Currently   • Drug use: Never   • Sexual activity: Defer       Family History   Problem Relation Age of Onset   • Heart disease Mother    • No Known Problems Father    • Heart disease Sister    • Heart disease Brother    • No Known Problems Maternal Grandmother    • No Known Problems Maternal Grandfather    • No Known Problems Paternal Grandmother    • No Known Problems Paternal Grandfather    • Malig Hyperthermia Neg Hx        The following portions of the patient's history were reviewed and updated as appropriate: allergies, current medications, past family history, past medical history, past social history, past surgical history and problem list.       Objective:       Vitals:    23 1502   BP: 110/90   BP Location: Left arm   Patient Position: Sitting   Cuff Size: Adult   Pulse: 95   Weight: 75.3 kg (166 lb)   Height: 165.1 cm (65\")         Physical Exam:  Constitutional: Well appearing, well developed, no acute distress   HENT: Oropharynx clear and membrane moist  Eyes: Normal conjunctiva, no sclera icterus  Neck: Supple, no carotid bruit bilaterally  Cardiovascular: Regular rate and rhythm, No Murmur, No bilateral lower extremity edema  Pulmonary: Normal respiratory effort, normal lung sounds, no wheezing  Neurological: Alert and orient x 3  Skin: Warm, dry, no ecchymosis, no rash  Psych: Appropriate mood and affect. Normal judgment and insight         Lab Results   Component Value Date    "  11/04/2022     11/03/2022    K 4.7 11/04/2022    K 5.0 11/03/2022     11/04/2022     11/03/2022    CO2 24.4 11/04/2022    CO2 22.8 11/03/2022    BUN 23 11/04/2022    BUN 20 11/03/2022    CREATININE 0.97 11/04/2022    CREATININE 0.88 11/03/2022    EGFRIFNONA 53 (L) 12/22/2021    EGFRIFNONA 66 10/06/2021    GLUCOSE 95 11/04/2022    GLUCOSE 196 (H) 11/03/2022    CALCIUM 8.6 11/04/2022    CALCIUM 8.7 11/03/2022    ALBUMIN 3.9 04/13/2022    ALBUMIN 3.90 04/06/2022    BILITOT 0.7 04/13/2022    BILITOT 1.0 04/06/2022    AST 47 (H) 04/13/2022    AST 33 (H) 04/06/2022    ALT 25 04/13/2022    ALT 32 04/06/2022     Lab Results   Component Value Date    WBC 6.29 02/22/2023    WBC 7.24 11/04/2022    HGB 13.2 02/22/2023    HGB 10.7 (L) 11/04/2022    HCT 41.4 02/22/2023    HCT 32.7 (L) 11/04/2022    MCV 84.7 02/22/2023    MCV 89.1 11/04/2022     02/22/2023     11/04/2022     Lab Results   Component Value Date    CHOL 129 03/25/2022    CHOL 105 07/20/2021    TRIG 161 (H) 03/25/2022    TRIG 134 07/20/2021    HDL 31 (L) 03/25/2022    HDL 31 (L) 07/20/2021    LDL 70 03/25/2022    LDL 50 07/20/2021     Lab Results   Component Value Date    PROBNP 1,994.0 (H) 04/14/2022    PROBNP 1,093.0 03/25/2022    BNP 1,070.0 (H) 06/28/2019     Lab Results   Component Value Date    CKTOTAL 69 12/14/2015    TROPONINI 0.029 06/29/2019    TROPONINT 0.013 07/08/2021     Lab Results   Component Value Date    TSH 4.740 (H) 04/05/2022    TSH 2.440 07/20/2021           ECG 12 Lead    Date/Time: 2/22/2023 3:40 PM  Performed by: Jeanette Fields APRN  Authorized by: Jeanette Fields APRN   Comparison: compared with previous ECG from 11/28/2022  Similar to previous ECG               Assessment:          Diagnosis Plan   1. Paroxysmal atrial fibrillation (HCC)  Comprehensive Metabolic Panel    CBC (No Diff)    TSH Rfx On Abnormal To Free T4    Magnesium    Cardioversion External in Cardiology Department    ECG 12  Lead      2. Coronary artery disease involving native coronary artery of native heart without angina pectoris        3. Ischemic cardiomyopathy        4. Essential hypertension               Plan:       Paroxysmal atrial fibrillation  · Reports increased fatigue, dyspnea and is noted to have return to atrial fibrillation  · Recommend cardioversion.  We will start oral Amio 200 mg twice daily prior to procedure and plan on cardioversion Monday  · Patient will call us if she is unable to get amiodarone in timely manner  · Continue anticoagulation with Eliquis  · Check labs today    CAD  · Status post CABG  · Continue beta-blocker, statin    Essential hypertension  · BP control    Patient is seen today after returning to atrial fibrillation.  She is symptomatic complaining of dyspnea and fatigue.  Start oral amiodarone 200 mg twice daily with plan for cardioversion on Monday.  Check labs today.  Patient has been appropriately anticoagulated long-term on Eliquis.  We will plan to have her follow-up 1 week following cardioversion.      Orders Placed This Encounter   Procedures   • Comprehensive Metabolic Panel     Standing Status:   Future     Number of Occurrences:   1     Standing Expiration Date:   2/22/2024     Order Specific Question:   Release to patient     Answer:   Routine Release   • CBC (No Diff)     Standing Status:   Future     Number of Occurrences:   1     Standing Expiration Date:   2/22/2024     Order Specific Question:   Release to patient     Answer:   Routine Release   • TSH Rfx On Abnormal To Free T4     Standing Status:   Future     Number of Occurrences:   1     Standing Expiration Date:   2/22/2024     Order Specific Question:   Release to patient     Answer:   Routine Release   • Magnesium     Standing Status:   Future     Number of Occurrences:   1     Standing Expiration Date:   2/22/2024     Order Specific Question:   Release to patient     Answer:   Routine Release   • Cardioversion External  in Cardiology Department     Standing Status:   Future     Standing Expiration Date:   2/22/2024     Order Specific Question:   What type of sedation does the patient require?     Answer:   Monitored Anesthesia Care     Order Specific Question:   At which hospital location will this be performed?     Answer:   [facility]     Order Specific Question:   Reason for Exam:     Answer:   atrial fibrillation     Order Specific Question:   Release to patient     Answer:   Routine Release   • ECG 12 Lead     This order was created via procedure documentation     Order Specific Question:   Release to patient     Answer:   Routine Release            JOHNNIE Coyle  [facility]  02/22/23  16:29 EST

## 2023-02-23 ENCOUNTER — TELEPHONE (OUTPATIENT)
Dept: CARDIOLOGY | Facility: CLINIC | Age: 83
End: 2023-02-23
Payer: MEDICARE

## 2023-02-23 NOTE — TELEPHONE ENCOUNTER
----- Message from JOHNNIE Hooker sent at 2/23/2023  8:37 AM EST -----  Please let the patient know that her labs look stable. Her kidney function and electrolytes are acceptable. She has stable blood counts and is not anemic. Her thyroid function is normal.

## 2023-02-23 NOTE — PROGRESS NOTES
Please let the patient know that her labs look stable. Her kidney function and electrolytes are acceptable. She has stable blood counts and is not anemic. Her thyroid function is normal.

## 2023-02-23 NOTE — TELEPHONE ENCOUNTER
Called patient with results. No questions at this time.    Carlie Johnson RN  [facility]

## 2023-02-27 ENCOUNTER — ANESTHESIA (OUTPATIENT)
Dept: POSTOP/PACU | Facility: HOSPITAL | Age: 83
End: 2023-02-27
Payer: MEDICARE

## 2023-02-27 ENCOUNTER — ANESTHESIA EVENT (OUTPATIENT)
Dept: POSTOP/PACU | Facility: HOSPITAL | Age: 83
End: 2023-02-27
Payer: MEDICARE

## 2023-02-27 ENCOUNTER — HOSPITAL ENCOUNTER (OUTPATIENT)
Dept: POSTOP/PACU | Facility: HOSPITAL | Age: 83
Discharge: HOME | End: 2023-02-27
Payer: MEDICARE

## 2023-02-27 VITALS
SYSTOLIC BLOOD PRESSURE: 101 MMHG | DIASTOLIC BLOOD PRESSURE: 69 MMHG | TEMPERATURE: 98.4 F | RESPIRATION RATE: 16 BRPM | HEART RATE: 53 BPM | OXYGEN SATURATION: 97 %

## 2023-02-27 VITALS — OXYGEN SATURATION: 99 % | HEART RATE: 47 BPM | SYSTOLIC BLOOD PRESSURE: 97 MMHG | DIASTOLIC BLOOD PRESSURE: 54 MMHG

## 2023-02-27 DIAGNOSIS — I48.0 PAROXYSMAL ATRIAL FIBRILLATION: ICD-10-CM

## 2023-02-27 LAB
ANION GAP SERPL CALCULATED.3IONS-SCNC: 10.2 MMOL/L (ref 5–15)
BUN SERPL-MCNC: 27 MG/DL (ref 8–23)
BUN/CREAT SERPL: 27.8 (ref 7–25)
CALCIUM SPEC-SCNC: 9.6 MG/DL (ref 8.6–10.5)
CHLORIDE SERPL-SCNC: 103 MMOL/L (ref 98–107)
CO2 SERPL-SCNC: 27.8 MMOL/L (ref 22–29)
CREAT SERPL-MCNC: 0.97 MG/DL (ref 0.57–1)
EGFRCR SERPLBLD CKD-EPI 2021: 58.1 ML/MIN/1.73
GLUCOSE SERPL-MCNC: 120 MG/DL (ref 65–99)
POTASSIUM SERPL-SCNC: 3.7 MMOL/L (ref 3.5–5.2)
QT INTERVAL: 527 MS
QT INTERVAL: 550 MS
SODIUM SERPL-SCNC: 141 MMOL/L (ref 136–145)

## 2023-02-27 PROCEDURE — 25010000002 PROPOFOL 10 MG/ML EMULSION: Performed by: NURSE ANESTHETIST, CERTIFIED REGISTERED

## 2023-02-27 PROCEDURE — 80048 BASIC METABOLIC PNL TOTAL CA: CPT | Performed by: INTERNAL MEDICINE

## 2023-02-27 PROCEDURE — 93010 ELECTROCARDIOGRAM REPORT: CPT | Performed by: INTERNAL MEDICINE

## 2023-02-27 PROCEDURE — 92960 CARDIOVERSION ELECTRIC EXT: CPT

## 2023-02-27 PROCEDURE — 93005 ELECTROCARDIOGRAM TRACING: CPT | Performed by: INTERNAL MEDICINE

## 2023-02-27 PROCEDURE — 92960 CARDIOVERSION ELECTRIC EXT: CPT | Performed by: INTERNAL MEDICINE

## 2023-02-27 RX ORDER — NALOXONE HCL 0.4 MG/ML
0.2 VIAL (ML) INJECTION AS NEEDED
Status: DISCONTINUED | OUTPATIENT
Start: 2023-02-27 | End: 2023-02-28 | Stop reason: HOSPADM

## 2023-02-27 RX ORDER — SODIUM CHLORIDE 0.9 % (FLUSH) 0.9 %
10 SYRINGE (ML) INJECTION AS NEEDED
Status: CANCELLED | OUTPATIENT
Start: 2023-02-27

## 2023-02-27 RX ORDER — PROMETHAZINE HYDROCHLORIDE 25 MG/1
25 TABLET ORAL ONCE AS NEEDED
Status: DISCONTINUED | OUTPATIENT
Start: 2023-02-27 | End: 2023-02-28 | Stop reason: HOSPADM

## 2023-02-27 RX ORDER — FLUMAZENIL 0.1 MG/ML
0.2 INJECTION INTRAVENOUS AS NEEDED
Status: DISCONTINUED | OUTPATIENT
Start: 2023-02-27 | End: 2023-02-28 | Stop reason: HOSPADM

## 2023-02-27 RX ORDER — MIDAZOLAM HYDROCHLORIDE 1 MG/ML
0.5 INJECTION INTRAMUSCULAR; INTRAVENOUS
Status: CANCELLED | OUTPATIENT
Start: 2023-02-27

## 2023-02-27 RX ORDER — FAMOTIDINE 10 MG/ML
20 INJECTION, SOLUTION INTRAVENOUS
Status: CANCELLED | OUTPATIENT
Start: 2023-02-27

## 2023-02-27 RX ORDER — ONDANSETRON 2 MG/ML
4 INJECTION INTRAMUSCULAR; INTRAVENOUS ONCE AS NEEDED
Status: DISCONTINUED | OUTPATIENT
Start: 2023-02-27 | End: 2023-02-28 | Stop reason: HOSPADM

## 2023-02-27 RX ORDER — DIPHENHYDRAMINE HYDROCHLORIDE 50 MG/ML
12.5 INJECTION INTRAMUSCULAR; INTRAVENOUS
Status: DISCONTINUED | OUTPATIENT
Start: 2023-02-27 | End: 2023-02-28 | Stop reason: HOSPADM

## 2023-02-27 RX ORDER — SODIUM CHLORIDE 9 MG/ML
40 INJECTION, SOLUTION INTRAVENOUS AS NEEDED
Status: CANCELLED | OUTPATIENT
Start: 2023-02-27

## 2023-02-27 RX ORDER — SODIUM CHLORIDE 9 MG/ML
INJECTION, SOLUTION INTRAVENOUS CONTINUOUS PRN
Status: DISCONTINUED | OUTPATIENT
Start: 2023-02-27 | End: 2023-02-27 | Stop reason: SURG

## 2023-02-27 RX ORDER — DROPERIDOL 2.5 MG/ML
0.62 INJECTION, SOLUTION INTRAMUSCULAR; INTRAVENOUS
Status: DISCONTINUED | OUTPATIENT
Start: 2023-02-27 | End: 2023-02-28 | Stop reason: HOSPADM

## 2023-02-27 RX ORDER — PROMETHAZINE HYDROCHLORIDE 25 MG/1
25 SUPPOSITORY RECTAL ONCE AS NEEDED
Status: DISCONTINUED | OUTPATIENT
Start: 2023-02-27 | End: 2023-02-28 | Stop reason: HOSPADM

## 2023-02-27 RX ORDER — EPHEDRINE SULFATE 50 MG/ML
INJECTION, SOLUTION INTRAVENOUS AS NEEDED
Status: DISCONTINUED | OUTPATIENT
Start: 2023-02-27 | End: 2023-02-27 | Stop reason: SURG

## 2023-02-27 RX ORDER — VITAMIN E 268 MG
400 CAPSULE ORAL DAILY
COMMUNITY

## 2023-02-27 RX ORDER — SODIUM CHLORIDE 0.9 % (FLUSH) 0.9 %
10 SYRINGE (ML) INJECTION EVERY 12 HOURS SCHEDULED
Status: CANCELLED | OUTPATIENT
Start: 2023-02-27

## 2023-02-27 RX ORDER — PROPOFOL 10 MG/ML
VIAL (ML) INTRAVENOUS AS NEEDED
Status: DISCONTINUED | OUTPATIENT
Start: 2023-02-27 | End: 2023-02-27 | Stop reason: SURG

## 2023-02-27 RX ORDER — SODIUM CHLORIDE, SODIUM LACTATE, POTASSIUM CHLORIDE, CALCIUM CHLORIDE 600; 310; 30; 20 MG/100ML; MG/100ML; MG/100ML; MG/100ML
9 INJECTION, SOLUTION INTRAVENOUS CONTINUOUS PRN
Status: CANCELLED | OUTPATIENT
Start: 2023-02-27

## 2023-02-27 RX ADMIN — SODIUM CHLORIDE: 9 INJECTION, SOLUTION INTRAVENOUS at 06:48

## 2023-02-27 RX ADMIN — EPHEDRINE SULFATE 10 MG: 50 INJECTION INTRAVENOUS at 08:25

## 2023-02-27 RX ADMIN — PROPOFOL 100 MG: 10 INJECTION, EMULSION INTRAVENOUS at 08:19

## 2023-02-27 NOTE — ANESTHESIA PREPROCEDURE EVALUATION
Anesthesia Evaluation     Patient summary reviewed     NPO Liquid Status: > 8 hours           Airway   No difficulty expected  Dental      Pulmonary    (+) shortness of breath,   Cardiovascular     ECG reviewed  Rhythm: regular    (+) hypertension, CAD, dysrhythmias Paroxysmal Atrial Fib,       Neuro/Psych  GI/Hepatic/Renal/Endo    (+)  GERD,  diabetes mellitus,     Musculoskeletal     Abdominal    Substance History      OB/GYN          Other                        Anesthesia Plan    ASA 3     MAC       Anesthetic plan, risks, benefits, and alternatives have been provided, discussed and informed consent has been obtained with: patient.        CODE STATUS:

## 2023-02-27 NOTE — ANESTHESIA POSTPROCEDURE EVALUATION
Patient: Alice Mabry    Procedure Summary     Date: 02/27/23 Room / Location: [facility] PACU    Anesthesia Start: 0817 Anesthesia Stop: 0831    Procedure: CARDIOVERSION EXTERNAL IN CARDIOLOGY DEPARTMENT Diagnosis:       Paroxysmal atrial fibrillation (HCC)      (atrial fibrillation)    Scheduled Providers:  Provider: Julio César Zaragoza MD    Anesthesia Type: MAC ASA Status: 3          Anesthesia Type: MAC    Vitals  Vitals Value Taken Time   BP 99/71 02/27/23 0901   Temp     Pulse 54 02/27/23 0917   Resp 16 02/27/23 0900   SpO2 93 % 02/27/23 0917   Vitals shown include unvalidated device data.        Post Anesthesia Care and Evaluation    Patient location during evaluation: PHASE II  Level of consciousness: awake and alert  Pain management: adequate    Airway patency: patent  Anesthetic complications: No anesthetic complications    Cardiovascular status: acceptable  Respiratory status: acceptable  Hydration status: acceptable    Comments: /69   Pulse 53   Temp 36.9 °C (98.4 °F) (Oral)   Resp 16   SpO2 97%

## 2023-02-28 ENCOUNTER — TELEPHONE (OUTPATIENT)
Dept: CARDIOLOGY | Facility: CLINIC | Age: 83
End: 2023-02-28

## 2023-02-28 NOTE — TELEPHONE ENCOUNTER
Caller: Alice Marby    Relationship: Self    Best call back number: [phone]    What is the best time to reach you: ANY     Who are you requesting to speak with (clinical staff, provider,  specific staff member): ANY     Do you know the name of the person who called:     What was the call regarding: PT WAS TOLD TO SEE DR. BUTLER ONE MONTH AFTER CARDIOVERSION. NO DIRECTION IN NOTE. PLEASE CALL TO SCHEDULE.     Do you require a callback: YES

## 2023-03-08 ENCOUNTER — OFFICE VISIT (OUTPATIENT)
Dept: CARDIOLOGY | Facility: CLINIC | Age: 83
End: 2023-03-08
Payer: MEDICARE

## 2023-03-08 ENCOUNTER — LAB (OUTPATIENT)
Dept: LAB | Facility: HOSPITAL | Age: 83
End: 2023-03-08
Payer: MEDICARE

## 2023-03-08 ENCOUNTER — HOSPITAL ENCOUNTER (OUTPATIENT)
Dept: GENERAL RADIOLOGY | Facility: HOSPITAL | Age: 83
Discharge: HOME | End: 2023-03-08
Payer: MEDICARE

## 2023-03-08 VITALS
HEART RATE: 57 BPM | OXYGEN SATURATION: 97 % | SYSTOLIC BLOOD PRESSURE: 110 MMHG | DIASTOLIC BLOOD PRESSURE: 70 MMHG | WEIGHT: 164.6 LBS | BODY MASS INDEX: 27.42 KG/M2 | HEIGHT: 65 IN

## 2023-03-08 DIAGNOSIS — R06.09 DYSPNEA ON EXERTION: ICD-10-CM

## 2023-03-08 DIAGNOSIS — I48.0 PAROXYSMAL ATRIAL FIBRILLATION: ICD-10-CM

## 2023-03-08 DIAGNOSIS — I48.0 PAROXYSMAL ATRIAL FIBRILLATION: Primary | ICD-10-CM

## 2023-03-08 LAB
ALBUMIN SERPL-MCNC: 4 G/DL (ref 3.5–5.2)
ALBUMIN/GLOB SERPL: 1.3 G/DL
ALP SERPL-CCNC: 65 U/L (ref 39–117)
ALT SERPL W P-5'-P-CCNC: 15 U/L (ref 1–33)
ANION GAP SERPL CALCULATED.3IONS-SCNC: 10.7 MMOL/L (ref 5–15)
AST SERPL-CCNC: 21 U/L (ref 1–32)
BILIRUB SERPL-MCNC: 1.3 MG/DL (ref 0–1.2)
BUN SERPL-MCNC: 17 MG/DL (ref 8–23)
BUN/CREAT SERPL: 15.6 (ref 7–25)
CALCIUM SPEC-SCNC: 9.1 MG/DL (ref 8.6–10.5)
CHLORIDE SERPL-SCNC: 99 MMOL/L (ref 98–107)
CO2 SERPL-SCNC: 29.3 MMOL/L (ref 22–29)
CREAT SERPL-MCNC: 1.09 MG/DL (ref 0.57–1)
EGFRCR SERPLBLD CKD-EPI 2021: 50.5 ML/MIN/1.73
GLOBULIN UR ELPH-MCNC: 3.1 GM/DL
GLUCOSE SERPL-MCNC: 114 MG/DL (ref 65–99)
NT-PROBNP SERPL-MCNC: 3713 PG/ML (ref 0–1800)
POTASSIUM SERPL-SCNC: 3.2 MMOL/L (ref 3.5–5.2)
PROT SERPL-MCNC: 7.1 G/DL (ref 6–8.5)
SODIUM SERPL-SCNC: 139 MMOL/L (ref 136–145)

## 2023-03-08 PROCEDURE — 99214 OFFICE O/P EST MOD 30 MIN: CPT | Performed by: NURSE PRACTITIONER

## 2023-03-08 PROCEDURE — 83880 ASSAY OF NATRIURETIC PEPTIDE: CPT

## 2023-03-08 PROCEDURE — 80053 COMPREHEN METABOLIC PANEL: CPT

## 2023-03-08 PROCEDURE — 71046 X-RAY EXAM CHEST 2 VIEWS: CPT

## 2023-03-08 PROCEDURE — 93000 ELECTROCARDIOGRAM COMPLETE: CPT | Performed by: NURSE PRACTITIONER

## 2023-03-08 PROCEDURE — 36415 COLL VENOUS BLD VENIPUNCTURE: CPT

## 2023-03-08 RX ORDER — AMIODARONE HYDROCHLORIDE 200 MG/1
200 TABLET ORAL DAILY
Qty: 90 TABLET | Refills: 1 | Status: SHIPPED | OUTPATIENT
Start: 2023-03-08

## 2023-03-08 NOTE — PROGRESS NOTES
[facility] Follow Up Office Note     Encounter Date:23  Patient:Alice Mabry  :1940  MRN:[id number]      Chief Complaint:   Chief Complaint   Patient presents with   • Follow-up         History of Presenting Illness:        Alice Mabry is a 82 y.o. female who is here for follow-up.  She is a patient of Dr Horner.    Patient has past medical history significant for coronary artery disease status post CABG, AI and AAS status post AVR, persistent atrial fibrillation on chronic anticoagulation, mixed hyperlipidemia, essential hypertension, ischemic cardiomyopathy with recovered LVEF.    Patient was found to have severe three-vessel coronary artery disease in 2022 and subsequently underwent four-vessel bypass and AVR as well as maze procedure and left atrial appendage ligation.  Patient had post-op atrial fibrillation but by 2022 had converted back to sinus rhythm.    I saw her in the office 23 and patient was back in atrial fibrillation, compaining of fatigue and increased dyspnea on exertion. She was started back on amiodarone and cardioversion was scheduled for the following week. On  patient was successfully cardioverted. She is here today for follow-up.    Unfortunately she is still feeling quite short of breath with minimal activity. She denies chest pain. She is not having swelling in her lower extremities and she sleeps elevated at baseline for comfort. She denies palpitations or noting fast heart rate. She denies bleeding concerns. She is having cataract surgery next week.    Review of Systems:  Review of Systems   Cardiovascular: Positive for dyspnea on exertion. Negative for chest pain, leg swelling, orthopnea and palpitations.   Respiratory: Positive for shortness of breath.        Current Outpatient Medications on File Prior to Visit   Medication Sig Dispense Refill   • acetaminophen (TYLENOL) 500 MG tablet Take 2 tablets by mouth 3 (Three) Times a  Day As Needed. PRN PAIN     • apixaban (Eliquis) 5 MG tablet tablet Take 1 tablet by mouth Every 12 (Twelve) Hours. 1 tablet 0   • atorvastatin (LIPITOR) 80 MG tablet TAKE 1 TABLET DAILY 90 tablet 3   • bumetanide (BUMEX) 2 MG tablet TAKE ONE TABLET BY MOUTH DAILY 90 tablet 2   • dapagliflozin Propanediol (Farxiga) 10 MG tablet Take 10 mg by mouth Every Morning. 90 tablet 3   • FLUoxetine (PROzac) 20 MG capsule Take 1 capsule by mouth Every Morning.     • LORazepam (ATIVAN) 1 MG tablet Take 1 tablet by mouth Every 8 (Eight) Hours As Needed for Anxiety.     • Melatonin 10 MG tablet Take 1 tablet by mouth Every Night.     • metoprolol succinate XL (TOPROL-XL) 50 MG 24 hr tablet Take 1 tablet by mouth Daily. 90 tablet 2   • nitroglycerin (NITROSTAT) 0.4 MG SL tablet Place 1 tablet under the tongue Every 5 (Five) Minutes As Needed for Chest Pain. Take no more than 3 doses in 15 minutes. 100 tablet 3   • omeprazole (priLOSEC) 40 MG capsule Take 1 capsule by mouth Daily.     • spironolactone (ALDACTONE) 25 MG tablet TAKE 1/2 TABLET BY MOUTH DAILY (Patient taking differently: Take 12.5 mg by mouth Daily.) 30 tablet 5   • vitamin C (ASCORBIC ACID) 500 MG tablet Take 1 tablet by mouth Daily. TO HOLD 1 WEEK PRIOR TO OR     • Vitamin D, Ergocalciferol, 2000 units capsule Take 2,000 Units by mouth Daily. TO HOLD 1 WEEK PRIOR TO OR     • vitamin E 400 UNIT capsule Take 1 capsule by mouth Daily.     • [DISCONTINUED] amiodarone (PACERONE) 200 MG tablet Take 1 tablet by mouth 2 (Two) Times a Day. 90 tablet 1     No current facility-administered medications on file prior to visit.       Allergies   Allergen Reactions   • Codeine Other (See Comments)     Chest pain .  Patient states she is allergic to codeine and tylenol when together but not separate.   • Penicillins Hives   • Ambien [Zolpidem Tartrate] Confusion       Past Medical History:   Diagnosis Date   • Acute diastolic (congestive) heart failure (HCC)    • Anxiety    •  Aortic valve stenosis     HX CABG, AVR, MVR 3/2022   • Arthritis    • CAD (coronary artery disease)     stents   • Depression    • Dizziness    • Fatty liver    • GERD (gastroesophageal reflux disease)    • H/O blood clots    • H/O Clostridium difficile infection    • Heart murmur    • History of atrial fibrillation    • History of cervical cancer     HX HYSTERECTOMY   • History of MI (myocardial infarction) 1999   • History of transfusion     no reactions   • [place] (hard of hearing)    • Hyperlipidemia    • Hypertension    • IBS (irritable bowel syndrome)    • Incontinence of urine     wears pads   • Renal mass, right        Past Surgical History:   Procedure Laterality Date   • APPENDECTOMY  1960   • CARDIAC CATHETERIZATION  06/10/2014    Dr. Murphy Horner   • CARDIAC CATHETERIZATION  11/13/2007    Dr. Murphy Horner   • CARDIAC CATHETERIZATION N/A 10/19/2004    Dr. Murphy Horner   • CARDIAC CATHETERIZATION N/A 07/15/2004    Dr. Gregorio Gonzales   • CARDIAC CATHETERIZATION  10/2003    Dr. Murphy Horner   • CARDIAC CATHETERIZATION N/A 03/21/2022    Procedure: Coronary angiography;  Surgeon: Murphy Horner MD;  Location:  [facility];  Service: Cardiology;  Laterality: N/A;   • CARDIAC CATHETERIZATION N/A 03/21/2022    Procedure: Right Heart Cath;  Surgeon: Murphy Horner MD;  Location:  [facility];  Service: Cardiology;  Laterality: N/A;   • CARDIAC CATHETERIZATION N/A 03/21/2022    Procedure: Left Heart Cath;  Surgeon: Murphy Horner MD;  Location:  [facility];  Service: Cardiology;  Laterality: N/A;   • CARDIAC CATHETERIZATION N/A 03/21/2022    Procedure: Left ventriculography;  Surgeon: Murphy Horner MD;  Location:  [facility];  Service: Cardiology;  Laterality: N/A;   • CHOLECYSTECTOMY  1998   • COLONOSCOPY N/A 07/2001    negative   • COLONOSCOPY N/A 02/28/2012    negative   • CORONARY ANGIOPLASTY WITH STENT PLACEMENT N/A 07/15/2004     Dr. Murphy Horner   • CORONARY ANGIOPLASTY WITH STENT PLACEMENT  2002    to RCA   • CORONARY ARTERY BYPASS GRAFT WITH AORTIC AND MITRAL VALVE REPAIR/REPLACEMENT N/A 2022    Procedure: DALTON STERNOTOMY CORONARY ARTERY BYPASS GRAFTING TIMES 4 USING LEFT INTERNAL MAMMARY ARTERY AND LEFT GREATER SAPHENOUS VEIN GRAFT PER ENDOSCOPIC VEIN HARVESTING, RIGHT CORONARY ENDARTERECTOMY, AORTIC VALVE REPLACEMENT, MITRAL VALVE REPAIR, ATRICURE MAZE PROCEDURE, CLOSURE OF LEFT ATRIAL APPENDAGE AND PRP;  Surgeon: Jr Isaias Lynn MD;  Location: [facility];  Service: Cardiothoracic;   • CORONARY ARTERY BYPASS GRAFT WITH AORTIC AND MITRAL VALVE REPAIR/REPLACEMENT  2022    Procedure: ;  Surgeon: Jr Isaias Lynn MD;  Location: [facility];  Service: Cardiothoracic;;   • HYSTERECTOMY  1974   • LUMBAR DISCECTOMY     • NEPHRECTOMY PARTIAL Right 11/3/2022    Procedure: RIGHT ROBOTIC PARTIAL NEPHRECTOMY;  Surgeon: Davy Silva Jr., MD;  Location: [facility];  Service: Robotics - DaVinci;  Laterality: Right;   • UPPER GASTROINTESTINAL ENDOSCOPY N/A 2015    Mild Schatzki ring, hiatus hernia, non-bleeding erosive gastropathy, erythematous mucosa in the antrum, normal duodenum-Dr. Alex Gill       Social History     Socioeconomic History   • Marital status:    Tobacco Use   • Smoking status: Former     Packs/day: 1.00     Years: 20.00     Pack years: 20.00     Types: Cigarettes     Quit date:      Years since quittin.1   • Smokeless tobacco: Never   Vaping Use   • Vaping Use: Never used   Substance and Sexual Activity   • Alcohol use: Not Currently   • Drug use: Never   • Sexual activity: Defer       Family History   Problem Relation Age of Onset   • Heart disease Mother    • No Known Problems Father    • Heart disease Sister    • Heart disease Brother    • No Known Problems Maternal Grandmother    • No Known Problems Maternal Grandfather    • No Known Problems Paternal  "Grandmother    • No Known Problems Paternal Grandfather    • Malig Hyperthermia Neg Hx        The following portions of the patient's history were reviewed and updated as appropriate: allergies, current medications, past family history, past medical history, past social history, past surgical history and problem list.       Objective:       Vitals:    03/08/23 1153   BP: 110/70   BP Location: Left arm   Patient Position: Sitting   Cuff Size: Adult   Pulse: 57   SpO2: 97%   Weight: 74.7 kg (164 lb 9.6 oz)   Height: 165.1 cm (65\")         Physical Exam:  Constitutional: Well appearing, well developed, no acute distress   HENT: Oropharynx clear and membrane moist  Eyes: Normal conjunctiva, no sclera icterus  Neck: Supple, no carotid bruit bilaterally  Cardiovascular: Regular rate and rhythm, No Murmur, No bilateral lower extremity edema  Pulmonary: Normal respiratory effort, normal lung sounds, no wheezing  Neurological: Alert and orient x 3  Skin: Warm, dry, no ecchymosis, no rash  Psych: Appropriate mood and affect. Normal judgment and insight         Lab Results   Component Value Date     02/27/2023     02/22/2023    K 3.7 02/27/2023    K 3.9 02/22/2023     02/27/2023     02/22/2023    CO2 27.8 02/27/2023    CO2 23.9 02/22/2023    BUN 27 (H) 02/27/2023    BUN 25 (H) 02/22/2023    CREATININE 0.97 02/27/2023    CREATININE 1.07 (H) 02/22/2023    EGFRIFNONA 53 (L) 12/22/2021    EGFRIFNONA 66 10/06/2021    GLUCOSE 120 (H) 02/27/2023    GLUCOSE 108 (H) 02/22/2023    CALCIUM 9.6 02/27/2023    CALCIUM 9.4 02/22/2023    ALBUMIN 4.5 02/22/2023    ALBUMIN 3.9 04/13/2022    BILITOT 0.4 02/22/2023    BILITOT 0.7 04/13/2022    AST 33 (H) 02/22/2023    AST 47 (H) 04/13/2022    ALT 28 02/22/2023    ALT 25 04/13/2022     Lab Results   Component Value Date    WBC 6.29 02/22/2023    WBC 7.24 11/04/2022    HGB 13.2 02/22/2023    HGB 10.7 (L) 11/04/2022    HCT 41.4 02/22/2023    HCT 32.7 (L) 11/04/2022    MCV " 84.7 02/22/2023    MCV 89.1 11/04/2022     02/22/2023     11/04/2022     Lab Results   Component Value Date    CHOL 129 03/25/2022    CHOL 105 07/20/2021    TRIG 161 (H) 03/25/2022    TRIG 134 07/20/2021    HDL 31 (L) 03/25/2022    HDL 31 (L) 07/20/2021    LDL 70 03/25/2022    LDL 50 07/20/2021     Lab Results   Component Value Date    PROBNP 1,994.0 (H) 04/14/2022    PROBNP 1,093.0 03/25/2022    BNP 1,070.0 (H) 06/28/2019     Lab Results   Component Value Date    CKTOTAL 69 12/14/2015    TROPONINI 0.029 06/29/2019    TROPONINT 0.013 07/08/2021     Lab Results   Component Value Date    TSH 2.720 02/22/2023    TSH 4.740 (H) 04/05/2022           ECG 12 Lead    Date/Time: 3/8/2023 12:07 PM  Performed by: Jeanette Fields APRN  Authorized by: Jeanette Fields APRN   Comparison: compared with previous ECG from 2/27/2023  Similar to previous ECG  Rhythm: sinus rhythm  Rate: normal  Conduction: conduction normal  ST Segments: ST segments normal  QRS axis: normal                 Assessment:          Diagnosis Plan   1. Paroxysmal atrial fibrillation (HCC)  ECG 12 Lead    Adult Transthoracic Echo Complete w/ Color, Spectral and Contrast if Necessary Per Protocol    Comprehensive Metabolic Panel      2. Dyspnea on exertion  XR chest pa and lateral    proBNP             Plan:       Dyspnea with exertion  · She has not noted improvement with cardioversion  · Check labs and CXR. Repeat echocardiogram ordered  · Pending results of above may need to consider repeat ischemic evaluation    Paroxysmal atrial fibrillation  · Recent cardioversion and remains sinus, HR 58  · I am reducing amiodarone to 200mg daily  · Continue metoprolol and apixaban    CAD  · Status post CABG  · Continue beta-blocker, statin    Essential hypertension  · BP control    Patient is seen today after cardioversion. She remains in sinus. Decrease amiodarone to 200mg daily. She is still quite dyspneic. Check CXR, labs and repeat  echocardiogram. Follow-up with Dr Horner as scheduled.      Orders Placed This Encounter   Procedures   • XR chest pa and lateral     Standing Status:   Future     Standing Expiration Date:   3/8/2024     Order Specific Question:   Reason for Exam:     Answer:   shortness of breath     Order Specific Question:   Release to patient     Answer:   Routine Release   • Comprehensive Metabolic Panel     Standing Status:   Future     Standing Expiration Date:   3/8/2024     Order Specific Question:   Release to patient     Answer:   Routine Release   • proBNP     Standing Status:   Future     Standing Expiration Date:   3/8/2024     Order Specific Question:   Release to patient     Answer:   Routine Release   • ECG 12 Lead     This order was created via procedure documentation     Order Specific Question:   Release to patient     Answer:   Routine Release   • Adult Transthoracic Echo Complete w/ Color, Spectral and Contrast if Necessary Per Protocol     Standing Status:   Future     Standing Expiration Date:   3/8/2024     Order Specific Question:   Reason for exam?     Answer:   Dyspnea     Order Specific Question:   Release to patient     Answer:   Routine Release            JOHNNIE Coyle  [facility]  03/08/23  12:30 EST

## 2023-03-09 DIAGNOSIS — R06.09 DYSPNEA ON EXERTION: Primary | ICD-10-CM

## 2023-03-09 RX ORDER — POTASSIUM CHLORIDE 750 MG/1
10 TABLET, FILM COATED, EXTENDED RELEASE ORAL 2 TIMES DAILY
Qty: 60 TABLET | Refills: 11 | Status: SHIPPED | OUTPATIENT
Start: 2023-03-09

## 2023-03-09 NOTE — PROGRESS NOTES
I discussed lab results with patient.  I think she needs to double her Bumex.  I also started her on a potassium pill.  Repeat labs in 1 week.  She verbalized understanding.

## 2023-03-14 ENCOUNTER — TELEPHONE (OUTPATIENT)
Dept: CARDIOLOGY | Facility: CLINIC | Age: 83
End: 2023-03-14
Payer: MEDICARE

## 2023-03-14 ENCOUNTER — LAB (OUTPATIENT)
Dept: LAB | Facility: HOSPITAL | Age: 83
End: 2023-03-14
Payer: MEDICARE

## 2023-03-14 DIAGNOSIS — R06.09 DYSPNEA ON EXERTION: ICD-10-CM

## 2023-03-14 LAB
ALBUMIN SERPL-MCNC: 4.3 G/DL (ref 3.5–5.2)
ALBUMIN/GLOB SERPL: 1.1 G/DL
ALP SERPL-CCNC: 71 U/L (ref 39–117)
ALT SERPL W P-5'-P-CCNC: 23 U/L (ref 1–33)
ANION GAP SERPL CALCULATED.3IONS-SCNC: 13 MMOL/L (ref 5–15)
AST SERPL-CCNC: 26 U/L (ref 1–32)
BILIRUB SERPL-MCNC: 0.9 MG/DL (ref 0–1.2)
BUN SERPL-MCNC: 20 MG/DL (ref 8–23)
BUN/CREAT SERPL: 17.4 (ref 7–25)
CALCIUM SPEC-SCNC: 9.6 MG/DL (ref 8.6–10.5)
CHLORIDE SERPL-SCNC: 97 MMOL/L (ref 98–107)
CO2 SERPL-SCNC: 31 MMOL/L (ref 22–29)
CREAT SERPL-MCNC: 1.15 MG/DL (ref 0.57–1)
EGFRCR SERPLBLD CKD-EPI 2021: 47.4 ML/MIN/1.73
GLOBULIN UR ELPH-MCNC: 4 GM/DL
GLUCOSE SERPL-MCNC: 108 MG/DL (ref 65–99)
NT-PROBNP SERPL-MCNC: 1831 PG/ML (ref 0–1800)
POTASSIUM SERPL-SCNC: 3.7 MMOL/L (ref 3.5–5.2)
PROT SERPL-MCNC: 8.3 G/DL (ref 6–8.5)
SODIUM SERPL-SCNC: 141 MMOL/L (ref 136–145)

## 2023-03-14 PROCEDURE — 80053 COMPREHEN METABOLIC PANEL: CPT

## 2023-03-14 PROCEDURE — 36415 COLL VENOUS BLD VENIPUNCTURE: CPT

## 2023-03-14 PROCEDURE — 83880 ASSAY OF NATRIURETIC PEPTIDE: CPT

## 2023-03-14 RX ORDER — BUMETANIDE 2 MG/1
2 TABLET ORAL 2 TIMES DAILY
Qty: 90 TABLET | Refills: 2 | Status: SHIPPED | OUTPATIENT
Start: 2023-03-14

## 2023-03-14 RX ORDER — RANOLAZINE 500 MG/1
TABLET, EXTENDED RELEASE ORAL
Qty: 180 TABLET | Refills: 2 | OUTPATIENT
Start: 2023-03-14

## 2023-03-14 NOTE — TELEPHONE ENCOUNTER
I tried to call patient and was unable to reach her.    Can you try to see how her shortness of breath is doing after we increase the Bumex?    Thank you

## 2023-03-14 NOTE — TELEPHONE ENCOUNTER
----- Message from JOHNNIE Hooker sent at 3/14/2023  3:29 PM EDT -----  I tried to call this patient to let her know her lab results look stable on increased Bumex and added potassium supplement.  I left her voicemail to call the office back to touch base regarding how she is feeling.

## 2023-03-14 NOTE — PROGRESS NOTES
I tried to call this patient to let her know her lab results look stable on increased Bumex and added potassium supplement.  I left her voicemail to call the office back to touch base regarding how she is feeling.

## 2023-03-15 ENCOUNTER — TELEPHONE (OUTPATIENT)
Dept: CARDIOLOGY | Facility: CLINIC | Age: 83
End: 2023-03-15

## 2023-03-15 NOTE — TELEPHONE ENCOUNTER
Caller: Alice Mabry    Relationship: Self    Best call back number: [phone]    What is the best time to reach you: ANY      What was the call regarding:PATIENT IS FEELING BETTER. SHE IS REALLY WEAK, TIRED, AND COUGHING. YET SHORTNESS OF BREATH IS NOT MUCH TROUBLE. PLEASE LEAVE MESSAGE IF YOU ARE NOT ABLE TO REACH PATIENT.      Do you require a callback: YES

## 2023-03-15 NOTE — TELEPHONE ENCOUNTER
Called and left VM. Will continue to try to reach patient.     Radha Cisse RN  Triage [facility]

## 2023-03-16 NOTE — TELEPHONE ENCOUNTER
I called and left voicemail to continue Bumex at current dose.  Continue to monitor symptoms and call with any worsening. She has scheduled follow-up in 4 weeks.

## 2023-03-29 ENCOUNTER — HOSPITAL ENCOUNTER (OUTPATIENT)
Dept: CARDIOLOGY | Facility: HOSPITAL | Age: 83
Discharge: HOME | End: 2023-03-29
Admitting: NURSE PRACTITIONER
Payer: MEDICARE

## 2023-03-29 VITALS
OXYGEN SATURATION: 98 % | SYSTOLIC BLOOD PRESSURE: 120 MMHG | WEIGHT: 164 LBS | HEIGHT: 65 IN | DIASTOLIC BLOOD PRESSURE: 57 MMHG | HEART RATE: 58 BPM | BODY MASS INDEX: 27.32 KG/M2

## 2023-03-29 DIAGNOSIS — I48.0 PAROXYSMAL ATRIAL FIBRILLATION: ICD-10-CM

## 2023-03-29 LAB
AORTIC ARCH: 2.2 CM
AORTIC DIMENSIONLESS INDEX: 0.4 (DI)
ASCENDING AORTA: 3.2 CM
BH CV ECHO MEAS - ACS: 1.26 CM
BH CV ECHO MEAS - AO MAX PG: 23.3 MMHG
BH CV ECHO MEAS - AO MEAN PG: 10.5 MMHG
BH CV ECHO MEAS - AO ROOT DIAM: 2.07 CM
BH CV ECHO MEAS - AO V2 MAX: 241.1 CM/SEC
BH CV ECHO MEAS - AO V2 VTI: 60.7 CM
BH CV ECHO MEAS - AVA(I,D): 0.83 CM2
BH CV ECHO MEAS - EDV(CUBED): 56.4 ML
BH CV ECHO MEAS - EDV(MOD-SP2): 105 ML
BH CV ECHO MEAS - EDV(MOD-SP4): 111 ML
BH CV ECHO MEAS - EF(MOD-BP): 54.2 %
BH CV ECHO MEAS - EF(MOD-SP2): 51.4 %
BH CV ECHO MEAS - EF(MOD-SP4): 56.8 %
BH CV ECHO MEAS - ESV(CUBED): 18.8 ML
BH CV ECHO MEAS - ESV(MOD-SP2): 51 ML
BH CV ECHO MEAS - ESV(MOD-SP4): 48 ML
BH CV ECHO MEAS - FS: 30.7 %
BH CV ECHO MEAS - IVS/LVPW: 1.06 CM
BH CV ECHO MEAS - IVSD: 1.42 CM
BH CV ECHO MEAS - LAT PEAK E' VEL: 10.4 CM/SEC
BH CV ECHO MEAS - LV DIASTOLIC VOL/BSA (35-75): 61.1 CM2
BH CV ECHO MEAS - LV MASS(C)D: 190.9 GRAMS
BH CV ECHO MEAS - LV MAX PG: 3.4 MMHG
BH CV ECHO MEAS - LV MEAN PG: 1.73 MMHG
BH CV ECHO MEAS - LV SYSTOLIC VOL/BSA (12-30): 26.4 CM2
BH CV ECHO MEAS - LV V1 MAX: 92.2 CM/SEC
BH CV ECHO MEAS - LV V1 VTI: 19.7 CM
BH CV ECHO MEAS - LVIDD: 3.8 CM
BH CV ECHO MEAS - LVIDS: 2.7 CM
BH CV ECHO MEAS - LVOT AREA: 2.5 CM2
BH CV ECHO MEAS - LVOT DIAM: 1.8 CM
BH CV ECHO MEAS - LVPWD: 1.33 CM
BH CV ECHO MEAS - MED PEAK E' VEL: 4.9 CM/SEC
BH CV ECHO MEAS - MR MAX PG: 66 MMHG
BH CV ECHO MEAS - MR MAX VEL: 406.1 CM/SEC
BH CV ECHO MEAS - MV DEC SLOPE: 701.5 CM/SEC2
BH CV ECHO MEAS - MV DEC TIME: 185 MSEC
BH CV ECHO MEAS - MV E MAX VEL: 153 CM/SEC
BH CV ECHO MEAS - MV MAX PG: 12 MMHG
BH CV ECHO MEAS - MV MEAN PG: 2.39 MMHG
BH CV ECHO MEAS - MV P1/2T: 71.8 MSEC
BH CV ECHO MEAS - MV V2 VTI: 36.2 CM
BH CV ECHO MEAS - MVA(P1/2T): 3.1 CM2
BH CV ECHO MEAS - MVA(VTI): 1.39 CM2
BH CV ECHO MEAS - PA ACC TIME: 0.08 SEC
BH CV ECHO MEAS - PA PR(ACCEL): 44.5 MMHG
BH CV ECHO MEAS - PA V2 MAX: 86.9 CM/SEC
BH CV ECHO MEAS - PULM DIAS VEL: 107.2 CM/SEC
BH CV ECHO MEAS - PULM S/D: 0.61
BH CV ECHO MEAS - PULM SYS VEL: 65 CM/SEC
BH CV ECHO MEAS - QP/QS: 0.87
BH CV ECHO MEAS - RAP SYSTOLE: 8 MMHG
BH CV ECHO MEAS - RV MAX PG: 0.99 MMHG
BH CV ECHO MEAS - RV V1 MAX: 49.7 CM/SEC
BH CV ECHO MEAS - RV V1 VTI: 10.4 CM
BH CV ECHO MEAS - RVOT DIAM: 2.32 CM
BH CV ECHO MEAS - RVSP: 62 MMHG
BH CV ECHO MEAS - SI(MOD-SP2): 29.7 ML/M2
BH CV ECHO MEAS - SI(MOD-SP4): 34.7 ML/M2
BH CV ECHO MEAS - SUP REN AO DIAM: 2 CM
BH CV ECHO MEAS - SV(LVOT): 50.2 ML
BH CV ECHO MEAS - SV(MOD-SP2): 54 ML
BH CV ECHO MEAS - SV(MOD-SP4): 63 ML
BH CV ECHO MEAS - SV(RVOT): 43.7 ML
BH CV ECHO MEAS - TAPSE (>1.6): 1.34 CM
BH CV ECHO MEAS - TR MAX PG: 53.7 MMHG
BH CV ECHO MEAS - TR MAX VEL: 366.5 CM/SEC
BH CV ECHO MEASUREMENTS AVERAGE E/E' RATIO: 20
BH CV XLRA - RV BASE: 6.2 CM
BH CV XLRA - RV LENGTH: 7.7 CM
BH CV XLRA - RV MID: 4.8 CM
BH CV XLRA - TDI S': 9.2 CM/SEC
LEFT ATRIUM VOLUME INDEX: 43 ML/M2
MAXIMAL PREDICTED HEART RATE: 137 BPM
SINUS: 2.24 CM
STJ: 2.24 CM
STRESS TARGET HR: 116 BPM

## 2023-03-29 PROCEDURE — 93306 TTE W/DOPPLER COMPLETE: CPT | Performed by: INTERNAL MEDICINE

## 2023-03-29 PROCEDURE — 93306 TTE W/DOPPLER COMPLETE: CPT

## 2023-03-30 NOTE — PROGRESS NOTES
Called patient and left her voicemail with results of normal heart squeezing function with stable aortic and mitral valves, increased leaking and pressure on the right side of the heart for which the increased diuretics are appropriate.

## 2023-04-04 ENCOUNTER — TELEPHONE (OUTPATIENT)
Dept: CARDIOLOGY | Facility: CLINIC | Age: 83
End: 2023-04-04
Payer: MEDICARE

## 2023-04-04 NOTE — TELEPHONE ENCOUNTER
Caller: Alice Mabry A    Relationship to patient: Self    Best call back number: [phone]    Patient is needing: PT NEEDS TO DISCUSS ECHO WITH SERG FORBES OR HER NURSE. TRIED TO RETURN A CALL TODAY BUT HASNT RECEIVED A RESPONSE.

## 2023-04-05 DIAGNOSIS — I25.5 ISCHEMIC CARDIOMYOPATHY: Primary | ICD-10-CM

## 2023-04-05 NOTE — TELEPHONE ENCOUNTER
I spoke to patient and discussed echocardiogram this morning. Discussed recommendation for right heart catheterization and she is agreeable. This has been ordered.

## 2023-04-10 ENCOUNTER — TELEPHONE (OUTPATIENT)
Dept: CARDIOLOGY | Facility: CLINIC | Age: 83
End: 2023-04-10

## 2023-04-10 RX ORDER — BUMETANIDE 2 MG/1
2 TABLET ORAL 2 TIMES DAILY
Qty: 180 TABLET | Refills: 2 | Status: ON HOLD | OUTPATIENT
Start: 2023-04-10 | End: 2023-04-13 | Stop reason: SDUPTHER

## 2023-04-10 NOTE — TELEPHONE ENCOUNTER
Caller: Alice Mabry    Relationship: Self    Best call back number: [phone]    What is the best time to reach you: ANYTIME        What was the call regarding: PATIENT HAS NOT RECEIVED CALL FOR SCHEDULING HEART CATH. PLEASE REACH OUT TO PATIENT FOR FURTHER ASSISTANCE.    Do you require a callback: YES

## 2023-04-10 NOTE — TELEPHONE ENCOUNTER
Caller: JEMAL GOODMAN     Relationship: SELF    Best call back number: [phone]    Requested Prescriptions:   Requested Prescriptions     Pending Prescriptions Disp Refills   • bumetanide (BUMEX) 2 MG tablet 90 tablet 2     Sig: Take 1 tablet by mouth 2 (Two) Times a Day.        Pharmacy where request should be sent: [facility] [id number] - [address] - [phone]  - [phone] FX     Last office visit with prescribing clinician: 5/6/2022   Last telemedicine visit with prescribing clinician: 4/25/2023   Next office visit with prescribing clinician: 4/25/2023         Does the patient have less than a 3 day supply:  [x] Yes  [] No    Would you like a call back once the refill request has been completed: [x] Yes [] No    If the office needs to give you a call back, can they leave a voicemail: [x] Yes [] No    Hermes Spears Rep   04/10/23 09:53 EDT

## 2023-04-11 ENCOUNTER — TRANSCRIBE ORDERS (OUTPATIENT)
Dept: CARDIOLOGY | Facility: CLINIC | Age: 83
End: 2023-04-11

## 2023-04-11 DIAGNOSIS — Z13.6 SCREENING FOR ISCHEMIC HEART DISEASE: ICD-10-CM

## 2023-04-11 DIAGNOSIS — Z01.810 PRE-OPERATIVE CARDIOVASCULAR EXAMINATION: Primary | ICD-10-CM

## 2023-04-12 ENCOUNTER — LAB (OUTPATIENT)
Dept: LAB | Facility: HOSPITAL | Age: 83
End: 2023-04-12
Payer: MEDICARE

## 2023-04-12 ENCOUNTER — TELEPHONE (OUTPATIENT)
Dept: CARDIOLOGY | Facility: CLINIC | Age: 83
End: 2023-04-12
Payer: MEDICARE

## 2023-04-12 DIAGNOSIS — Z01.810 PRE-OPERATIVE CARDIOVASCULAR EXAMINATION: ICD-10-CM

## 2023-04-12 DIAGNOSIS — Z13.6 SCREENING FOR ISCHEMIC HEART DISEASE: ICD-10-CM

## 2023-04-12 LAB
ANION GAP SERPL CALCULATED.3IONS-SCNC: 12 MMOL/L (ref 5–15)
BASOPHILS # BLD AUTO: 0.02 10*3/MM3 (ref 0–0.2)
BASOPHILS NFR BLD AUTO: 0.3 % (ref 0–1.5)
BUN SERPL-MCNC: 20 MG/DL (ref 8–23)
BUN/CREAT SERPL: 18 (ref 7–25)
CALCIUM SPEC-SCNC: 9.2 MG/DL (ref 8.6–10.5)
CHLORIDE SERPL-SCNC: 100 MMOL/L (ref 98–107)
CO2 SERPL-SCNC: 29 MMOL/L (ref 22–29)
CREAT SERPL-MCNC: 1.11 MG/DL (ref 0.57–1)
DEPRECATED RDW RBC AUTO: 50.8 FL (ref 37–54)
EGFRCR SERPLBLD CKD-EPI 2021: 49.4 ML/MIN/1.73
EOSINOPHIL # BLD AUTO: 0.1 10*3/MM3 (ref 0–0.4)
EOSINOPHIL NFR BLD AUTO: 1.4 % (ref 0.3–6.2)
ERYTHROCYTE [DISTWIDTH] IN BLOOD BY AUTOMATED COUNT: 16.1 % (ref 12.3–15.4)
GLUCOSE SERPL-MCNC: 111 MG/DL (ref 65–99)
HCT VFR BLD AUTO: 41.5 % (ref 34–46.6)
HGB BLD-MCNC: 12.8 G/DL (ref 12–15.9)
IMM GRANULOCYTES # BLD AUTO: 0.02 10*3/MM3 (ref 0–0.05)
IMM GRANULOCYTES NFR BLD AUTO: 0.3 % (ref 0–0.5)
LYMPHOCYTES # BLD AUTO: 2.06 10*3/MM3 (ref 0.7–3.1)
LYMPHOCYTES NFR BLD AUTO: 29.9 % (ref 19.6–45.3)
MCH RBC QN AUTO: 26.7 PG (ref 26.6–33)
MCHC RBC AUTO-ENTMCNC: 30.8 G/DL (ref 31.5–35.7)
MCV RBC AUTO: 86.6 FL (ref 79–97)
MONOCYTES # BLD AUTO: 0.94 10*3/MM3 (ref 0.1–0.9)
MONOCYTES NFR BLD AUTO: 13.6 % (ref 5–12)
NEUTROPHILS NFR BLD AUTO: 3.76 10*3/MM3 (ref 1.7–7)
NEUTROPHILS NFR BLD AUTO: 54.5 % (ref 42.7–76)
NRBC BLD AUTO-RTO: 0 /100 WBC (ref 0–0.2)
PLATELET # BLD AUTO: 169 10*3/MM3 (ref 140–450)
PMV BLD AUTO: 9.9 FL (ref 6–12)
POTASSIUM SERPL-SCNC: 4 MMOL/L (ref 3.5–5.2)
RBC # BLD AUTO: 4.79 10*6/MM3 (ref 3.77–5.28)
SODIUM SERPL-SCNC: 141 MMOL/L (ref 136–145)
WBC NRBC COR # BLD: 6.9 10*3/MM3 (ref 3.4–10.8)

## 2023-04-12 PROCEDURE — 85025 COMPLETE CBC W/AUTO DIFF WBC: CPT

## 2023-04-12 PROCEDURE — 36415 COLL VENOUS BLD VENIPUNCTURE: CPT

## 2023-04-12 PROCEDURE — 80048 BASIC METABOLIC PNL TOTAL CA: CPT

## 2023-04-12 NOTE — TELEPHONE ENCOUNTER
Called left message for patient labs look good per WD          Murphy Horner MD Bishop, Rita, MA  Let her know the labs look good    Associated Results     Contains abnormal data Basic Metabolic Panel  Order: [id number]   Status: Final result      Visible to patient: Yes (not seen)      Dx: Pre-operative cardiovascular examinat...     Specimen Information: Blood    1 Result Note

## 2023-04-13 ENCOUNTER — HOSPITAL ENCOUNTER (OUTPATIENT)
Facility: HOSPITAL | Age: 83
Setting detail: HOSPITAL OUTPATIENT SURGERY
Discharge: HOME | End: 2023-04-13
Attending: INTERNAL MEDICINE | Admitting: INTERNAL MEDICINE
Payer: MEDICARE

## 2023-04-13 VITALS
TEMPERATURE: 97.4 F | BODY MASS INDEX: 27.47 KG/M2 | OXYGEN SATURATION: 97 % | HEIGHT: 65 IN | RESPIRATION RATE: 15 BRPM | WEIGHT: 164.9 LBS | SYSTOLIC BLOOD PRESSURE: 114 MMHG | DIASTOLIC BLOOD PRESSURE: 60 MMHG | HEART RATE: 50 BPM

## 2023-04-13 DIAGNOSIS — I25.5 ISCHEMIC CARDIOMYOPATHY: ICD-10-CM

## 2023-04-13 LAB — GLUCOSE BLDC GLUCOMTR-MCNC: 119 MG/DL (ref 70–130)

## 2023-04-13 PROCEDURE — 25010000002 MIDAZOLAM PER 1 MG: Performed by: INTERNAL MEDICINE

## 2023-04-13 PROCEDURE — 82962 GLUCOSE BLOOD TEST: CPT

## 2023-04-13 PROCEDURE — C1894 INTRO/SHEATH, NON-LASER: HCPCS | Performed by: INTERNAL MEDICINE

## 2023-04-13 PROCEDURE — 82810 BLOOD GASES O2 SAT ONLY: CPT

## 2023-04-13 PROCEDURE — 85014 HEMATOCRIT: CPT

## 2023-04-13 PROCEDURE — 85018 HEMOGLOBIN: CPT

## 2023-04-13 PROCEDURE — 93451 RIGHT HEART CATH: CPT | Performed by: INTERNAL MEDICINE

## 2023-04-13 RX ORDER — BUMETANIDE 2 MG/1
3 TABLET ORAL 2 TIMES DAILY
Qty: 180 TABLET | Refills: 3 | Status: SHIPPED | OUTPATIENT
Start: 2023-04-13

## 2023-04-13 RX ORDER — SODIUM CHLORIDE 0.9 % (FLUSH) 0.9 %
10 SYRINGE (ML) INJECTION EVERY 12 HOURS SCHEDULED
Status: DISCONTINUED | OUTPATIENT
Start: 2023-04-13 | End: 2023-04-13 | Stop reason: HOSPADM

## 2023-04-13 RX ORDER — SODIUM CHLORIDE 0.9 % (FLUSH) 0.9 %
10 SYRINGE (ML) INJECTION AS NEEDED
Status: DISCONTINUED | OUTPATIENT
Start: 2023-04-13 | End: 2023-04-13 | Stop reason: HOSPADM

## 2023-04-13 RX ORDER — SODIUM CHLORIDE 9 MG/ML
75 INJECTION, SOLUTION INTRAVENOUS CONTINUOUS
Status: DISCONTINUED | OUTPATIENT
Start: 2023-04-13 | End: 2023-04-13 | Stop reason: HOSPADM

## 2023-04-13 RX ORDER — LIDOCAINE HYDROCHLORIDE 20 MG/ML
INJECTION, SOLUTION INFILTRATION; PERINEURAL
Status: DISCONTINUED | OUTPATIENT
Start: 2023-04-13 | End: 2023-04-13 | Stop reason: HOSPADM

## 2023-04-13 RX ORDER — SODIUM CHLORIDE 9 MG/ML
40 INJECTION, SOLUTION INTRAVENOUS AS NEEDED
Status: DISCONTINUED | OUTPATIENT
Start: 2023-04-13 | End: 2023-04-13 | Stop reason: HOSPADM

## 2023-04-13 RX ORDER — MIDAZOLAM HYDROCHLORIDE 1 MG/ML
INJECTION INTRAMUSCULAR; INTRAVENOUS
Status: DISCONTINUED | OUTPATIENT
Start: 2023-04-13 | End: 2023-04-13 | Stop reason: HOSPADM

## 2023-04-13 RX ADMIN — SODIUM CHLORIDE 75 ML/HR: 9 INJECTION, SOLUTION INTRAVENOUS at 08:08

## 2023-04-13 NOTE — INTERVAL H&P NOTE
She continues to have pulmonary hypertension on noninvasive testing we feel like we are having trouble figuring out her volume status organ to come back for right heart cath.  H&P reviewed. The patient was examined and there are no changes to the H&P. I have explained the risks and benefits of the procedure to the patient.  The patient understands and agrees to proceed

## 2023-04-13 NOTE — Clinical Note
Hemostasis started on the right brachial vein. Manual pressure applied to vessel. Manual pressure was held by . Manual pressure was held for 5 min. Hemostasis achieved successfully. Closure device additional comment: 4x4 amd tegaderm applied

## 2023-04-13 NOTE — DISCHARGE INSTRUCTIONS
[facility]  [address] [place] [address]    Right Heart Cath After Care    Refer to this sheet in the next few weeks. These instructions provide you with information on caring for yourself after your procedure. Your caregiver may also give you more specific instructions. Your treatment has been planned according to current medical practices, but problems sometimes occur. Call your caregiver if you have any problems or questions after your procedure.    Home Care Instructions:  You may shower the day after the procedure. Remove the bandage (dressing) and gently wash the site with plain soap and water. Gently pat the site dry. You may apply a band aid daily for 2 days if desired.    Do not apply powder or lotion to the site until the site is completely healed.  Do not submerge the affected site in water until the site is completely healed.   No heavy lifting today.   Inspect the site at least twice daily. You may notice some bruising at the site..     If you received sedation a responsible adult should be with you for the first 24 hours after you arrive home. Do not make any important legal decisions or sign legal papers for 24 hours.  Do not drink alcohol for 24 hours.  Do not operate machinery or power tools for 24 hours. You may drive 24 hours after the procedure unless otherwise instructed by your caregiver.        Call Your Doctor if:   You have unusual pain at the puncture site.  You have redness, warmth, swelling, or pain at the puncture site.  You have drainage (other than a small amount of blood on the dressing).  `You have chills or a fever > 101.  Your arm becomes pale or dark, cool, tingly, or numb.  You develop chest pain, shortness of breath, feel faint or pass out.    You have heavy bleeding from the site, hold pressure on the site for 20 minutes.  If the bleeding stops, apply a fresh bandage and call your cardiologist.  However, if you        continue to have bleeding, call 911  and continue to apply pressure to the site.   You have any symptoms of a stroke.  Remember BE FAST  B-balance. Sudden trouble walking or loss of balance.  E-eyes.  Sudden changes in how you see or a sudden onset of a very bad headache.   F-face. Sudden weakness or loss of feeling of the face or facial droop on one side.   A-arms Sudden weakness or numbness in one arm.  One arm drifts down if they are both held out in front of you. This happens suddenly and usually on one side of the body.   S-speech.  Sudden trouble speaking, slurred speech or trouble understanding what are saying.   T-time  Time to call emergency services.  Write down the symptoms and the time they started.

## 2023-04-14 LAB
HCT VFR BLDA CALC: 37 % (ref 38–51)
HGB BLDA-MCNC: 12.6 G/DL (ref 12–17)
SAO2 % BLDA: 66 % (ref 95–98)

## 2023-04-17 NOTE — H&P
History of Presenting Illness:      Ovi Mabry is a 82 y.o. female who is here for follow-up.  She is a patient of Dr Horner.     Patient has past medical history significant for coronary artery disease status post CABG, AI and AAS status post AVR, persistent atrial fibrillation on chronic anticoagulation, mixed hyperlipidemia, essential hypertension, ischemic cardiomyopathy with recovered LVEF.     Patient was found to have severe three-vessel coronary artery disease in March 2022 and subsequently underwent four-vessel bypass and AVR as well as maze procedure and left atrial appendage ligation.  Patient had post-op atrial fibrillation but by November 2022 had converted back to sinus rhythm.     I saw her in the office 2/22/23 and patient was back in atrial fibrillation, compaining of fatigue and increased dyspnea on exertion. She was started back on amiodarone and cardioversion was scheduled for the following week. On 2/27 patient was successfully cardioverted. She is here today for follow-up.     Unfortunately she is still feeling quite short of breath with minimal activity. She denies chest pain. She is not having swelling in her lower extremities and she sleeps elevated at baseline for comfort. She denies palpitations or noting fast heart rate. She denies bleeding concerns. She is having cataract surgery next week.     Review of Systems:  Review of Systems   Cardiovascular: Positive for dyspnea on exertion. Negative for chest pain, leg swelling, orthopnea and palpitations.   Respiratory: Positive for shortness of breath.                 Current Outpatient Medications on File Prior to Visit   Medication Sig Dispense Refill   • acetaminophen (TYLENOL) 500 MG tablet Take 2 tablets by mouth 3 (Three) Times a Day As Needed. PRN PAIN       • apixaban (Eliquis) 5 MG tablet tablet Take 1 tablet by mouth Every 12 (Twelve) Hours. 1 tablet 0   • atorvastatin (LIPITOR) 80 MG tablet TAKE 1 TABLET  DAILY 90 tablet 3   • bumetanide (BUMEX) 2 MG tablet TAKE ONE TABLET BY MOUTH DAILY 90 tablet 2   • dapagliflozin Propanediol (Farxiga) 10 MG tablet Take 10 mg by mouth Every Morning. 90 tablet 3   • FLUoxetine (PROzac) 20 MG capsule Take 1 capsule by mouth Every Morning.       • LORazepam (ATIVAN) 1 MG tablet Take 1 tablet by mouth Every 8 (Eight) Hours As Needed for Anxiety.       • Melatonin 10 MG tablet Take 1 tablet by mouth Every Night.       • metoprolol succinate XL (TOPROL-XL) 50 MG 24 hr tablet Take 1 tablet by mouth Daily. 90 tablet 2   • nitroglycerin (NITROSTAT) 0.4 MG SL tablet Place 1 tablet under the tongue Every 5 (Five) Minutes As Needed for Chest Pain. Take no more than 3 doses in 15 minutes. 100 tablet 3   • omeprazole (priLOSEC) 40 MG capsule Take 1 capsule by mouth Daily.       • spironolactone (ALDACTONE) 25 MG tablet TAKE 1/2 TABLET BY MOUTH DAILY (Patient taking differently: Take 12.5 mg by mouth Daily.) 30 tablet 5   • vitamin C (ASCORBIC ACID) 500 MG tablet Take 1 tablet by mouth Daily. TO HOLD 1 WEEK PRIOR TO OR       • Vitamin D, Ergocalciferol, 2000 units capsule Take 2,000 Units by mouth Daily. TO HOLD 1 WEEK PRIOR TO OR       • vitamin E 400 UNIT capsule Take 1 capsule by mouth Daily.       • [DISCONTINUED] amiodarone (PACERONE) 200 MG tablet Take 1 tablet by mouth 2 (Two) Times a Day. 90 tablet 1      No current facility-administered medications on file prior to visit.               Allergies   Allergen Reactions   • Codeine Other (See Comments)       Chest pain .  Patient states she is allergic to codeine and tylenol when together but not separate.   • Penicillins Hives   • Ambien [Zolpidem Tartrate] Confusion         Medical History        Past Medical History:   Diagnosis Date   • Acute diastolic (congestive) heart failure (HCC)     • Anxiety     • Aortic valve stenosis       HX CABG, AVR, MVR 3/2022   • Arthritis     • CAD (coronary artery disease)       stents   • Depression      • Dizziness     • Fatty liver     • GERD (gastroesophageal reflux disease)     • H/O blood clots     • H/O Clostridium difficile infection     • Heart murmur     • History of atrial fibrillation     • History of cervical cancer       HX HYSTERECTOMY   • History of MI (myocardial infarction) 1999   • History of transfusion       no reactions   • [place] (hard of hearing)     • Hyperlipidemia     • Hypertension     • IBS (irritable bowel syndrome)     • Incontinence of urine       wears pads   • Renal mass, right              Surgical History         Past Surgical History:   Procedure Laterality Date   • APPENDECTOMY   1960   • CARDIAC CATHETERIZATION   06/10/2014     Dr. Murphy Horner   • CARDIAC CATHETERIZATION   11/13/2007     Dr. Murphy Horner   • CARDIAC CATHETERIZATION N/A 10/19/2004     Dr. Murphy Horner   • CARDIAC CATHETERIZATION N/A 07/15/2004     Dr. Gregorio Gonzales   • CARDIAC CATHETERIZATION   10/2003     Dr. Murphy oHrner   • CARDIAC CATHETERIZATION N/A 03/21/2022     Procedure: Coronary angiography;  Surgeon: Murphy Horner MD;  Location:  [facility];  Service: Cardiology;  Laterality: N/A;   • CARDIAC CATHETERIZATION N/A 03/21/2022     Procedure: Right Heart Cath;  Surgeon: Murphy Horner MD;  Location:  [facility];  Service: Cardiology;  Laterality: N/A;   • CARDIAC CATHETERIZATION N/A 03/21/2022     Procedure: Left Heart Cath;  Surgeon: Murphy Horner MD;  Location:  [facility];  Service: Cardiology;  Laterality: N/A;   • CARDIAC CATHETERIZATION N/A 03/21/2022     Procedure: Left ventriculography;  Surgeon: Murphy Horner MD;  Location:  [facility];  Service: Cardiology;  Laterality: N/A;   • CHOLECYSTECTOMY   1998   • COLONOSCOPY N/A 07/2001     negative   • COLONOSCOPY N/A 02/28/2012     negative   • CORONARY ANGIOPLASTY WITH STENT PLACEMENT N/A 07/15/2004     Dr. Murphy Horner   • CORONARY ANGIOPLASTY WITH STENT  PLACEMENT   2002     to RCA   • CORONARY ARTERY BYPASS GRAFT WITH AORTIC AND MITRAL VALVE REPAIR/REPLACEMENT N/A 2022     Procedure: [facility] STERNOTOMY CORONARY ARTERY BYPASS GRAFTING TIMES 4 USING LEFT INTERNAL MAMMARY ARTERY AND LEFT GREATER SAPHENOUS VEIN GRAFT PER ENDOSCOPIC VEIN HARVESTING, RIGHT CORONARY ENDARTERECTOMY, AORTIC VALVE REPLACEMENT, MITRAL VALVE REPAIR, ATRICURE MAZE PROCEDURE, CLOSURE OF LEFT ATRIAL APPENDAGE AND PRP;  Surgeon: Jr Isaias Lynn MD;  Location: [facility];  Service: Cardiothoracic;   • CORONARY ARTERY BYPASS GRAFT WITH AORTIC AND MITRAL VALVE REPAIR/REPLACEMENT   2022     Procedure: ;  Surgeon: Jr Isaias Lynn MD;  Location: [facility];  Service: Cardiothoracic;;   • HYSTERECTOMY   1974   • LUMBAR DISCECTOMY       • NEPHRECTOMY PARTIAL Right 11/3/2022     Procedure: RIGHT ROBOTIC PARTIAL NEPHRECTOMY;  Surgeon: Davy Silva Jr., MD;  Location: [facility];  Service: Robotics - DaVinci;  Laterality: Right;   • UPPER GASTROINTESTINAL ENDOSCOPY N/A 2015     Mild Schatzki ring, hiatus hernia, non-bleeding erosive gastropathy, erythematous mucosa in the antrum, normal duodenum-Dr. Alex Gill            Social History   Social History            Socioeconomic History   • Marital status:    Tobacco Use   • Smoking status: Former       Packs/day: 1.00       Years: 20.00       Pack years: 20.00       Types: Cigarettes       Quit date:        Years since quittin.1   • Smokeless tobacco: Never   Vaping Use   • Vaping Use: Never used   Substance and Sexual Activity   • Alcohol use: Not Currently   • Drug use: Never   • Sexual activity: Defer                  Family History   Problem Relation Age of Onset   • Heart disease Mother     • No Known Problems Father     • Heart disease Sister     • Heart disease Brother     • No Known Problems Maternal Grandmother     • No Known Problems Maternal Grandfather     • No Known Problems Paternal  "Grandmother     • No Known Problems Paternal Grandfather     • Malig Hyperthermia Neg Hx           The following portions of the patient's history were reviewed and updated as appropriate: allergies, current medications, past family history, past medical history, past social history, past surgical history and problem list.           Objective:      Objective       Vitals       Vitals:     03/08/23 1153   BP: 110/70   BP Location: Left arm   Patient Position: Sitting   Cuff Size: Adult   Pulse: 57   SpO2: 97%   Weight: 74.7 kg (164 lb 9.6 oz)   Height: 165.1 cm (65\")               Physical Exam:  Constitutional: Well appearing, well developed, no acute distress   HENT: Oropharynx clear and membrane moist  Eyes: Normal conjunctiva, no sclera icterus  Neck: Supple, no carotid bruit bilaterally  Cardiovascular: Regular rate and rhythm, No Murmur, No bilateral lower extremity edema  Pulmonary: Normal respiratory effort, normal lung sounds, no wheezing  Neurological: Alert and orient x 3  Skin: Warm, dry, no ecchymosis, no rash  Psych: Appropriate mood and affect. Normal judgment and insight                 Lab Results   Component Value Date      02/27/2023      02/22/2023     K 3.7 02/27/2023     K 3.9 02/22/2023      02/27/2023      02/22/2023     CO2 27.8 02/27/2023     CO2 23.9 02/22/2023     BUN 27 (H) 02/27/2023     BUN 25 (H) 02/22/2023     CREATININE 0.97 02/27/2023     CREATININE 1.07 (H) 02/22/2023     EGFRIFNONA 53 (L) 12/22/2021     EGFRIFNONA 66 10/06/2021     GLUCOSE 120 (H) 02/27/2023     GLUCOSE 108 (H) 02/22/2023     CALCIUM 9.6 02/27/2023     CALCIUM 9.4 02/22/2023     ALBUMIN 4.5 02/22/2023     ALBUMIN 3.9 04/13/2022     BILITOT 0.4 02/22/2023     BILITOT 0.7 04/13/2022     AST 33 (H) 02/22/2023     AST 47 (H) 04/13/2022     ALT 28 02/22/2023     ALT 25 04/13/2022            Lab Results   Component Value Date     WBC 6.29 02/22/2023     WBC 7.24 11/04/2022     HGB 13.2 02/22/2023 "     HGB 10.7 (L) 11/04/2022     HCT 41.4 02/22/2023     HCT 32.7 (L) 11/04/2022     MCV 84.7 02/22/2023     MCV 89.1 11/04/2022      02/22/2023      11/04/2022            Lab Results   Component Value Date     CHOL 129 03/25/2022     CHOL 105 07/20/2021     TRIG 161 (H) 03/25/2022     TRIG 134 07/20/2021     HDL 31 (L) 03/25/2022     HDL 31 (L) 07/20/2021     LDL 70 03/25/2022     LDL 50 07/20/2021            Lab Results   Component Value Date     PROBNP 1,994.0 (H) 04/14/2022     PROBNP 1,093.0 03/25/2022     BNP 1,070.0 (H) 06/28/2019            Lab Results   Component Value Date     CKTOTAL 69 12/14/2015     TROPONINI 0.029 06/29/2019     TROPONINT 0.013 07/08/2021            Lab Results   Component Value Date     TSH 2.720 02/22/2023     TSH 4.740 (H) 04/05/2022               ECG 12 Lead     Date/Time: 3/8/2023 12:07 PM  Performed by: Jeanette Fields APRN  Authorized by: Jeanette Fields APRN   Comparison: compared with previous ECG from 2/27/2023  Similar to previous ECG  Rhythm: sinus rhythm  Rate: normal  Conduction: conduction normal  ST Segments: ST segments normal  QRS axis: normal                          Assessment:      Assessment            Diagnosis Plan   1. Paroxysmal atrial fibrillation (HCC)  ECG 12 Lead     Adult Transthoracic Echo Complete w/ Color, Spectral and Contrast if Necessary Per Protocol     Comprehensive Metabolic Panel       2. Dyspnea on exertion  XR chest pa and lateral     proBNP                   Plan:      Plan       Dyspnea with exertion  • She has not noted improvement with cardioversion  • Check labs and CXR. Repeat echocardiogram ordered  • Pending results of above may need to consider repeat ischemic evaluation     Paroxysmal atrial fibrillation  • Recent cardioversion and remains sinus, HR 58  • I am reducing amiodarone to 200mg daily  • Continue metoprolol and apixaban     CAD  • Status post CABG  • Continue beta-blocker, statin     Essential  hypertension  • BP control     Patient is seen today after cardioversion. She remains in sinus. Decrease amiodarone to 200mg daily. She is still quite dyspneic. Check CXR, labs and repeat echocardiogram. Follow-up with Dr Horner as scheduled.              Orders Placed This Encounter   Procedures   • XR chest pa and lateral       Standing Status:   Future       Standing Expiration Date:   3/8/2024       Order Specific Question:   Reason for Exam:       Answer:   shortness of breath       Order Specific Question:   Release to patient       Answer:   Routine Release   • Comprehensive Metabolic Panel       Standing Status:   Future       Standing Expiration Date:   3/8/2024       Order Specific Question:   Release to patient       Answer:   Routine Release   • proBNP       Standing Status:   Future       Standing Expiration Date:   3/8/2024       Order Specific Question:   Release to patient       Answer:   Routine Release   • ECG 12 Lead       This order was created via procedure documentation       Order Specific Question:   Release to patient       Answer:   Routine Release   • Adult Transthoracic Echo Complete w/ Color, Spectral and Contrast if Necessary Per Protocol       Standing Status:   Future       Standing Expiration Date:   3/8/2024       Order Specific Question:   Reason for exam?       Answer:   Dyspnea       Order Specific Question:   Release to patient       Answer:   Routine Release               Jeanette Fields APRMICHAEL  [facility]    I reviewed the above HPI and agree with it she has persistent shortness of breath.  Difficult to figure out her volume status and she is coming back for right heart catheterization.  H&P reviewed. The patient was examined and there are no changes to the H&P. I have explained the risks and benefits of the procedure to the patient.  The patient understands and agrees to proceed

## 2023-05-02 ENCOUNTER — OFFICE VISIT (OUTPATIENT)
Dept: CARDIOLOGY | Facility: CLINIC | Age: 83
End: 2023-05-02
Payer: MEDICARE

## 2023-05-02 ENCOUNTER — HOSPITAL ENCOUNTER (OUTPATIENT)
Dept: CARDIOLOGY | Facility: HOSPITAL | Age: 83
Discharge: HOME | End: 2023-05-02
Admitting: NURSE PRACTITIONER
Payer: MEDICARE

## 2023-05-02 VITALS
OXYGEN SATURATION: 96 % | BODY MASS INDEX: 27.16 KG/M2 | HEIGHT: 65 IN | WEIGHT: 163 LBS | DIASTOLIC BLOOD PRESSURE: 78 MMHG | HEART RATE: 74 BPM | SYSTOLIC BLOOD PRESSURE: 128 MMHG

## 2023-05-02 DIAGNOSIS — I50.20 HFREF (HEART FAILURE WITH REDUCED EJECTION FRACTION): ICD-10-CM

## 2023-05-02 DIAGNOSIS — I48.0 PAROXYSMAL ATRIAL FIBRILLATION: ICD-10-CM

## 2023-05-02 DIAGNOSIS — R06.09 DYSPNEA ON EXERTION: ICD-10-CM

## 2023-05-02 DIAGNOSIS — R06.09 DYSPNEA ON EXERTION: Primary | ICD-10-CM

## 2023-05-02 LAB
ANION GAP SERPL CALCULATED.3IONS-SCNC: 10.9 MMOL/L (ref 5–15)
BUN SERPL-MCNC: 24 MG/DL (ref 8–23)
BUN/CREAT SERPL: 16.4 (ref 7–25)
CALCIUM SPEC-SCNC: 10 MG/DL (ref 8.6–10.5)
CHLORIDE SERPL-SCNC: 98 MMOL/L (ref 98–107)
CO2 SERPL-SCNC: 27.1 MMOL/L (ref 22–29)
CREAT SERPL-MCNC: 1.46 MG/DL (ref 0.57–1)
EGFRCR SERPLBLD CKD-EPI 2021: 35.6 ML/MIN/1.73
GLUCOSE SERPL-MCNC: 107 MG/DL (ref 65–99)
NT-PROBNP SERPL-MCNC: 1647 PG/ML (ref 0–1800)
POTASSIUM SERPL-SCNC: 4.1 MMOL/L (ref 3.5–5.2)
SODIUM SERPL-SCNC: 136 MMOL/L (ref 136–145)

## 2023-05-02 PROCEDURE — 80048 BASIC METABOLIC PNL TOTAL CA: CPT | Performed by: NURSE PRACTITIONER

## 2023-05-02 PROCEDURE — 36415 COLL VENOUS BLD VENIPUNCTURE: CPT

## 2023-05-02 PROCEDURE — 83880 ASSAY OF NATRIURETIC PEPTIDE: CPT | Performed by: NURSE PRACTITIONER

## 2023-05-02 NOTE — PROGRESS NOTES
[facility] Follow Up Office Note     Encounter Date:23  Patient:Alice Mabry  :1940  MRN:[id number]      Chief Complaint:   Chief Complaint   Patient presents with   • Follow-up         History of Presenting Illness:        Ailce Mabry is a 82 y.o. female who is here for follow-up.  She is a patient of Dr Horner.    Patient has past medical history significant for coronary artery disease status post CABG, AI and AS status post AVR, persistent atrial fibrillation on chronic anticoagulation, mixed hyperlipidemia, essential hypertension, ischemic cardiomyopathy with recovered LVEF.    Patient was found to have severe three-vessel coronary artery disease in 2022 and subsequently underwent four-vessel bypass and AVR as well as maze procedure and left atrial appendage ligation.  Patient had post-op atrial fibrillation but by 2022 had converted back to sinus rhythm.    I saw her in the office in February and she was back in atrial fibrillation, compaining of fatigue and increased dyspnea on exertion. She was started back on amiodarone and cardioversion was scheduled for the following week. On  patient was successfully cardioverted. Unfortunately since then she continued to have increased dyspnea and her proBNP was elevated. Diuretics were increased. Repeat echocardiogram showed served LVEF with dilated right atrium and right ventricle with moderate TR and significant pulmonary hypertension.  Based on this we recommended right heart catheterization which showed pulmonary hypertension with PA 58/18 mmHg, prominent V wave and elevated pulmonary capillary wedge pressure of 20.  Based on hemodynamics her bumetanide was increased to 3 mg twice daily.    Patient is here today for follow-up.  Fortunately, her shortness of breath is improved.  Her largest complaint is fatigue.  She does not have lower extremity edema, orthopnea, or PND.  She denies palpitations, she has  occasional lightheadedness, particularly with position changes.  She denies chest pain.  She denies bleeding concerns.    Review of Systems:  Review of Systems   Constitutional: Positive for malaise/fatigue.   Cardiovascular: Positive for dyspnea on exertion. Negative for chest pain, leg swelling, orthopnea and palpitations.       Current Outpatient Medications on File Prior to Visit   Medication Sig Dispense Refill   • amiodarone (PACERONE) 200 MG tablet Take 1 tablet by mouth Daily. 90 tablet 1   • apixaban (Eliquis) 5 MG tablet tablet Take 1 tablet by mouth Every 12 (Twelve) Hours. 1 tablet 0   • atorvastatin (LIPITOR) 80 MG tablet TAKE 1 TABLET DAILY 90 tablet 3   • bumetanide (BUMEX) 2 MG tablet Take 1.5 tablets by mouth 2 (Two) Times a Day. 180 tablet 3   • dapagliflozin Propanediol (Farxiga) 10 MG tablet Take 10 mg by mouth Every Morning. 90 tablet 3   • FLUoxetine (PROzac) 20 MG capsule Take 1 capsule by mouth Every Morning.     • LORazepam (ATIVAN) 1 MG tablet Take 1 tablet by mouth Every 8 (Eight) Hours As Needed for Anxiety.     • metoprolol succinate XL (TOPROL-XL) 50 MG 24 hr tablet Take 1 tablet by mouth Daily. 90 tablet 2   • nitroglycerin (NITROSTAT) 0.4 MG SL tablet Place 1 tablet under the tongue Every 5 (Five) Minutes As Needed for Chest Pain. Take no more than 3 doses in 15 minutes. 100 tablet 3   • omeprazole (priLOSEC) 40 MG capsule Take 1 capsule by mouth Daily.     • potassium chloride 10 MEQ CR tablet Take 1 tablet by mouth 2 (Two) Times a Day. 60 tablet 11   • spironolactone (ALDACTONE) 25 MG tablet TAKE 1/2 TABLET BY MOUTH DAILY (Patient taking differently: Take 12.5 mg by mouth Daily.) 30 tablet 5   • Vitamin D, Ergocalciferol, 2000 units capsule Take 2,000 Units by mouth Daily. TO HOLD 1 WEEK PRIOR TO OR       No current facility-administered medications on file prior to visit.       Allergies   Allergen Reactions   • Codeine Other (See Comments)     Chest pain .  Patient states she is  allergic to codeine and tylenol when together but not separate.   • Penicillins Hives   • Ambien [Zolpidem Tartrate] Confusion       Past Medical History:   Diagnosis Date   • Acute diastolic (congestive) heart failure    • Anxiety    • Aortic valve stenosis     HX CABG, AVR, MVR 3/2022   • Arthritis    • CAD (coronary artery disease)     stents   • Depression    • Dizziness    • Fatty liver    • GERD (gastroesophageal reflux disease)    • H/O blood clots    • H/O Clostridium difficile infection    • Heart murmur    • History of atrial fibrillation    • History of cervical cancer     HX HYSTERECTOMY   • History of MI (myocardial infarction) 1999   • History of transfusion     no reactions   • [place] (hard of hearing)    • Hyperlipidemia    • Hypertension    • IBS (irritable bowel syndrome)    • Incontinence of urine     wears pads   • Renal mass, right        Past Surgical History:   Procedure Laterality Date   • APPENDECTOMY  1960   • CARDIAC CATHETERIZATION  06/10/2014    Dr. Murphy Horner   • CARDIAC CATHETERIZATION  11/13/2007    Dr. Murphy Horner   • CARDIAC CATHETERIZATION N/A 10/19/2004    Dr. Murphy Horner   • CARDIAC CATHETERIZATION N/A 07/15/2004    Dr. Gregorio Gonzales   • CARDIAC CATHETERIZATION  10/2003    Dr. Murphy Horner   • CARDIAC CATHETERIZATION N/A 03/21/2022    Procedure: Coronary angiography;  Surgeon: Murphy Horner MD;  Location: [facility] CATH INVASIVE LOCATION;  Service: Cardiology;  Laterality: N/A;   • CARDIAC CATHETERIZATION N/A 03/21/2022    Procedure: Right Heart Cath;  Surgeon: Murphy Horner MD;  Location: [facility] CATH INVASIVE LOCATION;  Service: Cardiology;  Laterality: N/A;   • CARDIAC CATHETERIZATION N/A 03/21/2022    Procedure: Left Heart Cath;  Surgeon: Murphy Horner MD;  Location: [facility] CATH INVASIVE LOCATION;  Service: Cardiology;  Laterality: N/A;   • CARDIAC CATHETERIZATION N/A 03/21/2022    Procedure: Left ventriculography;  Surgeon: Murphy Horner MD;  Location:   [facility] CATH INVASIVE LOCATION;  Service: Cardiology;  Laterality: N/A;   • CARDIAC CATHETERIZATION N/A 2023    Procedure: Right Heart Cath;  Surgeon: Murphy Horner MD;  [facility];  Service: Cardiology;  Laterality: N/A;   • CHOLECYSTECTOMY     • COLONOSCOPY N/A 2001    negative   • COLONOSCOPY N/A 2012    negative   • CORONARY ANGIOPLASTY WITH STENT PLACEMENT N/A 07/15/2004    Dr. Murphy Horner   • CORONARY ANGIOPLASTY WITH STENT PLACEMENT  2002    to RCA   • CORONARY ARTERY BYPASS GRAFT WITH AORTIC AND MITRAL VALVE REPAIR/REPLACEMENT N/A 2022    Procedure: DALTON STERNOTOMY CORONARY ARTERY BYPASS GRAFTING TIMES 4 USING LEFT INTERNAL MAMMARY ARTERY AND LEFT GREATER SAPHENOUS VEIN GRAFT PER ENDOSCOPIC VEIN HARVESTING, RIGHT CORONARY ENDARTERECTOMY, AORTIC VALVE REPLACEMENT, MITRAL VALVE REPAIR, ATRICURE MAZE PROCEDURE, CLOSURE OF LEFT ATRIAL APPENDAGE AND PRP;  Surgeon: Jr Isaias Lynn MD;  Location: [facility] CVOR;  Service: Cardiothoracic;   • CORONARY ARTERY BYPASS GRAFT WITH AORTIC AND MITRAL VALVE REPAIR/REPLACEMENT  2022    Procedure: ;  Surgeon: Jr Isaias Lynn MD;  Location: [facility] CVOR;  Service: Cardiothoracic;;   • HYSTERECTOMY  1974   • LUMBAR DISCECTOMY     • NEPHRECTOMY PARTIAL Right 11/3/2022    Procedure: RIGHT ROBOTIC PARTIAL NEPHRECTOMY;  Surgeon: Davy Silva Jr., MD;  Location: [facility] MAIN OR;  Service: [facility];  Laterality: Right;   • UPPER GASTROINTESTINAL ENDOSCOPY N/A 2015    Mild Schatzki ring, hiatus hernia, non-bleeding erosive gastropathy, erythematous mucosa in the antrum, normal duodenum-Dr. Alex Gill       Social History     Socioeconomic History   • Marital status:    Tobacco Use   • Smoking status: Former     Packs/day: 1.00     Years: 20.00     Pack years: 20.00     Types: Cigarettes     Quit date:      Years since quittin.3   • Smokeless tobacco: Never   Vaping Use  "  • Vaping Use: Never used   Substance and Sexual Activity   • Alcohol use: Not Currently   • Drug use: Never   • Sexual activity: Defer       Family History   Problem Relation Age of Onset   • Heart disease Mother    • No Known Problems Father    • Heart disease Sister    • Heart disease Brother    • No Known Problems Maternal Grandmother    • No Known Problems Maternal Grandfather    • No Known Problems Paternal Grandmother    • No Known Problems Paternal Grandfather    • Malig Hyperthermia Neg Hx        The following portions of the patient's history were reviewed and updated as appropriate: allergies, current medications, past family history, past medical history, past social history, past surgical history and problem list.       Objective:       Vitals:    05/02/23 1018   BP: 128/78   BP Location: Left arm   Patient Position: Sitting   Cuff Size: Adult   Pulse: 74   SpO2: 96%   Weight: 73.9 kg (163 lb)   Height: 165.1 cm (65\")         Physical Exam:  Constitutional: Well appearing, well developed, no acute distress   HENT: Oropharynx clear and membrane moist  Eyes: Normal conjunctiva, no sclera icterus  Neck: Supple, no carotid bruit bilaterally  Cardiovascular: Regular rate and rhythm, No Murmur, No bilateral lower extremity edema  Pulmonary: Normal respiratory effort, normal lung sounds, no wheezing  Neurological: Alert and orient x 3  Skin: Warm, dry, no ecchymosis, no rash  Psych: Appropriate mood and affect. Normal judgment and insight         Lab Results   Component Value Date     04/12/2023     03/14/2023    K 4.0 04/12/2023    K 3.7 03/14/2023     04/12/2023    CL 97 (L) 03/14/2023    CO2 29.0 04/12/2023    CO2 31.0 (H) 03/14/2023    BUN 20 04/12/2023    BUN 20 03/14/2023    CREATININE 1.11 (H) 04/12/2023    CREATININE 1.15 (H) 03/14/2023    EGFRIFNONA 53 (L) 12/22/2021    EGFRIFNONA 66 10/06/2021    GLUCOSE 111 (H) 04/12/2023    GLUCOSE 108 (H) 03/14/2023    CALCIUM 9.2 04/12/2023    " CALCIUM 9.6 03/14/2023    ALBUMIN 4.3 03/14/2023    ALBUMIN 4.0 03/08/2023    BILITOT 0.9 03/14/2023    BILITOT 1.3 (H) 03/08/2023    AST 26 03/14/2023    AST 21 03/08/2023    ALT 23 03/14/2023    ALT 15 03/08/2023     Lab Results   Component Value Date    WBC 6.90 04/12/2023    WBC 6.29 02/22/2023    HGB 12.6 04/13/2023    HGB 12.8 04/12/2023    HCT 37 (L) 04/13/2023    HCT 41.5 04/12/2023    MCV 86.6 04/12/2023    MCV 84.7 02/22/2023     04/12/2023     02/22/2023     Lab Results   Component Value Date    CHOL 129 03/25/2022    CHOL 105 07/20/2021    TRIG 161 (H) 03/25/2022    TRIG 134 07/20/2021    HDL 31 (L) 03/25/2022    HDL 31 (L) 07/20/2021    LDL 70 03/25/2022    LDL 50 07/20/2021     Lab Results   Component Value Date    PROBNP 1,831.0 (H) 03/14/2023    PROBNP 3,713.0 (H) 03/08/2023    BNP 1,070.0 (H) 06/28/2019     Lab Results   Component Value Date    CKTOTAL 69 12/14/2015    TROPONINI 0.029 06/29/2019    TROPONINT 0.013 07/08/2021     Lab Results   Component Value Date    TSH 2.720 02/22/2023    TSH 4.740 (H) 04/05/2022           ECG 12 Lead    Date/Time: 5/2/2023 10:57 AM  Performed by: Jeanette Fields APRN  Authorized by: Jeanette Fields APRN   Comparison: compared with previous ECG from 3/8/2023  Similar to previous ECG  Rhythm: sinus rhythm  Rate: normal  BPM: 74  Conduction: conduction normal  ST Segments: ST segments normal                 Assessment:          Diagnosis Plan   1. Dyspnea on exertion  proBNP      2. HFrEF (heart failure with reduced ejection fraction)  Basic Metabolic Panel      3. Paroxysmal atrial fibrillation  ECG 12 Lead             Plan:       Dyspnea with exertion -recent right heart catheterization with elevated left-sided filling pressures.  Subsequently, bumetanide increased.  Repeat labs today.  From a symptom perspective she is improved and she appears euvolemic on exam    Paroxysmal atrial fibrillation - cardioverted in February.  She remains in  sinus rhythm.  Continue amiodarone, metoprolol and apixaban    CAD - history of CABG.  Denies chest pain, dyspnea improved with diuresis. Continue beta-blocker, statin    Essential hypertension - BP controlled    Pulmonary hypertension - likely who group 2.  Diuretics increased with symptomatic improvement    Patient is seen today for follow-up.  Symptomatically improved.  We will repeat labs on increased diuretic regimen.  Follow-up with Dr. Horner in June as scheduled.      Orders Placed This Encounter   Procedures   • Basic Metabolic Panel     Standing Status:   Future     Number of Occurrences:   1     Standing Expiration Date:   5/2/2024     Order Specific Question:   Release to patient     Answer:   Routine Release   • proBNP     Standing Status:   Future     Number of Occurrences:   1     Standing Expiration Date:   5/2/2024     Order Specific Question:   Release to patient     Answer:   Routine Release   • ECG 12 Lead     This order was created via procedure documentation     Order Specific Question:   Release to patient     Answer:   Routine Release            JOHNNIE Coyle  [facility]  05/02/23  11:00 EDT

## 2023-05-08 ENCOUNTER — TELEPHONE (OUTPATIENT)
Dept: CARDIOLOGY | Facility: CLINIC | Age: 83
End: 2023-05-08

## 2023-05-08 DIAGNOSIS — I48.0 PAROXYSMAL ATRIAL FIBRILLATION: Primary | ICD-10-CM

## 2023-05-08 RX ORDER — METOPROLOL SUCCINATE 50 MG/1
100 TABLET, EXTENDED RELEASE ORAL DAILY
Qty: 90 TABLET | Refills: 2
Start: 2023-05-08 | End: 2023-05-09 | Stop reason: SDUPTHER

## 2023-05-08 NOTE — TELEPHONE ENCOUNTER
Caller: Alcie Mabry    Relationship: Self    Best call back number: [phone]    What was the call regarding: A-FIB    Do you require a callback: YES    PT WAS SEEN ON 5.2.23 FOR A HOSPITAL FU WITH JOHNNIE BROWN HOWEVER PT HAS NOW BEEN IN A-FIB SINCE Friday MORNING.

## 2023-05-08 NOTE — TELEPHONE ENCOUNTER
I spoke to Ms Mabry, she was feeling pretty good until Thursday or Friday when she went into a fib, now she has no energy. She thinks her HR is around 100. She can come in tomorrow to see me at 1030 so I scheduled her. We previously cardioverted her in February.

## 2023-05-09 ENCOUNTER — OFFICE VISIT (OUTPATIENT)
Dept: CARDIOLOGY | Facility: CLINIC | Age: 83
End: 2023-05-09
Payer: MEDICARE

## 2023-05-09 VITALS
BODY MASS INDEX: 26.99 KG/M2 | HEIGHT: 65 IN | OXYGEN SATURATION: 97 % | WEIGHT: 162 LBS | SYSTOLIC BLOOD PRESSURE: 115 MMHG | HEART RATE: 95 BPM | DIASTOLIC BLOOD PRESSURE: 78 MMHG

## 2023-05-09 DIAGNOSIS — I48.0 PAROXYSMAL ATRIAL FIBRILLATION: Primary | ICD-10-CM

## 2023-05-09 DIAGNOSIS — I25.10 CORONARY ARTERY DISEASE INVOLVING NATIVE CORONARY ARTERY OF NATIVE HEART WITHOUT ANGINA PECTORIS: ICD-10-CM

## 2023-05-09 RX ORDER — METOPROLOL SUCCINATE 100 MG/1
100 TABLET, EXTENDED RELEASE ORAL DAILY
Qty: 90 TABLET | Refills: 3 | Status: SHIPPED | OUTPATIENT
Start: 2023-05-09

## 2023-05-09 NOTE — PROGRESS NOTES
[facility] Follow Up Office Note     Encounter Date:23  Patient:Alice Mabry  :1940  MRN:[id number]      Chief Complaint:   Chief Complaint   Patient presents with   • Follow-up         History of Presenting Illness:        Alice Mabry is a 82 y.o. female who is here for follow-up.  She is a patient of Dr Horner.    Patient has past medical history significant for coronary artery disease status post CABG, AI and AS status post AVR, persistent atrial fibrillation on chronic anticoagulation, mixed hyperlipidemia, essential hypertension, ischemic cardiomyopathy with recovered LVEF.    Patient was found to have severe three-vessel coronary artery disease in 2022 and subsequently underwent four-vessel bypass and AVR as well as maze procedure and left atrial appendage ligation.  Patient had post-op atrial fibrillation but by 2022 had converted back to sinus rhythm.    I saw her in the office in February and she was back in atrial fibrillation, compaining of fatigue and increased dyspnea on exertion.  I started amiodarone and she underwent successful cardioversion on .  Following this, she continued to complain of dyspnea.  Repeat echo showed normal LVEF with significant pulmonary hypertension.  Subsequent right heart catheterization showed pulmonary hypertension with PA 58/18 mmHg, prominent V wave in setting of elevated wedge pressure of 20.  Her bumetanide was increased to 3 mg twice daily.    On follow-up, 2023 patient looked good.  She noted improved shortness of breath.  Repeat labs showed mild bump in her creatinine, stable BUN.    Patient called the office yesterday reporting she had gone back into atrial fibrillation and thought her ventricular rate was in the low 100s.  She noted significant fatigue since going into A-fib.  I recommended increasing her metoprolol succinate to 100 mg daily.  She is being seen today for further evaluation.    Patient denies  palpitations but at times notices her heart rate is elevated.  The highest she has noted is about 125 monitoring on her Fitbit.  Her largest complaint remains significant fatigue.  She has had a couple of seconds long episodes of dizziness.  These are not associated with position changes and have occurred while she is walking.  She denies chest pain, lower extremity edema, orthopnea or PND.    Review of Systems:  Review of Systems   Constitutional: Positive for malaise/fatigue.   Cardiovascular: Positive for dyspnea on exertion. Negative for chest pain, leg swelling, orthopnea and palpitations.       Current Outpatient Medications on File Prior to Visit   Medication Sig Dispense Refill   • amiodarone (PACERONE) 200 MG tablet Take 1 tablet by mouth Daily. 90 tablet 1   • apixaban (Eliquis) 5 MG tablet tablet Take 1 tablet by mouth Every 12 (Twelve) Hours. 1 tablet 0   • atorvastatin (LIPITOR) 80 MG tablet TAKE 1 TABLET DAILY 90 tablet 3   • bumetanide (BUMEX) 2 MG tablet Take 1.5 tablets by mouth 2 (Two) Times a Day. 180 tablet 3   • dapagliflozin Propanediol (Farxiga) 10 MG tablet Take 10 mg by mouth Every Morning. 90 tablet 3   • FLUoxetine (PROzac) 20 MG capsule Take 1 capsule by mouth Every Morning.     • LORazepam (ATIVAN) 1 MG tablet Take 1 tablet by mouth Every 8 (Eight) Hours As Needed for Anxiety.     • nitroglycerin (NITROSTAT) 0.4 MG SL tablet Place 1 tablet under the tongue Every 5 (Five) Minutes As Needed for Chest Pain. Take no more than 3 doses in 15 minutes. 100 tablet 3   • omeprazole (priLOSEC) 40 MG capsule Take 1 capsule by mouth Daily.     • potassium chloride 10 MEQ CR tablet Take 1 tablet by mouth 2 (Two) Times a Day. 60 tablet 11   • spironolactone (ALDACTONE) 25 MG tablet TAKE 1/2 TABLET BY MOUTH DAILY (Patient taking differently: Take 12.5 mg by mouth Daily.) 30 tablet 5   • Vitamin D, Ergocalciferol, 2000 units capsule Take 2,000 Units by mouth Daily. TO HOLD 1 WEEK PRIOR TO OR     •  [DISCONTINUED] metoprolol succinate XL (TOPROL-XL) 50 MG 24 hr tablet Take 2 tablets by mouth Daily. 90 tablet 2     No current facility-administered medications on file prior to visit.       Allergies   Allergen Reactions   • Codeine Other (See Comments)     Chest pain .  Patient states she is allergic to codeine and tylenol when together but not separate.   • Penicillins Hives   • Ambien [Zolpidem Tartrate] Confusion       Past Medical History:   Diagnosis Date   • Acute diastolic (congestive) heart failure    • Anxiety    • Aortic valve stenosis     HX CABG, AVR, MVR 3/2022   • Arthritis    • CAD (coronary artery disease)     stents   • Depression    • Dizziness    • Fatty liver    • GERD (gastroesophageal reflux disease)    • H/O blood clots    • H/O Clostridium difficile infection    • Heart murmur    • History of atrial fibrillation    • History of cervical cancer     HX HYSTERECTOMY   • History of MI (myocardial infarction) 1999   • History of transfusion     no reactions   • [place] (hard of hearing)    • Hyperlipidemia    • Hypertension    • IBS (irritable bowel syndrome)    • Incontinence of urine     wears pads   • Renal mass, right        Past Surgical History:   Procedure Laterality Date   • APPENDECTOMY  1960   • CARDIAC CATHETERIZATION  06/10/2014    Dr. Murphy Horner   • CARDIAC CATHETERIZATION  11/13/2007    Dr. Murphy Horner   • CARDIAC CATHETERIZATION N/A 10/19/2004    Dr. Murphy Horner   • CARDIAC CATHETERIZATION N/A 07/15/2004    Dr. Gregorio Gonzales   • CARDIAC CATHETERIZATION  10/2003    Dr. Murphy Horner   • CARDIAC CATHETERIZATION N/A 03/21/2022    Procedure: Coronary angiography;  Surgeon: Murphy Horner MD;  Location:  [facility];  Service: Cardiology;  Laterality: N/A;   • CARDIAC CATHETERIZATION N/A 03/21/2022    Procedure: Right Heart Cath;  Surgeon: Murphy Horner MD;  Location:  [facility];  Service: Cardiology;  Laterality: N/A;   • CARDIAC  CATHETERIZATION N/A 03/21/2022    Procedure: Left Heart Cath;  Surgeon: Murphy Horner MD;  Location: [facility] CATH INVASIVE LOCATION;  Service: Cardiology;  Laterality: N/A;   • CARDIAC CATHETERIZATION N/A 03/21/2022    Procedure: Left ventriculography;  Surgeon: Murphy Horner MD;  Location: [facility] CATH INVASIVE LOCATION;  Service: Cardiology;  Laterality: N/A;   • CARDIAC CATHETERIZATION N/A 4/13/2023    Procedure: Right Heart Cath;  Surgeon: Murphy Horner MD;  Location: [facility] CATH INVASIVE LOCATION;  Service: Cardiology;  Laterality: N/A;   • CHOLECYSTECTOMY  1998   • COLONOSCOPY N/A 07/2001    negative   • COLONOSCOPY N/A 02/28/2012    negative   • CORONARY ANGIOPLASTY WITH STENT PLACEMENT N/A 07/15/2004    Dr. Murphy Horner   • CORONARY ANGIOPLASTY WITH STENT PLACEMENT  03/2002    to RCA   • CORONARY ARTERY BYPASS GRAFT WITH AORTIC AND MITRAL VALVE REPAIR/REPLACEMENT N/A 03/28/2022    Procedure: DALTON STERNOTOMY CORONARY ARTERY BYPASS GRAFTING TIMES 4 USING LEFT INTERNAL MAMMARY ARTERY AND LEFT GREATER SAPHENOUS VEIN GRAFT PER ENDOSCOPIC VEIN HARVESTING, RIGHT CORONARY ENDARTERECTOMY, AORTIC VALVE REPLACEMENT, MITRAL VALVE REPAIR, ATRICURE MAZE PROCEDURE, CLOSURE OF LEFT ATRIAL APPENDAGE AND PRP;  Surgeon: Jr Iasias Lynn MD;  Location: [facility];  Service: Cardiothoracic;   • CORONARY ARTERY BYPASS GRAFT WITH AORTIC AND MITRAL VALVE REPAIR/REPLACEMENT  03/28/2022    Procedure: ;  Surgeon: Jr Isaias Lynn MD;  Location: [facility];  Service: Cardiothoracic;;   • HYSTERECTOMY  1974   • LUMBAR DISCECTOMY     • NEPHRECTOMY PARTIAL Right 11/3/2022    Procedure: RIGHT ROBOTIC PARTIAL NEPHRECTOMY;  Surgeon: Davy Silva Jr., MD;  Location: [facility] MAIN OR;  Service: Robotics - DaVinci;  Laterality: Right;   • UPPER GASTROINTESTINAL ENDOSCOPY N/A 04/20/2015    Mild Schatzki ring, hiatus hernia, non-bleeding erosive gastropathy, erythematous mucosa in the antrum, normal duodenum-Dr. Johnsno  "Gill       Social History     Socioeconomic History   • Marital status:    Tobacco Use   • Smoking status: Former     Packs/day: 1.00     Years: 20.00     Pack years: 20.00     Types: Cigarettes     Quit date:      Years since quittin.3   • Smokeless tobacco: Never   Vaping Use   • Vaping Use: Never used   Substance and Sexual Activity   • Alcohol use: Not Currently   • Drug use: Never   • Sexual activity: Defer       Family History   Problem Relation Age of Onset   • Heart disease Mother    • No Known Problems Father    • Heart disease Sister    • Heart disease Brother    • No Known Problems Maternal Grandmother    • No Known Problems Maternal Grandfather    • No Known Problems Paternal Grandmother    • No Known Problems Paternal Grandfather    • Malig Hyperthermia Neg Hx        The following portions of the patient's history were reviewed and updated as appropriate: allergies, current medications, past family history, past medical history, past social history, past surgical history and problem list.       Objective:       Vitals:    23 1025   BP: 115/78   BP Location: Left arm   Patient Position: Sitting   Cuff Size: Adult   Pulse: 95   SpO2: 97%   Weight: 73.5 kg (162 lb)   Height: 165.1 cm (65\")         Physical Exam:  Constitutional: Well appearing, well developed, no acute distress   HENT: Oropharynx clear and membrane moist  Eyes: Normal conjunctiva, no sclera icterus  Neck: Supple, no carotid bruit bilaterally  Cardiovascular: Irregularly irregular rate and rhythm, No Murmur, No bilateral lower extremity edema  Pulmonary: Normal respiratory effort, normal lung sounds, no wheezing  Neurological: Alert and orient x 3  Skin: Warm, dry, no ecchymosis, no rash  Psych: Appropriate mood and affect. Normal judgment and insight         Lab Results   Component Value Date     2023     2023    K 4.1 2023    K 4.0 2023    CL 98 2023     2023    " CO2 27.1 05/02/2023    CO2 29.0 04/12/2023    BUN 24 (H) 05/02/2023    BUN 20 04/12/2023    CREATININE 1.46 (H) 05/02/2023    CREATININE 1.11 (H) 04/12/2023    EGFRIFNONA 53 (L) 12/22/2021    EGFRIFNONA 66 10/06/2021    GLUCOSE 107 (H) 05/02/2023    GLUCOSE 111 (H) 04/12/2023    CALCIUM 10.0 05/02/2023    CALCIUM 9.2 04/12/2023    ALBUMIN 4.3 03/14/2023    ALBUMIN 4.0 03/08/2023    BILITOT 0.9 03/14/2023    BILITOT 1.3 (H) 03/08/2023    AST 26 03/14/2023    AST 21 03/08/2023    ALT 23 03/14/2023    ALT 15 03/08/2023     Lab Results   Component Value Date    WBC 6.90 04/12/2023    WBC 6.29 02/22/2023    HGB 12.6 04/13/2023    HGB 12.8 04/12/2023    HCT 37 (L) 04/13/2023    HCT 41.5 04/12/2023    MCV 86.6 04/12/2023    MCV 84.7 02/22/2023     04/12/2023     02/22/2023     Lab Results   Component Value Date    CHOL 129 03/25/2022    CHOL 105 07/20/2021    TRIG 161 (H) 03/25/2022    TRIG 134 07/20/2021    HDL 31 (L) 03/25/2022    HDL 31 (L) 07/20/2021    LDL 70 03/25/2022    LDL 50 07/20/2021     Lab Results   Component Value Date    PROBNP 1,647.0 05/02/2023    PROBNP 1,831.0 (H) 03/14/2023    BNP 1,070.0 (H) 06/28/2019     Lab Results   Component Value Date    CKTOTAL 69 12/14/2015    TROPONINI 0.029 06/29/2019    TROPONINT 0.013 07/08/2021     Lab Results   Component Value Date    TSH 2.720 02/22/2023    TSH 4.740 (H) 04/05/2022           ECG 12 Lead    Date/Time: 5/9/2023 11:01 AM  Performed by: Jeanette Fields APRN  Authorized by: Jeanette Fields APRN   Comparison: compared with previous ECG from 5/2/2023  Rhythm: atrial fibrillation  Rate: normal  BPM: 95  QRS axis: normal  Other findings: non-specific ST-T wave changes               Assessment:          Diagnosis Plan   1. Paroxysmal atrial fibrillation  ECG 12 Lead    Amiodarone Level    Comprehensive Metabolic Panel      2. Coronary artery disease involving native coronary artery of native heart without angina pectoris               Plan:          Paroxysmal atrial fibrillation - cardioverted 2/2023.  Completed about a week of twice daily amiodarone and remains on 200 mg daily.  Back in [facility] week, Toprol increased to 100 mg daily.  She is relatively rate controlled on EKG today.  Continue amiodarone, metoprolol and apixaban.  She has been referred to EP for further evaluation.  We will check amiodarone level with her labs next week    CAD - history of CABG.  Denies chest pain, dyspnea improved with diuresis. Continue beta-blocker, statin    Essential hypertension - BP controlled    Pulmonary hypertension - likely who group 2.  Diuretics increased with improvement in her dyspnea.  She did have a mild bump in her creatinine with stable BUN.  Plan for repeat labs next week to continue monitoring her kidney function    [facility]  She is symptomatic, primarily reporting fatigue.  Continue higher dose of Toprol.  Plan to repeat labs next week including metabolic panel and amiodarone level.  She will see EP for further evaluation of A-fib.      Orders Placed This Encounter   Procedures   • Amiodarone Level     Standing Status:   Future     Standing Expiration Date:   5/9/2024     Order Specific Question:   Release to patient     Answer:   Routine Release   • Comprehensive Metabolic Panel     Standing Status:   Future     Standing Expiration Date:   5/9/2024     Order Specific Question:   Release to patient     Answer:   Routine Release   • ECG 12 Lead     This order was created via procedure documentation     Order Specific Question:   Release to patient     Answer:   Routine Release            JOHNNIE Coyle  [facility]  05/09/23  11:07 EDT

## 2023-05-16 ENCOUNTER — LAB (OUTPATIENT)
Dept: LAB | Facility: HOSPITAL | Age: 83
End: 2023-05-16
Payer: MEDICARE

## 2023-05-16 DIAGNOSIS — I48.0 PAROXYSMAL ATRIAL FIBRILLATION: ICD-10-CM

## 2023-05-16 LAB
ALBUMIN SERPL-MCNC: 3.8 G/DL (ref 3.5–5.2)
ALBUMIN/GLOB SERPL: 1 G/DL
ALP SERPL-CCNC: 76 U/L (ref 39–117)
ALT SERPL W P-5'-P-CCNC: 29 U/L (ref 1–33)
ANION GAP SERPL CALCULATED.3IONS-SCNC: 17 MMOL/L (ref 5–15)
AST SERPL-CCNC: 31 U/L (ref 1–32)
BILIRUB SERPL-MCNC: 0.4 MG/DL (ref 0–1.2)
BUN SERPL-MCNC: 26 MG/DL (ref 8–23)
BUN/CREAT SERPL: 18.3 (ref 7–25)
CALCIUM SPEC-SCNC: 8.7 MG/DL (ref 8.6–10.5)
CHLORIDE SERPL-SCNC: 96 MMOL/L (ref 98–107)
CO2 SERPL-SCNC: 25 MMOL/L (ref 22–29)
CREAT SERPL-MCNC: 1.42 MG/DL (ref 0.57–1)
EGFRCR SERPLBLD CKD-EPI 2021: 36.8 ML/MIN/1.73
GLOBULIN UR ELPH-MCNC: 4 GM/DL
GLUCOSE SERPL-MCNC: 129 MG/DL (ref 65–99)
POTASSIUM SERPL-SCNC: 3.3 MMOL/L (ref 3.5–5.2)
PROT SERPL-MCNC: 7.8 G/DL (ref 6–8.5)
SODIUM SERPL-SCNC: 138 MMOL/L (ref 136–145)

## 2023-05-16 PROCEDURE — 36415 COLL VENOUS BLD VENIPUNCTURE: CPT

## 2023-05-16 PROCEDURE — 80151 DRUG ASSAY AMIODARONE: CPT

## 2023-05-16 PROCEDURE — 80053 COMPREHEN METABOLIC PANEL: CPT

## 2023-05-17 NOTE — PROGRESS NOTES
Spoke to patient. She has stable kidney function. Her potassium is low but she ran out of potassium pills about 3 days prior to labs and has since resumed taking them. Her potassium has previously been normal on her current potassium and diuretic dose

## 2023-05-20 ENCOUNTER — HOSPITAL ENCOUNTER (INPATIENT)
Facility: HOSPITAL | Age: 83
LOS: 2 days | Discharge: HOME | End: 2023-05-22
Attending: EMERGENCY MEDICINE | Admitting: HOSPITALIST
Payer: MEDICARE

## 2023-05-20 ENCOUNTER — APPOINTMENT (OUTPATIENT)
Dept: GENERAL RADIOLOGY | Facility: HOSPITAL | Age: 83
End: 2023-05-20
Payer: MEDICARE

## 2023-05-20 DIAGNOSIS — B34.2 CORONAVIRUS INFECTION: ICD-10-CM

## 2023-05-20 DIAGNOSIS — J96.01 ACUTE HYPOXEMIC RESPIRATORY FAILURE: Primary | ICD-10-CM

## 2023-05-20 DIAGNOSIS — J81.0 ACUTE PULMONARY EDEMA: ICD-10-CM

## 2023-05-20 LAB
ALBUMIN SERPL-MCNC: 3.7 G/DL (ref 3.5–5.2)
ALBUMIN/GLOB SERPL: 1 G/DL
ALP SERPL-CCNC: 70 U/L (ref 39–117)
ALT SERPL W P-5'-P-CCNC: 27 U/L (ref 1–33)
ANION GAP SERPL CALCULATED.3IONS-SCNC: 12 MMOL/L (ref 5–15)
AST SERPL-CCNC: 31 U/L (ref 1–32)
B PARAPERT DNA SPEC QL NAA+PROBE: NOT DETECTED
B PERT DNA SPEC QL NAA+PROBE: NOT DETECTED
BASOPHILS # BLD AUTO: 0.03 10*3/MM3 (ref 0–0.2)
BASOPHILS NFR BLD AUTO: 0.4 % (ref 0–1.5)
BILIRUB SERPL-MCNC: 0.3 MG/DL (ref 0–1.2)
BUN SERPL-MCNC: 21 MG/DL (ref 8–23)
BUN/CREAT SERPL: 17.6 (ref 7–25)
C PNEUM DNA NPH QL NAA+NON-PROBE: NOT DETECTED
CALCIUM SPEC-SCNC: 8.8 MG/DL (ref 8.6–10.5)
CHLORIDE SERPL-SCNC: 102 MMOL/L (ref 98–107)
CO2 SERPL-SCNC: 27 MMOL/L (ref 22–29)
CREAT SERPL-MCNC: 1.19 MG/DL (ref 0.57–1)
DEPRECATED RDW RBC AUTO: 48.4 FL (ref 37–54)
EGFRCR SERPLBLD CKD-EPI 2021: 45.5 ML/MIN/1.73
EOSINOPHIL # BLD AUTO: 0.46 10*3/MM3 (ref 0–0.4)
EOSINOPHIL NFR BLD AUTO: 5.9 % (ref 0.3–6.2)
ERYTHROCYTE [DISTWIDTH] IN BLOOD BY AUTOMATED COUNT: 16 % (ref 12.3–15.4)
FLUAV SUBTYP SPEC NAA+PROBE: NOT DETECTED
FLUBV RNA ISLT QL NAA+PROBE: NOT DETECTED
GLOBULIN UR ELPH-MCNC: 3.8 GM/DL
GLUCOSE SERPL-MCNC: 131 MG/DL (ref 65–99)
HADV DNA SPEC NAA+PROBE: NOT DETECTED
HCOV 229E RNA SPEC QL NAA+PROBE: NOT DETECTED
HCOV HKU1 RNA SPEC QL NAA+PROBE: NOT DETECTED
HCOV NL63 RNA SPEC QL NAA+PROBE: DETECTED
HCOV OC43 RNA SPEC QL NAA+PROBE: NOT DETECTED
HCT VFR BLD AUTO: 40.6 % (ref 34–46.6)
HGB BLD-MCNC: 12.6 G/DL (ref 12–15.9)
HMPV RNA NPH QL NAA+NON-PROBE: NOT DETECTED
HOLD SPECIMEN: NORMAL
HOLD SPECIMEN: NORMAL
HPIV1 RNA ISLT QL NAA+PROBE: NOT DETECTED
HPIV2 RNA SPEC QL NAA+PROBE: NOT DETECTED
HPIV3 RNA NPH QL NAA+PROBE: NOT DETECTED
HPIV4 P GENE NPH QL NAA+PROBE: NOT DETECTED
IMM GRANULOCYTES # BLD AUTO: 0.02 10*3/MM3 (ref 0–0.05)
IMM GRANULOCYTES NFR BLD AUTO: 0.3 % (ref 0–0.5)
LYMPHOCYTES # BLD AUTO: 1.69 10*3/MM3 (ref 0.7–3.1)
LYMPHOCYTES NFR BLD AUTO: 21.6 % (ref 19.6–45.3)
M PNEUMO IGG SER IA-ACNC: NOT DETECTED
MCH RBC QN AUTO: 25.9 PG (ref 26.6–33)
MCHC RBC AUTO-ENTMCNC: 31 G/DL (ref 31.5–35.7)
MCV RBC AUTO: 83.4 FL (ref 79–97)
MONOCYTES # BLD AUTO: 1.12 10*3/MM3 (ref 0.1–0.9)
MONOCYTES NFR BLD AUTO: 14.3 % (ref 5–12)
NEUTROPHILS NFR BLD AUTO: 4.52 10*3/MM3 (ref 1.7–7)
NEUTROPHILS NFR BLD AUTO: 57.5 % (ref 42.7–76)
NRBC BLD AUTO-RTO: 0 /100 WBC (ref 0–0.2)
NT-PROBNP SERPL-MCNC: 3374 PG/ML (ref 0–1800)
PLATELET # BLD AUTO: 238 10*3/MM3 (ref 140–450)
PMV BLD AUTO: 9.7 FL (ref 6–12)
POTASSIUM SERPL-SCNC: 3.4 MMOL/L (ref 3.5–5.2)
PROT SERPL-MCNC: 7.5 G/DL (ref 6–8.5)
RBC # BLD AUTO: 4.87 10*6/MM3 (ref 3.77–5.28)
RHINOVIRUS RNA SPEC NAA+PROBE: NOT DETECTED
RSV RNA NPH QL NAA+NON-PROBE: NOT DETECTED
SARS-COV-2 RNA NPH QL NAA+NON-PROBE: NOT DETECTED
SODIUM SERPL-SCNC: 141 MMOL/L (ref 136–145)
TROPONIN T SERPL HS-MCNC: 20 NG/L
WBC NRBC COR # BLD: 7.84 10*3/MM3 (ref 3.4–10.8)
WHOLE BLOOD HOLD COAG: NORMAL
WHOLE BLOOD HOLD SPECIMEN: NORMAL

## 2023-05-20 PROCEDURE — 84484 ASSAY OF TROPONIN QUANT: CPT

## 2023-05-20 PROCEDURE — 71046 X-RAY EXAM CHEST 2 VIEWS: CPT

## 2023-05-20 PROCEDURE — 0202U NFCT DS 22 TRGT SARS-COV-2: CPT

## 2023-05-20 PROCEDURE — 93010 ELECTROCARDIOGRAM REPORT: CPT | Performed by: INTERNAL MEDICINE

## 2023-05-20 PROCEDURE — 83880 ASSAY OF NATRIURETIC PEPTIDE: CPT

## 2023-05-20 PROCEDURE — 99284 EMERGENCY DEPT VISIT MOD MDM: CPT

## 2023-05-20 PROCEDURE — 85025 COMPLETE CBC W/AUTO DIFF WBC: CPT

## 2023-05-20 PROCEDURE — 25010000002 FUROSEMIDE PER 20 MG: Performed by: EMERGENCY MEDICINE

## 2023-05-20 PROCEDURE — 80053 COMPREHEN METABOLIC PANEL: CPT

## 2023-05-20 PROCEDURE — 93005 ELECTROCARDIOGRAM TRACING: CPT | Performed by: EMERGENCY MEDICINE

## 2023-05-20 PROCEDURE — 93005 ELECTROCARDIOGRAM TRACING: CPT

## 2023-05-20 RX ORDER — POLYETHYLENE GLYCOL 3350 17 G/17G
17 POWDER, FOR SOLUTION ORAL DAILY PRN
Status: DISCONTINUED | OUTPATIENT
Start: 2023-05-20 | End: 2023-05-22 | Stop reason: HOSPADM

## 2023-05-20 RX ORDER — HYDROCODONE POLISTIREX AND CHLORPHENIRAMINE POLISTIREX 10; 8 MG/5ML; MG/5ML
2.5 SUSPENSION, EXTENDED RELEASE ORAL EVERY 12 HOURS PRN
Status: DISCONTINUED | OUTPATIENT
Start: 2023-05-20 | End: 2023-05-22 | Stop reason: HOSPADM

## 2023-05-20 RX ORDER — FUROSEMIDE 10 MG/ML
60 INJECTION INTRAMUSCULAR; INTRAVENOUS ONCE
Status: COMPLETED | OUTPATIENT
Start: 2023-05-20 | End: 2023-05-20

## 2023-05-20 RX ORDER — PANTOPRAZOLE SODIUM 40 MG/1
40 TABLET, DELAYED RELEASE ORAL
Status: DISCONTINUED | OUTPATIENT
Start: 2023-05-21 | End: 2023-05-22 | Stop reason: HOSPADM

## 2023-05-20 RX ORDER — FLUOXETINE HYDROCHLORIDE 20 MG/1
20 CAPSULE ORAL EVERY MORNING
Status: DISCONTINUED | OUTPATIENT
Start: 2023-05-21 | End: 2023-05-22 | Stop reason: HOSPADM

## 2023-05-20 RX ORDER — BISACODYL 10 MG
10 SUPPOSITORY, RECTAL RECTAL DAILY PRN
Status: DISCONTINUED | OUTPATIENT
Start: 2023-05-20 | End: 2023-05-22 | Stop reason: HOSPADM

## 2023-05-20 RX ORDER — BISACODYL 5 MG/1
5 TABLET, DELAYED RELEASE ORAL DAILY PRN
Status: DISCONTINUED | OUTPATIENT
Start: 2023-05-20 | End: 2023-05-22 | Stop reason: HOSPADM

## 2023-05-20 RX ORDER — IPRATROPIUM BROMIDE AND ALBUTEROL SULFATE 2.5; .5 MG/3ML; MG/3ML
3 SOLUTION RESPIRATORY (INHALATION)
Status: DISCONTINUED | OUTPATIENT
Start: 2023-05-21 | End: 2023-05-22 | Stop reason: HOSPADM

## 2023-05-20 RX ORDER — AMOXICILLIN 250 MG
2 CAPSULE ORAL 2 TIMES DAILY
Status: DISCONTINUED | OUTPATIENT
Start: 2023-05-21 | End: 2023-05-22 | Stop reason: HOSPADM

## 2023-05-20 RX ORDER — ATORVASTATIN CALCIUM 80 MG/1
80 TABLET, FILM COATED ORAL DAILY
Status: DISCONTINUED | OUTPATIENT
Start: 2023-05-21 | End: 2023-05-22 | Stop reason: HOSPADM

## 2023-05-20 RX ORDER — NITROGLYCERIN 0.4 MG/1
0.4 TABLET SUBLINGUAL
Status: DISCONTINUED | OUTPATIENT
Start: 2023-05-20 | End: 2023-05-22 | Stop reason: HOSPADM

## 2023-05-20 RX ORDER — SODIUM CHLORIDE 0.9 % (FLUSH) 0.9 %
10 SYRINGE (ML) INJECTION AS NEEDED
Status: DISCONTINUED | OUTPATIENT
Start: 2023-05-20 | End: 2023-05-22 | Stop reason: HOSPADM

## 2023-05-20 RX ORDER — SODIUM CHLORIDE 9 MG/ML
40 INJECTION, SOLUTION INTRAVENOUS AS NEEDED
Status: DISCONTINUED | OUTPATIENT
Start: 2023-05-20 | End: 2023-05-22 | Stop reason: HOSPADM

## 2023-05-20 RX ORDER — METOPROLOL SUCCINATE 100 MG/1
100 TABLET, EXTENDED RELEASE ORAL DAILY
Status: DISCONTINUED | OUTPATIENT
Start: 2023-05-21 | End: 2023-05-22 | Stop reason: HOSPADM

## 2023-05-20 RX ORDER — ONDANSETRON 2 MG/ML
4 INJECTION INTRAMUSCULAR; INTRAVENOUS EVERY 6 HOURS PRN
Status: DISCONTINUED | OUTPATIENT
Start: 2023-05-20 | End: 2023-05-22 | Stop reason: HOSPADM

## 2023-05-20 RX ORDER — SODIUM CHLORIDE 0.9 % (FLUSH) 0.9 %
10 SYRINGE (ML) INJECTION EVERY 12 HOURS SCHEDULED
Status: DISCONTINUED | OUTPATIENT
Start: 2023-05-21 | End: 2023-05-22 | Stop reason: HOSPADM

## 2023-05-20 RX ORDER — FUROSEMIDE 10 MG/ML
40 INJECTION INTRAMUSCULAR; INTRAVENOUS EVERY 12 HOURS
Status: COMPLETED | OUTPATIENT
Start: 2023-05-21 | End: 2023-05-21

## 2023-05-20 RX ORDER — POTASSIUM CHLORIDE 750 MG/1
40 TABLET, FILM COATED, EXTENDED RELEASE ORAL EVERY 4 HOURS
Status: COMPLETED | OUTPATIENT
Start: 2023-05-21 | End: 2023-05-21

## 2023-05-20 RX ORDER — LORAZEPAM 1 MG/1
1 TABLET ORAL EVERY 8 HOURS PRN
Status: DISCONTINUED | OUTPATIENT
Start: 2023-05-20 | End: 2023-05-22 | Stop reason: HOSPADM

## 2023-05-20 RX ORDER — AMIODARONE HYDROCHLORIDE 200 MG/1
200 TABLET ORAL DAILY
Status: DISCONTINUED | OUTPATIENT
Start: 2023-05-21 | End: 2023-05-22 | Stop reason: HOSPADM

## 2023-05-20 RX ADMIN — FUROSEMIDE 60 MG: 20 INJECTION, SOLUTION INTRAMUSCULAR; INTRAVENOUS at 19:32

## 2023-05-20 NOTE — ED PROVIDER NOTES
EMERGENCY DEPARTMENT ENCOUNTER    Room Number:  21/21  Date seen:  5/20/2023  PCP: Tho Mar MD      HPI:  Chief Complaint: Cough and shortness of breath  A complete HPI/ROS/PMH/PSH/SH/FH are unobtainable due to: None  Context: Alice Mabry is a 82 y.o. female who presents to the ED c/o cough and shortness of breath.  She has been coughing for 2 months.  Is gotten worse to the point where she is now desatting particular with exertion into the low 80s.  She has been very fatigued has been spending most of her day in bed according to the patient's daughter.  She denies having any pain.          PAST MEDICAL HISTORY  Active Ambulatory Problems     Diagnosis Date Noted    CAD (coronary artery disease) 07/12/2017    Cardiomyopathy 07/12/2017    Mitral valve insufficiency 07/12/2017    Paroxysmal atrial fibrillation 07/20/2021    HFrEF (heart failure with reduced ejection fraction) 07/27/2021    Nonrheumatic aortic valve stenosis 09/30/2021    Sleep apnea-like behavior 11/24/2021    Dyspnea on exertion 03/10/2022    Abnormal findings on diagnostic imaging of heart and coronary circulation 03/23/2022    Coronary artery disease of native heart with stable angina pectoris, unspecified vessel or lesion type 03/28/2022    Recent cerebrovascular accident (CVA) 04/06/2022    Hypokalemia 04/06/2022    Anemia 04/06/2022    Confusion 04/06/2022    Right kidney mass 04/06/2022    Type 2 diabetes mellitus 04/06/2022    Valvular heart disease 04/06/2022    Essential hypertension 04/06/2022    S/P CABG (coronary artery bypass graft) 04/14/2022    S/P AVR 04/14/2022    Right renal mass 11/03/2022     Resolved Ambulatory Problems     Diagnosis Date Noted    History of coronary artery stent placement 07/12/2017    Chronic diastolic (congestive) heart failure (HCC) 07/19/2019    Atrial fibrillation with RVR 07/06/2021    Stable angina pectoris 07/06/2021    Acute diastolic congestive heart failure 07/08/2021     Past Medical  History:   Diagnosis Date    Acute diastolic (congestive) heart failure     Anxiety     Aortic valve stenosis     Arthritis     Depression     Dizziness     Fatty liver     GERD (gastroesophageal reflux disease)     H/O blood clots     H/O Clostridium difficile infection     Heart murmur     History of atrial fibrillation     History of cervical cancer     History of MI (myocardial infarction) 1999    History of transfusion     [place] (hard of hearing)     Hyperlipidemia     Hypertension     IBS (irritable bowel syndrome)     Incontinence of urine     Renal mass, right          PAST SURGICAL HISTORY  Past Surgical History:   Procedure Laterality Date    APPENDECTOMY  1960    CARDIAC CATHETERIZATION  06/10/2014    Dr. Murphy Horner    CARDIAC CATHETERIZATION  11/13/2007    Dr. Murphy Horner    CARDIAC CATHETERIZATION N/A 10/19/2004    Dr. Murphy Horner    CARDIAC CATHETERIZATION N/A 07/15/2004    Dr. Gregorio Gonzales    CARDIAC CATHETERIZATION  10/2003    Dr. Murphy Horner    CARDIAC CATHETERIZATION N/A 03/21/2022    Procedure: Coronary angiography;  Surgeon: Murphy Horner MD;  Location:  [facility] CATH INVASIVE LOCATION;  Service: Cardiology;  Laterality: N/A;    CARDIAC CATHETERIZATION N/A 03/21/2022    Procedure: Right Heart Cath;  Surgeon: Murphy Horner MD;  [facility];  Service: Cardiology;  Laterality: N/A;    CARDIAC CATHETERIZATION N/A 03/21/2022    Procedure: Left Heart Cath;  Surgeon: Murphy Horner MD;  Location:  [facility] CATH INVASIVE LOCATION;  Service: Cardiology;  Laterality: N/A;    CARDIAC CATHETERIZATION N/A 03/21/2022    Procedure: Left ventriculography;  Surgeon: Murphy Horner MD;  Location:  [facility] CATH INVASIVE LOCATION;  Service: Cardiology;  Laterality: N/A;    CARDIAC CATHETERIZATION N/A 4/13/2023    Procedure: Right Heart Cath;  Surgeon: Murphy Horner MD;  Location:  [facility] CATH INVASIVE LOCATION;  Service: Cardiology;  Laterality: N/A;    CHOLECYSTECTOMY   1998    COLONOSCOPY N/A 07/2001    negative    COLONOSCOPY N/A 02/28/2012    negative    CORONARY ANGIOPLASTY WITH STENT PLACEMENT N/A 07/15/2004    Dr. Murphy Horner    CORONARY ANGIOPLASTY WITH STENT PLACEMENT  03/2002    to RCA    CORONARY ARTERY BYPASS GRAFT WITH AORTIC AND MITRAL VALVE REPAIR/REPLACEMENT N/A 03/28/2022    Procedure: DALTON STERNOTOMY CORONARY ARTERY BYPASS GRAFTING TIMES 4 USING LEFT INTERNAL MAMMARY ARTERY AND LEFT GREATER SAPHENOUS VEIN GRAFT PER ENDOSCOPIC VEIN HARVESTING, RIGHT CORONARY ENDARTERECTOMY, AORTIC VALVE REPLACEMENT, MITRAL VALVE REPAIR, ATRICURE MAZE PROCEDURE, CLOSURE OF LEFT ATRIAL APPENDAGE AND PRP;  Surgeon: Jr Isaias Lynn MD;  Location: [facility];  Service: Cardiothoracic;    CORONARY ARTERY BYPASS GRAFT WITH AORTIC AND MITRAL VALVE REPAIR/REPLACEMENT  03/28/2022    Procedure: ;  Surgeon: Jr Isaias Lynn MD;  Location: [facility];  Service: Cardiothoracic;;    HYSTERECTOMY  1974    LUMBAR DISCECTOMY      NEPHRECTOMY PARTIAL Right 11/3/2022    Procedure: RIGHT ROBOTIC PARTIAL NEPHRECTOMY;  Surgeon: Davy Silva Jr., MD;  Location: [facility] OR;  Service: Robotics - DaVinci;  Laterality: Right;    UPPER GASTROINTESTINAL ENDOSCOPY N/A 04/20/2015    Mild Schatzki ring, hiatus hernia, non-bleeding erosive gastropathy, erythematous mucosa in the antrum, normal duodenum-Dr. Alex Gill         FAMILY HISTORY  Family History   Problem Relation Age of Onset    Heart disease Mother     No Known Problems Father     Heart disease Sister     Heart disease Brother     No Known Problems Maternal Grandmother     No Known Problems Maternal Grandfather     No Known Problems Paternal Grandmother     No Known Problems Paternal Grandfather     Malig Hyperthermia Neg Hx          SOCIAL HISTORY  Social History     Socioeconomic History    Marital status:    Tobacco Use    Smoking status: Former     Packs/day: 1.00     Years: 20.00     Pack years: 20.00      Types: Cigarettes     Quit date:      Years since quittin.3    Smokeless tobacco: Never   Vaping Use    Vaping Use: Never used   Substance and Sexual Activity    Alcohol use: Not Currently    Drug use: Never    Sexual activity: Defer         ALLERGIES  Codeine, Penicillins, and Ambien [zolpidem tartrate]        REVIEW OF SYSTEMS  Review of Systems     All systems reviewed and negative except for those discussed in HPI.       PHYSICAL EXAM  ED Triage Vitals   Temp Heart Rate Resp BP SpO2   23 1728 23 1728 23 1728 23 1730 23 1728   98 °F (36.7 °C) 107 18 90/73 100 %      Temp src Heart Rate Source Patient Position BP Location FiO2 (%)   -- -- 23 1730 23 1730 --     Sitting Left arm        Physical Exam      GENERAL: no acute distress  HENT: nares patent  EYES: no scleral icterus  CV: regular rhythm, normal rate  RESPIRATORY: normal effort, speaks in full sentences, faint bibasilar crackles  ABDOMEN: soft, nontender  MUSCULOSKELETAL: no deformity, no lower extremity edema or tenderness  NEURO: alert, moves all extremities, follows commands  PSYCH:  calm, cooperative  SKIN: warm, dry    Vital signs and nursing notes reviewed.          LAB RESULTS  Recent Results (from the past 24 hour(s))   Comprehensive Metabolic Panel    Collection Time: 23  5:45 PM    Specimen: Blood   Result Value Ref Range    Glucose 131 (H) 65 - 99 mg/dL    BUN 21 8 - 23 mg/dL    Creatinine 1.19 (H) 0.57 - 1.00 mg/dL    Sodium 141 136 - 145 mmol/L    Potassium 3.4 (L) 3.5 - 5.2 mmol/L    Chloride 102 98 - 107 mmol/L    CO2 27.0 22.0 - 29.0 mmol/L    Calcium 8.8 8.6 - 10.5 mg/dL    Total Protein 7.5 6.0 - 8.5 g/dL    Albumin 3.7 3.5 - 5.2 g/dL    ALT (SGPT) 27 1 - 33 U/L    AST (SGOT) 31 1 - 32 U/L    Alkaline Phosphatase 70 39 - 117 U/L    Total Bilirubin 0.3 0.0 - 1.2 mg/dL    Globulin 3.8 gm/dL    A/G Ratio 1.0 g/dL    BUN/Creatinine Ratio 17.6 7.0 - 25.0    Anion Gap 12.0 5.0 - 15.0  mmol/L    eGFR 45.5 (L) >60.0 mL/min/1.73   BNP    Collection Time: 05/20/23  5:45 PM    Specimen: Blood   Result Value Ref Range    proBNP 3,374.0 (H) 0.0 - 1,800.0 pg/mL   Single High Sensitivity Troponin T    Collection Time: 05/20/23  5:45 PM    Specimen: Blood   Result Value Ref Range    HS Troponin T 20 (H) <10 ng/L   Green Top (Gel)    Collection Time: 05/20/23  5:45 PM   Result Value Ref Range    Extra Tube Hold for add-ons.    Lavender Top    Collection Time: 05/20/23  5:45 PM   Result Value Ref Range    Extra Tube hold for add-on    Gold Top - SST    Collection Time: 05/20/23  5:45 PM   Result Value Ref Range    Extra Tube Hold for add-ons.    Light Blue Top    Collection Time: 05/20/23  5:45 PM   Result Value Ref Range    Extra Tube Hold for add-ons.    CBC Auto Differential    Collection Time: 05/20/23  5:45 PM    Specimen: Blood   Result Value Ref Range    WBC 7.84 3.40 - 10.80 10*3/mm3    RBC 4.87 3.77 - 5.28 10*6/mm3    Hemoglobin 12.6 12.0 - 15.9 g/dL    Hematocrit 40.6 34.0 - 46.6 %    MCV 83.4 79.0 - 97.0 fL    MCH 25.9 (L) 26.6 - 33.0 pg    MCHC 31.0 (L) 31.5 - 35.7 g/dL    RDW 16.0 (H) 12.3 - 15.4 %    RDW-SD 48.4 37.0 - 54.0 fl    MPV 9.7 6.0 - 12.0 fL    Platelets 238 140 - 450 10*3/mm3    Neutrophil % 57.5 42.7 - 76.0 %    Lymphocyte % 21.6 19.6 - 45.3 %    Monocyte % 14.3 (H) 5.0 - 12.0 %    Eosinophil % 5.9 0.3 - 6.2 %    Basophil % 0.4 0.0 - 1.5 %    Immature Grans % 0.3 0.0 - 0.5 %    Neutrophils, Absolute 4.52 1.70 - 7.00 10*3/mm3    Lymphocytes, Absolute 1.69 0.70 - 3.10 10*3/mm3    Monocytes, Absolute 1.12 (H) 0.10 - 0.90 10*3/mm3    Eosinophils, Absolute 0.46 (H) 0.00 - 0.40 10*3/mm3    Basophils, Absolute 0.03 0.00 - 0.20 10*3/mm3    Immature Grans, Absolute 0.02 0.00 - 0.05 10*3/mm3    nRBC 0.0 0.0 - 0.2 /100 WBC   Respiratory Panel PCR w/COVID-19(SARS-CoV-2) [facility] In-House, NP Swab in [facility], 3-4 HR TAT - Swab, Nasopharynx    Collection Time: 05/20/23  5:46 PM     Specimen: Nasopharynx; Swab   Result Value Ref Range    ADENOVIRUS, PCR Not Detected Not Detected    Coronavirus 229E Not Detected Not Detected    Coronavirus HKU1 Not Detected Not Detected    Coronavirus NL63 Detected (A) Not Detected    Coronavirus OC43 Not Detected Not Detected    COVID19 Not Detected Not Detected - Ref. Range    Human Metapneumovirus Not Detected Not Detected    Human Rhinovirus/Enterovirus Not Detected Not Detected    Influenza A PCR Not Detected Not Detected    Influenza B PCR Not Detected Not Detected    Parainfluenza Virus 1 Not Detected Not Detected    Parainfluenza Virus 2 Not Detected Not Detected    Parainfluenza Virus 3 Not Detected Not Detected    Parainfluenza Virus 4 Not Detected Not Detected    RSV, PCR Not Detected Not Detected    Bordetella pertussis pcr Not Detected Not Detected    Bordetella parapertussis PCR Not Detected Not Detected    Chlamydophila pneumoniae PCR Not Detected Not Detected    Mycoplasma pneumo by PCR Not Detected Not Detected   ECG 12 Lead ED Triage Standing Order; SOA    Collection Time: 05/20/23  6:07 PM   Result Value Ref Range    QT Interval 344 ms   ECG 12 Lead Dyspnea    Collection Time: 05/20/23  8:12 PM   Result Value Ref Range    QT Interval 413 ms       Ordered the above labs and reviewed the results.        RADIOLOGY  XR Chest 2 View    Result Date: 5/20/2023  CHEST: 2 VIEWS  HISTORY: Cough and shortness of air.  COMPARISON: Two-view chest 03/08/2023, AP chest, 04/06/2022.  FINDINGS:Sternotomy wires, prosthetic valves, cardiomegaly are present. The thoracic aorta is tortuous and aortic vascular calcifications are present. There are increased interstitial markings which appear progressive when compared to previous studies and this is suspected to represent mild hydrostatic interstitial pulmonary edema versus atypical interstitial infiltrate superimposed on chronic interstitial disease.      Suspect mild CHF with increased interstitial markings  suspicious for mild hydrostatic interstitial pulmonary edema. Symmetric atypical interstitial infiltrates are also a consideration. Heart size is enlarged and there has been previous median sternotomy with aortic and mitral valve replacements.  This report was finalized on 5/20/2023 6:32 PM by Dr. Jose Guadalupe Cruz M.D.       Ordered the above noted radiological studies. Reviewed by me in PACS.          PROCEDURES  Procedures        MEDICATIONS GIVEN IN ER  Medications   sodium chloride 0.9 % flush 10 mL (has no administration in time range)   furosemide (LASIX) injection 60 mg (60 mg Intravenous Given 5/20/23 1932)         MEDICAL DECISION MAKING, PROGRESS, and CONSULTS    Discussion below represents my analysis of pertinent findings related to patient's condition, differential diagnosis, treatment plan and final disposition.      Orders placed during this visit:  Orders Placed This Encounter   Procedures    Respiratory Panel PCR w/COVID-19(SARS-CoV-2) [facility] In-House, NP Swab in UTM/VTM, 3-4 HR TAT - Swab, Nasopharynx    XR Chest 2 View    [place] Draw    Comprehensive Metabolic Panel    BNP    Single High Sensitivity Troponin T    CBC Auto Differential    NPO Diet NPO Type: Strict NPO    Undress & Gown    Continuous Pulse Oximetry    Vital Signs    LHA (on-call MD unless specified) Details    Oxygen Therapy- Nasal Cannula; Titrate 1-6 LPM Per SpO2; 90 - 95%    ECG 12 Lead ED Triage Standing Order; SOA    ECG 12 Lead Dyspnea    Insert Peripheral IV    Inpatient Admission    CBC & Differential    Green Top (Gel)    Lavender Top    Gold Top - SST    Light Blue Top         Additional sources:  - Discussed/obtained information from independent historians:  and daughter at bedside  Additional information was obtained to confirm the patient's history.        Differential diagnosis:    Differential diagnosis includes but not limited to CHF, acute coronary syndrome, pulmonary embolism,  pneumothorax, pneumonia, asthma/COPD, pulmonary hypertension, deconditioning, anemia, other hematologic etiologies such as CO poisoning, anxiety.         Independent interpretation of labs, radiology studies, and discussions with consultants:  ED Course as of 05/20/23 2045   Sat May 20, 2023   1900 Coronavirus NL63(!): Detected [TD]   1900 Creatinine(!): 1.19 [TD]   1900 Potassium(!): 3.4 [TD]   1901 proBNP(!): 3,374.0 [TD]   1901 WBC: 7.84 [TD]   1902 Chest x-ray shows interstitial markings suspicious for mild pulmonary edema. [TD]   2014 EKG independently interpreted by myself.  Time 8:12 PM.  Atrial fibrillation.  Heart rate 89.  Normal axis.  Prolonged R wave progression.  Nonspecific ST changes. [TD]   2045 I discussed the case Dr. Caceres, hospitalist.  We reviewed patient's labs, history, imaging.  He will admit. [TD]      ED Course User Index  [TD] Benny Hull II, MD               DIAGNOSIS  Final diagnoses:   Acute hypoxemic respiratory failure   Coronavirus infection   Acute pulmonary edema         DISPOSITION  Admit      Latest Documented Vital Signs:  As of 20:45 EDT  BP- 112/76 HR- 103 Temp- 98 °F (36.7 °C) O2 sat- 100%      --    Please note that portions of this were completed with a voice recognition program.       Note Disclaimer: At [facility], we believe that sharing information builds trust and better relationships. You are receiving this note because you are receiving care at [facility] or recently visited. It is possible you will see health information before a provider has talked with you about it. This kind of information can be easy to misunderstand. To help you fully understand what it means for your health, we urge you to discuss this note with your provider.         Benny Hull II, MD  05/20/23 2046

## 2023-05-20 NOTE — ED NOTES
Pt c/o cough that has worsened over the last couple of months. Pt reports being fatigued, SOA, generalized weakness. Pt denies pain, sore throat. Pt is alert and oriented. Pt reports she has hx of afib and CHF

## 2023-05-21 PROBLEM — B34.2 CORONAVIRUS INFECTION: Status: ACTIVE | Noted: 2023-05-21

## 2023-05-21 PROBLEM — R53.81 PHYSICAL DECONDITIONING: Status: ACTIVE | Noted: 2023-05-21

## 2023-05-21 LAB
ANION GAP SERPL CALCULATED.3IONS-SCNC: 12 MMOL/L (ref 5–15)
BASOPHILS # BLD AUTO: 0.02 10*3/MM3 (ref 0–0.2)
BASOPHILS NFR BLD AUTO: 0.3 % (ref 0–1.5)
BUN SERPL-MCNC: 21 MG/DL (ref 8–23)
BUN/CREAT SERPL: 17.6 (ref 7–25)
CALCIUM SPEC-SCNC: 8.7 MG/DL (ref 8.6–10.5)
CHLORIDE SERPL-SCNC: 100 MMOL/L (ref 98–107)
CO2 SERPL-SCNC: 26 MMOL/L (ref 22–29)
CREAT SERPL-MCNC: 1.19 MG/DL (ref 0.57–1)
DEPRECATED RDW RBC AUTO: 48.9 FL (ref 37–54)
EGFRCR SERPLBLD CKD-EPI 2021: 45.5 ML/MIN/1.73
EOSINOPHIL # BLD AUTO: 0.31 10*3/MM3 (ref 0–0.4)
EOSINOPHIL NFR BLD AUTO: 4.3 % (ref 0.3–6.2)
ERYTHROCYTE [DISTWIDTH] IN BLOOD BY AUTOMATED COUNT: 16.4 % (ref 12.3–15.4)
GLUCOSE SERPL-MCNC: 120 MG/DL (ref 65–99)
HCT VFR BLD AUTO: 38.2 % (ref 34–46.6)
HGB BLD-MCNC: 12 G/DL (ref 12–15.9)
IMM GRANULOCYTES # BLD AUTO: 0.02 10*3/MM3 (ref 0–0.05)
IMM GRANULOCYTES NFR BLD AUTO: 0.3 % (ref 0–0.5)
LYMPHOCYTES # BLD AUTO: 1.76 10*3/MM3 (ref 0.7–3.1)
LYMPHOCYTES NFR BLD AUTO: 24.4 % (ref 19.6–45.3)
MAGNESIUM SERPL-MCNC: 2.2 MG/DL (ref 1.6–2.4)
MCH RBC QN AUTO: 26 PG (ref 26.6–33)
MCHC RBC AUTO-ENTMCNC: 31.4 G/DL (ref 31.5–35.7)
MCV RBC AUTO: 82.9 FL (ref 79–97)
MONOCYTES # BLD AUTO: 1.06 10*3/MM3 (ref 0.1–0.9)
MONOCYTES NFR BLD AUTO: 14.7 % (ref 5–12)
NEUTROPHILS NFR BLD AUTO: 4.04 10*3/MM3 (ref 1.7–7)
NEUTROPHILS NFR BLD AUTO: 56 % (ref 42.7–76)
NRBC BLD AUTO-RTO: 0 /100 WBC (ref 0–0.2)
PLATELET # BLD AUTO: 234 10*3/MM3 (ref 140–450)
PMV BLD AUTO: 9.8 FL (ref 6–12)
POTASSIUM SERPL-SCNC: 3.4 MMOL/L (ref 3.5–5.2)
RBC # BLD AUTO: 4.61 10*6/MM3 (ref 3.77–5.28)
SODIUM SERPL-SCNC: 138 MMOL/L (ref 136–145)
WBC NRBC COR # BLD: 7.21 10*3/MM3 (ref 3.4–10.8)

## 2023-05-21 PROCEDURE — 97162 PT EVAL MOD COMPLEX 30 MIN: CPT

## 2023-05-21 PROCEDURE — 85025 COMPLETE CBC W/AUTO DIFF WBC: CPT | Performed by: HOSPITALIST

## 2023-05-21 PROCEDURE — 83735 ASSAY OF MAGNESIUM: CPT | Performed by: HOSPITALIST

## 2023-05-21 PROCEDURE — 97110 THERAPEUTIC EXERCISES: CPT

## 2023-05-21 PROCEDURE — 94799 UNLISTED PULMONARY SVC/PX: CPT

## 2023-05-21 PROCEDURE — 80048 BASIC METABOLIC PNL TOTAL CA: CPT | Performed by: HOSPITALIST

## 2023-05-21 PROCEDURE — 99222 1ST HOSP IP/OBS MODERATE 55: CPT | Performed by: INTERNAL MEDICINE

## 2023-05-21 PROCEDURE — 94640 AIRWAY INHALATION TREATMENT: CPT

## 2023-05-21 PROCEDURE — 94761 N-INVAS EAR/PLS OXIMETRY MLT: CPT

## 2023-05-21 PROCEDURE — 94760 N-INVAS EAR/PLS OXIMETRY 1: CPT

## 2023-05-21 PROCEDURE — 25010000002 FUROSEMIDE PER 20 MG: Performed by: HOSPITALIST

## 2023-05-21 RX ORDER — BUMETANIDE 0.25 MG/ML
0.5 INJECTION INTRAMUSCULAR; INTRAVENOUS
Status: DISCONTINUED | OUTPATIENT
Start: 2023-05-21 | End: 2023-05-22 | Stop reason: HOSPADM

## 2023-05-21 RX ORDER — ACETAMINOPHEN 325 MG/1
650 TABLET ORAL EVERY 6 HOURS PRN
Status: DISCONTINUED | OUTPATIENT
Start: 2023-05-21 | End: 2023-05-22 | Stop reason: HOSPADM

## 2023-05-21 RX ORDER — POTASSIUM CHLORIDE 1.5 G/1.77G
20 POWDER, FOR SOLUTION ORAL 2 TIMES DAILY
Status: DISCONTINUED | OUTPATIENT
Start: 2023-05-21 | End: 2023-05-22

## 2023-05-21 RX ADMIN — LORAZEPAM 1 MG: 1 TABLET ORAL at 22:13

## 2023-05-21 RX ADMIN — APIXABAN 5 MG: 5 TABLET, FILM COATED ORAL at 12:05

## 2023-05-21 RX ADMIN — FLUOXETINE HYDROCHLORIDE 20 MG: 20 CAPSULE ORAL at 06:08

## 2023-05-21 RX ADMIN — ACETAMINOPHEN 650 MG: 325 TABLET, FILM COATED ORAL at 13:09

## 2023-05-21 RX ADMIN — PANTOPRAZOLE SODIUM 40 MG: 40 TABLET, DELAYED RELEASE ORAL at 06:08

## 2023-05-21 RX ADMIN — AMIODARONE HYDROCHLORIDE 200 MG: 200 TABLET ORAL at 08:49

## 2023-05-21 RX ADMIN — EMPAGLIFLOZIN 25 MG: 25 TABLET, FILM COATED ORAL at 08:49

## 2023-05-21 RX ADMIN — FUROSEMIDE 40 MG: 40 INJECTION, SOLUTION INTRAMUSCULAR; INTRAVENOUS at 00:19

## 2023-05-21 RX ADMIN — BUMETANIDE 3 MG: 2 TABLET ORAL at 17:19

## 2023-05-21 RX ADMIN — POTASSIUM CHLORIDE 40 MEQ: 750 TABLET, EXTENDED RELEASE ORAL at 04:11

## 2023-05-21 RX ADMIN — POTASSIUM CHLORIDE 20 MEQ: 1.5 POWDER, FOR SOLUTION ORAL at 21:40

## 2023-05-21 RX ADMIN — Medication 10 ML: at 08:50

## 2023-05-21 RX ADMIN — POTASSIUM CHLORIDE 40 MEQ: 750 TABLET, EXTENDED RELEASE ORAL at 00:21

## 2023-05-21 RX ADMIN — ATORVASTATIN CALCIUM 80 MG: 80 TABLET, FILM COATED ORAL at 08:49

## 2023-05-21 RX ADMIN — IPRATROPIUM BROMIDE AND ALBUTEROL SULFATE 3 ML: 2.5; .5 SOLUTION RESPIRATORY (INHALATION) at 11:39

## 2023-05-21 RX ADMIN — ACETAMINOPHEN 650 MG: 325 TABLET, FILM COATED ORAL at 21:40

## 2023-05-21 RX ADMIN — Medication 10 ML: at 00:19

## 2023-05-21 RX ADMIN — ACETAMINOPHEN 650 MG: 325 TABLET, FILM COATED ORAL at 01:54

## 2023-05-21 RX ADMIN — HYDROCODONE POLISTIREX AND CHLORPHENIRAMINE POLISTIREX 2.5 ML: 10; 8 SUSPENSION, EXTENDED RELEASE ORAL at 21:40

## 2023-05-21 RX ADMIN — APIXABAN 5 MG: 5 TABLET, FILM COATED ORAL at 00:20

## 2023-05-21 RX ADMIN — HYDROCODONE POLISTIREX AND CHLORPHENIRAMINE POLISTIREX 2.5 ML: 10; 8 SUSPENSION, EXTENDED RELEASE ORAL at 01:54

## 2023-05-21 RX ADMIN — FUROSEMIDE 40 MG: 40 INJECTION, SOLUTION INTRAMUSCULAR; INTRAVENOUS at 12:05

## 2023-05-21 RX ADMIN — IPRATROPIUM BROMIDE AND ALBUTEROL SULFATE 3 ML: 2.5; .5 SOLUTION RESPIRATORY (INHALATION) at 19:41

## 2023-05-21 RX ADMIN — IPRATROPIUM BROMIDE AND ALBUTEROL SULFATE 3 ML: 2.5; .5 SOLUTION RESPIRATORY (INHALATION) at 15:07

## 2023-05-21 RX ADMIN — Medication 12.5 MG: at 08:49

## 2023-05-21 RX ADMIN — METOPROLOL SUCCINATE 100 MG: 100 TABLET, EXTENDED RELEASE ORAL at 08:49

## 2023-05-21 NOTE — PLAN OF CARE
Goal Outcome Evaluation:  Plan of Care Reviewed With: patient           Outcome Evaluation: Pt VSS AOx4.  IV lasix, starting home dose of bumex tonight.  Likely home tomorrow.  No needs at this time.  Call light within reach.

## 2023-05-21 NOTE — CONSULTS
Patient Name: Alice Mabry  Age/Sex: 82 y.o. female  : 1940  MRN: [id number]    Date of Admission: 2023  Date of Encounter Visit: 23  Encounter Provider: Brannon Stevens MD  Place of Service: [facility] CARDIOLOGY      Referring Provider: Denys Caceres MD  Patient Care Team:  Tho Mar MD as PCP - General (Internal Medicine)  Murphy Horner MD as Consulting Physician (Cardiology)  Jr Isaias Lynn MD as Surgeon (Cardiothoracic Surgery)    Subjective:       Chief Complaint:   Fatigue, shortness of breath    History of Present Illness:  Alice Mabry is a 82 y.o. female with past medical history of CAD with CABG last year, AVR, and cryo MAZE.  She ejection fraction is now normal.      She has struggled a lot with fatigue and shortness of breath over the past few months, and she is here with the same.      Treatment attempted include cardioversion, anti arrhythmic therapy with amiodarone, and increase in diuretic prescription.      Most of these seem to have temporarily improved things, but within a week or two she is back with similar complaints.     She currently feels improved, which she attributes to the iv lasix that she received in the ER.      She has also converted to sinus rhythm.       Past Medical History:  Past Medical History:   Diagnosis Date   • Acute diastolic (congestive) heart failure    • Anxiety    • Aortic valve stenosis     HX CABG, AVR, MVR 3/2022   • Arthritis    • CAD (coronary artery disease)     stents   • Depression    • Dizziness    • Fatty liver    • GERD (gastroesophageal reflux disease)    • H/O blood clots    • H/O Clostridium difficile infection    • Heart murmur    • History of atrial fibrillation    • History of cervical cancer     HX HYSTERECTOMY   • History of MI (myocardial infarction)    • History of transfusion     no reactions   • [place] (hard of hearing)    • Hyperlipidemia    • Hypertension     • IBS (irritable bowel syndrome)    • Incontinence of urine     wears pads   • Renal mass, right        Past Surgical History:   Procedure Laterality Date   • APPENDECTOMY  1960   • CARDIAC CATHETERIZATION  06/10/2014    Dr. Murphy Horner   • CARDIAC CATHETERIZATION  11/13/2007    Dr. Murphy Horner   • CARDIAC CATHETERIZATION N/A 10/19/2004    Dr. Murphy Horner   • CARDIAC CATHETERIZATION N/A 07/15/2004    Dr. Gregorio Gonzales   • CARDIAC CATHETERIZATION  10/2003    Dr. Murphy Horner   • CARDIAC CATHETERIZATION N/A 03/21/2022    Procedure: Coronary angiography;  Surgeon: Murphy Horner MD;  Location:  [facility];  Service: Cardiology;  Laterality: N/A;   • CARDIAC CATHETERIZATION N/A 03/21/2022    Procedure: Right Heart Cath;  Surgeon: Murphy Horner MD;  Location:  [facility];  Service: Cardiology;  Laterality: N/A;   • CARDIAC CATHETERIZATION N/A 03/21/2022    Procedure: Left Heart Cath;  Surgeon: Murphy Horner MD;  Location:  [facility];  Service: Cardiology;  Laterality: N/A;   • CARDIAC CATHETERIZATION N/A 03/21/2022    Procedure: Left ventriculography;  Surgeon: Murphy Horner MD;  Location:  [facility];  Service: Cardiology;  Laterality: N/A;   • CARDIAC CATHETERIZATION N/A 4/13/2023    Procedure: Right Heart Cath;  Surgeon: Murphy Horner MD;  Location:  [facility];  Service: Cardiology;  Laterality: N/A;   • CHOLECYSTECTOMY  1998   • COLONOSCOPY N/A 07/2001    negative   • COLONOSCOPY N/A 02/28/2012    negative   • CORONARY ANGIOPLASTY WITH STENT PLACEMENT N/A 07/15/2004    Dr. Murphy Horner   • CORONARY ANGIOPLASTY WITH STENT PLACEMENT  03/2002    to RCA   • CORONARY ARTERY BYPASS GRAFT WITH AORTIC AND MITRAL VALVE REPAIR/REPLACEMENT N/A 03/28/2022    Procedure: DALTON STERNOTOMY CORONARY ARTERY BYPASS GRAFTING TIMES 4 USING LEFT INTERNAL MAMMARY ARTERY AND LEFT GREATER SAPHENOUS VEIN GRAFT PER ENDOSCOPIC  VEIN HARVESTING, RIGHT CORONARY ENDARTERECTOMY, AORTIC VALVE REPLACEMENT, MITRAL VALVE REPAIR, ATRICURE MAZE PROCEDURE, CLOSURE OF LEFT ATRIAL APPENDAGE AND PRP;  Surgeon: Jr Isaias Lynn MD;  Location: [facility];  Service: Cardiothoracic;   • CORONARY ARTERY BYPASS GRAFT WITH AORTIC AND MITRAL VALVE REPAIR/REPLACEMENT  03/28/2022    Procedure: ;  Surgeon: Jr Isaias Lynn MD;  Location: [facility];  Service: Cardiothoracic;;   • HYSTERECTOMY  1974   • LUMBAR DISCECTOMY     • NEPHRECTOMY PARTIAL Right 11/3/2022    Procedure: RIGHT ROBOTIC PARTIAL NEPHRECTOMY;  Surgeon: Davy Silva Jr., MD;  Location: [facility] MAIN OR;  Service: Robotics - DaVinci;  Laterality: Right;   • UPPER GASTROINTESTINAL ENDOSCOPY N/A 04/20/2015    Mild Schatzki ring, hiatus hernia, non-bleeding erosive gastropathy, erythematous mucosa in the antrum, normal duodenum-Dr. Alex Gill       Home Medications:   Medications Prior to Admission   Medication Sig Dispense Refill Last Dose   • amiodarone (PACERONE) 200 MG tablet Take 1 tablet by mouth Daily. 90 tablet 1    • apixaban (Eliquis) 5 MG tablet tablet Take 1 tablet by mouth Every 12 (Twelve) Hours. 1 tablet 0    • atorvastatin (LIPITOR) 80 MG tablet TAKE 1 TABLET DAILY 90 tablet 3    • bumetanide (BUMEX) 2 MG tablet Take 1.5 tablets by mouth 2 (Two) Times a Day. 180 tablet 3    • dapagliflozin Propanediol (Farxiga) 10 MG tablet Take 10 mg by mouth Every Morning. 90 tablet 3    • FLUoxetine (PROzac) 20 MG capsule Take 1 capsule by mouth Every Morning.      • LORazepam (ATIVAN) 1 MG tablet Take 1 tablet by mouth Every 8 (Eight) Hours As Needed for Anxiety.      • metoprolol succinate XL (TOPROL-XL) 100 MG 24 hr tablet Take 1 tablet by mouth Daily. 90 tablet 3    • nitroglycerin (NITROSTAT) 0.4 MG SL tablet Place 1 tablet under the tongue Every 5 (Five) Minutes As Needed for Chest Pain. Take no more than 3 doses in 15 minutes. 100 tablet 3    • omeprazole (priLOSEC) 40  MG capsule Take 1 capsule by mouth Daily.      • potassium chloride 10 MEQ CR tablet Take 1 tablet by mouth 2 (Two) Times a Day. 60 tablet 11    • spironolactone (ALDACTONE) 25 MG tablet TAKE 1/2 TABLET BY MOUTH DAILY (Patient taking differently: Take 12.5 mg by mouth Daily.) 30 tablet 5    • Vitamin D, Ergocalciferol, 2000 units capsule Take 2,000 Units by mouth Daily. TO HOLD 1 WEEK PRIOR TO OR          Allergies:  Allergies   Allergen Reactions   • Codeine Other (See Comments)     Chest pain .  Patient states she is allergic to codeine and tylenol when together but not separate.   • Penicillins Hives   • Ambien [Zolpidem Tartrate] Confusion       Past Social History:  Social History     Socioeconomic History   • Marital status:    Tobacco Use   • Smoking status: Former     Packs/day: 1.00     Years: 20.00     Pack years: 20.00     Types: Cigarettes     Quit date:      Years since quittin.4   • Smokeless tobacco: Never   Vaping Use   • Vaping Use: Never used   Substance and Sexual Activity   • Alcohol use: Not Currently   • Drug use: Never   • Sexual activity: Defer        Past Family History:  Family History   Problem Relation Age of Onset   • Heart disease Mother    • No Known Problems Father    • Heart disease Sister    • Heart disease Brother    • No Known Problems Maternal Grandmother    • No Known Problems Maternal Grandfather    • No Known Problems Paternal Grandmother    • No Known Problems Paternal Grandfather    • Malig Hyperthermia Neg Hx    • Drug abuse Neg Hx        Review of Systems: All systems reviewed. Pertinent positives identified in HPI. All other systems are negative.     Review of Systems   Constitutional: Negative.   Cardiovascular: Negative.    Respiratory: Negative.    Gastrointestinal: Negative.          Objective:     Objective:  Temp:  [97.5 °F (36.4 °C)-99.2 °F (37.3 °C)] 97.9 °F (36.6 °C)  Heart Rate:  [] 73  Resp:  [16-18] 18  BP: ()/(53-78)  103/74    Intake/Output Summary (Last 24 hours) at 5/21/2023 1617  Last data filed at 5/21/2023 0610  Gross per 24 hour   Intake 720 ml   Output 550 ml   Net 170 ml     Body mass index is 26.97 kg/m².      05/20/23  1728 05/20/23  2250 05/21/23  0505   Weight: 73.5 kg (162 lb) 73.2 kg (161 lb 6.4 oz) 73.5 kg (162 lb 1.6 oz)           Physical Exam:   Vitals and nursing note reviewed.   Constitutional:       General: Not in acute distress.  Pulmonary:      Effort: Pulmonary effort is normal. No respiratory distress.   Cardiovascular:      Normal rate. Regular rhythm.   Edema:     Peripheral edema absent.   Skin:     General: Skin is warm and dry.   Neurological:      Mental Status: Alert and oriented to person, place, and time.   Psychiatric:         Behavior: Behavior normal.         Thought Content: Thought content normal.         Judgment: Judgment normal.           Lab Review:     Results from last 7 days   Lab Units 05/21/23  0318 05/20/23  1745 05/16/23  1232   SODIUM mmol/L 138 141 138   POTASSIUM mmol/L 3.4* 3.4* 3.3*   CHLORIDE mmol/L 100 102 96*   CO2 mmol/L 26.0 27.0 25.0   BUN mg/dL 21 21 26*   CREATININE mg/dL 1.19* 1.19* 1.42*   GLUCOSE mg/dL 120* 131* 129*   CALCIUM mg/dL 8.7 8.8 8.7       Results from last 7 days   Lab Units 05/20/23  1745   HSTROP T ng/L 20*     Results from last 7 days   Lab Units 05/21/23  0318   WBC 10*3/mm3 7.21   HEMOGLOBIN g/dL 12.0   HEMATOCRIT % 38.2   PLATELETS 10*3/mm3 234             Results from last 7 days   Lab Units 05/21/23  0318   MAGNESIUM mg/dL 2.2         Results from last 7 days   Lab Units 05/20/23  1745   PROBNP pg/mL 3,374.0*               Imaging:    Imaging Results (Most Recent)     Procedure Component Value Units Date/Time    XR Chest 2 View [[id number]] Collected: 05/20/23 1828     Updated: 05/20/23 1835    Narrative:      CHEST: 2 VIEWS     HISTORY: Cough and shortness of air.     COMPARISON: Two-view chest 03/08/2023, AP chest, 04/06/2022.      FINDINGS:Sternotomy wires, prosthetic valves, cardiomegaly are present.  The thoracic aorta is tortuous and aortic vascular calcifications are  present. There are increased interstitial markings which appear  progressive when compared to previous studies and this is suspected to  represent mild hydrostatic interstitial pulmonary edema versus atypical  interstitial infiltrate superimposed on chronic interstitial disease.       Impression:      Suspect mild CHF with increased interstitial markings  suspicious for mild hydrostatic interstitial pulmonary edema. Symmetric  atypical interstitial infiltrates are also a consideration. Heart size  is enlarged and there has been previous median sternotomy with aortic  and mitral valve replacements.     This report was finalized on 5/20/2023 6:32 PM by Dr. Jose Guadalupe Cruz M.D.             EKG:   Initially was in atrial fibrillation now sinus rhythm    Also history of atypical flutter following cryo Maze ablation      Baseline:     I personally viewed and interpreted the patient's EKG/Telemetry tracings.    Assessment:   Assessment & Plan         Acute hypoxemic respiratory failure    CAD (coronary artery disease)    Paroxysmal atrial fibrillation    HFrEF (heart failure with reduced ejection fraction)    Nonrheumatic aortic valve stenosis    Dyspnea on exertion    Essential hypertension    S/P CABG (coronary artery bypass graft)    S/P AVR    Coronavirus infection    Physical deconditioning        Plan:     She really doesn't look to be in that bad of heart failure.  No edema, lungs pretty clear, but she says she never has edema.      She is receiving a breathing treatment now and appears comfortable.     Possibly its due to arrhythmia and repeat cryo MAZE should be considered.     Thank you for allowing me to participate in the care of Alice Mabry. Feel free to contact me directly with any further questions or concerns.    Brannon Stevens MD  05/21/23  16:17 EDT

## 2023-05-21 NOTE — THERAPY EVALUATION
Patient Name: Alice Mabry  : 1940    MRN: [id number]                              Today's Date: 2023       Admit Date: 2023    Visit Dx:     ICD-10-CM ICD-9-CM   1. Acute hypoxemic respiratory failure  J96.01 518.81   2. Coronavirus infection  B34.2 079.89   3. Acute pulmonary edema  J81.0 518.4     Patient Active Problem List   Diagnosis   • CAD (coronary artery disease)   • Cardiomyopathy   • Mitral valve insufficiency   • Paroxysmal atrial fibrillation   • HFrEF (heart failure with reduced ejection fraction)   • Nonrheumatic aortic valve stenosis   • Sleep apnea-like behavior   • Dyspnea on exertion   • Abnormal findings on diagnostic imaging of heart and coronary circulation   • Coronary artery disease of native heart with stable angina pectoris, unspecified vessel or lesion type   • Recent cerebrovascular accident (CVA)   • Hypokalemia   • Anemia   • Confusion   • Right kidney mass   • Type 2 diabetes mellitus   • Valvular heart disease   • Essential hypertension   • S/P CABG (coronary artery bypass graft)   • S/P AVR   • Right renal mass   • Acute hypoxemic respiratory failure   • Coronavirus infection   • Physical deconditioning     Past Medical History:   Diagnosis Date   • Acute diastolic (congestive) heart failure    • Anxiety    • Aortic valve stenosis     HX CABG, AVR, MVR 3/2022   • Arthritis    • CAD (coronary artery disease)     stents   • Depression    • Dizziness    • Fatty liver    • GERD (gastroesophageal reflux disease)    • H/O blood clots    • H/O Clostridium difficile infection    • Heart murmur    • History of atrial fibrillation    • History of cervical cancer     HX HYSTERECTOMY   • History of MI (myocardial infarction)    • History of transfusion     no reactions   • [place] (hard of hearing)    • Hyperlipidemia    • Hypertension    • IBS (irritable bowel syndrome)    • Incontinence of urine     wears pads   • Renal mass, right      Past Surgical History:    Procedure Laterality Date   • APPENDECTOMY  1960   • CARDIAC CATHETERIZATION  06/10/2014    Dr. Murphy Horner   • CARDIAC CATHETERIZATION  11/13/2007    Dr. Murphy Horner   • CARDIAC CATHETERIZATION N/A 10/19/2004    Dr. Murphy Horner   • CARDIAC CATHETERIZATION N/A 07/15/2004    Dr. Gregorio Gonzales   • CARDIAC CATHETERIZATION  10/2003    Dr. Murphy Horner   • CARDIAC CATHETERIZATION N/A 03/21/2022    Procedure: Coronary angiography;  Surgeon: Murphy Horner MD;  Location:  [facility];  Service: Cardiology;  Laterality: N/A;   • CARDIAC CATHETERIZATION N/A 03/21/2022    Procedure: Right Heart Cath;  Surgeon: Murphy Horner MD;  Location:  [facility];  Service: Cardiology;  Laterality: N/A;   • CARDIAC CATHETERIZATION N/A 03/21/2022    Procedure: Left Heart Cath;  Surgeon: Murphy Horenr MD;  Location:  [facility];  Service: Cardiology;  Laterality: N/A;   • CARDIAC CATHETERIZATION N/A 03/21/2022    Procedure: Left ventriculography;  Surgeon: Murphy Horner MD;  Location:  [facility];  Service: Cardiology;  Laterality: N/A;   • CARDIAC CATHETERIZATION N/A 4/13/2023    Procedure: Right Heart Cath;  Surgeon: Murphy Horner MD;  Location:  [facility];  Service: Cardiology;  Laterality: N/A;   • CHOLECYSTECTOMY  1998   • COLONOSCOPY N/A 07/2001    negative   • COLONOSCOPY N/A 02/28/2012    negative   • CORONARY ANGIOPLASTY WITH STENT PLACEMENT N/A 07/15/2004    Dr. Murphy Horner   • CORONARY ANGIOPLASTY WITH STENT PLACEMENT  03/2002    to RCA   • CORONARY ARTERY BYPASS GRAFT WITH AORTIC AND MITRAL VALVE REPAIR/REPLACEMENT N/A 03/28/2022    Procedure: DALTON STERNOTOMY CORONARY ARTERY BYPASS GRAFTING TIMES 4 USING LEFT INTERNAL MAMMARY ARTERY AND LEFT GREATER SAPHENOUS VEIN GRAFT PER ENDOSCOPIC VEIN HARVESTING, RIGHT CORONARY ENDARTERECTOMY, AORTIC VALVE REPLACEMENT, MITRAL VALVE REPAIR, ATRICURE MAZE PROCEDURE, CLOSURE  OF LEFT ATRIAL APPENDAGE AND PRP;  Surgeon: Jr Isaias Lynn MD;  Location: [facility];  Service: Cardiothoracic;   • CORONARY ARTERY BYPASS GRAFT WITH AORTIC AND MITRAL VALVE REPAIR/REPLACEMENT  03/28/2022    Procedure: ;  Surgeon: Jr Isaias Lynn MD;  Location: [facility];  Service: Cardiothoracic;;   • HYSTERECTOMY  1974   • LUMBAR DISCECTOMY     • NEPHRECTOMY PARTIAL Right 11/3/2022    Procedure: RIGHT ROBOTIC PARTIAL NEPHRECTOMY;  Surgeon: Davy Silva Jr., MD;  Location: [facility];  Service: Robotics - DaVinci;  Laterality: Right;   • UPPER GASTROINTESTINAL ENDOSCOPY N/A 04/20/2015    Mild Schatzki ring, hiatus hernia, non-bleeding erosive gastropathy, erythematous mucosa in the antrum, normal duodenum-Dr. Alex Gill      General Information     Row Name 05/21/23 1117          Physical Therapy Time and Intention    Document Type evaluation  -LB     Mode of Treatment physical therapy  -LB     Row Name 05/21/23 1117          General Information    Patient Profile Reviewed yes  -LB     Prior Level of Function independent:  using RW  -LB     Existing Precautions/Restrictions fall  -LB     Barriers to Rehab none identified  -LB     Row Name 05/21/23 1117          Living Environment    People in Home spouse  -LB     Row Name 05/21/23 1117          Home Main Entrance    Number of Stairs, Main Entrance one  -LB     Stair Railings, Main Entrance railings safe and in good condition  -LB     Row Name 05/21/23 1117          Stairs Within Home, Primary    Number of Stairs, Within Home, Primary none  -LB     Row Name 05/21/23 1117          Cognition    Orientation Status (Cognition) oriented x 4  [place]  -LB     Row Name 05/21/23 1117          Safety Issues, Functional Mobility    Impairments Affecting Function (Mobility) shortness of breath;strength;endurance/activity tolerance  -LB     Comment, Safety Issues/Impairments (Mobility) gait belt and non-skid socks donned throughout  -LB            User Key  (r) = Recorded By, (t) = Taken By, (c) = Cosigned By    Initials Name Provider Type    Shirley Durán PT Physical Therapist               Mobility     Row Name 05/21/23 1118          Bed Mobility    Bed Mobility bed mobility (all) activities  -LB     All Activities, [place] (Bed Mobility) minimum assist (75% patient effort)  -LB     Assistive Device (Bed Mobility) bed rails  -LB     Row Name 05/21/23 1118          Bed-Chair Transfer    Bed-Chair [place] (Transfers) not tested  -LB     Row Name 05/21/23 1118          Sit-Stand Transfer    Sit-Stand [place] (Transfers) minimum assist (75% patient effort)  -LB     Assistive Device (Sit-Stand Transfers) walker, front-wheeled  -LB     Row Name 05/21/23 1118          Gait/Stairs (Locomotion)    [place] Level (Gait) contact guard;verbal cues  -LB     Assistive Device (Gait) walker, front-wheeled  -LB     Distance in Feet (Gait) 40'  -LB     Deviations/Abnormal Patterns (Gait) stride length decreased  -LB     Bilateral Gait Deviations forward flexed posture  -LB     Comment, (Gait/Stairs) pt fatigued and SOA following ambulation; O2 sat 82% with return to sitting EOB  -LB           User Key  (r) = Recorded By, (t) = Taken By, (c) = Cosigned By    Initials Name Provider Type    Shirley Durán PT Physical Therapist               Obj/Interventions     Row Name 05/21/23 1119          Range of Motion Comprehensive    General Range of Motion no range of motion deficits identified  -LB     Comment, General Range of Motion BLE WFL  -LB     Row Name 05/21/23 1119          Strength Comprehensive (MMT)    General Manual Muscle Testing (MMT) Assessment no strength deficits identified  -LB     Comment, General Manual Muscle Testing (MMT) Assessment BLE grossly 4/5  -LB           User Key  (r) = Recorded By, (t) = Taken By, (c) = Cosigned By    Initials Name Provider Type    Shirley Durán PT Physical Therapist               Goals/Plan     Row Name  05/21/23 1122          Bed Mobility Goal 1 (PT)    Activity/Assistive Device (Bed Mobility Goal 1, PT) bed mobility activities, all  -LB     [place] Level/Cues Needed (Bed Mobility Goal 1, PT) supervision required  -LB     Time Frame (Bed Mobility Goal 1, PT) 1 week  -LB     Row Name 05/21/23 1122          Transfer Goal 1 (PT)    Activity/Assistive Device (Transfer Goal 1, PT) transfers, all  -LB     [place] Level/Cues Needed (Transfer Goal 1, PT) supervision required  -LB     Time Frame (Transfer Goal 1, PT) 1 week  -LB     Row Name 05/21/23 1122          Gait Training Goal 1 (PT)    Activity/Assistive Device (Gait Training Goal 1, PT) gait (walking locomotion)  -LB     [place] Level (Gait Training Goal 1, PT) contact guard required  -LB     Distance (Gait Training Goal 1, PT) 120'  -LB     Time Frame (Gait Training Goal 1, PT) 1 week  -LB           User Key  (r) = Recorded By, (t) = Taken By, (c) = Cosigned By    Initials Name Provider Type    LB Shirley Ruby, PT Physical Therapist               Clinical Impression     Row Name 05/21/23 1119          Pain    Pretreatment Pain Rating 0/10 - no pain  -LB     Posttreatment Pain Rating 0/10 - no pain  -LB     Pain Intervention(s) Ambulation/increased activity  -LB     Row Name 05/21/23 1119          Plan of Care Review    Plan of Care Reviewed With patient  -LB     Progress no change  -LB     Outcome Evaluation Pt 83 yo female admitted with increasing SOA, weakness from home. She uses RW for ambulation and occasionally drives and goes out in community. She lives with spouse with 2 TRACY. She demonstrates mobility below baseline as she was very fatigued and SOA following short distance ambulation of 40' using RW. O2 sats 82% with return to seated EOB position. O2 sat recovered in 1 min to >90%. Pt required min A for bed mobility and STS. She remains appropriate for continued inpatient skilled PT services to address mobility deficits and progress  ambulation. She will benefit from HH PT at d/c.  -LB     Row Name 05/21/23 1119          Therapy Assessment/Plan (PT)    Patient/Family Therapy Goals Statement (PT) home with HH PT  -LB     Rehab Potential (PT) good, to achieve stated therapy goals  -LB     Criteria for Skilled Interventions Met (PT) yes  -LB     Therapy Frequency (PT) 6 times/wk  -LB     Row Name 05/21/23 1119          Vital Signs    O2 Delivery Pre Treatment supplemental O2  -LB     O2 Delivery Intra Treatment supplemental O2  -LB     O2 Delivery Post Treatment supplemental O2  -LB     Pre Patient Position Supine  -LB     Intra Patient Position Standing  -LB     Post Patient Position Supine  -LB     Row Name 05/21/23 1119          Positioning and Restraints    Pre-Treatment Position in bed  -LB     Post Treatment Position bed  -LB     In Bed supine;call light within reach;encouraged to call for assist;exit alarm on;with family/caregiver  -LB           User Key  (r) = Recorded By, (t) = Taken By, (c) = Cosigned By    Initials Name Provider Type    Shirley Durán, PT Physical Therapist               Outcome Measures     Row Name 05/21/23 1122          How much help from another person do you currently need...    Turning from your back to your side while in flat bed without using bedrails? 3  -LB     Moving from lying on back to sitting on the side of a flat bed without bedrails? 3  -LB     Moving to and from a bed to a chair (including a wheelchair)? 3  -LB     Standing up from a chair using your arms (e.g., wheelchair, bedside chair)? 3  -LB     Climbing 3-5 steps with a railing? 3  -LB     To walk in hospital room? 3  -LB     AM-PAC 6 Clicks Score (PT) 18  -LB     Highest level of mobility 6 --> Walked 10 steps or more  -LB     Row Name 05/21/23 1122          Functional Assessment    Outcome Measure Options AM-PAC 6 Clicks Basic Mobility (PT)  -LB           User Key  (r) = Recorded By, (t) = Taken By, (c) = Cosigned By    Initials Name Provider  Type    LB Shirley Ruby PT Physical Therapist                             Physical Therapy Education     Title: PT OT SLP Therapies (Done)     Topic: Physical Therapy (Done)     Point: Mobility training (Done)     Learning Progress Summary           Patient Acceptance, E,TB, DU,VU by LB at 5/21/2023 1122                   Point: Home exercise program (Done)     Learning Progress Summary           Patient Acceptance, E,TB, DU,VU by LB at 5/21/2023 1122                   Point: Body mechanics (Done)     Learning Progress Summary           Patient Acceptance, E,TB, DU,VU by LB at 5/21/2023 1122                   Point: Precautions (Done)     Learning Progress Summary           Patient Acceptance, E,TB, DU,VU by LB at 5/21/2023 1122                               User Key     Initials Effective Dates Name Provider Type Discipline     08/09/20 -  Shirley Ruby PT Physical Therapist PT              PT Recommendation and Plan     Plan of Care Reviewed With: patient  Progress: no change  Outcome Evaluation: Pt 83 yo female admitted with increasing SOA, weakness from home. She uses RW for ambulation and occasionally drives and goes out in community. She lives with spouse with 2 TRACY. She demonstrates mobility below baseline as she was very fatigued and SOA following short distance ambulation of 40' using RW. O2 sats 82% with return to seated EOB position. O2 sat recovered in 1 min to >90%. Pt required min A for bed mobility and STS. She remains appropriate for continued inpatient skilled PT services to address mobility deficits and progress ambulation. She will benefit from  PT at d/c.     Time Calculation:    PT Charges     Row Name 05/21/23 1123             Time Calculation    Start Time 1050  -LB      Stop Time 1110  -LB      Time Calculation (min) 20 min  -LB      PT Received On 05/21/23  -LB      PT - Next Appointment 05/22/23  -LB      PT Goal Re-Cert Due Date 05/28/23  -LB         Time Calculation- PT    Total  Timed Code Minutes- PT 15 minute(s)  -FLOR            User Key  (r) = Recorded By, (t) = Taken By, (c) = Cosigned By    Initials Name Provider Type    Shirley Durán PT Physical Therapist              Therapy Charges for Today     Code Description Service Date Service Provider Modifiers Qty    [id number]  PT EVAL MOD COMPLEXITY 2 5/21/2023 Shirley Ruby, PT GP 1    [id number]  PT THER PROC EA 15 MIN 5/21/2023 Shirley Ruby, PT GP 1          PT G-Codes  Outcome Measure Options: AM-PAC 6 Clicks Basic Mobility (PT)  AM-PAC 6 Clicks Score (PT): 18  PT Discharge Summary  Anticipated Discharge Disposition (PT): home with home health    Shirley Ruby PT  5/21/2023

## 2023-05-21 NOTE — PLAN OF CARE
Goal Outcome Evaluation:  Plan of Care Reviewed With: patient        Progress: no change  Outcome Evaluation: Pt 81 yo female admitted with increasing SOA, weakness from home. She uses RW for ambulation and occasionally drives and goes out in community. She lives with spouse with 2 TRACY. She demonstrates mobility below baseline as she was very fatigued and SOA following short distance ambulation of 40' using RW. O2 sats 82% with return to seated EOB position. O2 sat recovered in 1 min to >90%. Pt required min A for bed mobility and STS. She remains appropriate for continued inpatient skilled PT services to address mobility deficits and progress ambulation. She will benefit from  PT at d/c.

## 2023-05-21 NOTE — ED NOTES
"Nursing report ED to floor  Alice Mabry  82 y.o.  female    HPI :   Chief Complaint   Patient presents with    Cough       Admitting doctor:   Denys Caceres MD    Admitting diagnosis:   The primary encounter diagnosis was Acute hypoxemic respiratory failure. Diagnoses of Coronavirus infection and Acute pulmonary edema were also pertinent to this visit.    Code status:   Current Code Status       Date Active Code Status Order ID Comments User Context       Prior            Allergies:   Codeine, Penicillins, and Ambien [zolpidem tartrate]    Isolation:   No active isolations    Intake and Output  No intake or output data in the 24 hours ending 05/20/23 2105    Weight:       05/20/23  1728   Weight: 73.5 kg (162 lb)       Most recent vitals:   Vitals:    05/20/23 1728 05/20/23 1730 05/20/23 1813   BP:  90/73 112/76   BP Location:  Left arm    Patient Position:  Sitting    Pulse: 107  103   Resp: 18     Temp: 98 °F (36.7 °C)     SpO2: 100%     Weight: 73.5 kg (162 lb)     Height: 165.1 cm (65\")         Active LDAs/IV Access:   Lines, Drains & Airways       Active LDAs       Name Placement date Placement time Site Days    Peripheral IV 05/20/23 1746 Anterior;Right Antecubital 05/20/23  1746  Antecubital  less than 1                    Labs (abnormal labs have a star):   Labs Reviewed   RESPIRATORY PANEL PCR W/ COVID-19 (SARS-COV-2) [facility]SE, NP SWAB IN UTM/VTP, 3-4 HR TAT - Abnormal; Notable for the following components:       Result Value    Coronavirus NL63 Detected (*)     All other components within normal limits    Narrative:     In the setting of a positive respiratory panel with a viral infection PLUS a negative procalcitonin without other underlying concern for bacterial infection, consider observing off antibiotics or discontinuation of antibiotics and continue supportive care. If the respiratory panel is positive for atypical bacterial infection (Bordetella pertussis, " Chlamydophila pneumoniae, or Mycoplasma pneumoniae), consider antibiotic de-escalation to target atypical bacterial infection.   COMPREHENSIVE METABOLIC PANEL - Abnormal; Notable for the following components:    Glucose 131 (*)     Creatinine 1.19 (*)     Potassium 3.4 (*)     eGFR 45.5 (*)     All other components within normal limits    Narrative:     GFR Normal >60  Chronic Kidney Disease <60  Kidney Failure <15    The GFR formula is only valid for adults with stable renal function between ages 18 and 70.   BNP (IN-HOUSE) - Abnormal; Notable for the following components:    proBNP 3,374.0 (*)     All other components within normal limits    Narrative:     Among patients with dyspnea, NT-proBNP is highly sensitive for the detection of acute congestive heart failure. In addition NT-proBNP of <300 pg/ml effectively rules out acute congestive heart failure with 99% negative predictive value.    Results may be falsely decreased if patient taking Biotin.     SINGLE HSTROPONIN T - Abnormal; Notable for the following components:    HS Troponin T 20 (*)     All other components within normal limits    Narrative:     High Sensitive Troponin T Reference Range:  <10.0 ng/L- Negative Female for AMI  <15.0 ng/L- Negative Male for AMI  >=10 - Abnormal Female indicating possible myocardial injury.  >=15 - Abnormal Male indicating possible myocardial injury.   Clinicians would have to utilize clinical acumen, EKG, Troponin, and serial changes to determine if it is an Acute Myocardial Infarction or myocardial injury due to an underlying chronic condition.        CBC WITH AUTO DIFFERENTIAL - Abnormal; Notable for the following components:    MCH 25.9 (*)     MCHC 31.0 (*)     RDW 16.0 (*)     Monocyte % 14.3 (*)     Monocytes, Absolute 1.12 (*)     Eosinophils, Absolute 0.46 (*)     All other components within normal limits   RAINBOW DRAW    Narrative:     The following orders were created for panel order [place] Draw.  Procedure                                Abnormality         Status                     ---------                               -----------         ------                     Green Top (Gel)[[id number]]                                  Final result               Lavender Top[[id number]]                                     Final result               Gold Top - SST[[id number]]                                   Final result               Light Blue Top[[id number]]                                   Final result                 Please view results for these tests on the individual orders.   CBC AND DIFFERENTIAL    Narrative:     The following orders were created for panel order CBC & Differential.  Procedure                               Abnormality         Status                     ---------                               -----------         ------                     CBC Auto Differential[[id number]]        Abnormal            Final result                 Please view results for these tests on the individual orders.   GREEN TOP   LAVENDER TOP   GOLD TOP - SST   LIGHT BLUE TOP       EKG:   ECG 12 Lead Dyspnea   Preliminary Result   HEART RATE= 89  bpm   RR Interval= 674  ms   RI Interval=   ms   P Horizontal Axis=   deg   P Front Axis=   deg   QRSD Interval= 104  ms   QT Interval= 413  ms   QRS Axis= 82  deg   T Wave Axis= 169  deg   - ABNORMAL ECG -   Atrial fibrillation   Inferior infarct, acute (RCA)   Prolonged QT interval   RV involvement, suggest recording right precordial leads   Electronically Signed By:    Date and Time of Study: 2023-05-20 20:12:35      ECG 12 Lead ED Triage Standing Order; SOA   Preliminary Result   HEART RATE= 103  bpm   RR Interval= 583  ms   RI Interval=   ms   P Horizontal Axis= 190  deg   P Front Axis= 0  deg   QRSD Interval= 100  ms   QT Interval= 344  ms   QRS Axis= 83  deg   T Wave Axis= 146  deg   - ABNORMAL ECG -   AV block, complete (third degree)   Borderline right axis deviation   Nonspecific  repol abnormality, lateral leads   Electronically Signed By:    Date and Time of Study: 2023 18:07:39          Meds given in ED:   Medications   sodium chloride 0.9 % flush 10 mL (has no administration in time range)   furosemide (LASIX) injection 60 mg (60 mg Intravenous Given 23)       Imaging results:  XR Chest 2 View    Result Date: 2023  Suspect mild CHF with increased interstitial markings suspicious for mild hydrostatic interstitial pulmonary edema. Symmetric atypical interstitial infiltrates are also a consideration. Heart size is enlarged and there has been previous median sternotomy with aortic and mitral valve replacements.  This report was finalized on 2023 6:32 PM by Dr. Jose Guadalupe Cruz M.D.       Ambulatory status:   - Assist x2     Social issues:   Social History     Socioeconomic History    Marital status:    Tobacco Use    Smoking status: Former     Packs/day: 1.00     Years: 20.00     Pack years: 20.00     Types: Cigarettes     Quit date:      Years since quittin.3    Smokeless tobacco: Never   Vaping Use    Vaping Use: Never used   Substance and Sexual Activity    Alcohol use: Not Currently    Drug use: Never    Sexual activity: Defer       NIH Stroke Scale:         Shaylee Plata RN  23 21:05 EDT

## 2023-05-21 NOTE — PROGRESS NOTES
Name: Alice Mabry ADMIT: 2023   : 1940  PCP: Tho Mar MD    MRN: [id number] LOS: 1 days   AGE/SEX: 82 y.o. female  ROOM: [facility]     Subjective   Subjective   She is doing well from a cardiopulmonary standpoint.  She is now on room air.  Reports feeling much better.      Objective   Objective   Vital Signs  Temp:  [97.5 °F (36.4 °C)-99.2 °F (37.3 °C)] 97.9 °F (36.6 °C)  Heart Rate:  [] 79  Resp:  [16-18] 18  BP: ()/(53-78) 103/74  SpO2:  [94 %-100 %] 100 %  on  Flow (L/min):  [2] 2;   Device (Oxygen Therapy): room air  Body mass index is 26.97 kg/m².  Physical Exam  Constitutional:       Appearance: Normal appearance.   HENT:      Head: Normocephalic and atraumatic.   Cardiovascular:      Rate and Rhythm: Normal rate and regular rhythm.   Pulmonary:      Effort: Pulmonary effort is normal. No respiratory distress.   Abdominal:      General: There is no distension.      Palpations: Abdomen is soft.      Tenderness: There is no abdominal tenderness.   Musculoskeletal:      Right lower leg: No edema.      Left lower leg: No edema.   Neurological:      Mental Status: She is alert.         Results Review     I reviewed the patient's new clinical results.  Results from last 7 days   Lab Units 23  0318 23  1745   WBC 10*3/mm3 7.21 7.84   HEMOGLOBIN g/dL 12.0 12.6   PLATELETS 10*3/mm3 234 238     Results from last 7 days   Lab Units 23  0318 23  1745 23  1232   SODIUM mmol/L 138 141 138   POTASSIUM mmol/L 3.4* 3.4* 3.3*   CHLORIDE mmol/L 100 102 96*   CO2 mmol/L 26.0 27.0 25.0   BUN mg/dL *   CREATININE mg/dL 1.19* 1.19* 1.42*   GLUCOSE mg/dL 120* 131* 129*   Estimated Creatinine Clearance: 36 mL/min (A) (by C-G formula based on SCr of 1.19 mg/dL (H)).  Results from last 7 days   Lab Units 23  1745 23  1232   ALBUMIN g/dL 3.7 3.8   BILIRUBIN mg/dL 0.3 0.4   ALK PHOS U/L 70 76   AST (SGOT) U/L 31 31   ALT (SGPT) U/L 27 29      Results from last 7 days   Lab Units 05/21/23  0318 05/20/23  1745 05/16/23  1232   CALCIUM mg/dL 8.7 8.8 8.7   ALBUMIN g/dL  --  3.7 3.8   MAGNESIUM mg/dL 2.2  --   --        COVID19   Date Value Ref Range Status   05/20/2023 Not Detected Not Detected - Ref. Range Final   03/25/2022 Not Detected Not Detected - Ref. Range Final     No results found for: HGBA1C, POCGLU    XR Chest 2 View  Narrative: CHEST: 2 VIEWS     HISTORY: Cough and shortness of air.     COMPARISON: Two-view chest 03/08/2023, AP chest, 04/06/2022.     FINDINGS:Sternotomy wires, prosthetic valves, cardiomegaly are present.  The thoracic aorta is tortuous and aortic vascular calcifications are  present. There are increased interstitial markings which appear  progressive when compared to previous studies and this is suspected to  represent mild hydrostatic interstitial pulmonary edema versus atypical  interstitial infiltrate superimposed on chronic interstitial disease.     Impression: Suspect mild CHF with increased interstitial markings  suspicious for mild hydrostatic interstitial pulmonary edema. Symmetric  atypical interstitial infiltrates are also a consideration. Heart size  is enlarged and there has been previous median sternotomy with aortic  and mitral valve replacements.     This report was finalized on 5/20/2023 6:32 PM by Dr. Jose Guadalupe Cruz M.D.       Scheduled Medications  amiodarone, 200 mg, Oral, Daily  apixaban, 5 mg, Oral, Q12H  atorvastatin, 80 mg, Oral, Daily  bumetanide, 3 mg, Oral, BID   Or  bumetanide, 0.5 mg, Intravenous, BID  empagliflozin, 25 mg, Oral, Daily  FLUoxetine, 20 mg, Oral, QAM  ipratropium-albuterol, 3 mL, Nebulization, 4x Daily - RT  metoprolol succinate XL, 100 mg, Oral, Daily  pantoprazole, 40 mg, Oral, Q AM  senna-docusate sodium, 2 tablet, Oral, BID  sodium chloride, 10 mL, Intravenous, Q12H  spironolactone, 12.5 mg, Oral, Daily    Infusions   Diet  Diet: Cardiac Diets; Healthy Heart (2-3 Na+);  Texture: Regular Texture (IDDSI 7); Fluid Consistency: Thin (IDDSI 0)       Assessment/Plan     Active Hospital Problems    Diagnosis  POA   • **Acute hypoxemic respiratory failure [J96.01]  Yes   • Coronavirus infection [B34.2]  Unknown   • Physical deconditioning [R53.81]  Unknown   • S/P CABG (coronary artery bypass graft) [Z95.1]  Not Applicable   • S/P AVR [Z95.2]  Not Applicable   • Essential hypertension [I10]  Yes   • Dyspnea on exertion [R06.09]  Yes   • Nonrheumatic aortic valve stenosis [I35.0]  Yes   • HFrEF (heart failure with reduced ejection fraction) [I50.20]  Yes   • Paroxysmal atrial fibrillation [I48.0]  Yes   • CAD (coronary artery disease) [I25.10]  Yes      Resolved Hospital Problems   No resolved problems to display.         82 y.o. female  with a history of CAD and aortic insufficiency/stenosis status post CABG and AVR March 2022, persistent atrial fibrillation on anticoagulation, hyperlipidemia, hypertension, ischemic cardiomyopathy with recovered LVEF, pulmonary hypertension presents with 2 months of dyspnea on exertion, fatigue, failure to thrive.     Acute hypoxic respiratory failure  Failure to thrive, progressive weakness, decreased appetite and weight loss  Deconditioning  -PT/OT, nutrition    Acute on chronic CHF, presumed diastolic (recovered LVEF)  CAD, aortic insufficiency status post CABG and AVR last year  -Mild CHF on x-ray.  Received some IV Lasix and I have started her home Bumex this morning.  -Statin, Farxiga, metoprolol, Aldactone  -Echocardiogram almost 2 months ago above defer to cardiology if they wish to repeat  -Monitor output, weights     Persistent atrial fibrillation  -Apixaban, amiodarone, metoprolol     Coronavirus NL 63 infection  -Acutely contributing to her issues  -Supportive care      · Eliquis (home med) for DVT prophylaxis.  · DNR.  · Discussed with patient and nursing staff.  · Anticipate discharge home with home health tomorrow.      Hieu Bennett  MD [facility]  05/21/23  13:52 EDT

## 2023-05-21 NOTE — PLAN OF CARE
Goal Outcome Evaluation:  Plan of Care Reviewed With: patient        Progress: no change  Outcome Evaluation: vitals stable.  pt expressed pain and a cough.  meds given per orders.  turn q 2 hours.  erich care as needed.  external catheter in place.  skin breakdown noted.  wound consult placed.  cardiology consulted.  will continue to monitor.

## 2023-05-21 NOTE — H&P
[facility]    Patient Identification:  Name: Alice Mabry  Age: 82 y.o.  Sex: female  :  1940  MRN: [id number]         Primary Care Physician: Tho Mar MD    Chief Complaint   Patient presents with   • Cough     Subjective   Patient is a 82 y.o. female with a history of CAD and aortic insufficiency/stenosis status post CABG and AVR 2022, persistent atrial fibrillation on anticoagulation, hyperlipidemia, hypertension, ischemic cardiomyopathy with recovered LVEF (echocardiogram below), pulmonary hypertension. She had cardioversion for atrial fibrillation but is back in atrial fibrillation. She presents to the ED because of 2 months worth of fatigue and dyspnea on exertion. It has progressively gotten worse over the Last couple months to the point where she will not get out of bed. Patient's  called patient's daughter and with her help convince her to come to the hospital. She was given IV furosemide in the ED however she denies much improvement so far. Her main complaint is severe fatigue and weakness. Normally she ambulates with a walker and she is not able to do much out of bed. She is also had a cough. Denies any fevers or chills. She does not eat much and she has lost some weight. No nausea or vomiting or diarrhea. She does not lie flat sleeps with a raised bed.    Results for orders placed during the hospital encounter of 23    Adult Transthoracic Echo Complete w/ Color, Spectral and Contrast if Necessary Per Protocol    Interpretation Summary  •  Left ventricular systolic function is normal. Calculated left ventricular EF = 54.2%  •  Left ventricular wall thickness is consistent with mild concentric hypertrophy.  •  Left ventricular diastolic function was indeterminate.  •  The right ventricular cavity is mildly dilated.  •  The right atrial cavity is severely  dilated.  •  There is a bioprosthetic aortic valve  present.  •  Aortic valve area is 0.8 cm2.  •  Peak velocity of the flow distal to the aortic valve is 241.1 cm/s. Aortic valve mean pressure gradient is 11 mmHg.  •  Fixed posterior leaflet findings are consistent with surgical mitral valve repair.  •  Moderate mitral valve regurgitation is present.  •  Moderate tricuspid valve regurgitation is present.  •  Estimated right ventricular systolic pressure from tricuspid regurgitation is markedly elevated (>55 mmHg). Calculated right ventricular systolic pressure from tricuspid regurgitation is 62 mmHg.     Past Medical History:   Diagnosis Date   • Acute diastolic (congestive) heart failure    • Anxiety    • Aortic valve stenosis     HX CABG, AVR, MVR 3/2022   • Arthritis    • CAD (coronary artery disease)     stents   • Depression    • Dizziness    • Fatty liver    • GERD (gastroesophageal reflux disease)    • H/O blood clots    • H/O Clostridium difficile infection    • Heart murmur    • History of atrial fibrillation    • History of cervical cancer     HX HYSTERECTOMY   • History of MI (myocardial infarction) 1999   • History of transfusion     no reactions   • [place] (hard of hearing)    • Hyperlipidemia    • Hypertension    • IBS (irritable bowel syndrome)    • Incontinence of urine     wears pads   • Renal mass, right      Past Surgical History:   Procedure Laterality Date   • APPENDECTOMY  1960   • CARDIAC CATHETERIZATION  06/10/2014    Dr. Murphy Horner   • CARDIAC CATHETERIZATION  11/13/2007    Dr. Murphy Horner   • CARDIAC CATHETERIZATION N/A 10/19/2004    Dr. Murphy Horner   • CARDIAC CATHETERIZATION N/A 07/15/2004    Dr. Gregorio Gonzales   • CARDIAC CATHETERIZATION  10/2003    Dr. Murphy Horner   • CARDIAC CATHETERIZATION N/A 03/21/2022    Procedure: Coronary angiography;  Surgeon: Murphy Horner MD;  Location: [facility] CATH INVASIVE LOCATION;  Service: Cardiology;  Laterality: N/A;   • CARDIAC CATHETERIZATION N/A 03/21/2022    Procedure: Right Heart Cath;   Surgeon: Murphy Horner MD;  Location: [facility] CATH INVASIVE LOCATION;  Service: Cardiology;  Laterality: N/A;   • CARDIAC CATHETERIZATION N/A 03/21/2022    Procedure: Left Heart Cath;  Surgeon: Murphy Horner MD;  [facility];  Service: Cardiology;  Laterality: N/A;   • CARDIAC CATHETERIZATION N/A 03/21/2022    Procedure: Left ventriculography;  Surgeon: Murphy Horner MD;  [facility];  Service: Cardiology;  Laterality: N/A;   • CARDIAC CATHETERIZATION N/A 4/13/2023    Procedure: Right Heart Cath;  Surgeon: Murphy Horner MD;  [facility];  Service: Cardiology;  Laterality: N/A;   • CHOLECYSTECTOMY  1998   • COLONOSCOPY N/A 07/2001    negative   • COLONOSCOPY N/A 02/28/2012    negative   • CORONARY ANGIOPLASTY WITH STENT PLACEMENT N/A 07/15/2004    Dr. Murphy Horner   • CORONARY ANGIOPLASTY WITH STENT PLACEMENT  03/2002    to RCA   • CORONARY ARTERY BYPASS GRAFT WITH AORTIC AND MITRAL VALVE REPAIR/REPLACEMENT N/A 03/28/2022    Procedure: DALTON STERNOTOMY CORONARY ARTERY BYPASS GRAFTING TIMES 4 USING LEFT INTERNAL MAMMARY ARTERY AND LEFT GREATER SAPHENOUS VEIN GRAFT PER ENDOSCOPIC VEIN HARVESTING, RIGHT CORONARY ENDARTERECTOMY, AORTIC VALVE REPLACEMENT, MITRAL VALVE REPAIR, ATRICURE MAZE PROCEDURE, CLOSURE OF LEFT ATRIAL APPENDAGE AND PRP;  Surgeon: Jr Isaias Lynn MD;  Location: [facility];  Service: Cardiothoracic;   • CORONARY ARTERY BYPASS GRAFT WITH AORTIC AND MITRAL VALVE REPAIR/REPLACEMENT  03/28/2022    Procedure: ;  Surgeon: Jr Isaias Lynn MD;  Location: [facility];  Service: Cardiothoracic;;   • HYSTERECTOMY  1974   • LUMBAR DISCECTOMY     • NEPHRECTOMY PARTIAL Right 11/3/2022    Procedure: RIGHT ROBOTIC PARTIAL NEPHRECTOMY;  Surgeon: Davy Silva Jr., MD;  Location: [facility] OR;  Service: [facility];  Laterality: Right;   • UPPER GASTROINTESTINAL ENDOSCOPY N/A 04/20/2015    Mild  Schatzki ring, hiatus hernia, non-bleeding erosive gastropathy, erythematous mucosa in the antrum, normal duodenum-Dr. Alex Gill     Family History   Problem Relation Age of Onset   • Heart disease Mother    • No Known Problems Father    • Heart disease Sister    • Heart disease Brother    • No Known Problems Maternal Grandmother    • No Known Problems Maternal Grandfather    • No Known Problems Paternal Grandmother    • No Known Problems Paternal Grandfather    • Malig Hyperthermia Neg Hx    • Drug abuse Neg Hx      Social History     Tobacco Use   • Smoking status: Former     Packs/day: 1.00     Years: 20.00     Pack years: 20.00     Types: Cigarettes     Quit date:      Years since quittin.3   • Smokeless tobacco: Never   Vaping Use   • Vaping Use: Never used   Substance Use Topics   • Alcohol use: Not Currently   • Drug use: Never     Objective      Vital Signs  Temp:  [98 °F (36.7 °C)-98.5 °F (36.9 °C)] 98.5 °F (36.9 °C)  Heart Rate:  [] 95  Resp:  [18] 18  BP: ()/(64-76) 117/64  Body mass index is 26.86 kg/m².    Physical Exam  Constitutional:       General: She is not in acute distress.     Appearance: Normal appearance. She is ill-appearing. She is not toxic-appearing.   HENT:      Head: Normocephalic and atraumatic.   Cardiovascular:      Rate and Rhythm: Normal rate. Rhythm irregular.   Pulmonary:      Effort: Pulmonary effort is normal. No respiratory distress.      Breath sounds: Examination of the right-lower field reveals rales. Examination of the left-lower field reveals rales. Rales present. No wheezing or rhonchi.      Comments: Frequent coughing  Abdominal:      General: Bowel sounds are normal.      Palpations: Abdomen is soft.      Tenderness: There is no abdominal tenderness. There is no guarding or rebound.   Musculoskeletal:         General: No swelling.      Cervical back: Normal range of motion.      Right lower leg: No edema.      Left lower leg: No edema.    Skin:     General: Skin is warm and dry.   Neurological:      General: No focal deficit present.      Mental Status: She is alert and oriented to person, place, and time.   Psychiatric:         Mood and Affect: Mood normal.         Behavior: Behavior normal.         Thought Content: Thought content normal.     I have personally reviewed:  [x]  Medications  [x]  Laboratory   [x]  Microbiology   [x]  Radiology   []  EKG/Telemetry   []  Cardiology/Vascular   []  Pathology   [x]  Records    Lab Results   Component Value Date    ANIONGAP 12.0 05/20/2023     Estimated Creatinine Clearance: 35.9 mL/min (A) (by C-G formula based on SCr of 1.19 mg/dL (H)).    Assessment & Plan   Active Hospital Problems    Diagnosis  POA   • **Acute hypoxemic respiratory failure [J96.01]  Yes   • Coronavirus infection [B34.2]  Unknown   • Physical deconditioning [R53.81]  Unknown   • S/P CABG (coronary artery bypass graft) [Z95.1]  Not Applicable   • S/P AVR [Z95.2]  Not Applicable   • Essential hypertension [I10]  Yes   • Dyspnea on exertion [R06.09]  Yes   • Nonrheumatic aortic valve stenosis [I35.0]  Yes   • HFrEF (heart failure with reduced ejection fraction) [I50.20]  Yes   • Paroxysmal atrial fibrillation [I48.0]  Yes   • CAD (coronary artery disease) [I25.10]  Yes      Resolved Hospital Problems   No resolved problems to display.     82 y.o. female  with a history of CAD and aortic insufficiency/stenosis status post CABG and AVR March 2022, persistent atrial fibrillation on anticoagulation, hyperlipidemia, hypertension, ischemic cardiomyopathy with recovered LVEF, pulmonary hypertension presents with 2 months of dyspnea on exertion, fatigue, failure to thrive.    Acute hypoxic respiratory failure  Failure to thrive, progressive weakness, decreased appetite and weight loss  Deconditioning  -PT/OT, nutrition  -Probably will need SNF (lives at home with )  -Flow (L/min):  [2] 2 wean as tolerated    Acute on chronic CHF, presumed  diastolic (recovered LVEF)  CAD, aortic insufficiency status post CABG and AVR last year  -Mild CHF on x-ray will continue diuresis couple more doses of IV furosemide and consult cardiology  -Statin, Farxiga, metoprolol, Aldactone  -Echocardiogram almost 2 months ago above defer to cardiology if they wish to repeat  -Monitor output, weights    Persistent atrial fibrillation  -Apixaban, amiodarone, metoprolol    Coronavirus NL 63 infection  -Acutely contributing to her issues  -Supportive care    Patient confirms DNR status. 2 family members at bedside    Discussed with patient and family and Dr. Hull in ED.    Denys Caceres MD  [facility]  05/20/23     normal...

## 2023-05-22 ENCOUNTER — READMISSION MANAGEMENT (OUTPATIENT)
Dept: CALL CENTER | Facility: HOSPITAL | Age: 83
End: 2023-05-22
Payer: MEDICARE

## 2023-05-22 VITALS
TEMPERATURE: 98.4 F | HEART RATE: 78 BPM | OXYGEN SATURATION: 94 % | HEIGHT: 65 IN | BODY MASS INDEX: 27.01 KG/M2 | RESPIRATION RATE: 18 BRPM | SYSTOLIC BLOOD PRESSURE: 106 MMHG | DIASTOLIC BLOOD PRESSURE: 68 MMHG | WEIGHT: 162.1 LBS

## 2023-05-22 LAB
ANION GAP SERPL CALCULATED.3IONS-SCNC: 12.4 MMOL/L (ref 5–15)
BASOPHILS # BLD AUTO: 0.01 10*3/MM3 (ref 0–0.2)
BASOPHILS NFR BLD AUTO: 0.1 % (ref 0–1.5)
BUN SERPL-MCNC: 26 MG/DL (ref 8–23)
BUN/CREAT SERPL: 19.5 (ref 7–25)
CALCIUM SPEC-SCNC: 9.3 MG/DL (ref 8.6–10.5)
CHLORIDE SERPL-SCNC: 98 MMOL/L (ref 98–107)
CO2 SERPL-SCNC: 26.6 MMOL/L (ref 22–29)
CREAT SERPL-MCNC: 1.33 MG/DL (ref 0.57–1)
DEPRECATED RDW RBC AUTO: 50.5 FL (ref 37–54)
EGFRCR SERPLBLD CKD-EPI 2021: 39.8 ML/MIN/1.73
EOSINOPHIL # BLD AUTO: 0.31 10*3/MM3 (ref 0–0.4)
EOSINOPHIL NFR BLD AUTO: 3.7 % (ref 0.3–6.2)
ERYTHROCYTE [DISTWIDTH] IN BLOOD BY AUTOMATED COUNT: 16.3 % (ref 12.3–15.4)
GLUCOSE SERPL-MCNC: 128 MG/DL (ref 65–99)
HCT VFR BLD AUTO: 39.8 % (ref 34–46.6)
HGB BLD-MCNC: 12.6 G/DL (ref 12–15.9)
IMM GRANULOCYTES # BLD AUTO: 0.03 10*3/MM3 (ref 0–0.05)
IMM GRANULOCYTES NFR BLD AUTO: 0.4 % (ref 0–0.5)
LYMPHOCYTES # BLD AUTO: 1.74 10*3/MM3 (ref 0.7–3.1)
LYMPHOCYTES NFR BLD AUTO: 20.6 % (ref 19.6–45.3)
MCH RBC QN AUTO: 26.6 PG (ref 26.6–33)
MCHC RBC AUTO-ENTMCNC: 31.7 G/DL (ref 31.5–35.7)
MCV RBC AUTO: 84.1 FL (ref 79–97)
MONOCYTES # BLD AUTO: 1.07 10*3/MM3 (ref 0.1–0.9)
MONOCYTES NFR BLD AUTO: 12.7 % (ref 5–12)
NEUTROPHILS NFR BLD AUTO: 5.27 10*3/MM3 (ref 1.7–7)
NEUTROPHILS NFR BLD AUTO: 62.5 % (ref 42.7–76)
NRBC BLD AUTO-RTO: 0 /100 WBC (ref 0–0.2)
PLATELET # BLD AUTO: 231 10*3/MM3 (ref 140–450)
PMV BLD AUTO: 9.9 FL (ref 6–12)
POTASSIUM SERPL-SCNC: 3.6 MMOL/L (ref 3.5–5.2)
QT INTERVAL: 344 MS
QT INTERVAL: 413 MS
RBC # BLD AUTO: 4.73 10*6/MM3 (ref 3.77–5.28)
SODIUM SERPL-SCNC: 137 MMOL/L (ref 136–145)
WBC NRBC COR # BLD: 8.43 10*3/MM3 (ref 3.4–10.8)

## 2023-05-22 PROCEDURE — 94799 UNLISTED PULMONARY SVC/PX: CPT

## 2023-05-22 PROCEDURE — 94664 DEMO&/EVAL PT USE INHALER: CPT

## 2023-05-22 PROCEDURE — 99232 SBSQ HOSP IP/OBS MODERATE 35: CPT | Performed by: INTERNAL MEDICINE

## 2023-05-22 PROCEDURE — 85025 COMPLETE CBC W/AUTO DIFF WBC: CPT | Performed by: HOSPITALIST

## 2023-05-22 PROCEDURE — 80048 BASIC METABOLIC PNL TOTAL CA: CPT | Performed by: HOSPITALIST

## 2023-05-22 PROCEDURE — 94761 N-INVAS EAR/PLS OXIMETRY MLT: CPT

## 2023-05-22 PROCEDURE — 94760 N-INVAS EAR/PLS OXIMETRY 1: CPT

## 2023-05-22 RX ORDER — BUMETANIDE 2 MG/1
3 TABLET ORAL 2 TIMES DAILY
Qty: 180 TABLET | Refills: 3 | Status: SHIPPED | OUTPATIENT
Start: 2023-05-22

## 2023-05-22 RX ORDER — POTASSIUM CHLORIDE 750 MG/1
20 TABLET, FILM COATED, EXTENDED RELEASE ORAL ONCE
Status: COMPLETED | OUTPATIENT
Start: 2023-05-22 | End: 2023-05-22

## 2023-05-22 RX ORDER — POTASSIUM CHLORIDE 750 MG/1
40 TABLET, FILM COATED, EXTENDED RELEASE ORAL EVERY 4 HOURS
Status: COMPLETED | OUTPATIENT
Start: 2023-05-22 | End: 2023-05-22

## 2023-05-22 RX ORDER — BUMETANIDE 2 MG/1
2 TABLET ORAL 2 TIMES DAILY
Qty: 180 TABLET | Refills: 3 | Status: SHIPPED | OUTPATIENT
Start: 2023-05-22 | End: 2023-05-22 | Stop reason: SDUPTHER

## 2023-05-22 RX ADMIN — ATORVASTATIN CALCIUM 80 MG: 80 TABLET, FILM COATED ORAL at 08:38

## 2023-05-22 RX ADMIN — POTASSIUM CHLORIDE 20 MEQ: 750 TABLET, EXTENDED RELEASE ORAL at 08:38

## 2023-05-22 RX ADMIN — EMPAGLIFLOZIN 25 MG: 25 TABLET, FILM COATED ORAL at 08:36

## 2023-05-22 RX ADMIN — BUMETANIDE 3 MG: 2 TABLET ORAL at 08:35

## 2023-05-22 RX ADMIN — POTASSIUM CHLORIDE 40 MEQ: 750 TABLET, EXTENDED RELEASE ORAL at 06:39

## 2023-05-22 RX ADMIN — IPRATROPIUM BROMIDE AND ALBUTEROL SULFATE 3 ML: 2.5; .5 SOLUTION RESPIRATORY (INHALATION) at 07:53

## 2023-05-22 RX ADMIN — POTASSIUM CHLORIDE 40 MEQ: 750 TABLET, EXTENDED RELEASE ORAL at 11:32

## 2023-05-22 RX ADMIN — METOPROLOL SUCCINATE 100 MG: 100 TABLET, EXTENDED RELEASE ORAL at 08:35

## 2023-05-22 RX ADMIN — APIXABAN 5 MG: 5 TABLET, FILM COATED ORAL at 11:32

## 2023-05-22 RX ADMIN — PANTOPRAZOLE SODIUM 40 MG: 40 TABLET, DELAYED RELEASE ORAL at 06:35

## 2023-05-22 RX ADMIN — IPRATROPIUM BROMIDE AND ALBUTEROL SULFATE 3 ML: 2.5; .5 SOLUTION RESPIRATORY (INHALATION) at 11:20

## 2023-05-22 RX ADMIN — AMIODARONE HYDROCHLORIDE 200 MG: 200 TABLET ORAL at 08:35

## 2023-05-22 RX ADMIN — Medication 12.5 MG: at 08:36

## 2023-05-22 RX ADMIN — FLUOXETINE HYDROCHLORIDE 20 MG: 20 CAPSULE ORAL at 06:35

## 2023-05-22 RX ADMIN — Medication 10 ML: at 08:36

## 2023-05-22 RX ADMIN — APIXABAN 5 MG: 5 TABLET, FILM COATED ORAL at 00:03

## 2023-05-22 NOTE — PLAN OF CARE
Goal Outcome Evaluation:  Plan of Care Reviewed With: patient, spouse        Progress: improving  Outcome Evaluation: Adequate for discharge home

## 2023-05-22 NOTE — NURSING NOTE
05/22/23 0956   Wound 05/20/23 2305 Right coccyx Pressure Injury   Placement Date/Time: 05/20/23 2305   Present on Hospital Admission: Yes  Side: Right  Location: coccyx  Primary Wound Type: Pressure Injury   Pressure Injury Stage 2   Dressing Appearance open to air   Base non-blanchable;pink;red   Periwound intact   Edges irregular   Wound Length (cm) 2 cm   Wound Width (cm) 1 cm   Wound Surface Area (cm^2) 2 cm^2   Drainage Amount none   Care, Wound   (silicone border dressing)     WOCN Consult: Stage 2 pressure injury coccyx. Patient can reposition self in bed per self. Wound is not painful. Silicone border dressing applied.

## 2023-05-22 NOTE — DISCHARGE SUMMARY
Patient Name: Alice Mabry  : 1940  MRN: [id number]    Date of Admission: 2023  Date of Discharge:  2023  Primary Care Physician: Tho Mar MD      Chief Complaint:   Cough      Discharge Diagnoses     Active Hospital Problems    Diagnosis  POA   • **Acute hypoxemic respiratory failure [J96.01]  Yes   • Coronavirus infection [B34.2]  Unknown   • Physical deconditioning [R53.81]  Unknown   • S/P CABG (coronary artery bypass graft) [Z95.1]  Not Applicable   • S/P AVR [Z95.2]  Not Applicable   • Essential hypertension [I10]  Yes   • Dyspnea on exertion [R06.09]  Yes   • Nonrheumatic aortic valve stenosis [I35.0]  Yes   • HFrEF (heart failure with reduced ejection fraction) [I50.20]  Yes   • Paroxysmal atrial fibrillation [I48.0]  Yes   • CAD (coronary artery disease) [I25.10]  Yes      Resolved Hospital Problems   No resolved problems to display.        Hospital Course     This is an 82-year-old woman with a past medical history of coronary artery disease and aortic stenosis status post CABG and AVR 2022, atrial fibrillation, ischemic cardiomyopathy with recovered ejection fraction, pulmonary hypertension comes to the hospital after experiencing worsening fatigue and dyspnea on exertion for 2 months.  She was initially started on IV Lasix.  However after a couple of doses her home Bumex was restarted.  She really did not require much supplemental oxygen and did not really have much lower extremity edema this admission.  A respiratory viral panel was positive for coronavirus NL 63.  On the day of discharge patient was in no acute distress, had nonlabored respirations on room air, was tolerating her home Bumex dose, and requested to be discharged, which I was agreeable to.  She has a follow-up appointment with cardiology on  and 2023.     Her creatinine did bump up slightly on the day of admission.  She should follow-up with her primary care provider for  reassessment of her kidney function.      Day of Discharge       Physical Exam:  Temp:  [97.9 °F (36.6 °C)-98.7 °F (37.1 °C)] 98.4 °F (36.9 °C)  Heart Rate:  [73-87] 78  Resp:  [16-18] 18  BP: ()/(58-74) 106/68  Body mass index is 26.97 kg/m².  Physical Exam    General: Alert and oriented x3, no acute distress  HEENT: Normocephalic, atraumatic  CV: Regular rate and rhythm, no murmurs rubs or gallops  Lungs: Clear to auscultation bilaterally, no crackles or wheezes  Abdomen: Soft, nontender, nondistended  Neuro: CN II-XII intact, no FND appreciated     Consultants     Consult Orders (all) (From admission, onward)     Start     Ordered    05/20/23 2350  Inpatient Case Management  Consult  Once        Provider:  (Not yet assigned)    05/20/23 2349 05/20/23 2345  Inpatient Cardiology Consult  Once        Specialty:  Cardiology  Provider:  Murphy Horner MD    05/20/23 2344 05/20/23 2313  Inpatient Case Management  Consult  Once        Provider:  (Not yet assigned)    05/20/23 2313    05/20/23 1913  [facility] (on-call MD unless specified) Details  Once        Specialty:  Hospitalist  Provider:  (Not yet assigned)    05/20/23 1912              Procedures     Imaging Results (All)     Procedure Component Value Units Date/Time    XR Chest 2 View [[id number]] Collected: 05/20/23 1828     Updated: 05/20/23 1835    Narrative:      CHEST: 2 VIEWS     HISTORY: Cough and shortness of air.     COMPARISON: Two-view chest 03/08/2023, AP chest, 04/06/2022.     FINDINGS:Sternotomy wires, prosthetic valves, cardiomegaly are present.  The thoracic aorta is tortuous and aortic vascular calcifications are  present. There are increased interstitial markings which appear  progressive when compared to previous studies and this is suspected to  represent mild hydrostatic interstitial pulmonary edema versus atypical  interstitial infiltrate superimposed on chronic interstitial disease.       Impression:       Suspect mild CHF with increased interstitial markings  suspicious for mild hydrostatic interstitial pulmonary edema. Symmetric  atypical interstitial infiltrates are also a consideration. Heart size  is enlarged and there has been previous median sternotomy with aortic  and mitral valve replacements.     This report was finalized on 5/20/2023 6:32 PM by Dr. Jose Guadalupe Cruz M.D.             Pertinent Labs     Results from last 7 days   Lab Units 05/22/23 0356 05/21/23 0318 05/20/23  1745   WBC 10*3/mm3 8.43 7.21 7.84   HEMOGLOBIN g/dL 12.6 12.0 12.6   PLATELETS 10*3/mm3 231 234 238     Results from last 7 days   Lab Units 05/22/23 0356 05/21/23 0318 05/20/23 1745 05/16/23  1232   SODIUM mmol/L 137 138 141 138   POTASSIUM mmol/L 3.6 3.4* 3.4* 3.3*   CHLORIDE mmol/L 98 100 102 96*   CO2 mmol/L 26.6 26.0 27.0 25.0   BUN mg/dL 26* 21 21 26*   CREATININE mg/dL 1.33* 1.19* 1.19* 1.42*   GLUCOSE mg/dL 128* 120* 131* 129*   Estimated Creatinine Clearance: 32.2 mL/min (A) (by C-G formula based on SCr of 1.33 mg/dL (H)).  Results from last 7 days   Lab Units 05/20/23 1745 05/16/23  1232   ALBUMIN g/dL 3.7 3.8   BILIRUBIN mg/dL 0.3 0.4   ALK PHOS U/L 70 76   AST (SGOT) U/L 31 31   ALT (SGPT) U/L 27 29     Results from last 7 days   Lab Units 05/22/23 0356 05/21/23 0318 05/20/23 1745 05/16/23  1232   CALCIUM mg/dL 9.3 8.7 8.8 8.7   ALBUMIN g/dL  --   --  3.7 3.8   MAGNESIUM mg/dL  --  2.2  --   --        Results from last 7 days   Lab Units 05/20/23  1745   HSTROP T ng/L 20*   PROBNP pg/mL 3,374.0*           Invalid input(s): LDLCALC        Test Results Pending at Discharge       Discharge Details        Discharge Medications      Continue These Medications      Instructions Start Date   amiodarone 200 MG tablet  Commonly known as: PACERONE   200 mg, Oral, Daily      apixaban 5 MG tablet tablet  Commonly known as: Eliquis   5 mg, Oral, Every 12 Hours      atorvastatin 80 MG tablet  Commonly known as: LIPITOR   TAKE  1 TABLET DAILY      bumetanide 2 MG tablet  Commonly known as: BUMEX   3 mg, Oral, 2 Times Daily      Farxiga 10 MG tablet  Generic drug: dapagliflozin Propanediol   10 mg, Oral, Every Morning      FLUoxetine 20 MG capsule  Commonly known as: PROzac   20 mg, Oral, Every Morning      LORazepam 1 MG tablet  Commonly known as: ATIVAN   1 mg, Oral, Every 8 Hours PRN      metoprolol succinate  MG 24 hr tablet  Commonly known as: TOPROL-XL   100 mg, Oral, Daily      nitroglycerin 0.4 MG SL tablet  Commonly known as: NITROSTAT   0.4 mg, Sublingual, Every 5 Minutes PRN, Take no more than 3 doses in 15 minutes.       omeprazole 40 MG capsule  Commonly known as: priLOSEC   40 mg, Oral, Daily      potassium chloride 10 MEQ CR tablet   10 mEq, Oral, 2 Times Daily      spironolactone 25 MG tablet  Commonly known as: ALDACTONE   TAKE 1/2 TABLET BY MOUTH DAILY      Vitamin D (Ergocalciferol) 50 MCG (2000 UT) capsule   2,000 Units, Oral, Daily, TO HOLD 1 WEEK PRIOR TO OR             Allergies   Allergen Reactions   • Codeine Other (See Comments)     Chest pain .  Patient states she is allergic to codeine and tylenol when together but not separate.   • Penicillins Hives   • Ambien [Zolpidem Tartrate] Confusion         Discharge Disposition:  Home or Self Care    Discharge Diet:  Diet Order   Procedures   • Diet: Cardiac Diets; Healthy Heart (2-3 Na+); Texture: Regular Texture (IDDSI 7); Fluid Consistency: Thin (IDDSI 0)       Discharge Activity:       CODE STATUS:    Code Status and Medical Interventions:   Ordered at: 05/20/23 8749     Code Status (Patient has no pulse and is not breathing):    No CPR (Do Not Attempt to Resuscitate)     Medical Interventions (Patient has pulse or is breathing):    Full Support       Future Appointments   Date Time Provider Department Center   7/6/2023 10:00 AM Brannon Stevens MD [facility]   7/19/2023 11:20 AM Murphy Horner MD [facility]      Follow-up Information      Tho Mar MD. Schedule an appointment as soon as possible for a visit in 1 week(s).    Specialty: Internal Medicine  Why: to measure renal function  Contact information:  [address] [facility] [address] [phone]                         Time Spent on Discharge:  Greater than 30 minutes      Heiu Bennett MD  [facility]  05/22/23  11:30 EDT

## 2023-05-22 NOTE — PROGRESS NOTES
"Alice Mabry  1940 82 y.o.  [id number]      Patient Care Team:  Tho Mar MD as PCP - General (Internal Medicine)  Murphy Horner MD as Consulting Physician (Cardiology)  Jr Isaias Lynn MD as Surgeon (Cardiothoracic Surgery)    CC: Heart failure with preserved ejection fraction, pulmonary hypertension, CAD    Interval History: She feels a little bit better      Objective   Vital Signs  Temp:  [98.3 °F (36.8 °C)-98.7 °F (37.1 °C)] 98.4 °F (36.9 °C)  Heart Rate:  [73-87] 78  Resp:  [18] 18  BP: ()/(58-68) 106/68  No intake or output data in the 24 hours ending 05/22/23 1401  Flowsheet Rows    Flowsheet Row First Filed Value   Admission Height 165.1 cm (65\") Documented at 05/20/2023 1728   Admission Weight 73.5 kg (162 lb) Documented at 05/20/2023 1728          Physical Exam:   General Appearance:    Alert,oriented, in no acute distress   Lungs:     Clear to auscultation,BS are equal    Heart:    Normal S1 and S2, RRR without murmur, gallop or rub   HEENT:    Sclerae are clear, no JVD or adenopathy   Abdomen:     Normal bowel sounds, soft nontender, nondistended, no HSM   Extremities:   Moves all extremities well, no edema, no cyanosis, no             Redness, no rash     Medication Review:      amiodarone, 200 mg, Oral, Daily  apixaban, 5 mg, Oral, Q12H  atorvastatin, 80 mg, Oral, Daily  bumetanide, 3 mg, Oral, BID   Or  bumetanide, 0.5 mg, Intravenous, BID  empagliflozin, 25 mg, Oral, Daily  FLUoxetine, 20 mg, Oral, QAM  ipratropium-albuterol, 3 mL, Nebulization, 4x Daily - RT  metoprolol succinate XL, 100 mg, Oral, Daily  pantoprazole, 40 mg, Oral, Q AM  senna-docusate sodium, 2 tablet, Oral, BID  sodium chloride, 10 mL, Intravenous, Q12H  spironolactone, 12.5 mg, Oral, Daily             I reviewed the patient's new clinical results.  I personally viewed and interpreted the patient's EKG/Telemetry data    Assessment/Plan  Active Hospital Problems    Diagnosis  POA   • **Acute " hypoxemic respiratory failure [J96.01]  Yes   • CAD (coronary artery disease) [I25.10]  Yes     Priority: High   • Coronavirus infection [B34.2]  Unknown   • Physical deconditioning [R53.81]  Unknown   • S/P CABG (coronary artery bypass graft) [Z95.1]  Not Applicable   • S/P AVR [Z95.2]  Not Applicable   • Essential hypertension [I10]  Yes   • Dyspnea on exertion [R06.09]  Yes   • Nonrheumatic aortic valve stenosis [I35.0]  Yes   • HFrEF (heart failure with reduced ejection fraction) [I50.20]  Yes   • Paroxysmal atrial fibrillation [I48.0]  Yes      Resolved Hospital Problems   No resolved problems to display.       Has been a tough course for her recently now in with maybe heart failure she started on diuretics and an SGLT2 inhibitor she is better today she is going to go home follow-up with Jeanette in a week left to check her labs at that time    Murphy Horner MD  05/22/23  14:01 EDT

## 2023-05-22 NOTE — CASE MANAGEMENT/SOCIAL WORK
Case Management Discharge Note      Final Note: Discharge to home with family support- [facility]         Selected Continued Care - Admitted Since 5/20/2023     Destination    No services have been selected for the patient.              Durable Medical Equipment    No services have been selected for the patient.              Dialysis/Infusion    No services have been selected for the patient.              Home Medical Care    No services have been selected for the patient.              Therapy    No services have been selected for the patient.              Community Resources    No services have been selected for the patient.              Community & DME    No services have been selected for the patient.                  Transportation Services  Private: Car    Final Discharge Disposition Code: 01 - home or self-care

## 2023-05-22 NOTE — OUTREACH NOTE
Prep Survey    Flowsheet Row Responses   [facility] patient discharged from? [place]   Is LACE score < 7 ? No   Eligibility Readm Mgmt   Discharge diagnosis Coronavirus infection, CHF   Does the patient have one of the following disease processes/diagnoses(primary or secondary)? CHF   Does the patient have Home health ordered? No   Is there a DME ordered? No   Prep survey completed? Yes          Yashira LOUIE - Registered Nurse

## 2023-05-24 ENCOUNTER — READMISSION MANAGEMENT (OUTPATIENT)
Dept: CALL CENTER | Facility: HOSPITAL | Age: 83
End: 2023-05-24
Payer: MEDICARE

## 2023-05-24 NOTE — OUTREACH NOTE
CHF Week 1 Survey    Flowsheet Row Responses   [facility] patient discharged from? [place]   Does the patient have one of the following disease processes/diagnoses(primary or secondary)? CHF   CHF Week 1 attempt successful? Yes   Call start time 1226   Call end time 1237   Discharge diagnosis Coronavirus infection, CHF   Meds reviewed with patient/caregiver? Yes   Does the patient have all medications ordered at discharge? N/A   Does the patient have a primary care provider?  Yes   Does the patient have an appointment with their PCP within 7 days of discharge? No   What is preventing the patient from scheduling follow up appointments within 7 days of discharge? Haven't had time   Has the patient kept scheduled appointments due by today? N/A   DME comments no home O2 in place.   Pulse Ox monitoring Intermittent   Pulse Ox device source Patient   O2 Sat comments RA- 90's sitting, has not checked monitored while walking   O2 Sat: education provided Sat levels, Monitoring frequency, When to seek care   Comments Pt c/o weakness, low grade temp currently using Tylenol. Appetite is poor.   What is the patient's perception of their health status since discharge? Same   Nursing interventions Nurse provided patient education   Is the patient able to teach back signs and symptoms of worsening condition? (i.e. weight gain, shortness of air, etc.) Yes   Is the patient/caregiver able to teach back the hierarchy of who to call/visit for symptoms/problems? PCP, Specialist, Home health nurse, Urgent Care, ED, 911 Yes   CHF Zone this Call Yellow Zone   Yellow Zone Not feeling as good as usual, Worsening shortness of breath with activity    CHF Week 1 call completed? Yes   Revoked No further contact(revokes)-requires comment   Is the patient interested in additional calls from an ambulatory ?  NOTE:  applies to high risk patients requiring additional follow-up. No   Call end time 1237          Tania Whitfield  Registered Nurse

## 2023-05-25 LAB
AMIODARONE SERPL-MCNC: 1291 NG/ML (ref 1000–2500)
DESETHYLAMIODARONE SERPL-MCNC: 760 NG/ML

## 2023-05-25 NOTE — PROGRESS NOTES
"Enter Query Response Below      Query Response: Acute hypoxemic respiratory failure not supported after study             If applicable, please update the problem list.       Patient: Alice Mabry        : 1940  Account: [id number]           Admit Date: 2023        How to Respond to this query:       a. Click New Note     b. Answer query within the yellow box.                c. Update the Problem List, if applicable.      If you have any questions about this query contact me at:  Sincerely,    Ashvin Garcia RN, BSN  Clinical Documentation Integrity  [facility]  [email or web address]      Dr. Bennett,     On 2023, 82 year old female with history of CHF, ischemic cardiomyopathy, CABG and valve replacement presented with weakness, increased dyspnea and was noted to have \"Acute hypoxemic respiratory failure\" per the discharge summary.  Patient is not noted to use oxygen at home.  O2 sats. were 100% on presentation, no ABG's were obtained.  Patient was maintained on 2L per NC in the stay with wean to room air , no noted increased work of breathing per nursing.  After study, was the acute hypoxemic respiratory failure clinically supported in the stay?    *Yes, with additional indicators:________  *Acute hypoxemic respiratory failure not supported after study  *Other________  *Clinically undetermined    By submitting this query, we are merely seeking further clarification of documentation to accurately reflect all conditions that you are monitoring, evaluating, treating or that extend the hospitalization or utilize additional resources of care. Please utilize your independent clinical judgment when addressing the question(s) above.     This query and your response, once completed, will be entered into the legal medical record.    Sincerely,  Ashvin Garcia  Clinical Documentation Integrity Program     "

## 2023-06-02 ENCOUNTER — HOSPITAL ENCOUNTER (OUTPATIENT)
Dept: CARDIOLOGY | Facility: HOSPITAL | Age: 83
Discharge: HOME | End: 2023-06-02

## 2023-06-02 ENCOUNTER — OFFICE VISIT (OUTPATIENT)
Dept: CARDIOLOGY | Facility: CLINIC | Age: 83
End: 2023-06-02

## 2023-06-02 VITALS
WEIGHT: 158 LBS | DIASTOLIC BLOOD PRESSURE: 72 MMHG | OXYGEN SATURATION: 95 % | HEART RATE: 101 BPM | SYSTOLIC BLOOD PRESSURE: 115 MMHG | BODY MASS INDEX: 26.33 KG/M2 | HEIGHT: 65 IN

## 2023-06-02 DIAGNOSIS — I50.32 CHRONIC DIASTOLIC HEART FAILURE: ICD-10-CM

## 2023-06-02 DIAGNOSIS — I48.0 PAROXYSMAL ATRIAL FIBRILLATION: Primary | ICD-10-CM

## 2023-06-02 LAB
ALBUMIN SERPL-MCNC: 3.7 G/DL (ref 3.5–5.2)
ALBUMIN/GLOB SERPL: 0.9 G/DL
ALP SERPL-CCNC: 81 U/L (ref 39–117)
ALT SERPL W P-5'-P-CCNC: 53 U/L (ref 1–33)
ANION GAP SERPL CALCULATED.3IONS-SCNC: 13.4 MMOL/L (ref 5–15)
AST SERPL-CCNC: 54 U/L (ref 1–32)
BILIRUB SERPL-MCNC: 0.5 MG/DL (ref 0–1.2)
BUN SERPL-MCNC: 26 MG/DL (ref 8–23)
BUN/CREAT SERPL: 20.2 (ref 7–25)
CALCIUM SPEC-SCNC: 9.3 MG/DL (ref 8.6–10.5)
CHLORIDE SERPL-SCNC: 96 MMOL/L (ref 98–107)
CO2 SERPL-SCNC: 25.6 MMOL/L (ref 22–29)
CREAT SERPL-MCNC: 1.29 MG/DL (ref 0.57–1)
EGFRCR SERPLBLD CKD-EPI 2021: 41.3 ML/MIN/1.73
GLOBULIN UR ELPH-MCNC: 4 GM/DL
GLUCOSE SERPL-MCNC: 102 MG/DL (ref 65–99)
NT-PROBNP SERPL-MCNC: 2061 PG/ML (ref 0–1800)
POTASSIUM SERPL-SCNC: 4.3 MMOL/L (ref 3.5–5.2)
PROT SERPL-MCNC: 7.7 G/DL (ref 6–8.5)
SODIUM SERPL-SCNC: 135 MMOL/L (ref 136–145)

## 2023-06-02 PROCEDURE — 36415 COLL VENOUS BLD VENIPUNCTURE: CPT

## 2023-06-02 PROCEDURE — 80053 COMPREHEN METABOLIC PANEL: CPT | Performed by: NURSE PRACTITIONER

## 2023-06-02 PROCEDURE — 83880 ASSAY OF NATRIURETIC PEPTIDE: CPT | Performed by: NURSE PRACTITIONER

## 2023-06-02 NOTE — PROGRESS NOTES
Please let the patient know that her kidney function and electrolyte are stable, her heart failure marker is also stable.  She has a mild elevation in her liver enzymes, ALT and AST.  As this is just a mild elevation I would not make any changes and we can follow-up with repeat labs at her next appointment.

## 2023-07-06 PROBLEM — I48.19 PERSISTENT ATRIAL FIBRILLATION: Status: ACTIVE | Noted: 2023-07-06

## 2023-07-19 PROBLEM — I48.0 PAROXYSMAL ATRIAL FIBRILLATION: Status: RESOLVED | Noted: 2021-07-20 | Resolved: 2023-07-19

## 2023-08-01 RX ORDER — AMIODARONE HYDROCHLORIDE 200 MG/1
TABLET ORAL
Qty: 90 TABLET | Refills: 1 | Status: SHIPPED | OUTPATIENT
Start: 2023-08-01

## 2023-08-14 RX ORDER — SPIRONOLACTONE 25 MG/1
TABLET ORAL
Qty: 30 TABLET | Refills: 5 | Status: SHIPPED | OUTPATIENT
Start: 2023-08-14

## 2023-10-19 ENCOUNTER — TELEPHONE (OUTPATIENT)
Dept: CARDIOLOGY | Facility: CLINIC | Age: 83
End: 2023-10-19
Payer: MEDICARE

## 2023-10-19 ENCOUNTER — HOSPITAL ENCOUNTER (OUTPATIENT)
Dept: CARDIOLOGY | Facility: HOSPITAL | Age: 83
Discharge: HOME | End: 2023-10-19
Payer: MEDICARE

## 2023-10-19 ENCOUNTER — OFFICE VISIT (OUTPATIENT)
Dept: CARDIOLOGY | Facility: CLINIC | Age: 83
End: 2023-10-19
Payer: MEDICARE

## 2023-10-19 VITALS
OXYGEN SATURATION: 96 % | WEIGHT: 166 LBS | HEART RATE: 67 BPM | BODY MASS INDEX: 27.66 KG/M2 | HEIGHT: 65 IN | DIASTOLIC BLOOD PRESSURE: 88 MMHG | SYSTOLIC BLOOD PRESSURE: 130 MMHG

## 2023-10-19 DIAGNOSIS — I50.33 ACUTE ON CHRONIC DIASTOLIC CONGESTIVE HEART FAILURE: ICD-10-CM

## 2023-10-19 DIAGNOSIS — I48.0 PAROXYSMAL ATRIAL FIBRILLATION: Primary | ICD-10-CM

## 2023-10-19 DIAGNOSIS — I10 ESSENTIAL HYPERTENSION: ICD-10-CM

## 2023-10-19 LAB
ALBUMIN SERPL-MCNC: 4.4 G/DL (ref 3.5–5.2)
ALBUMIN/GLOB SERPL: 1.4 G/DL
ALP SERPL-CCNC: 108 U/L (ref 39–117)
ALT SERPL W P-5'-P-CCNC: 185 U/L (ref 1–33)
ANION GAP SERPL CALCULATED.3IONS-SCNC: 14.9 MMOL/L (ref 5–15)
AST SERPL-CCNC: 190 U/L (ref 1–32)
BILIRUB SERPL-MCNC: 0.7 MG/DL (ref 0–1.2)
BUN SERPL-MCNC: 19 MG/DL (ref 8–23)
BUN/CREAT SERPL: 13.8 (ref 7–25)
CALCIUM SPEC-SCNC: 9.2 MG/DL (ref 8.6–10.5)
CHLORIDE SERPL-SCNC: 97 MMOL/L (ref 98–107)
CO2 SERPL-SCNC: 28.1 MMOL/L (ref 22–29)
CREAT SERPL-MCNC: 1.38 MG/DL (ref 0.57–1)
DEPRECATED RDW RBC AUTO: 47.3 FL (ref 37–54)
EGFRCR SERPLBLD CKD-EPI 2021: 38.1 ML/MIN/1.73
ERYTHROCYTE [DISTWIDTH] IN BLOOD BY AUTOMATED COUNT: 15.9 % (ref 12.3–15.4)
GLOBULIN UR ELPH-MCNC: 3.1 GM/DL
GLUCOSE SERPL-MCNC: 126 MG/DL (ref 65–99)
HCT VFR BLD AUTO: 44.7 % (ref 34–46.6)
HGB BLD-MCNC: 14.2 G/DL (ref 12–15.9)
MCH RBC QN AUTO: 26.2 PG (ref 26.6–33)
MCHC RBC AUTO-ENTMCNC: 31.8 G/DL (ref 31.5–35.7)
MCV RBC AUTO: 82.5 FL (ref 79–97)
NT-PROBNP SERPL-MCNC: 1278 PG/ML (ref 0–1800)
PLATELET # BLD AUTO: 211 10*3/MM3 (ref 140–450)
PMV BLD AUTO: 10.5 FL (ref 6–12)
POTASSIUM SERPL-SCNC: 3.6 MMOL/L (ref 3.5–5.2)
PROT SERPL-MCNC: 7.5 G/DL (ref 6–8.5)
RBC # BLD AUTO: 5.42 10*6/MM3 (ref 3.77–5.28)
SODIUM SERPL-SCNC: 140 MMOL/L (ref 136–145)
T4 FREE SERPL-MCNC: 1.65 NG/DL (ref 0.93–1.7)
TSH SERPL DL<=0.05 MIU/L-ACNC: 5.55 UIU/ML (ref 0.27–4.2)
WBC NRBC COR # BLD: 5.86 10*3/MM3 (ref 3.4–10.8)

## 2023-10-19 PROCEDURE — 84443 ASSAY THYROID STIM HORMONE: CPT | Performed by: NURSE PRACTITIONER

## 2023-10-19 PROCEDURE — 36415 COLL VENOUS BLD VENIPUNCTURE: CPT

## 2023-10-19 PROCEDURE — 83880 ASSAY OF NATRIURETIC PEPTIDE: CPT | Performed by: NURSE PRACTITIONER

## 2023-10-19 PROCEDURE — 84439 ASSAY OF FREE THYROXINE: CPT | Performed by: NURSE PRACTITIONER

## 2023-10-19 PROCEDURE — 85027 COMPLETE CBC AUTOMATED: CPT | Performed by: NURSE PRACTITIONER

## 2023-10-19 PROCEDURE — 80053 COMPREHEN METABOLIC PANEL: CPT | Performed by: NURSE PRACTITIONER

## 2023-10-19 NOTE — PROGRESS NOTES
[facility] Follow Up Office Note     Encounter Date:10/19/23  Patient:Alice Mabry  :1940  MRN:[id number]      Chief Complaint:   Chief Complaint   Patient presents with    Atrial Fibrillation    Coronary Artery Disease    Cardiomyopathy    Fatigue         History of Presenting Illness:        Alice Mabry is a 82 y.o. female who is here for follow-up.  She is a patient of Dr Horner.    Patient has past medical history significant for coronary artery disease status post CABG, AI and AS status post AVR, persistent atrial fibrillation on chronic anticoagulation, mixed hyperlipidemia, essential hypertension, ischemic cardiomyopathy with recovered LVEF.    Patient was found to have severe three-vessel coronary artery disease in 2022 and subsequently underwent four-vessel bypass and AVR as well as maze procedure and left atrial appendage ligation.  Patient had post-op atrial fibrillation but by 2022 had converted back to sinus rhythm.    In 2023 patient presented to clinic with dyspnea and fatigue and was found to be back in atrial fibrillation.  She was started back on amiodarone and successfully cardioverted n on .  Following this, she continued to complain of dyspnea.  Repeat echo showed normal LVEF with significant pulmonary hypertension. Right heart catheterization showed pulmonary hypertension with PA 58/18 mmHg, prominent V wave in setting of elevated wedge pressure of 20.  Her diuretics were increased, Bumex 3 mg twice daily.  In follow-up she had noted improvement.    Since this time patient has had recurrent atrial fibrillation and treated for acute heart failure. More recently she has been doing well with stable dyspnea and was maintaining sinus rhythm on amiodarone.    Today patient's largest concern is significant fatigue.  She reports she can cook but she doesn't have enough energy to do any cleaning. She walks small distances at home but she does  become fairly short of breath. She denies AISHA, PND or orthopnea however she always sleeps with her head elevated.  She has had a couple of dizzy spells after being on her feet for a little while. She is not having palpitations or chest pain.    Review of Systems:  Review of Systems   Constitutional: Positive for malaise/fatigue.   Cardiovascular:  Positive for dyspnea on exertion. Negative for chest pain, leg swelling, orthopnea and palpitations.   Respiratory:  Negative for shortness of breath.        Current Outpatient Medications on File Prior to Visit   Medication Sig Dispense Refill    amiodarone (PACERONE) 200 MG tablet TAKE ONE TABLET BY MOUTH TWICE A DAY 90 tablet 1    apixaban (Eliquis) 5 MG tablet tablet Take 1 tablet by mouth Every 12 (Twelve) Hours. 1 tablet 0    atorvastatin (LIPITOR) 80 MG tablet TAKE 1 TABLET DAILY 90 tablet 3    bumetanide (BUMEX) 2 MG tablet Take 1.5 tablets by mouth 2 (Two) Times a Day. 180 tablet 3    dapagliflozin Propanediol (Farxiga) 10 MG tablet Take 10 mg by mouth Every Morning. 90 tablet 3    FLUoxetine (PROzac) 20 MG capsule Take 1 capsule by mouth Every Morning.      LORazepam (ATIVAN) 1 MG tablet Take 1 tablet by mouth Every 8 (Eight) Hours As Needed for Anxiety.      metoprolol succinate XL (TOPROL-XL) 100 MG 24 hr tablet Take 1 tablet by mouth Daily. 90 tablet 3    nitroglycerin (NITROSTAT) 0.4 MG SL tablet Place 1 tablet under the tongue Every 5 (Five) Minutes As Needed for Chest Pain. Take no more than 3 doses in 15 minutes. 100 tablet 3    omeprazole (priLOSEC) 40 MG capsule Take 1 capsule by mouth Daily.      potassium chloride 10 MEQ CR tablet Take 1 tablet by mouth 2 (Two) Times a Day. 60 tablet 11    spironolactone (ALDACTONE) 25 MG tablet TAKE 1/2 TABLET BY MOUTH DAILY 30 tablet 5    Vitamin D, Ergocalciferol, 2000 units capsule Take 2,000 Units by mouth Daily. TO HOLD 1 WEEK PRIOR TO OR       No current facility-administered medications on file prior to visit.        Allergies   Allergen Reactions    Codeine Other (See Comments)     Chest pain .  Patient states she is allergic to codeine and tylenol when together but not separate.    Penicillins Hives    Ambien [Zolpidem Tartrate] Confusion       Past Medical History:   Diagnosis Date    Acute diastolic (congestive) heart failure     Anxiety     Aortic valve stenosis     HX CABG, AVR, MVR 3/2022    Arthritis     CAD (coronary artery disease)     stents    Depression     Dizziness     Fatty liver     GERD (gastroesophageal reflux disease)     H/O blood clots     H/O Clostridium difficile infection     Heart murmur     History of atrial fibrillation     History of cervical cancer     HX HYSTERECTOMY    History of MI (myocardial infarction) 1999    History of transfusion     no reactions    [place] (hard of hearing)     Hyperlipidemia     Hypertension     IBS (irritable bowel syndrome)     Incontinence of urine     wears pads    Renal mass, right        Past Surgical History:   Procedure Laterality Date    APPENDECTOMY  1960    CARDIAC CATHETERIZATION  06/10/2014    Dr. Murphy Horner    CARDIAC CATHETERIZATION  11/13/2007    Dr. Murphy Horner    CARDIAC CATHETERIZATION N/A 10/19/2004    Dr. Murphy Horner    CARDIAC CATHETERIZATION N/A 07/15/2004    Dr. Gregorio Gonzales    CARDIAC CATHETERIZATION  10/2003    Dr. Murphy Horner    CARDIAC CATHETERIZATION N/A 03/21/2022    Procedure: Coronary angiography;  Surgeon: Murphy Horner MD;  Location:  [facility];  Service: Cardiology;  Laterality: N/A;    CARDIAC CATHETERIZATION N/A 03/21/2022    Procedure: Right Heart Cath;  Surgeon: Murphy Horner MD;  Location:  [facility];  Service: Cardiology;  Laterality: N/A;    CARDIAC CATHETERIZATION N/A 03/21/2022    Procedure: Left Heart Cath;  Surgeon: Murphy Horner MD;  Location:  [facility];  Service: Cardiology;  Laterality: N/A;    CARDIAC CATHETERIZATION N/A 03/21/2022     Procedure: Left ventriculography;  Surgeon: Murphy Horner MD;  Location: [facility] CATH INVASIVE LOCATION;  Service: Cardiology;  Laterality: N/A;    CARDIAC CATHETERIZATION N/A 4/13/2023    Procedure: Right Heart Cath;  Surgeon: Murphy Horner MD;  Location:  [facility];  Service: Cardiology;  Laterality: N/A;    CHOLECYSTECTOMY  1998    COLONOSCOPY N/A 07/2001    negative    COLONOSCOPY N/A 02/28/2012    negative    CORONARY ANGIOPLASTY WITH STENT PLACEMENT N/A 07/15/2004    Dr. Murphy Horner    CORONARY ANGIOPLASTY WITH STENT PLACEMENT  03/2002    to RCA    CORONARY ARTERY BYPASS GRAFT WITH AORTIC AND MITRAL VALVE REPAIR/REPLACEMENT N/A 03/28/2022    Procedure: DALTON STERNOTOMY CORONARY ARTERY BYPASS GRAFTING TIMES 4 USING LEFT INTERNAL MAMMARY ARTERY AND LEFT GREATER SAPHENOUS VEIN GRAFT PER ENDOSCOPIC VEIN HARVESTING, RIGHT CORONARY ENDARTERECTOMY, AORTIC VALVE REPLACEMENT, MITRAL VALVE REPAIR, ATRICURE MAZE PROCEDURE, CLOSURE OF LEFT ATRIAL APPENDAGE AND PRP;  Surgeon: Jr Isaias Lynn MD;  Location: [facility] CVOR;  Service: Cardiothoracic;    CORONARY ARTERY BYPASS GRAFT WITH AORTIC AND MITRAL VALVE REPAIR/REPLACEMENT  03/28/2022    Procedure: ;  Surgeon: Jr Isaias Lynn MD;  Location: [facility] CVOR;  Service: Cardiothoracic;;    HYSTERECTOMY  1974    LUMBAR DISCECTOMY      NEPHRECTOMY PARTIAL Right 11/3/2022    Procedure: RIGHT ROBOTIC PARTIAL NEPHRECTOMY;  Surgeon: Davy Silva Jr., MD;  Location: [facility] MAIN OR;  Service: Rob[facility] - [facility];  Laterality: Right;    UPPER GASTROINTESTINAL ENDOSCOPY N/A 04/20/2015    Mild Schatzki ring, hiatus hernia, non-bleeding erosive gastropathy, erythematous mucosa in the antrum, normal duodenum-Dr. Alex Gill       Social History     Socioeconomic History    Marital status:    Tobacco Use    Smoking status: Former     Packs/day: 1.00     Years: 20.00     Additional pack years: 0.00     Total pack years: 20.00     Types:  "Cigarettes     Quit date:      Years since quittin.8    Smokeless tobacco: Never    Tobacco comments:     Caffeine - half and half coffee   Vaping Use    Vaping Use: Never used   Substance and Sexual Activity    Alcohol use: Not Currently    Drug use: Never    Sexual activity: Defer       Family History   Problem Relation Age of Onset    Heart disease Mother     No Known Problems Father     Heart disease Sister     Heart disease Brother     No Known Problems Maternal Grandmother     No Known Problems Maternal Grandfather     No Known Problems Paternal Grandmother     No Known Problems Paternal Grandfather     Malig Hyperthermia Neg Hx     Drug abuse Neg Hx        The following portions of the patient's history were reviewed and updated as appropriate: allergies, current medications, past family history, past medical history, past social history, past surgical history and problem list.       Objective:       Vitals:    10/19/23 1051   BP: 130/88   Pulse: 67   SpO2: 96%   Weight: 75.3 kg (166 lb)   Height: 165.1 cm (65\")         Physical Exam:  Constitutional: Alert and conversant, pleasant  HENT: Oropharynx clear and membrane moist  Eyes: Normal conjunctiva, no sclera icterus  Neck: Supple, no carotid bruit bilaterally  Cardiovascular: Regular rate and rhythm, No Murmur, No bilateral lower extremity edema  Pulmonary: Normal respiratory effort, normal lung sounds, no wheezing  Neurological: Alert and orient x 3  Skin: Warm, dry, no ecchymosis, no rash  Psych: Appropriate mood and affect. Normal judgment and insight         Lab Results   Component Value Date     (L) 2023     2023    K 4.3 2023    K 3.6 2023    CL 96 (L) 2023    CL 98 2023    CO2 25.6 2023    CO2 26.6 2023    BUN 26 (H) 2023    BUN 26 (H) 2023    CREATININE 1.29 (H) 2023    CREATININE 1.33 (H) 2023    EGFRIFNONA 53 (L) 2021    EGFRIFNONA 66 10/06/2021    " GLUCOSE 102 (H) 06/02/2023    GLUCOSE 128 (H) 05/22/2023    CALCIUM 9.3 06/02/2023    CALCIUM 9.3 05/22/2023    ALBUMIN 3.7 06/02/2023    ALBUMIN 3.7 05/20/2023    BILITOT 0.5 06/02/2023    BILITOT 0.3 05/20/2023    AST 54 (H) 06/02/2023    AST 31 05/20/2023    ALT 53 (H) 06/02/2023    ALT 27 05/20/2023     Lab Results   Component Value Date    WBC 8.43 05/22/2023    WBC 7.21 05/21/2023    HGB 12.6 05/22/2023    HGB 12.0 05/21/2023    HCT 39.8 05/22/2023    HCT 38.2 05/21/2023    MCV 84.1 05/22/2023    MCV 82.9 05/21/2023     05/22/2023     05/21/2023     Lab Results   Component Value Date    CHOL 129 03/25/2022    CHOL 105 07/20/2021    TRIG 161 (H) 03/25/2022    TRIG 134 07/20/2021    HDL 31 (L) 03/25/2022    HDL 31 (L) 07/20/2021    LDL 70 03/25/2022    LDL 50 07/20/2021     Lab Results   Component Value Date    PROBNP 2,061.0 (H) 06/02/2023    PROBNP 3,374.0 (H) 05/20/2023    BNP 1,070.0 (H) 06/28/2019     Lab Results   Component Value Date    CKTOTAL 69 12/14/2015    TROPONINI 0.029 06/29/2019    TROPONINT 20 (H) 05/20/2023     Lab Results   Component Value Date    TSH 2.720 02/22/2023    TSH 4.740 (H) 04/05/2022           ECG 12 Lead    Date/Time: 10/19/2023 11:02 AM  Performed by: Jeanette Fields APRN    Authorized by: Jeanette Fields APRN  Comparison: compared with previous ECG from 7/19/2023  Similar to previous ECG  Rhythm: sinus rhythm  Rate: normal  BPM: 65  ST Depression: I and aVL  T inversion: I and aVL  QRS axis: normal             Assessment:          Diagnosis Plan   1. Paroxysmal atrial fibrillation  ECG 12 Lead      2. Essential hypertension        3. Acute on chronic diastolic congestive heart failure  Comprehensive Metabolic Panel    proBNP    CBC (No Diff)    TSH Rfx On Abnormal To Free T4               Plan:       Fatigue and dyspnea - chief complaint. She does not appear decompensated. Check full set of labs. Consider repeat echo without significant findings  Acute on  chronic diastolic heart failure - appears compensated. Continue current diuretics, check labs including CMP and proBNP  Paroxysmal atrial fibrillation - cardioverted 2/2023.  Has seen EP. Currently sinus on amio and beta blocker  CAD - history of CABG 2022.  Denies chest pain. 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 She is reporting significant fatigue and worsened dyspnea. Will start with labs then consider echo.   Follow-up to be determined.    Orders Placed This Encounter   Procedures    Comprehensive Metabolic Panel     Standing Status:   Future     Number of Occurrences:   1     Standing Expiration Date:   10/19/2024     Order Specific Question:   Release to patient     Answer:   Routine Release [[id number]]    proBNP     Standing Status:   Future     Number of Occurrences:   1     Standing Expiration Date:   10/19/2024     Order Specific Question:   Release to patient     Answer:   Routine Release [[id number]]    CBC (No Diff)     Standing Status:   Future     Number of Occurrences:   1     Standing Expiration Date:   10/19/2024     Order Specific Question:   Release to patient     Answer:   Routine Release [[id number]]    TSH Rfx On Abnormal To Free T4     Standing Status:   Future     Number of Occurrences:   1     Standing Expiration Date:   10/19/2024     Order Specific Question:   Release to patient     Answer:   Routine Release [[id number]]    ECG 12 Lead     This order was created via procedure documentation     Order Specific Question:   Release to patient     Answer:   Routine Release [[id number]]            JOHNNIE Coyle  [facility]  10/19/23  11:34 EDT

## 2023-10-19 NOTE — TELEPHONE ENCOUNTER
Called and left VM, will continue to try to reach pt.    Maria Antonia Dash, RN  Triage RN  10/19/23 15:19 EDT

## 2023-10-19 NOTE — TELEPHONE ENCOUNTER
----- Message from JOHNNIE Johnson sent at 10/19/2023  3:16 PM EDT -----  Please let patient know that overall her labs look ok. Her liver function is high though. I want her to hold her statin for now. We may need to cut back on her amiodarone but I am going to defer that to Jeanette when she returns tomorrow.     Jeanette-I didn't want to make any changes to her amiodarone in case you felt we should consult with EP first.

## 2023-10-20 DIAGNOSIS — I50.33 ACUTE ON CHRONIC DIASTOLIC CONGESTIVE HEART FAILURE: Primary | ICD-10-CM

## 2023-10-20 DIAGNOSIS — I48.0 PAROXYSMAL ATRIAL FIBRILLATION: ICD-10-CM

## 2023-10-20 NOTE — TELEPHONE ENCOUNTER
I called pt, but pt states that Jeanette already talked with her this morning, and that she will have repeat lab work done as requested.    Maria Antonia Dash, RN  Triage RN  10/20/23 10:29 EDT

## 2023-10-20 NOTE — PROGRESS NOTES
Spoke to patient about results.  In addition to holding her atorvastatin I would also like for her to hold her amiodarone at this time.  She is actually been taking it twice daily and probably only needs to be on once daily in general at this juncture.  She will get repeat labs in 2 weeks.  Additionally I would like for her to have a right upper quadrant ultrasound which I have ordered.

## 2023-10-30 RX ORDER — AMIODARONE HYDROCHLORIDE 200 MG/1
200 TABLET ORAL 2 TIMES DAILY
Qty: 90 TABLET | Refills: 1 | OUTPATIENT
Start: 2023-10-30

## 2023-10-30 NOTE — TELEPHONE ENCOUNTER
Rx Refill Note  Requested Prescriptions     Pending Prescriptions Disp Refills    amiodarone (PACERONE) 200 MG tablet [Pharmacy Med Name: AMIODARONE  MG TABLET] 90 tablet 1     Sig: TAKE 1 TABLET BY MOUTH TWICE A DAY      Last office visit with prescribing clinician: 10/19/2023   Last telemedicine visit with prescribing clinician: Visit date not found   Next office visit with prescribing clinician: Visit date not found                         Would you like a call back once the refill request has been completed: [] Yes [] No    If the office needs to give you a call back, can they leave a voicemail: [] Yes [] No    Winnie Negrete MA  10/30/23, 09:15 EDT

## 2023-10-31 ENCOUNTER — LAB (OUTPATIENT)
Dept: LAB | Facility: HOSPITAL | Age: 83
End: 2023-10-31
Payer: MEDICARE

## 2023-10-31 ENCOUNTER — HOSPITAL ENCOUNTER (OUTPATIENT)
Dept: ULTRASOUND IMAGING | Facility: HOSPITAL | Age: 83
Discharge: HOME | End: 2023-10-31
Admitting: NURSE PRACTITIONER
Payer: MEDICARE

## 2023-10-31 DIAGNOSIS — I48.0 PAROXYSMAL ATRIAL FIBRILLATION: ICD-10-CM

## 2023-10-31 DIAGNOSIS — I50.33 ACUTE ON CHRONIC DIASTOLIC CONGESTIVE HEART FAILURE: ICD-10-CM

## 2023-10-31 LAB
ALBUMIN SERPL-MCNC: 3.9 G/DL (ref 3.5–5.2)
ALBUMIN/GLOB SERPL: 1.2 G/DL
ALP SERPL-CCNC: 87 U/L (ref 39–117)
ALT SERPL W P-5'-P-CCNC: 87 U/L (ref 1–33)
ANION GAP SERPL CALCULATED.3IONS-SCNC: 14 MMOL/L (ref 5–15)
AST SERPL-CCNC: 83 U/L (ref 1–32)
BILIRUB SERPL-MCNC: 0.8 MG/DL (ref 0–1.2)
BUN SERPL-MCNC: 24 MG/DL (ref 8–23)
BUN/CREAT SERPL: 14.8 (ref 7–25)
CALCIUM SPEC-SCNC: 9.6 MG/DL (ref 8.6–10.5)
CHLORIDE SERPL-SCNC: 99 MMOL/L (ref 98–107)
CO2 SERPL-SCNC: 27 MMOL/L (ref 22–29)
CREAT SERPL-MCNC: 1.62 MG/DL (ref 0.57–1)
EGFRCR SERPLBLD CKD-EPI 2021: 31.4 ML/MIN/1.73
GLOBULIN UR ELPH-MCNC: 3.2 GM/DL
GLUCOSE SERPL-MCNC: 125 MG/DL (ref 65–99)
POTASSIUM SERPL-SCNC: 3.9 MMOL/L (ref 3.5–5.2)
PROT SERPL-MCNC: 7.1 G/DL (ref 6–8.5)
SODIUM SERPL-SCNC: 140 MMOL/L (ref 136–145)

## 2023-10-31 PROCEDURE — 36415 COLL VENOUS BLD VENIPUNCTURE: CPT

## 2023-10-31 PROCEDURE — 76705 ECHO EXAM OF ABDOMEN: CPT

## 2023-10-31 PROCEDURE — 80053 COMPREHEN METABOLIC PANEL: CPT

## 2023-11-02 DIAGNOSIS — I50.33 ACUTE ON CHRONIC DIASTOLIC CONGESTIVE HEART FAILURE: Primary | ICD-10-CM

## 2023-11-06 DIAGNOSIS — N28.89 RIGHT RENAL MASS: ICD-10-CM

## 2023-11-06 RX ORDER — APIXABAN 5 MG/1
5 TABLET, FILM COATED ORAL EVERY 12 HOURS
Qty: 60 TABLET | Refills: 11 | Status: SHIPPED | OUTPATIENT
Start: 2023-11-06

## 2023-11-08 ENCOUNTER — HOSPITAL ENCOUNTER (INPATIENT)
Facility: HOSPITAL | Age: 83
LOS: 3 days | Discharge: HOME | DRG: 309 | End: 2023-11-12
Attending: EMERGENCY MEDICINE | Admitting: INTERNAL MEDICINE
Payer: MEDICARE

## 2023-11-08 ENCOUNTER — APPOINTMENT (OUTPATIENT)
Dept: GENERAL RADIOLOGY | Facility: HOSPITAL | Age: 83
DRG: 309 | End: 2023-11-08
Payer: MEDICARE

## 2023-11-08 DIAGNOSIS — R06.00 DYSPNEA, UNSPECIFIED TYPE: Primary | ICD-10-CM

## 2023-11-08 DIAGNOSIS — R00.1 BRADYCARDIA: ICD-10-CM

## 2023-11-08 DIAGNOSIS — I49.5 SSS (SICK SINUS SYNDROME): ICD-10-CM

## 2023-11-08 LAB
ALBUMIN SERPL-MCNC: 4 G/DL (ref 3.5–5.2)
ALBUMIN/GLOB SERPL: 1.2 G/DL
ALP SERPL-CCNC: 80 U/L (ref 39–117)
ALT SERPL W P-5'-P-CCNC: 48 U/L (ref 1–33)
ANION GAP SERPL CALCULATED.3IONS-SCNC: 14.5 MMOL/L (ref 5–15)
AST SERPL-CCNC: 52 U/L (ref 1–32)
BASOPHILS # BLD AUTO: 0.02 10*3/MM3 (ref 0–0.2)
BASOPHILS NFR BLD AUTO: 0.3 % (ref 0–1.5)
BILIRUB SERPL-MCNC: 0.9 MG/DL (ref 0–1.2)
BUN SERPL-MCNC: 24 MG/DL (ref 8–23)
BUN/CREAT SERPL: 15.2 (ref 7–25)
CALCIUM SPEC-SCNC: 9 MG/DL (ref 8.6–10.5)
CHLORIDE SERPL-SCNC: 103 MMOL/L (ref 98–107)
CO2 SERPL-SCNC: 25.5 MMOL/L (ref 22–29)
CREAT SERPL-MCNC: 1.58 MG/DL (ref 0.57–1)
DEPRECATED RDW RBC AUTO: 47.8 FL (ref 37–54)
EGFRCR SERPLBLD CKD-EPI 2021: 32.4 ML/MIN/1.73
EOSINOPHIL # BLD AUTO: 0.08 10*3/MM3 (ref 0–0.4)
EOSINOPHIL NFR BLD AUTO: 1.1 % (ref 0.3–6.2)
ERYTHROCYTE [DISTWIDTH] IN BLOOD BY AUTOMATED COUNT: 15.7 % (ref 12.3–15.4)
GLOBULIN UR ELPH-MCNC: 3.4 GM/DL
GLUCOSE SERPL-MCNC: 125 MG/DL (ref 65–99)
HCT VFR BLD AUTO: 39.6 % (ref 34–46.6)
HGB BLD-MCNC: 12.3 G/DL (ref 12–15.9)
HOLD SPECIMEN: NORMAL
HOLD SPECIMEN: NORMAL
IMM GRANULOCYTES # BLD AUTO: 0.03 10*3/MM3 (ref 0–0.05)
IMM GRANULOCYTES NFR BLD AUTO: 0.4 % (ref 0–0.5)
INR PPP: 1.43 (ref 0.9–1.1)
LYMPHOCYTES # BLD AUTO: 1.52 10*3/MM3 (ref 0.7–3.1)
LYMPHOCYTES NFR BLD AUTO: 21.2 % (ref 19.6–45.3)
MCH RBC QN AUTO: 25.9 PG (ref 26.6–33)
MCHC RBC AUTO-ENTMCNC: 31.1 G/DL (ref 31.5–35.7)
MCV RBC AUTO: 83.5 FL (ref 79–97)
MONOCYTES # BLD AUTO: 0.76 10*3/MM3 (ref 0.1–0.9)
MONOCYTES NFR BLD AUTO: 10.6 % (ref 5–12)
NEUTROPHILS NFR BLD AUTO: 4.77 10*3/MM3 (ref 1.7–7)
NEUTROPHILS NFR BLD AUTO: 66.4 % (ref 42.7–76)
NRBC BLD AUTO-RTO: 0 /100 WBC (ref 0–0.2)
NT-PROBNP SERPL-MCNC: 6015 PG/ML (ref 0–1800)
PLATELET # BLD AUTO: 211 10*3/MM3 (ref 140–450)
PMV BLD AUTO: 11 FL (ref 6–12)
POTASSIUM SERPL-SCNC: 3.8 MMOL/L (ref 3.5–5.2)
PROT SERPL-MCNC: 7.4 G/DL (ref 6–8.5)
PROTHROMBIN TIME: 17.7 SECONDS (ref 11.7–14.2)
RBC # BLD AUTO: 4.74 10*6/MM3 (ref 3.77–5.28)
SODIUM SERPL-SCNC: 143 MMOL/L (ref 136–145)
TROPONIN T SERPL HS-MCNC: 38 NG/L
WBC NRBC COR # BLD: 7.18 10*3/MM3 (ref 3.4–10.8)
WHOLE BLOOD HOLD COAG: NORMAL
WHOLE BLOOD HOLD SPECIMEN: NORMAL

## 2023-11-08 PROCEDURE — 93010 ELECTROCARDIOGRAM REPORT: CPT | Performed by: INTERNAL MEDICINE

## 2023-11-08 PROCEDURE — 99223 1ST HOSP IP/OBS HIGH 75: CPT | Performed by: INTERNAL MEDICINE

## 2023-11-08 PROCEDURE — 85025 COMPLETE CBC W/AUTO DIFF WBC: CPT | Performed by: NURSE PRACTITIONER

## 2023-11-08 PROCEDURE — 84484 ASSAY OF TROPONIN QUANT: CPT | Performed by: NURSE PRACTITIONER

## 2023-11-08 PROCEDURE — 99285 EMERGENCY DEPT VISIT HI MDM: CPT

## 2023-11-08 PROCEDURE — G0378 HOSPITAL OBSERVATION PER HR: HCPCS

## 2023-11-08 PROCEDURE — 83880 ASSAY OF NATRIURETIC PEPTIDE: CPT | Performed by: NURSE PRACTITIONER

## 2023-11-08 PROCEDURE — 93005 ELECTROCARDIOGRAM TRACING: CPT

## 2023-11-08 PROCEDURE — 80053 COMPREHEN METABOLIC PANEL: CPT | Performed by: NURSE PRACTITIONER

## 2023-11-08 PROCEDURE — 85610 PROTHROMBIN TIME: CPT | Performed by: NURSE PRACTITIONER

## 2023-11-08 PROCEDURE — 71045 X-RAY EXAM CHEST 1 VIEW: CPT

## 2023-11-08 RX ORDER — SODIUM CHLORIDE 0.9 % (FLUSH) 0.9 %
10 SYRINGE (ML) INJECTION AS NEEDED
Status: DISCONTINUED | OUTPATIENT
Start: 2023-11-08 | End: 2023-11-12 | Stop reason: HOSPADM

## 2023-11-08 RX ORDER — NITROGLYCERIN 0.4 MG/1
0.4 TABLET SUBLINGUAL
Status: DISCONTINUED | OUTPATIENT
Start: 2023-11-08 | End: 2023-11-12 | Stop reason: HOSPADM

## 2023-11-08 RX ORDER — BUMETANIDE 0.25 MG/ML
2 INJECTION INTRAMUSCULAR; INTRAVENOUS ONCE
Status: COMPLETED | OUTPATIENT
Start: 2023-11-08 | End: 2023-11-08

## 2023-11-08 RX ORDER — BISACODYL 5 MG/1
5 TABLET, DELAYED RELEASE ORAL DAILY PRN
Status: DISCONTINUED | OUTPATIENT
Start: 2023-11-08 | End: 2023-11-12 | Stop reason: HOSPADM

## 2023-11-08 RX ORDER — POLYETHYLENE GLYCOL 3350 17 G/17G
17 POWDER, FOR SOLUTION ORAL DAILY PRN
Status: DISCONTINUED | OUTPATIENT
Start: 2023-11-08 | End: 2023-11-12 | Stop reason: HOSPADM

## 2023-11-08 RX ORDER — SODIUM CHLORIDE 9 MG/ML
40 INJECTION, SOLUTION INTRAVENOUS AS NEEDED
Status: DISCONTINUED | OUTPATIENT
Start: 2023-11-08 | End: 2023-11-12 | Stop reason: HOSPADM

## 2023-11-08 RX ORDER — SODIUM CHLORIDE 0.9 % (FLUSH) 0.9 %
10 SYRINGE (ML) INJECTION EVERY 12 HOURS SCHEDULED
Status: DISCONTINUED | OUTPATIENT
Start: 2023-11-08 | End: 2023-11-12 | Stop reason: HOSPADM

## 2023-11-08 RX ORDER — LORAZEPAM 1 MG/1
1 TABLET ORAL EVERY 8 HOURS PRN
Status: DISCONTINUED | OUTPATIENT
Start: 2023-11-08 | End: 2023-11-12 | Stop reason: HOSPADM

## 2023-11-08 RX ORDER — PANTOPRAZOLE SODIUM 40 MG/1
40 TABLET, DELAYED RELEASE ORAL
Status: DISCONTINUED | OUTPATIENT
Start: 2023-11-09 | End: 2023-11-12 | Stop reason: HOSPADM

## 2023-11-08 RX ORDER — FLUOXETINE HYDROCHLORIDE 20 MG/1
20 CAPSULE ORAL EVERY MORNING
Status: DISCONTINUED | OUTPATIENT
Start: 2023-11-09 | End: 2023-11-12 | Stop reason: HOSPADM

## 2023-11-08 RX ORDER — BISACODYL 10 MG
10 SUPPOSITORY, RECTAL RECTAL DAILY PRN
Status: DISCONTINUED | OUTPATIENT
Start: 2023-11-08 | End: 2023-11-12 | Stop reason: HOSPADM

## 2023-11-08 RX ORDER — BUMETANIDE 0.25 MG/ML
0.5 INJECTION INTRAMUSCULAR; INTRAVENOUS
Status: DISCONTINUED | OUTPATIENT
Start: 2023-11-08 | End: 2023-11-09

## 2023-11-08 RX ORDER — AMOXICILLIN 250 MG
2 CAPSULE ORAL 2 TIMES DAILY
Status: DISCONTINUED | OUTPATIENT
Start: 2023-11-08 | End: 2023-11-12 | Stop reason: HOSPADM

## 2023-11-08 RX ORDER — POTASSIUM CHLORIDE 750 MG/1
10 TABLET, FILM COATED, EXTENDED RELEASE ORAL 2 TIMES DAILY
Status: DISCONTINUED | OUTPATIENT
Start: 2023-11-08 | End: 2023-11-12 | Stop reason: HOSPADM

## 2023-11-08 RX ADMIN — Medication 10 ML: at 20:31

## 2023-11-08 RX ADMIN — Medication 12.5 MG: at 20:30

## 2023-11-08 RX ADMIN — BUMETANIDE 2 MG: 0.25 INJECTION INTRAMUSCULAR; INTRAVENOUS at 10:48

## 2023-11-08 RX ADMIN — BUMETANIDE 0.5 MG: 0.25 INJECTION INTRAMUSCULAR; INTRAVENOUS at 20:30

## 2023-11-08 RX ADMIN — POTASSIUM CHLORIDE 10 MEQ: 750 TABLET, EXTENDED RELEASE ORAL at 20:30

## 2023-11-08 RX ADMIN — APIXABAN 5 MG: 5 TABLET, FILM COATED ORAL at 20:30

## 2023-11-08 NOTE — CASE MANAGEMENT/SOCIAL WORK
"Continued Stay Note  [facility]     Patient Name: Alice Mabry  MRN: [id number]  Today's Date: 11/8/2023    Admit Date: 11/8/2023    Plan: Plan home with spouse.   BRIGITTE Quintana RN   Discharge Plan       Row Name 11/08/23 6190       Plan    Plan Plan home with spouse.   BRIGITTE Quintana RN    Patient/Family in Agreement with Plan yes    Plan Comments FACE SHEET VERIFIED/ BEGUM SIGNED.  Spoke with pt at bedside.  Pt's PCP is Dr. Tho Mar.  Pt lives with her spouse \"Andreas \" Raghavendra ([phone]) in a single story house.  Pt is independent with ADLs.  Pt has grab bar, rollator and shower chair for home use.  Pt gets her prescriptions at [facility] in [facility].  Pt denies any issues affording medications. Pt is not current with HH.  Pt has not been in SNF.  Pt denies any discharge needs at present. Pt states her spouse will assist her at home if needed and transport her home.  Plan home with spouse.  BRIGITTE Quintana RN                   Discharge Codes    No documentation.                       Maryan Quintana RN    "

## 2023-11-08 NOTE — ED PROVIDER NOTES
I supervised care provided by the midlevel provider.   We have discussed this patient's history, physical exam, and treatment plan.  I have reviewed the note and personally saw and examined the patient and agree with the plan of care.   I have seen this patient.  Obtained history from the patient, her old records, [facility].  Patient's spouse is present as well.  She has had some shortness of breath for about 3 weeks.  It is gradually getting worse.  It is worse with exertion.  She does report a 4 pound weight gain but denies any swelling anywhere to her body.  Denies any chest pain at all.  She has been compliant with her Eliquis.  Denies any exertional chest pain.  She will occasionally have which she feels is nausea related to heartburn.  She has had a history of a heart attack and states that she never had chest pain with her previous heart attack.  She denies any fevers or chills or coughs or colds.  When she is at rest she is asymptomatic.  She never sleeps lying flat she always sleeps sitting up and there is no change in sleeping.  Denies any nausea vomiting.  Denies coughs or colds.    GENERAL: Elderly female.  Not distressed.  On exam she has marked bradycardia.  Her heart rate is upper 30s to low 40s.  Blood pressure is low 100 systolic.  She is afebrile.  Oxygen saturation is 97% on room air sitting in the bed.  HENT: nares patent  Head/neck/ face are symmetric without gross deformity or swelling  EYES: no scleral icterus  CV: regular rhythm, bradycardia.  With intact distal pulses.  Soft systolic murmur 2 out of 6.  RESPIRATORY: normal effort and no respiratory distress lungs are clear to auscultation bilaterally  ABDOMEN: soft and nontender  MUSCULOSKELETAL: no deformity.  Intact distal pulses to upper and lower extremities are equal strong and symmetric.  NEURO: alert and appropriate, moves all extremities, follows commands  SKIN: warm, dry    Vital signs and nursing notes reviewed.    Plan   ED Course as  of 11/08/23 1651   Wed Nov 08, 2023   1114 XR Chest 1 View  Per my independent interpretation is no focal infiltrate, no pneumothorax [AH]   1118 ECG 12 Lead Dyspnea  Per my independent interpretation is sinus bradycardia rate 42, no acute injury pattern, qtc 504 [AH]   1234 I reviewed the EKG today that was done at 1018  I believe that is normal sinus with bradycardia to rate of 42.  There is some mild intravelar conduction delay I do not see any definitive acute injury pattern has some Q waves in the inferior leads  Diffuse nonspecific T wave changes  QT looks mildly prolonged.  I compared this to the prior EKG that was done October 19, 2023 the bradycardia seems to be the most prominent change.  No other obvious changes apparent [MM]   1234 proBNP(!): 6,015.0 [MM]   1234 HS Troponin T(!): 38 [MM]   1234 Creatinine(!): 1.58  Chronic renal failure baseline [MM]   1234 Hemoglobin: 12.3 [MM]   1234 I reviewed the chest x-ray she does have some cardiomegaly but no obvious pleural effusions or obvious failure.  Previous sternotomy. [MM]   1234 I do not believe this patient has a pulmonary embolism.  She is on Eliquis for atrial fibrillation and has been compliant with Eliquis and has not missed any doses. [MM]   1334 Phone call with dr. catherine.  Discussed the patient, relevant history, exam, diagnostics, ED findings/progress, and concerns. They agree to admit to a tele obs bed   [AH]      ED Course User Index  [AH] Oma Anders APRN  [MM] Abhishek Richard MD Molinari, Mark, MD  11/08/23 1651

## 2023-11-08 NOTE — ED NOTES
.Nursing report ED to floor  Alice Mabry  82 y.o.  female    HPI :   Chief Complaint   Patient presents with    Shortness of Breath       Admitting doctor:   Murphy Horner MD    Admitting diagnosis:   The primary encounter diagnosis was Dyspnea, unspecified type. A diagnosis of Bradycardia was also pertinent to this visit.    Code status:   Current Code Status       Date Active Code Status Order ID Comments User Context       Prior            Allergies:   Codeine, Penicillins, and Ambien [zolpidem tartrate]    Isolation:   No active isolations    Intake and Output  No intake or output data in the 24 hours ending 11/08/23 1359    Weight:       11/08/23  1032   Weight: 78.5 kg (173 lb)       Most recent vitals:   Vitals:    11/08/23 1131 11/08/23 1201 11/08/23 1231 11/08/23 1331   BP: 122/71 114/68 109/68 128/71   BP Location: Left arm Left arm Left arm Left arm   Patient Position: Lying Lying Lying Lying   Pulse: (!) 40 (!) 43 (!) 41 (!) 45   Resp: 18 18 18 18   Temp:       TempSrc:       SpO2: 97% 95% 97% 96%   Weight:       Height:           Active LDAs/IV Access:   Lines, Drains & Airways       Active LDAs       Name Placement date Placement time Site Days    Peripheral IV 11/08/23 1039 Anterior;Proximal;Right Forearm 11/08/23  1039  Forearm  less than 1    External Urinary Catheter --  --  --  --                    Labs (abnormal labs have a star):   Labs Reviewed   COMPREHENSIVE METABOLIC PANEL - Abnormal; Notable for the following components:       Result Value    Glucose 125 (*)     BUN 24 (*)     Creatinine 1.58 (*)     ALT (SGPT) 48 (*)     AST (SGOT) 52 (*)     eGFR 32.4 (*)     All other components within normal limits    Narrative:     GFR Normal >60  Chronic Kidney Disease <60  Kidney Failure <15    The GFR formula is only valid for adults with stable renal function between ages 18 and 70.   SINGLE HSTROPONIN T - Abnormal; Notable for the following components:    HS Troponin T 38 (*)     All other  components within normal limits    Narrative:     High Sensitive Troponin T Reference Range:  <14.0 ng/L- Negative Female for AMI  <22.0 ng/L- Negative Male for AMI  >=14 - Abnormal Female indicating possible myocardial injury.  >=22 - Abnormal Male indicating possible myocardial injury.   Clinicians would have to utilize clinical acumen, EKG, Troponin, and serial changes to determine if it is an Acute Myocardial Infarction or myocardial injury due to an underlying chronic condition.        BNP (IN-HOUSE) - Abnormal; Notable for the following components:    proBNP 6,015.0 (*)     All other components within normal limits    Narrative:     This assay is used as an aid in the diagnosis of individuals suspected of having heart failure. It can be used as an aid in the diagnosis of acute decompensated heart failure (ADHF) in patients presenting with signs and symptoms of ADHF to the emergency department (ED). In addition, NT-proBNP of <300 pg/mL indicates ADHF is not likely.    Age Range Result Interpretation  NT-proBNP Concentration (pg/mL:      <50             Positive            >450                   Gray                 300-450                    Negative             <300    50-75           Positive            >900                  Gray                300-900                  Negative            <300      >75             Positive            >1800                  Gray                300-1800                  Negative            <300   PROTIME-INR - Abnormal; Notable for the following components:    Protime 17.7 (*)     INR 1.43 (*)     All other components within normal limits   CBC WITH AUTO DIFFERENTIAL - Abnormal; Notable for the following components:    MCH 25.9 (*)     MCHC 31.1 (*)     RDW 15.7 (*)     All other components within normal limits   RAINBOW DRAW    Narrative:     The following orders were created for panel order [place] Draw.  Procedure                               Abnormality         Status                      ---------                               -----------         ------                     Green Top (Gel)[[id number]]                                  Final result               Lavender Top[[id number]]                                     Final result               Gold Top - SST[[id number]]                                   Final result               Light Blue Top[[id number]]                                   Final result                 Please view results for these tests on the individual orders.   CBC AND DIFFERENTIAL    Narrative:     The following orders were created for panel order CBC & Differential.  Procedure                               Abnormality         Status                     ---------                               -----------         ------                     CBC Auto Differential[[id number]]        Abnormal            Final result                 Please view results for these tests on the individual orders.   GREEN TOP   LAVENDER TOP   GOLD TOP - SST   LIGHT BLUE TOP       EKG:   ECG 12 Lead Dyspnea   Preliminary Result   HEART RATE= 42  bpm   RR Interval= 1429  ms   [place] Interval=   ms   P Horizontal Axis=   deg   P Front Axis=   deg   QRSD Interval= 139  ms   QT Interval= 603  ms   QTcB= 504  ms   QRS Axis= 66  deg   T Wave Axis= 143  deg   - ABNORMAL ECG -   Junctional rhythm   IVCD, consider atypical RBBB   Repol abnrm suggests ischemia, lateral leads   Electronically Signed By:    Date and Time of Study: 2023-11-08 10:18:14      ECG 12 Lead Dyspnea    (Results Pending)       Meds given in ED:   Medications   sodium chloride 0.9 % flush 10 mL (has no administration in time range)   bumetanide (BUMEX) injection 2 mg (2 mg Intravenous Given 11/8/23 1048)       Imaging results:  XR Chest 1 View    Result Date: 11/8/2023  1. Cardiomegaly.   This report was finalized on 11/8/2023 10:37 AM by Dr. Yunior Loja M.D on Workstation: [employer]       Ambulatory status:   - assist stand  by    Social issues:   Social History     Socioeconomic History    Marital status:    Tobacco Use    Smoking status: Former     Packs/day: 1.00     Years: 20.00     Additional pack years: 0.00     Total pack years: 20.00     Types: Cigarettes     Quit date:      Years since quittin.    Smokeless tobacco: Never    Tobacco comments:     Caffeine - half and half coffee   Vaping Use    Vaping Use: Never used   Substance and Sexual Activity    Alcohol use: Not Currently    Drug use: Never    Sexual activity: Defer       NIH Stroke Scale:       Corazon Sal RN  23 13:59 EST

## 2023-11-08 NOTE — PLAN OF CARE
Goal Outcome Evaluation:  New admit, Denies pain, denies nausea, zahra, soa of exertion, hawa meals

## 2023-11-08 NOTE — H&P
Date of Hospital Visit: 23  Encounter Provider: Murphy Horner MD  Place of Service: [facility] CARDIOLOGY  Patient Name: Alice Mabry  :1940  [id number]    Chief complaint: Shortness of breath    History of Present Illness: She is an 82-year-old woman who has had significant cardiac disease.  She has had persistent atrial fibrillation hypertension in 2022 she had an aortic valve replacement a four-vessel bypass Maze procedure and a left atrial appendage ligation.  She was in sinus for a period of time in  she went back into A-fib was started back on amiodarone and cardioverted.  Right heart cath was performed which showed pulmonary hypertension with PA pressures in the 58/18 in the setting of a wedge pressure of 20.  She is continue to have some recurrent A-fib and diastolic heart failure.  Recently she was seen and her LFTs were elevated and she has been having A-fib so we decided to stop her amiodarone as well as her Lipitor to see if that would improve things.    She is extremely short of breath with minimal activity.  Has been getting worse over the last few weeks no change in her weight no edema she chronically sleeps with the head of her bed elevated she has not had syncope and has not had chest pain no dietary indiscretion    Past Medical History:   Diagnosis Date    Acute diastolic (congestive) heart failure     Anxiety     Aortic valve stenosis     HX CABG, AVR, MVR 3/2022    Arthritis     CAD (coronary artery disease)     stents    Depression     Dizziness     Fatty liver     GERD (gastroesophageal reflux disease)     H/O blood clots     H/O Clostridium difficile infection     Heart murmur     History of atrial fibrillation     History of cervical cancer     HX HYSTERECTOMY    History of MI (myocardial infarction)     History of transfusion     no reactions    [place] (hard of hearing)     Hyperlipidemia     Hypertension     IBS (irritable  bowel syndrome)     Incontinence of urine     wears pads    Renal mass, right        Past Surgical History:   Procedure Laterality Date    APPENDECTOMY  1960    CARDIAC CATHETERIZATION  06/10/2014    Dr. Murphy Horner    CARDIAC CATHETERIZATION  11/13/2007    Dr. Murphy Horner    CARDIAC CATHETERIZATION N/A 10/19/2004    Dr. Murphy Horner    CARDIAC CATHETERIZATION N/A 07/15/2004    Dr. Gregorio Gonzales    CARDIAC CATHETERIZATION  10/2003    Dr. Murphy Horner    CARDIAC CATHETERIZATION N/A 03/21/2022    Procedure: Coronary angiography;  Surgeon: Murphy Horner MD;  Location:  [facility];  Service: Cardiology;  Laterality: N/A;    CARDIAC CATHETERIZATION N/A 03/21/2022    Procedure: Right Heart Cath;  Surgeon: Murphy Horner MD;  Location:  [facility];  Service: Cardiology;  Laterality: N/A;    CARDIAC CATHETERIZATION N/A 03/21/2022    Procedure: Left Heart Cath;  Surgeon: Murphy Horner MD;  Location:  [facility];  Service: Cardiology;  Laterality: N/A;    CARDIAC CATHETERIZATION N/A 03/21/2022    Procedure: Left ventriculography;  Surgeon: Murphy Horner MD;  Location:  [facility];  Service: Cardiology;  Laterality: N/A;    CARDIAC CATHETERIZATION N/A 4/13/2023    Procedure: Right Heart Cath;  Surgeon: Murphy Horner MD;  Location:  [facility];  Service: Cardiology;  Laterality: N/A;    CHOLECYSTECTOMY  1998    COLONOSCOPY N/A 07/2001    negative    COLONOSCOPY N/A 02/28/2012    negative    CORONARY ANGIOPLASTY WITH STENT PLACEMENT N/A 07/15/2004    Dr. Murphy Horner    CORONARY ANGIOPLASTY WITH STENT PLACEMENT  03/2002    to RCA    CORONARY ARTERY BYPASS GRAFT WITH AORTIC AND MITRAL VALVE REPAIR/REPLACEMENT N/A 03/28/2022    Procedure: DALTON STERNOTOMY CORONARY ARTERY BYPASS GRAFTING TIMES 4 USING LEFT INTERNAL MAMMARY ARTERY AND LEFT GREATER SAPHENOUS VEIN GRAFT PER ENDOSCOPIC VEIN HARVESTING, RIGHT CORONARY  ENDARTERECTOMY, AORTIC VALVE REPLACEMENT, MITRAL VALVE REPAIR, ATRICURE MAZE PROCEDURE, CLOSURE OF LEFT ATRIAL APPENDAGE AND PRP;  Surgeon: Jr Isaias Lynn MD;  Location: [facility];  Service: Cardiothoracic;    CORONARY ARTERY BYPASS GRAFT WITH AORTIC AND MITRAL VALVE REPAIR/REPLACEMENT  03/28/2022    Procedure: ;  Surgeon: Jr Isaias Lynn MD;  Location: [facility] CVOR;  Service: Cardiothoracic;;    HYSTERECTOMY  1974    LUMBAR DISCECTOMY      NEPHRECTOMY PARTIAL Right 11/3/2022    Procedure: RIGHT ROBOTIC PARTIAL NEPHRECTOMY;  Surgeon: Davy Silva Jr., MD;  Location: [facility] MAIN OR;  Service: Robotics - DaVinci;  Laterality: Right;    UPPER GASTROINTESTINAL ENDOSCOPY N/A 04/20/2015    Mild Schatzki ring, hiatus hernia, non-bleeding erosive gastropathy, erythematous mucosa in the antrum, normal duodenum-Dr. Alex Gill       Medications Prior to Admission   Medication Sig Dispense Refill Last Dose    apixaban (Eliquis) 5 MG tablet tablet TAKE ONE TABLET BY MOUTH EVERY 12 HOURS 60 tablet 11 11/8/2023    bumetanide (BUMEX) 2 MG tablet Take 1.5 tablets by mouth 2 (Two) Times a Day. 180 tablet 3 11/8/2023    dapagliflozin Propanediol (Farxiga) 10 MG tablet Take 10 mg by mouth Every Morning. 90 tablet 3 11/8/2023    FLUoxetine (PROzac) 20 MG capsule Take 1 capsule by mouth Every Morning.   11/8/2023    LORazepam (ATIVAN) 1 MG tablet Take 1 tablet by mouth Every 8 (Eight) Hours As Needed for Anxiety.   11/7/2023    metoprolol succinate XL (TOPROL-XL) 100 MG 24 hr tablet Take 1 tablet by mouth Daily. 90 tablet 3 11/8/2023    omeprazole (priLOSEC) 40 MG capsule Take 1 capsule by mouth Daily.   11/8/2023    potassium chloride 10 MEQ CR tablet Take 1 tablet by mouth 2 (Two) Times a Day. 60 tablet 11 11/8/2023    spironolactone (ALDACTONE) 25 MG tablet TAKE 1/2 TABLET BY MOUTH DAILY 30 tablet 5 11/8/2023    Vitamin D, Ergocalciferol, 2000 units capsule Take 2,000 Units by mouth Daily. TO HOLD 1 WEEK  PRIOR TO OR   11/7/2023    nitroglycerin (NITROSTAT) 0.4 MG SL tablet Place 1 tablet under the tongue Every 5 (Five) Minutes As Needed for Chest Pain. Take no more than 3 doses in 15 minutes. 100 tablet 3 Unknown       Current Meds  No current facility-administered medications on file prior to encounter.     Current Outpatient Medications on File Prior to Encounter   Medication Sig Dispense Refill    apixaban (Eliquis) 5 MG tablet tablet TAKE ONE TABLET BY MOUTH EVERY 12 HOURS 60 tablet 11    bumetanide (BUMEX) 2 MG tablet Take 1.5 tablets by mouth 2 (Two) Times a Day. 180 tablet 3    dapagliflozin Propanediol (Farxiga) 10 MG tablet Take 10 mg by mouth Every Morning. 90 tablet 3    FLUoxetine (PROzac) 20 MG capsule Take 1 capsule by mouth Every Morning.      LORazepam (ATIVAN) 1 MG tablet Take 1 tablet by mouth Every 8 (Eight) Hours As Needed for Anxiety.      metoprolol succinate XL (TOPROL-XL) 100 MG 24 hr tablet Take 1 tablet by mouth Daily. 90 tablet 3    omeprazole (priLOSEC) 40 MG capsule Take 1 capsule by mouth Daily.      potassium chloride 10 MEQ CR tablet Take 1 tablet by mouth 2 (Two) Times a Day. 60 tablet 11    spironolactone (ALDACTONE) 25 MG tablet TAKE 1/2 TABLET BY MOUTH DAILY 30 tablet 5    Vitamin D, Ergocalciferol, 2000 units capsule Take 2,000 Units by mouth Daily. TO HOLD 1 WEEK PRIOR TO OR      nitroglycerin (NITROSTAT) 0.4 MG SL tablet Place 1 tablet under the tongue Every 5 (Five) Minutes As Needed for Chest Pain. Take no more than 3 doses in 15 minutes. 100 tablet 3    [DISCONTINUED] amiodarone (PACERONE) 200 MG tablet TAKE ONE TABLET BY MOUTH TWICE A DAY 90 tablet 1    [DISCONTINUED] atorvastatin (LIPITOR) 80 MG tablet TAKE 1 TABLET DAILY 90 tablet 3       Social History     Socioeconomic History    Marital status:    Tobacco Use    Smoking status: Former     Packs/day: 1.00     Years: 20.00     Additional pack years: 0.00     Total pack years: 20.00     Types: Cigarettes     Quit  "date:      Years since quittin.8    Smokeless tobacco: Never    Tobacco comments:     Caffeine - half and half coffee   Vaping Use    Vaping Use: Never used   Substance and Sexual Activity    Alcohol use: Not Currently    Drug use: Never    Sexual activity: Defer       Family Hx: Non-contributory    REVIEW OF SYSTEMS:   ROS was performed and is negative except as outlined in HPI     REVIEW OF SYSTEMS:   CONSTITUTIONAL: No weight loss, fever, chills, weakness or fatigue.   HEENT: Eyes: No visual loss, blurred vision, double vision or yellow sclerae. Ears, Nose, Throat: No hearing loss, sneezing, congestion, runny nose or sore throat.   SKIN: No rash or itching.     RESPIRATORY: No shortness of breath, hemoptysis, cough or sputum.   GASTROINTESTINAL: No anorexia, nausea, vomiting or diarrhea. No abdominal pain, bright red blood per rectum or melena.  NEUROLOGICAL: No headache, dizziness, syncope, paralysis, numbness or tingling in the extremities.  MUSCULOSKELETAL: No muscle, back pain, joint pain or stiffness.   HEMATOLOGIC: No anemia, bleeding or bruising.   LYMPHATICS: No enlarged nodes.  PSYCHIATRIC: No history of depression, anxiety, hallucinations.   ENDOCRINOLOGIC: No reports of sweating, cold or heat intolerance. No polyuria or polydipsia.        Objective:     Vitals:    23 1331 23 1400 23 1433 23 1524   BP: 128/71 130/77 123/60 133/79   BP Location: Left arm Left arm Left arm Left arm   Patient Position: Lying Lying Lying Lying   Pulse: (!) 45 (!) 42 (!) 43 (!) 43   Resp: 18 18 18 18   Temp:    97.9 °F (36.6 °C)   TempSrc:    Oral   SpO2: 96% 94% 95% 97%   Weight:    78.4 kg (172 lb 13.5 oz)   Height:    165.1 cm (65\")     Body mass index is 28.76 kg/m².  Flowsheet Rows      Flowsheet Row First Filed Value   Admission Height 165.1 cm (65\") Documented at 2023 1032   Admission Weight 78.5 kg (173 lb) Documented at 2023 1032            General Appearance:    Alert, " oriented x 3, in no acute distress   Head:    Normocephalic, without obvious abnormality, atraumatic   Ears:    Ears appear intact with no abnormalities noted   Throat:   No oral lesions, dentition good   Neck:   No adenopathy, supple, trachea midline, no thyromegaly, no carotid bruit, no JVD   Lungs:    Breath sounds are equal and  clear to auscultation    Heart:   Normal S1 and S2, RRR, no murmur/gallop or rub   Abdomen:    Normal bowel sounds, obese, soft non-tender, non-distended, no organomegaly, no guarding   Extremities:   Moves all extremities well, no edema, no cyanosis, no redness   Pulses:   Pulses palpable and equal bilaterally. Normal radial pulses   Skin:   No bleeding, bruising or rash   Lymph nodes:   No palpable adenopathy     I personally viewed and interpreted the patient's EKG/Telemetry data    Assessment:  Active Hospital Problems    Diagnosis  POA    **Dyspnea [R06.00]  Yes      Resolved Hospital Problems   No resolved problems to display.       Plan: When she came into the ER she was in junctional rhythm with periods of sinus bradycardia in the low 40s upper 30s.  She is in overt heart failure.  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  I think if she has recurrent A-fib we may want to think about an AV node ablation and a pacer

## 2023-11-08 NOTE — CASE MANAGEMENT/SOCIAL WORK
"Discharge Planning Assessment  [facility]     Patient Name: Alice Mabry  MRN: [id number]  Today's Date: 11/8/2023    Admit Date: 11/8/2023    Plan: Plan home with spouse.   BRIGITTE Quintana RN   Discharge Needs Assessment       Row Name 11/08/23 1702       Living Environment    People in Home spouse    Name(s) of People in Home Spouse \"Andreas Frankel" Raghavendra ([phone])    Current Living Arrangements home    Potentially Unsafe Housing Conditions none    Primary Care Provided by self    Provides Primary Care For no one    Family Caregiver if Needed spouse    Family Caregiver Names Spouse \"Andreas \" Raghavendra ([phone])    Quality of Family Relationships helpful;involved;supportive    Able to Return to Prior Arrangements yes    Living Arrangement Comments Pt lives with her spouse \"Andreas Frankel" Raghavendra ([phone]) in a single story house.       Resource/Environmental Concerns    Resource/Environmental Concerns none    Transportation Concerns none       Transition Planning    Patient/Family Anticipates Transition to home with family    Patient/Family Anticipated Services at Transition none    Transportation Anticipated family or friend will provide       Discharge Needs Assessment    Readmission Within the Last 30 Days no previous admission in last 30 days    Equipment Currently Used at Home grab bar;rollator;shower chair    Concerns to be Addressed no discharge needs identified;denies needs/concerns at this time    Anticipated Changes Related to Illness none    Equipment Needed After Discharge grab bar, tub/shower;rollator;shower chair                   Discharge Plan       Row Name 11/08/23 1702       Plan    Plan Plan home with spouse.   BRIGITTE Quintana RN    Patient/Family in Agreement with Plan yes    Plan Comments FACE SHEET VERIFIED/ BEGUM SIGNED.  Spoke with pt at bedside.  Pt's PCP is Dr. Tho Mar.  Pt lives with her spouse \"Andreas Frankel" Raghavendra ([phone]) in a single story house.  Pt is independent with ADLs.  Pt " has grab bar, rollator and shower chair for home use.  Pt gets her [facility].  Pt denies any issues affording medications. Pt is not current with HH.  Pt has not been in SNF.  Pt denies any discharge needs at present. Pt states her spouse will assist her at home if needed and transport her home.  Plan home with spouse.  BRIGITTE Quintana RN                  Continued Care and Services - Admitted Since 11/8/2023    Coordination has not been started for this encounter.          Demographic Summary       Row Name 11/08/23 170       General Information    Admission Type observation    Arrived From emergency department    Required Notices Provided Observation Status Notice    Referral Source admission list    Reason for Consult discharge planning    Preferred Language English                   Functional Status       Row Name 11/08/23 1702       Functional Status    Usual Activity Tolerance good    Current Activity Tolerance moderate       Functional Status, IADL    Medications independent    Meal Preparation independent    Housekeeping independent    Laundry independent    Shopping independent       Mental Status    General Appearance WDL WDL                   Psychosocial    No documentation.                  Abuse/Neglect    No documentation.                  Legal    No documentation.                  Substance Abuse    No documentation.                  Patient Forms    No documentation.                     Maryan Quintana, RN

## 2023-11-08 NOTE — PLAN OF CARE
Goal Outcome Evaluation:  Plan of Care Reviewed With: patient           Outcome Evaluation: Pt admitted to unit, c/o SOA and dysrrythmia.   Pt is not SOA when speaking nurse lying on  r. side. CATARINO GARCIA.

## 2023-11-08 NOTE — ED TRIAGE NOTES
Pt to ed from home via PV    Pt c/o gaining 4 lbs in 2 days, pt reports she is CHF pt. Pt also c/o SOB. Pt states her HR normally runs in 40's

## 2023-11-09 ENCOUNTER — APPOINTMENT (OUTPATIENT)
Dept: CARDIOLOGY | Facility: HOSPITAL | Age: 83
DRG: 309 | End: 2023-11-09
Payer: MEDICARE

## 2023-11-09 LAB
ALBUMIN SERPL-MCNC: 3.6 G/DL (ref 3.5–5.2)
ALBUMIN/GLOB SERPL: 1.2 G/DL
ALP SERPL-CCNC: 70 U/L (ref 39–117)
ALT SERPL W P-5'-P-CCNC: 40 U/L (ref 1–33)
ANION GAP SERPL CALCULATED.3IONS-SCNC: 11.8 MMOL/L (ref 5–15)
AORTIC DIMENSIONLESS INDEX: 0.3 (DI)
ASCENDING AORTA: 3.3 CM
AST SERPL-CCNC: 47 U/L (ref 1–32)
BH CV ECHO MEAS - ACS: 1.43 CM
BH CV ECHO MEAS - AO MAX PG: 59 MMHG
BH CV ECHO MEAS - AO MEAN PG: 27.5 MMHG
BH CV ECHO MEAS - AO ROOT DIAM: 3.1 CM
BH CV ECHO MEAS - AO V2 MAX: 382 CM/SEC
BH CV ECHO MEAS - AO V2 VTI: 77.3 CM
BH CV ECHO MEAS - AVA(I,D): 0.85 CM2
BH CV ECHO MEAS - EDV(CUBED): 72.5 ML
BH CV ECHO MEAS - EDV(MOD-SP2): 120 ML
BH CV ECHO MEAS - EDV(MOD-SP4): 77 ML
BH CV ECHO MEAS - EF(MOD-BP): 41.7 %
BH CV ECHO MEAS - EF(MOD-SP2): 43.3 %
BH CV ECHO MEAS - EF(MOD-SP4): 41.6 %
BH CV ECHO MEAS - ESV(CUBED): 51.2 ML
BH CV ECHO MEAS - ESV(MOD-SP2): 68 ML
BH CV ECHO MEAS - ESV(MOD-SP4): 45 ML
BH CV ECHO MEAS - FS: 11 %
BH CV ECHO MEAS - IVS/LVPW: 1.27 CM
BH CV ECHO MEAS - IVSD: 1.02 CM
BH CV ECHO MEAS - LAT PEAK E' VEL: 7.9 CM/SEC
BH CV ECHO MEAS - LV MASS(C)D: 120.2 GRAMS
BH CV ECHO MEAS - LV MAX PG: 4 MMHG
BH CV ECHO MEAS - LV MEAN PG: 1.95 MMHG
BH CV ECHO MEAS - LV V1 MAX: 62.6 CM/SEC
BH CV ECHO MEAS - LV V1 VTI: 21.7 CM
BH CV ECHO MEAS - LVIDD: 4.2 CM
BH CV ECHO MEAS - LVIDS: 3.7 CM
BH CV ECHO MEAS - LVOT AREA: 3 CM2
BH CV ECHO MEAS - LVOT DIAM: 1.97 CM
BH CV ECHO MEAS - LVPWD: 0.81 CM
BH CV ECHO MEAS - MED PEAK E' VEL: 4.6 CM/SEC
BH CV ECHO MEAS - MV DEC SLOPE: 816.7 CM/SEC2
BH CV ECHO MEAS - MV DEC TIME: 0.2 SEC
BH CV ECHO MEAS - MV E MAX VEL: 126 CM/SEC
BH CV ECHO MEAS - MV MAX PG: 10.8 MMHG
BH CV ECHO MEAS - MV MEAN PG: 2.6 MMHG
BH CV ECHO MEAS - MV P1/2T: 55.3 MSEC
BH CV ECHO MEAS - MV V2 VTI: 43 CM
BH CV ECHO MEAS - MVA(P1/2T): 4 CM2
BH CV ECHO MEAS - MVA(VTI): 1.54 CM2
BH CV ECHO MEAS - PA ACC TIME: 0.12 SEC
BH CV ECHO MEAS - PA V2 MAX: 92.3 CM/SEC
BH CV ECHO MEAS - RAP SYSTOLE: 15 MMHG
BH CV ECHO MEAS - RV MAX PG: 1.18 MMHG
BH CV ECHO MEAS - RV V1 MAX: 54.4 CM/SEC
BH CV ECHO MEAS - RV V1 VTI: 11.6 CM
BH CV ECHO MEAS - RVSP: 48 MMHG
BH CV ECHO MEAS - SV(LVOT): 66 ML
BH CV ECHO MEAS - SV(MOD-SP2): 52 ML
BH CV ECHO MEAS - SV(MOD-SP4): 32 ML
BH CV ECHO MEAS - TAPSE (>1.6): 1.94 CM
BH CV ECHO MEAS - TR MAX PG: 33.4 MMHG
BH CV ECHO MEAS - TR MAX VEL: 289.1 CM/SEC
BH CV ECHO MEASUREMENTS AVERAGE E/E' RATIO: 20.16
BH CV XLRA - RV BASE: 4.4 CM
BH CV XLRA - RV LENGTH: 7.6 CM
BH CV XLRA - RV MID: 4 CM
BH CV XLRA - TDI S': 9.7 CM/SEC
BILIRUB SERPL-MCNC: 0.9 MG/DL (ref 0–1.2)
BUN SERPL-MCNC: 22 MG/DL (ref 8–23)
BUN/CREAT SERPL: 15.2 (ref 7–25)
CALCIUM SPEC-SCNC: 8.7 MG/DL (ref 8.6–10.5)
CHLORIDE SERPL-SCNC: 104 MMOL/L (ref 98–107)
CO2 SERPL-SCNC: 25.2 MMOL/L (ref 22–29)
CREAT SERPL-MCNC: 1.45 MG/DL (ref 0.57–1)
DEPRECATED RDW RBC AUTO: 48.8 FL (ref 37–54)
EGFRCR SERPLBLD CKD-EPI 2021: 35.9 ML/MIN/1.73
ERYTHROCYTE [DISTWIDTH] IN BLOOD BY AUTOMATED COUNT: 15.8 % (ref 12.3–15.4)
GEN 5 2HR TROPONIN T REFLEX: 33 NG/L
GLOBULIN UR ELPH-MCNC: 3 GM/DL
GLUCOSE SERPL-MCNC: 111 MG/DL (ref 65–99)
HCT VFR BLD AUTO: 36.2 % (ref 34–46.6)
HGB BLD-MCNC: 11.3 G/DL (ref 12–15.9)
LEFT ATRIUM VOLUME INDEX: 45.9 ML/M2
MCH RBC QN AUTO: 26.5 PG (ref 26.6–33)
MCHC RBC AUTO-ENTMCNC: 31.2 G/DL (ref 31.5–35.7)
MCV RBC AUTO: 85 FL (ref 79–97)
PLATELET # BLD AUTO: 185 10*3/MM3 (ref 140–450)
PMV BLD AUTO: 10.8 FL (ref 6–12)
POTASSIUM SERPL-SCNC: 3.4 MMOL/L (ref 3.5–5.2)
PROT SERPL-MCNC: 6.6 G/DL (ref 6–8.5)
RBC # BLD AUTO: 4.26 10*6/MM3 (ref 3.77–5.28)
SINUS: 2.5 CM
SODIUM SERPL-SCNC: 141 MMOL/L (ref 136–145)
STJ: 2.6 CM
TROPONIN T DELTA: -1 NG/L
TROPONIN T SERPL HS-MCNC: 34 NG/L
WBC NRBC COR # BLD: 5.72 10*3/MM3 (ref 3.4–10.8)

## 2023-11-09 PROCEDURE — 84484 ASSAY OF TROPONIN QUANT: CPT | Performed by: INTERNAL MEDICINE

## 2023-11-09 PROCEDURE — 25510000001 PERFLUTREN (DEFINITY) 8.476 MG IN SODIUM CHLORIDE (PF) 0.9 % 10 ML INJECTION: Performed by: INTERNAL MEDICINE

## 2023-11-09 PROCEDURE — 80053 COMPREHEN METABOLIC PANEL: CPT | Performed by: INTERNAL MEDICINE

## 2023-11-09 PROCEDURE — 99232 SBSQ HOSP IP/OBS MODERATE 35: CPT | Performed by: INTERNAL MEDICINE

## 2023-11-09 PROCEDURE — 85027 COMPLETE CBC AUTOMATED: CPT | Performed by: INTERNAL MEDICINE

## 2023-11-09 PROCEDURE — 93306 TTE W/DOPPLER COMPLETE: CPT | Performed by: INTERNAL MEDICINE

## 2023-11-09 PROCEDURE — 93005 ELECTROCARDIOGRAM TRACING: CPT | Performed by: INTERNAL MEDICINE

## 2023-11-09 PROCEDURE — 93010 ELECTROCARDIOGRAM REPORT: CPT | Performed by: INTERNAL MEDICINE

## 2023-11-09 PROCEDURE — 93306 TTE W/DOPPLER COMPLETE: CPT

## 2023-11-09 RX ORDER — BUMETANIDE 0.25 MG/ML
2.5 INJECTION INTRAMUSCULAR; INTRAVENOUS ONCE
Status: COMPLETED | OUTPATIENT
Start: 2023-11-09 | End: 2023-11-09

## 2023-11-09 RX ORDER — BUMETANIDE 0.25 MG/ML
3 INJECTION INTRAMUSCULAR; INTRAVENOUS EVERY 12 HOURS
Status: DISCONTINUED | OUTPATIENT
Start: 2023-11-09 | End: 2023-11-11

## 2023-11-09 RX ADMIN — Medication 10 ML: at 20:57

## 2023-11-09 RX ADMIN — POTASSIUM CHLORIDE 10 MEQ: 750 TABLET, EXTENDED RELEASE ORAL at 09:38

## 2023-11-09 RX ADMIN — POTASSIUM CHLORIDE 10 MEQ: 750 TABLET, EXTENDED RELEASE ORAL at 21:55

## 2023-11-09 RX ADMIN — BUMETANIDE 3 MG: 0.25 INJECTION INTRAMUSCULAR; INTRAVENOUS at 21:55

## 2023-11-09 RX ADMIN — FLUOXETINE HYDROCHLORIDE 20 MG: 20 CAPSULE ORAL at 06:07

## 2023-11-09 RX ADMIN — PANTOPRAZOLE SODIUM 40 MG: 40 TABLET, DELAYED RELEASE ORAL at 06:07

## 2023-11-09 RX ADMIN — APIXABAN 5 MG: 5 TABLET, FILM COATED ORAL at 18:12

## 2023-11-09 RX ADMIN — PERFLUTREN 2 ML: 6.52 INJECTION, SUSPENSION INTRAVENOUS at 11:17

## 2023-11-09 RX ADMIN — EMPAGLIFLOZIN 10 MG: 10 TABLET, FILM COATED ORAL at 09:39

## 2023-11-09 RX ADMIN — LORAZEPAM 1 MG: 1 TABLET ORAL at 14:16

## 2023-11-09 RX ADMIN — BUMETANIDE 0.5 MG: 0.25 INJECTION INTRAMUSCULAR; INTRAVENOUS at 09:39

## 2023-11-09 RX ADMIN — APIXABAN 5 MG: 5 TABLET, FILM COATED ORAL at 06:07

## 2023-11-09 RX ADMIN — LORAZEPAM 1 MG: 1 TABLET ORAL at 01:38

## 2023-11-09 RX ADMIN — BUMETANIDE 2.5 MG: 0.25 INJECTION INTRAMUSCULAR; INTRAVENOUS at 12:52

## 2023-11-09 RX ADMIN — Medication 12.5 MG: at 09:38

## 2023-11-09 RX ADMIN — Medication 10 ML: at 09:39

## 2023-11-09 NOTE — PROGRESS NOTES
"Alice Mabry  1940 82 y.o.  [id number]      Patient Care Team:  Tho Mar MD as PCP - General (Internal Medicine)  Murphy Horner MD as Consulting Physician (Cardiology)  Jr Isaias Lynn MD as Surgeon (Cardiothoracic Surgery)    CC: Prior CABG AVR mitral valve repair    Interval History: Still bradycardic no real change in breathing      Objective   Vital Signs  Temp:  [97.2 °F (36.2 °C)-98.6 °F (37 °C)] 97.7 °F (36.5 °C)  Heart Rate:  [42-56] 56  Resp:  [18] 18  BP: (105-115)/(65-69) 113/69    Intake/Output Summary (Last 24 hours) at 11/9/2023 1639  Last data filed at 11/9/2023 0941  Gross per 24 hour   Intake 600 ml   Output 350 ml   Net 250 ml     Flowsheet Rows      Flowsheet Row First Filed Value   Admission Height 165.1 cm (65\") Documented at 11/08/2023 1032   Admission Weight 78.5 kg (173 lb) Documented at 11/08/2023 1032            Physical Exam:   General Appearance:    Alert,oriented, in no acute distress   Lungs:     Clear to auscultation,BS are equal    Heart:    Normal S1 and S2, RRR without murmur, gallop or rub   HEENT:    Sclerae are clear, no JVD or adenopathy   Abdomen:     Normal bowel sounds, soft nontender, nondistended, no HSM   Extremities:   Moves all extremities well, no edema, no cyanosis, no             Redness, no rash     Medication Review:      apixaban, 5 mg, Oral, Q12H  bumetanide, 3 mg, Intravenous, Q12H  empagliflozin, 10 mg, Oral, Daily  FLUoxetine, 20 mg, Oral, QAM  pantoprazole, 40 mg, Oral, Q AM  potassium chloride, 10 mEq, Oral, BID  senna-docusate sodium, 2 tablet, Oral, BID  sodium chloride, 10 mL, Intravenous, Q12H  spironolactone, 12.5 mg, Oral, Daily             I reviewed the patient's new clinical results.  I personally viewed and interpreted the patient's EKG/Telemetry data    Assessment/Plan  Active Hospital Problems    Diagnosis  POA    **Dyspnea [R06.00]  Yes      Resolved Hospital Problems   No resolved problems to display.       We will " continue to diurese and follow her bradycardia if it persist tomorrow we may have the arrhythmia team see here.  I I had her echo reviewed we do not feel that her aortic valve is bad she does have at least moderate mitral insufficiency and she has severe tricuspid insufficiency we are going to try and manage that medically at age 82.    Murphy Horner MD  11/09/23  16:39 EST

## 2023-11-09 NOTE — PLAN OF CARE
Goal Outcome Evaluation:         Pt resting in bed quietly. Denies pain. Soa on exertion. On room air. Bradycardia in 40s on monitor.

## 2023-11-10 PROBLEM — I49.5 SSS (SICK SINUS SYNDROME): Status: ACTIVE | Noted: 2023-11-08

## 2023-11-10 LAB
ANION GAP SERPL CALCULATED.3IONS-SCNC: 11.7 MMOL/L (ref 5–15)
BUN SERPL-MCNC: 22 MG/DL (ref 8–23)
BUN/CREAT SERPL: 16.7 (ref 7–25)
CALCIUM SPEC-SCNC: 8.8 MG/DL (ref 8.6–10.5)
CHLORIDE SERPL-SCNC: 104 MMOL/L (ref 98–107)
CO2 SERPL-SCNC: 27.3 MMOL/L (ref 22–29)
CREAT SERPL-MCNC: 1.32 MG/DL (ref 0.57–1)
EGFRCR SERPLBLD CKD-EPI 2021: 40.1 ML/MIN/1.73
GLUCOSE SERPL-MCNC: 148 MG/DL (ref 65–99)
POTASSIUM SERPL-SCNC: 4.2 MMOL/L (ref 3.5–5.2)
QT INTERVAL: 503 MS
QT INTERVAL: 603 MS
QT INTERVAL: 671 MS
QTC INTERVAL: 459 MS
QTC INTERVAL: 504 MS
QTC INTERVAL: 581 MS
SODIUM SERPL-SCNC: 143 MMOL/L (ref 136–145)

## 2023-11-10 PROCEDURE — 99232 SBSQ HOSP IP/OBS MODERATE 35: CPT | Performed by: INTERNAL MEDICINE

## 2023-11-10 PROCEDURE — 93010 ELECTROCARDIOGRAM REPORT: CPT | Performed by: INTERNAL MEDICINE

## 2023-11-10 PROCEDURE — 80048 BASIC METABOLIC PNL TOTAL CA: CPT | Performed by: INTERNAL MEDICINE

## 2023-11-10 PROCEDURE — 93005 ELECTROCARDIOGRAM TRACING: CPT | Performed by: INTERNAL MEDICINE

## 2023-11-10 PROCEDURE — 99222 1ST HOSP IP/OBS MODERATE 55: CPT | Performed by: NURSE PRACTITIONER

## 2023-11-10 RX ADMIN — EMPAGLIFLOZIN 10 MG: 10 TABLET, FILM COATED ORAL at 09:31

## 2023-11-10 RX ADMIN — FLUOXETINE HYDROCHLORIDE 20 MG: 20 CAPSULE ORAL at 06:25

## 2023-11-10 RX ADMIN — Medication 10 ML: at 20:17

## 2023-11-10 RX ADMIN — POTASSIUM CHLORIDE 10 MEQ: 750 TABLET, EXTENDED RELEASE ORAL at 20:17

## 2023-11-10 RX ADMIN — BUMETANIDE 3 MG: 0.25 INJECTION INTRAMUSCULAR; INTRAVENOUS at 09:31

## 2023-11-10 RX ADMIN — Medication 12.5 MG: at 09:31

## 2023-11-10 RX ADMIN — POTASSIUM CHLORIDE 10 MEQ: 750 TABLET, EXTENDED RELEASE ORAL at 09:31

## 2023-11-10 RX ADMIN — BUMETANIDE 3 MG: 0.25 INJECTION INTRAMUSCULAR; INTRAVENOUS at 20:17

## 2023-11-10 RX ADMIN — PANTOPRAZOLE SODIUM 40 MG: 40 TABLET, DELAYED RELEASE ORAL at 06:25

## 2023-11-10 RX ADMIN — Medication 10 ML: at 09:39

## 2023-11-10 RX ADMIN — DOCUSATE SODIUM 50MG AND SENNOSIDES 8.6MG 2 TABLET: 8.6; 5 TABLET, FILM COATED ORAL at 20:17

## 2023-11-10 RX ADMIN — APIXABAN 5 MG: 5 TABLET, FILM COATED ORAL at 06:25

## 2023-11-10 NOTE — PLAN OF CARE
Goal Outcome Evaluation:            Pt. Alert, oriented x4, ambulatory with walker , used bathroom with 1 person attended, v/s stable with HR 50, 02 sats 95% on room air, no s/s acute distress noted

## 2023-11-10 NOTE — CASE MANAGEMENT/SOCIAL WORK
Continued Stay Note  [facility]     Patient Name: Alice Mabry  MRN: [id number]  Today's Date: 11/10/2023    Admit Date: 11/8/2023    Plan: Plan home with spouse.  BRIGITTE Quintana RN   Discharge Plan       Row Name 11/10/23 1622       Plan    Plan Plan home with spouse.  BRIGITTE Quintana RN    Plan Comments Pt walking in ca.  Pt denies any discharge needs.  Pt's spouse will assist her at home and transport her home.  Plan home with spouse.  BRIGITTE Quintana RN                   Discharge Codes    No documentation.                 Expected Discharge Date and Time       Expected Discharge Date Expected Discharge Time    Nov 13, 2023               Maryan Quintana RN

## 2023-11-10 NOTE — PLAN OF CARE
Goal Outcome Evaluation:        Pt is A&O x4. Pt is up nicole in the room with family and walked around the nursing station multiple times today with no complaints of SOA. Tentative plans for pacemaker on Monday. Plan of care ongoing.

## 2023-11-10 NOTE — PROGRESS NOTES
"Alice Mabry  1940 82 y.o.  [id number]      Patient Care Team:  Tho Mar MD as PCP - General (Internal Medicine)  Murphy Horner MD as Consulting Physician (Cardiology)  Jr Isaias Lynn MD as Surgeon (Cardiothoracic Surgery)    CC: Prior CABG AVR mitral valve repair, most recent echo with an EF of 50 to 55%.  Her aortic valve is doing well despite some readings across it I think that is wrong she has moderate MR and severe TR    Interval History: She still short of breath a little bit better her heart rate is mildly better      Objective   Vital Signs  Temp:  [97.3 °F (36.3 °C)-97.7 °F (36.5 °C)] 97.3 °F (36.3 °C)  Heart Rate:  [42-56] 50  Resp:  [16-18] 16  BP: (111-118)/(59-88) 111/88    Intake/Output Summary (Last 24 hours) at 11/10/2023 1007  Last data filed at 11/10/2023 0354  Gross per 24 hour   Intake --   Output 1500 ml   Net -1500 ml     Flowsheet Rows      Flowsheet Row First Filed Value   Admission Height 165.1 cm (65\") Documented at 11/08/2023 1032   Admission Weight 78.5 kg (173 lb) Documented at 11/08/2023 1032            Physical Exam:   General Appearance:    Alert,oriented, in no acute distress   Lungs:     Clear to auscultation,BS are equal    Heart:    Normal S1 and S2, RRR without murmur, gallop or rub   HEENT:    Sclerae are clear, no JVD or adenopathy   Abdomen:     Normal bowel sounds, soft nontender, nondistended, no HSM   Extremities:   Moves all extremities well, no edema, no cyanosis, no             Redness, no rash     Medication Review:      apixaban, 5 mg, Oral, Q12H  bumetanide, 3 mg, Intravenous, Q12H  empagliflozin, 10 mg, Oral, Daily  FLUoxetine, 20 mg, Oral, QAM  pantoprazole, 40 mg, Oral, Q AM  potassium chloride, 10 mEq, Oral, BID  senna-docusate sodium, 2 tablet, Oral, BID  sodium chloride, 10 mL, Intravenous, Q12H  spironolactone, 12.5 mg, Oral, Daily             I reviewed the patient's new clinical results.  I personally viewed and interpreted the " patient's EKG/Telemetry data    Assessment/Plan  Active Hospital Problems    Diagnosis  POA    **Dyspnea [R06.00]  Yes      Resolved Hospital Problems   No resolved problems to display.     She is in with HFpEF and had severe bradycardia with a junctional rhythm.  She has had some sinus rhythm but on the ECG today she is back in a junctional rhythm we have held her long-acting metoprolol now for 3 days.  Kind of think she probably has sick sinus syndrome and when asked the arrhythmia team to see her as I think she may need a pacemaker.  Her blood pressure is soft we will continue to diurese her    Murphy Horner MD  11/10/23  10:07 EST

## 2023-11-10 NOTE — CONSULTS
Electrophysiology Hospital Consult            Patient Name: Alice Mabry  Age/Sex: 82 y.o. female  : 1940  MRN: [id number]    Date of Admission: 2023  Date of Encounter Visit: 11/10/23  Encounter Provider: JOHNNIE Gallegos  Referring Provider: No ref. provider found  Place of Service: [facility] CARDIOLOGY  Patient Care Team:  Tho Mar MD as PCP - General (Internal Medicine)  Murphy Horner MD as Consulting Physician (Cardiology)  Jr Isaias Lynn MD as Surgeon (Cardiothoracic Surgery)      Subjective:   EP Consultation for: Bradycardia, possible pacemaker    Chief Complaint: Progressive dyspnea, low HR    History of Present Illness:  Alice Mabry is a 82 y.o. female who follows with Dr. Horner---Persistent AF 3/2022 and found to have valvular heart disease and CAD---underwent CABG, AVR, maze and RADHA closure.     Maintained SR until 2023--back in Afib, started on amio and cardioverted, right heart cath showed pul HTN and wedge 20, she has continued to have HFpEF. LFts trended up so amio and statin were stopped.     She presented to ED with progressive dyspnea with minimal exertion. She noted to be bradycardiac--HR 30's, junctional and SB.     She has not had any issues with AF and was on high dose beta blocker which has been stopped.     EP has been ask to see to evaluate for possible pacemaker.     She does feel better this afternoon but did ambulate with her family out to the nurses station and felt dizzy.     Past Medical History:  Past Medical History:   Diagnosis Date    Acute diastolic (congestive) heart failure     Anxiety     Aortic valve stenosis     HX CABG, AVR, MVR 3/2022    Arthritis     CAD (coronary artery disease)     stents    Depression     Dizziness     Fatty liver     GERD (gastroesophageal reflux disease)     H/O blood clots     H/O Clostridium difficile infection     Heart murmur     History of atrial fibrillation      History of cervical cancer     HX HYSTERECTOMY    History of MI (myocardial infarction) 1999    History of transfusion     no reactions    [place] (hard of hearing)     Hyperlipidemia     Hypertension     IBS (irritable bowel syndrome)     Incontinence of urine     wears pads    Renal mass, right        Past Surgical History:   Procedure Laterality Date    APPENDECTOMY  1960    CARDIAC CATHETERIZATION  06/10/2014    Dr. Murphy Horner    CARDIAC CATHETERIZATION  11/13/2007    Dr. Murphy Horner    CARDIAC CATHETERIZATION N/A 10/19/2004    Dr. Murphy Horner    CARDIAC CATHETERIZATION N/A 07/15/2004    Dr. Gregorio Gonzales    CARDIAC CATHETERIZATION  10/2003    Dr. Murphy Horner    CARDIAC CATHETERIZATION N/A 03/21/2022    Procedure: Coronary angiography;  Surgeon: Murphy Horner MD;  Location:  [facility];  Service: Cardiology;  Laterality: N/A;    CARDIAC CATHETERIZATION N/A 03/21/2022    Procedure: Right Heart Cath;  Surgeon: Murphy Horner MD;  Location:  [facility];  Service: Cardiology;  Laterality: N/A;    CARDIAC CATHETERIZATION N/A 03/21/2022    Procedure: Left Heart Cath;  Surgeon: Murphy Horner MD;  Location:  [facility];  Service: Cardiology;  Laterality: N/A;    CARDIAC CATHETERIZATION N/A 03/21/2022    Procedure: Left ventriculography;  Surgeon: Murphy Horner MD;  Location:  [facility];  Service: Cardiology;  Laterality: N/A;    CARDIAC CATHETERIZATION N/A 4/13/2023    Procedure: Right Heart Cath;  Surgeon: Murphy Horner MD;  Location:  [facility];  Service: Cardiology;  Laterality: N/A;    CHOLECYSTECTOMY  1998    COLONOSCOPY N/A 07/2001    negative    COLONOSCOPY N/A 02/28/2012    negative    CORONARY ANGIOPLASTY WITH STENT PLACEMENT N/A 07/15/2004    Dr. Murphy Horner    CORONARY ANGIOPLASTY WITH STENT PLACEMENT  03/2002    to RCA    CORONARY ARTERY BYPASS GRAFT WITH AORTIC AND MITRAL VALVE  REPAIR/REPLACEMENT N/A 03/28/2022    Procedure: DALTON STERNOTOMY CORONARY ARTERY BYPASS GRAFTING TIMES 4 USING LEFT INTERNAL MAMMARY ARTERY AND LEFT GREATER SAPHENOUS VEIN GRAFT PER ENDOSCOPIC VEIN HARVESTING, RIGHT CORONARY ENDARTERECTOMY, AORTIC VALVE REPLACEMENT, MITRAL VALVE REPAIR, ATRICURE MAZE PROCEDURE, CLOSURE OF LEFT ATRIAL APPENDAGE AND PRP;  Surgeon: Jr Isaias Lynn MD;  Location: [facility];  Service: Cardiothoracic;    CORONARY ARTERY BYPASS GRAFT WITH AORTIC AND MITRAL VALVE REPAIR/REPLACEMENT  03/28/2022    Procedure: ;  Surgeon: Jr Isaias Lynn MD;  [facility];  Service: Cardiothoracic;;    HYSTERECTOMY  1974    LUMBAR DISCECTOMY      NEPHRECTOMY PARTIAL Right 11/3/2022    Procedure: RIGHT ROBOTIC PARTIAL NEPHRECTOMY;  Surgeon: Davy Silva Jr., MD;  Location: [facility] MAIN OR;  Service: Robotics - DaVinci;  Laterality: Right;    UPPER GASTROINTESTINAL ENDOSCOPY N/A 04/20/2015    Mild Schatzki ring, hiatus hernia, non-bleeding erosive gastropathy, erythematous mucosa in the antrum, normal duodenum-Dr. Alex Gill       Home Medications:   Medications Prior to Admission   Medication Sig Dispense Refill Last Dose    apixaban (Eliquis) 5 MG tablet tablet TAKE ONE TABLET BY MOUTH EVERY 12 HOURS 60 tablet 11 11/8/2023    bumetanide (BUMEX) 2 MG tablet Take 1.5 tablets by mouth 2 (Two) Times a Day. 180 tablet 3 11/8/2023    dapagliflozin Propanediol (Farxiga) 10 MG tablet Take 10 mg by mouth Every Morning. 90 tablet 3 11/8/2023    FLUoxetine (PROzac) 20 MG capsule Take 1 capsule by mouth Every Morning.   11/8/2023    LORazepam (ATIVAN) 1 MG tablet Take 1 tablet by mouth Every 8 (Eight) Hours As Needed for Anxiety.   11/7/2023    metoprolol succinate XL (TOPROL-XL) 100 MG 24 hr tablet Take 1 tablet by mouth Daily. 90 tablet 3 11/8/2023    omeprazole (priLOSEC) 40 MG capsule Take 1 capsule by mouth Daily.   11/8/2023    potassium chloride 10 MEQ CR tablet Take 1 tablet by  mouth 2 (Two) Times a Day. 60 tablet 11 2023    spironolactone (ALDACTONE) 25 MG tablet TAKE 1/2 TABLET BY MOUTH DAILY 30 tablet 5 2023    Vitamin D, Ergocalciferol, 2000 units capsule Take 2,000 Units by mouth Daily. TO HOLD 1 WEEK PRIOR TO OR   2023    nitroglycerin (NITROSTAT) 0.4 MG SL tablet Place 1 tablet under the tongue Every 5 (Five) Minutes As Needed for Chest Pain. Take no more than 3 doses in 15 minutes. 100 tablet 3 Unknown       Allergies:  Allergies   Allergen Reactions    Codeine Other (See Comments)     Chest pain .  Patient states she is allergic to codeine and tylenol when together but not separate.    Penicillins Hives    Ambien [Zolpidem Tartrate] Confusion       Past Social History:  Social History     Socioeconomic History    Marital status:    Tobacco Use    Smoking status: Former     Packs/day: 1.00     Years: 20.00     Additional pack years: 0.00     Total pack years: 20.00     Types: Cigarettes     Quit date:      Years since quittin.8    Smokeless tobacco: Never    Tobacco comments:     Caffeine - half and half coffee   Vaping Use    Vaping Use: Never used   Substance and Sexual Activity    Alcohol use: Not Currently    Drug use: Never    Sexual activity: Defer       Past Family History:  Family History   Problem Relation Age of Onset    Heart disease Mother     No Known Problems Father     Heart disease Sister     Heart disease Brother     No Known Problems Maternal Grandmother     No Known Problems Maternal Grandfather     No Known Problems Paternal Grandmother     No Known Problems Paternal Grandfather     Malig Hyperthermia Neg Hx     Drug abuse Neg Hx        Review of Systems: All systems reviewed. Pertinent positives identified in HPI. All other systems are negative.     14 point ROS was performed and is negative except as outlined in HPI.     Objective:     Objective:  Vital Signs (last 24 hours)          0700  11/10 0659 11/10 0700  11/10 1324    Most Recent      Temp (°F) 97.3 -  98.1      97.3     97.3 (36.3) 11/10 0723    Heart Rate 42 -  56      50     50 11/10 0723    Resp   18      16     16 11/10 0723    /59 -  118/71      111/88     111/88 11/10 0723    SpO2 (%) 92 -  100      97     97 11/10 0723          Temp:  [97.3 °F (36.3 °C)-97.7 °F (36.5 °C)] 97.3 °F (36.3 °C)  Heart Rate:  [50-56] 50  Resp:  [16-18] 16  BP: (111-118)/(59-88) 111/88  Body mass index is 29.01 kg/m².        Physical Exam:     General Appearance: No acute distress, well developed and well nourished.   Eyes: Conjunctiva and lids: No erythema, swelling, or discharge. Sclera non-icteric.   HENT: Atraumatic, normocephalic. External eyes, ears, and nose normal.   Respiratory: No signs of respiratory distress. Respiration rhythm and depth normal.   Cardiovascular:  Heart Rate and Rhythm: Regular, zahra.   Lower Extremities: No edema noted.  Musculoskeletal: Normal movement of extremities  Skin: Warm and dry.   Psychiatric: Patient alert and oriented to person, place, and time. Speech and behavior appropriate. Normal mood and affect.    Labs:   Lab Review:     Results from last 7 days   Lab Units 11/10/23  0631 23  0602 23  1041   SODIUM mmol/L 143 141 143   POTASSIUM mmol/L 4.2 3.4* 3.8   CHLORIDE mmol/L 104 104 103   CO2 mmol/L 27.3 25.2 25.5   BUN mg/dL 22 22 24*   CREATININE mg/dL 1.32* 1.45* 1.58*   GLUCOSE mg/dL 148* 111* 125*   CALCIUM mg/dL 8.8 8.7 9.0   AST (SGOT) U/L  --  47* 52*   ALT (SGPT) U/L  --  40* 48*     Results from last 7 days   Lab Units 23  0813 23  0602 23  1041   HSTROP T ng/L 33* 34* 38*     Results from last 7 days   Lab Units 23  0602   WBC 10*3/mm3 5.72   HEMOGLOBIN g/dL 11.3*   HEMATOCRIT % 36.2   PLATELETS 10*3/mm3 185     Results from last 7 days   Lab Units 23  1041   INR  1.43*                 Results from last 7 days   Lab Units 23  1041   PROBNP pg/mL 6,015.0*         Imagin/9/2023  Echo:    Interpretation Summary         Left ventricular systolic function is normal. Left ventricular ejection fraction appears to be 51 - 55%.    Left ventricular wall thickness is consistent with mild concentric hypertrophy.    Left ventricular diastolic function is consistent with (grade II w/high LAP) pseudonormalization.    The right ventricular cavity is mildly dilated.  Normal right ventricular systolic function present.    There is severe biatrial enlargement present.    There is a bioprosthetic aortic valve present. The prosthetic aortic valve peak and mean gradients are elevated compared to prior studies indicating some stenosis of the valve.Peak velocity of the flow distal to the aortic valve is 382 cm/s. Aortic valve maximum pressure gradient is 59 mmHg. Aortic valve mean pressure gradient is 28 mmHg. Aortic valve dimensionless index is 0.3 .Aortic valve area is 0.9 cm2.    Moderate tricuspid valve regurgitation is present.Calculated right ventricular systolic pressure from tricuspid regurgitation is 48 mmHg.    Fixed posterior leaflet findings are consistent with surgical mitral valve repair.Moderate mitral valve regurgitation is present. No significant mitral valve stenosis is present.         EKG:               I personally viewed and interpreted the patient's EKG/Telemetry tracings.    Assessment:       Dyspnea        Plan:     Dr. Stevens and I saw her this afternoon.    Symptomatic SSS, pacemaker is reasonable -discussed with Dr. Horner    Will try to do on Monday.    Will go ahead and stop apixaban.    Thank you for allowing me to participate in the care of Alice Mabry. Feel free to contact me directly with any further questions or concerns.    JOHNNIE Gallegos  [facility]  11/10/23  13:24 EST

## 2023-11-11 PROCEDURE — 99232 SBSQ HOSP IP/OBS MODERATE 35: CPT | Performed by: NURSE PRACTITIONER

## 2023-11-11 RX ORDER — BUMETANIDE 0.25 MG/ML
3 INJECTION INTRAMUSCULAR; INTRAVENOUS EVERY 12 HOURS
Status: DISCONTINUED | OUTPATIENT
Start: 2023-11-11 | End: 2023-11-12 | Stop reason: HOSPADM

## 2023-11-11 RX ADMIN — Medication 12.5 MG: at 08:39

## 2023-11-11 RX ADMIN — LORAZEPAM 1 MG: 1 TABLET ORAL at 20:20

## 2023-11-11 RX ADMIN — EMPAGLIFLOZIN 10 MG: 10 TABLET, FILM COATED ORAL at 08:39

## 2023-11-11 RX ADMIN — Medication 10 ML: at 20:21

## 2023-11-11 RX ADMIN — BUMETANIDE 3 MG: 0.25 INJECTION INTRAMUSCULAR; INTRAVENOUS at 12:21

## 2023-11-11 RX ADMIN — BUMETANIDE 3 MG: 0.25 INJECTION INTRAMUSCULAR; INTRAVENOUS at 08:39

## 2023-11-11 RX ADMIN — LORAZEPAM 1 MG: 1 TABLET ORAL at 00:11

## 2023-11-11 RX ADMIN — FLUOXETINE HYDROCHLORIDE 20 MG: 20 CAPSULE ORAL at 06:30

## 2023-11-11 RX ADMIN — POTASSIUM CHLORIDE 10 MEQ: 750 TABLET, EXTENDED RELEASE ORAL at 08:39

## 2023-11-11 RX ADMIN — PANTOPRAZOLE SODIUM 40 MG: 40 TABLET, DELAYED RELEASE ORAL at 06:31

## 2023-11-11 RX ADMIN — POTASSIUM CHLORIDE 10 MEQ: 750 TABLET, EXTENDED RELEASE ORAL at 20:20

## 2023-11-11 RX ADMIN — Medication 10 ML: at 08:39

## 2023-11-11 NOTE — PROGRESS NOTES
"[facility]    Patient Name: Alice Mabry  :1940  82 y.o.  LOS: 2  Encounter Provider: JOHNNIE Jasmine      Patient Care Team:  Tho Mar MD as PCP - General (Internal Medicine)  Murphy Horner MD as Consulting Physician (Cardiology)  Jr Isaias Lynn MD as Surgeon (Cardiothoracic Surgery)    Chief Complaint: Follow-up CAD prior CABG, AVR, mitral valve repair    Interval History: Patient reports generalized fatigue.  Denies chest pain, dyspnea at rest or with exertion.  Sitting up in bedside chair.       Objective   Vital Signs  Temp:  [97.7 °F (36.5 °C)-98.6 °F (37 °C)] 98.6 °F (37 °C)  Heart Rate:  [57-66] 59  Resp:  [16-18] 18  BP: (111-132)/(50-96) 111/50    Intake/Output Summary (Last 24 hours) at 2023 1021  Last data filed at 2023 0839  Gross per 24 hour   Intake 720 ml   Output 1300 ml   Net -580 ml     Flowsheet Rows      Flowsheet Row First Filed Value   Admission Height 165.1 cm (65\") Documented at 2023 1032   Admission Weight 78.5 kg (173 lb) Documented at 2023 1032              Vitals and nursing note reviewed.   Constitutional:       Appearance: Normal appearance. Well-developed.   Eyes:      Conjunctiva/sclera: Conjunctivae normal.   Neck:      Vascular: No carotid bruit.   Pulmonary:      Breath sounds: Normal breath sounds.   Cardiovascular:      Normal rate. Regular rhythm. Normal S1 with normal intensity. Normal S2 with normal intensity.       Murmurs: There is no murmur.      No gallop.  No click. No rub.   Edema:     Peripheral edema absent.   Musculoskeletal: Normal range of motion. Skin:     General: Skin is warm and dry.   Neurological:      Mental Status: Alert and oriented to person, place, and time.      GCS: GCS eye subscore is 4. GCS verbal subscore is 5. GCS motor subscore is 6.   Psychiatric:         Speech: Speech normal.         Behavior: Behavior normal.         Thought Content: Thought content normal. " "        Judgment: Judgment normal.           Pertinent Test Results:  Results from last 7 days   Lab Units 11/10/23  0631 11/09/23  0602 11/08/23  1041   SODIUM mmol/L 143 141 143   POTASSIUM mmol/L 4.2 3.4* 3.8   CHLORIDE mmol/L 104 104 103   CO2 mmol/L 27.3 25.2 25.5   BUN mg/dL 22 22 24*   CREATININE mg/dL 1.32* 1.45* 1.58*   GLUCOSE mg/dL 148* 111* 125*   CALCIUM mg/dL 8.8 8.7 9.0   AST (SGOT) U/L  --  47* 52*   ALT (SGPT) U/L  --  40* 48*     Results from last 7 days   Lab Units 11/09/23  0813 11/09/23  0602 11/08/23  1041   HSTROP T ng/L 33* 34* 38*     Results from last 7 days   Lab Units 11/09/23  0602 11/08/23  1041   WBC 10*3/mm3 5.72 7.18   HEMOGLOBIN g/dL 11.3* 12.3   HEMATOCRIT % 36.2 39.6   PLATELETS 10*3/mm3 185 211     Results from last 7 days   Lab Units 11/08/23  1041   INR  1.43*               Invalid input(s): \"LDLCALC\"  Results from last 7 days   Lab Units 11/08/23  1041   PROBNP pg/mL 6,015.0*               Medication Review:   bumetanide, 3 mg, Intravenous, Q12H  empagliflozin, 10 mg, Oral, Daily  FLUoxetine, 20 mg, Oral, QAM  pantoprazole, 40 mg, Oral, Q AM  potassium chloride, 10 mEq, Oral, BID  senna-docusate sodium, 2 tablet, Oral, BID  sodium chloride, 10 mL, Intravenous, Q12H  spironolactone, 12.5 mg, Oral, Daily              Assessment & Plan     Active Hospital Problems    Diagnosis  POA    **Dyspnea [R06.00]  Yes    SSS (sick sinus syndrome) [I49.5]  Unknown      Resolved Hospital Problems   No resolved problems to display.        CAD with prior CABG  History of AVR  History of mitral valve repair  Moderate MR  Severe TR  HFpEF  Bradycardia with junctional rhythm/SSS    Patient with sick sinus syndrome, EP has consulted on patient and will plan for pacemaker placement on Monday, 11/13  Most recent echocardiogram 11/9 reveals preserved LVEF, grade 2 diastolic dysfunction, aortic valve mean gradients are elevated compared to prior studies.  Max gradient 59 mmHg, mean gradient 28 mmHg.  " Moderate tricuspid valve regurgitation, moderate mitral valve regurgitation with prior mitral valve repair.          JOHNNIE Jasmine  [facility]  11/11/23  10:21 EST

## 2023-11-11 NOTE — PLAN OF CARE
Goal Outcome Evaluation:                 Pt resting in bed quietly. Denies pain. Up ad nicole.

## 2023-11-11 NOTE — PLAN OF CARE
Goal Outcome Evaluation:           Progress: improving  Outcome Evaluation: Pt AOx4. On RA. NSR-SB on monitor. Consent signed and in chart for monday pacemaker placement. Pt on IV bumex per MAR. Pt w/ good output documented. Pt denies pain. Up ad nicole in the room. Plan of care is ongoing

## 2023-11-12 ENCOUNTER — APPOINTMENT (OUTPATIENT)
Dept: CARDIOLOGY | Facility: HOSPITAL | Age: 83
DRG: 309 | End: 2023-11-12
Payer: MEDICARE

## 2023-11-12 ENCOUNTER — READMISSION MANAGEMENT (OUTPATIENT)
Dept: CALL CENTER | Facility: HOSPITAL | Age: 83
End: 2023-11-12
Payer: MEDICARE

## 2023-11-12 VITALS
HEART RATE: 68 BPM | SYSTOLIC BLOOD PRESSURE: 113 MMHG | TEMPERATURE: 97.9 F | RESPIRATION RATE: 18 BRPM | BODY MASS INDEX: 28.16 KG/M2 | DIASTOLIC BLOOD PRESSURE: 55 MMHG | HEIGHT: 65 IN | WEIGHT: 169 LBS | OXYGEN SATURATION: 98 %

## 2023-11-12 PROCEDURE — 93246 EXT ECG>7D<15D RECORDING: CPT

## 2023-11-12 PROCEDURE — 99239 HOSP IP/OBS DSCHRG MGMT >30: CPT | Performed by: NURSE PRACTITIONER

## 2023-11-12 RX ORDER — SPIRONOLACTONE 25 MG/1
12.5 TABLET ORAL DAILY
Start: 2023-11-13

## 2023-11-12 RX ADMIN — PANTOPRAZOLE SODIUM 40 MG: 40 TABLET, DELAYED RELEASE ORAL at 06:28

## 2023-11-12 RX ADMIN — BUMETANIDE 3 MG: 0.25 INJECTION INTRAMUSCULAR; INTRAVENOUS at 02:33

## 2023-11-12 RX ADMIN — FLUOXETINE HYDROCHLORIDE 20 MG: 20 CAPSULE ORAL at 06:28

## 2023-11-12 RX ADMIN — Medication 10 ML: at 08:43

## 2023-11-12 RX ADMIN — Medication 12.5 MG: at 08:41

## 2023-11-12 RX ADMIN — POTASSIUM CHLORIDE 10 MEQ: 750 TABLET, EXTENDED RELEASE ORAL at 08:42

## 2023-11-12 RX ADMIN — EMPAGLIFLOZIN 10 MG: 10 TABLET, FILM COATED ORAL at 08:42

## 2023-11-12 NOTE — PLAN OF CARE
Goal Outcome Evaluation:           Progress: improving  Outcome Evaluation: Pt AOx4. On RA. NSR on monitor. Pt was supposed to have pacemaker tomorrow-- this has been canceled as MD believes beta blocker was causing symptoms. Pt ambulated in hallways and HR stayed in the 80s. Otherwise sitting up in bed pts in the 60s. Daughter at bedside to [facility] pt home. Pt denies pain. Plan of care is ongoing. Pt is to have a holter monitor place before d/c

## 2023-11-12 NOTE — OUTREACH NOTE
Prep Survey      Flowsheet Row Responses   [facility] facility patient discharged from? [place]   Is LACE score < 7 ? No   Eligibility Readm Mgmt   Discharge diagnosis Dyspnea   Does the patient have one of the following disease processes/diagnoses(primary or secondary)? Other   Does the patient have Home health ordered? No   Is there a DME ordered? No   Prep survey completed? Yes            [employer] - Registered Nurse

## 2023-11-12 NOTE — DISCHARGE SUMMARY
[facility]      Patient Name: Alice Mabry  :1940  82 y.o.  LOS: 3  Encounter Provider: JOHNNIE Jasmine      Date of Admission: 2023    Date of Discharge:  2023    Treatment Team:  Patient Care Team:  Tho Mar MD as PCP - General (Internal Medicine)  Murphy Horner MD as Consulting Physician (Cardiology)  Jr Isaias Lynn MD as Surgeon (Cardiothoracic Surgery)    Discharge Condition: Good  Discharge Diagnosis:    Dyspnea    SSS (sick sinus syndrome)      History of Present Illness:  Alice Mabry is a 82 y.o. female who was admitted on 2023 with dyspnea with exertion    Patient with history of persistent atrial fibrillation, hypertension, aortic valve replacement, four-vessel CABG, left atrial appendage ligation.    Patient has been in sinus.  For a period of time however in 2023 she went back into atrial fibrillation.  She was started back on amiodarone and ultimately converted.  Right heart cath was performed at that time which revealed pulmonary hypertension with PA pressures in the 58/18 in the setting of wedge pressure of 20 mmHg.  Patient has continued to have recurrent atrial fibrillation and diastolic heart failure.  Recently LFTs were elevated and she continued to have atrial fibrillation so amiodarone was discontinued as well as her atorvastatin.    The weeks leading up to admission patient was experiencing dyspnea with minimal activity.    Hospital Course:   Patient was hospitalized on 2023, when in the emergency department she was found to have junctional rhythm with periods of sinus bradycardia with heart rates in the upper 30s-40s.  She was found to be in overt heart failure.  Patient was admitted for diuretic regimen.  Beta-blockers were held.  There was question if patient was having tachybradycardia syndrome.    Patient was evaluated by electrophysiology for symptomatic sick sinus syndrome and recommended  placement of permanent pacemaker to be done on 11/13/2023.    Since patient's beta-blockers have been held she has not had any further episodes of bradycardia on her telemetry.  She has ambulated around the nursing station and heart rate only elevates to the 80s.  At this time we will hold off on implantation of permanent pacemaker per patient's primary cardiologist, Dr. Horner.  Recommend discontinuing beta-blockers and placing ZIO monitor at discharge to further evaluate patient's heart rhythm in the outpatient setting.  Patient will have follow-up in clinic in 1 month with nurse practitioner to review monitor results and further recommendations.  Message has been sent to the EP team to make them aware of need to cancel permanent pacemaker implantation planned for tomorrow.  Patient and her daughters were at bedside for discussion of this plan of care and all are in agreement.      Objective:  Temp:  [97.5 °F (36.4 °C)-98.2 °F (36.8 °C)] 97.9 °F (36.6 °C)  Heart Rate:  [62-72] 68  Resp:  [16-18] 18  BP: (112-127)/(55-82) 113/55    Intake/Output Summary (Last 24 hours) at 11/12/2023 1439  Last data filed at 11/12/2023 1405  Gross per 24 hour   Intake 480 ml   Output 2700 ml   Net -2220 ml     Body mass index is 28.12 kg/m².      11/10/23  0609 11/11/23  0545 11/12/23  0540   Weight: 79.1 kg (174 lb 4.8 oz) 77.8 kg (171 lb 9.6 oz) 76.7 kg (169 lb)     Weight change: -1.179 kg (-2 lb 9.6 oz)    Physical Exam    Procedures Performed:  Procedure(s):  Pacemaker DC new---[employer]         Pertinent Test Results:  Results from last 7 days   Lab Units 11/10/23  0631 11/09/23  0602 11/08/23  1041   SODIUM mmol/L 143 141 143   POTASSIUM mmol/L 4.2 3.4* 3.8   CHLORIDE mmol/L 104 104 103   CO2 mmol/L 27.3 25.2 25.5   BUN mg/dL 22 22 24*   CREATININE mg/dL 1.32* 1.45* 1.58*   GLUCOSE mg/dL 148* 111* 125*   CALCIUM mg/dL 8.8 8.7 9.0   AST (SGOT) U/L  --  47* 52*   ALT (SGPT) U/L  --  40* 48*     Results from last 7 days   Lab  "Units 11/09/23  0813 11/09/23  0602 11/08/23  1041   HSTROP T ng/L 33* 34* 38*     Results from last 7 days   Lab Units 11/09/23  0602 11/08/23  1041   WBC 10*3/mm3 5.72 7.18   HEMOGLOBIN g/dL 11.3* 12.3   HEMATOCRIT % 36.2 39.6   PLATELETS 10*3/mm3 185 211     Results from last 7 days   Lab Units 11/08/23  1041   INR  1.43*               Invalid input(s): \"LDLCALC\"  Results from last 7 days   Lab Units 11/08/23  1041   PROBNP pg/mL 6,015.0*           Discharge Diet:    Dietary Orders (From admission, onward)       Start     Ordered    11/13/23 0001  NPO Diet NPO Type: Sips with Meds  Diet Effective Midnight        Question:  NPO Type  Answer:  Sips with Meds    11/10/23 1558    11/08/23 1806  Diet: Cardiac Diets; Healthy Heart (2-3 Na+); Texture: Regular Texture (IDDSI 7); Fluid Consistency: Thin (IDDSI 0)  Diet Effective Now        References:    Diet Order [place]   Question Answer Comment   Diets: Cardiac Diets    Cardiac Diet: Healthy Heart (2-3 Na+)    Texture: Regular Texture (IDDSI 7)    Fluid Consistency: Thin (IDDSI 0)        11/08/23 1806                    Activity at Discharge:       Discharge disposition: home     Discharge Medications:     Discharge Medications        Continue These Medications        Instructions Start Date   bumetanide 2 MG tablet  Commonly known as: BUMEX   3 mg, Oral, 2 Times Daily      Eliquis 5 MG tablet tablet  Generic drug: apixaban   5 mg, Oral, Every 12 Hours      Farxiga 10 MG tablet  Generic drug: dapagliflozin Propanediol   10 mg, Oral, Every Morning      FLUoxetine 20 MG capsule  Commonly known as: PROzac   20 mg, Oral, Every Morning      LORazepam 1 MG tablet  Commonly known as: ATIVAN   1 mg, Oral, Every 8 Hours PRN      nitroglycerin 0.4 MG SL tablet  Commonly known as: NITROSTAT   0.4 mg, Sublingual, Every 5 Minutes PRN, Take no more than 3 doses in 15 minutes.       omeprazole 40 MG capsule  Commonly known as: priLOSEC   40 mg, Oral, Daily      spironolactone 25 " MG tablet  Commonly known as: ALDACTONE   12.5 mg, Oral, Daily   Start Date: November 13, 2023     Vitamin D (Ergocalciferol) 50 MCG (2000 UT) capsule   2,000 Units, Oral, Daily, TO HOLD 1 WEEK PRIOR TO OR             Stop These Medications      metoprolol succinate  MG 24 hr tablet  Commonly known as: TOPROL-XL     potassium chloride 10 MEQ CR tablet                Follow-up:   Follow-up Information       Jeanette Fields APRN. Schedule an appointment as soon as possible for a visit in 1 month(s).    Specialties: Nurse Practitioner, Cardiology  Contact information:  [address] [address]  [place] [id number] [phone]               Tho Mar MD .    Specialty: Internal Medicine  Contact information:  [address] [place] [id number] [phone]                             Future Appointments   Date Time Provider Department Center   2/9/2024  1:20 PM Murphy Horner MD [facility]         Time Spent on Discharge:  Greater than 30 min    JOHNNIE Jasmine  11/12/23  14:39 EST      EMR Dragon/Transcription disclaimer:   Parts of this note may be an electronic transcription/translation of spoken language to printed text using the Dragon dictation system

## 2023-11-12 NOTE — PLAN OF CARE
Goal Outcome Evaluation:  Plan of Care Reviewed With: patient      Patient resting in bed.No voiced c/o pain.VSS.Room air.IV Bumex given, diuresing well.Plan of care ongoing

## 2023-11-15 ENCOUNTER — READMISSION MANAGEMENT (OUTPATIENT)
Dept: CALL CENTER | Facility: HOSPITAL | Age: 83
End: 2023-11-15
Payer: MEDICARE

## 2023-11-15 NOTE — OUTREACH NOTE
Medical Week 1 Survey      Flowsheet Row Responses   [facility] patient discharged from? [place]   Does the patient have one of the following disease processes/diagnoses(primary or secondary)? Other   Week 1 attempt successful? No   Unsuccessful attempts Attempt 1            Nina ANGELES - Registered Nurse

## 2023-11-20 ENCOUNTER — READMISSION MANAGEMENT (OUTPATIENT)
Dept: CALL CENTER | Facility: HOSPITAL | Age: 83
End: 2023-11-20
Payer: MEDICARE

## 2023-11-20 NOTE — OUTREACH NOTE
Medical Week 1 Survey      Flowsheet Row Responses   [facility] patient discharged from? [place]   Does the patient have one of the following disease processes/diagnoses(primary or secondary)? Other   Week 1 attempt successful? Yes   Call start time 1142   Call end time 1147   Discharge diagnosis Dyspnea   Meds reviewed with patient/caregiver? Yes   Is the patient having any side effects they believe may be caused by any medication additions or changes? No   Does the patient have all medications ordered at discharge? N/A   Is the patient taking all medications as directed (includes completed medication regime)? Yes   Comments regarding appointments Cards apt on 12/13/23   Does the patient have a primary care provider?  Yes   Has the patient kept scheduled appointments due by today? N/A   Has home health visited the patient within 72 hours of discharge? N/A   Psychosocial issues? No   Did the patient receive a copy of their discharge instructions? Yes   Nursing interventions Reviewed instructions with patient   What is the patient's perception of their health status since discharge? Improving   Is the patient/caregiver able to teach back signs and symptoms related to disease process for when to call PCP? Yes   Is the patient/caregiver able to teach back signs and symptoms related to disease process for when to call 911? Yes   Is the patient/caregiver able to teach back the hierarchy of who to call/visit for symptoms/problems? PCP, Specialist, Home health nurse, Urgent Care, ED, 911 Yes   If the patient is a current smoker, are they able to teach back resources for cessation? Not a smoker   Week 1 call completed? Yes   Graduated Yes   Did the patient feel the follow up calls were helpful during their recovery period? Yes   Graduated/Revoked comments Pt still having SOB, but, has improved per pt   Call end time 1147            Teresa H - Registered Nurse

## 2023-12-13 ENCOUNTER — HOSPITAL ENCOUNTER (OUTPATIENT)
Dept: CARDIOLOGY | Facility: HOSPITAL | Age: 83
Discharge: HOME | End: 2023-12-13
Admitting: NURSE PRACTITIONER
Payer: MEDICARE

## 2023-12-13 ENCOUNTER — TELEPHONE (OUTPATIENT)
Dept: CARDIOLOGY | Facility: CLINIC | Age: 83
End: 2023-12-13
Payer: MEDICARE

## 2023-12-13 ENCOUNTER — OFFICE VISIT (OUTPATIENT)
Age: 83
End: 2023-12-13
Payer: MEDICARE

## 2023-12-13 VITALS
BODY MASS INDEX: 24.44 KG/M2 | WEIGHT: 165 LBS | DIASTOLIC BLOOD PRESSURE: 74 MMHG | HEART RATE: 73 BPM | SYSTOLIC BLOOD PRESSURE: 136 MMHG | HEIGHT: 69 IN

## 2023-12-13 DIAGNOSIS — Z95.1 S/P CABG (CORONARY ARTERY BYPASS GRAFT): ICD-10-CM

## 2023-12-13 DIAGNOSIS — I50.20 HFREF (HEART FAILURE WITH REDUCED EJECTION FRACTION): ICD-10-CM

## 2023-12-13 DIAGNOSIS — Z95.2 S/P AVR: ICD-10-CM

## 2023-12-13 DIAGNOSIS — I48.0 PAROXYSMAL ATRIAL FIBRILLATION: ICD-10-CM

## 2023-12-13 DIAGNOSIS — R74.8 ELEVATED LIVER ENZYMES: ICD-10-CM

## 2023-12-13 DIAGNOSIS — I48.0 PAROXYSMAL ATRIAL FIBRILLATION: Primary | ICD-10-CM

## 2023-12-13 LAB
ALBUMIN SERPL-MCNC: 4.3 G/DL (ref 3.5–5.2)
ALBUMIN/GLOB SERPL: 1.3 G/DL
ALP SERPL-CCNC: 112 U/L (ref 39–117)
ALT SERPL W P-5'-P-CCNC: 65 U/L (ref 1–33)
ANION GAP SERPL CALCULATED.3IONS-SCNC: 15 MMOL/L (ref 5–15)
AST SERPL-CCNC: 89 U/L (ref 1–32)
BILIRUB SERPL-MCNC: 0.9 MG/DL (ref 0–1.2)
BUN SERPL-MCNC: 14 MG/DL (ref 8–23)
BUN/CREAT SERPL: 11.7 (ref 7–25)
CALCIUM SPEC-SCNC: 9.5 MG/DL (ref 8.6–10.5)
CHLORIDE SERPL-SCNC: 96 MMOL/L (ref 98–107)
CO2 SERPL-SCNC: 28 MMOL/L (ref 22–29)
CREAT SERPL-MCNC: 1.2 MG/DL (ref 0.57–1)
EGFRCR SERPLBLD CKD-EPI 2021: 44.7 ML/MIN/1.73
GLOBULIN UR ELPH-MCNC: 3.4 GM/DL
GLUCOSE SERPL-MCNC: 115 MG/DL (ref 65–99)
POTASSIUM SERPL-SCNC: 3.3 MMOL/L (ref 3.5–5.2)
PROT SERPL-MCNC: 7.7 G/DL (ref 6–8.5)
SODIUM SERPL-SCNC: 139 MMOL/L (ref 136–145)

## 2023-12-13 PROCEDURE — 36415 COLL VENOUS BLD VENIPUNCTURE: CPT

## 2023-12-13 PROCEDURE — 80053 COMPREHEN METABOLIC PANEL: CPT | Performed by: NURSE PRACTITIONER

## 2023-12-13 RX ORDER — POTASSIUM CHLORIDE 750 MG/1
10 TABLET, FILM COATED, EXTENDED RELEASE ORAL DAILY
Qty: 30 TABLET | Refills: 11 | Status: SHIPPED | OUTPATIENT
Start: 2023-12-13

## 2023-12-13 NOTE — PROGRESS NOTES
Labs show AST and ALT back up -- remains off statin and amiodarone.  Liver US was okay.  She also needs to start back her potassium supplement and I sent this in. Repeat labs in a week    Left voicemail, asked to call back to confirm receipt and with any questions

## 2023-12-13 NOTE — TELEPHONE ENCOUNTER
Patient returned call.  Went over her lab results.  Let her know that we would like her to go back on potassium supplement.  She will take one 10 meq potassium supplement once a day and get repeat labs in one week.  She has some potassium left and I had her confirm her prescription bottle at home is 10 meq tablets and it is.  Reiterated that the dose will be 1 tablet daily.    I also let her know you placed a GI referral for elevated liver enzymes and GI office will be reaching out to her for and appointment.     She verbalizes understanding of all and agrees with plan.  Encourages patient to call with question or concerns.  Otherwise, we will call her when we get her lab results.    Diamond Russell RN  [facility] T[facility]  12/13/23 14:56 EST

## 2023-12-13 NOTE — PROGRESS NOTES
[facility] Follow Up Office Note     Encounter Date:23  Patient:Alice Mabry  :1940  MRN:[id number]      Chief Complaint:   Chief Complaint   Patient presents with    Follow-up         History of Presenting Illness:        Alice Mabry is a 83 y.o. female who is here for follow-up.  She is a patient of Dr Horner.    Patient has past medical history significant for coronary artery disease status post CABG, AI and AS status post AVR, persistent atrial fibrillation on chronic anticoagulation, mixed hyperlipidemia, essential hypertension, ischemic cardiomyopathy with recovered LVEF.    Patient was found to have severe three-vessel coronary artery disease in 2022 and subsequently underwent four-vessel bypass and AVR as well as maze procedure and left atrial appendage ligation.  Patient had post-op atrial fibrillation but by 2022 had converted back to sinus rhythm.    In 2023 patient presented to clinic with dyspnea and fatigue and was found to be back in atrial fibrillation.  She was started back on amiodarone and successfully cardioverted n on .  Following this, she continued to complain of dyspnea.  Repeat echo showed normal LVEF with significant pulmonary hypertension. Right heart catheterization showed pulmonary hypertension with PA 58/18 mmHg, prominent V wave in setting of elevated wedge pressure of 20.  Her diuretics were increased, Bumex 3 mg twice daily.  In follow-up she had noted improvement.    Since this time patient has had recurrent atrial fibrillation and treated for acute heart failure. More recently she has been doing well with stable dyspnea and was maintaining sinus rhythm on amiodarone.    I saw the patient in October at which point she was reporting some significant fatigue.  Labs showed elevated AST and ALT so her amiodarone and statin were stopped.    In early November patient was admitted to the hospital with increasing dyspnea and  fatigue.  He was found to be in a junctional rhythm with periods of sinus bradycardia in the 30s to 40s.  She was in acute heart failure.  Her metoprolol was stopped.  She was seen by EP and initially felt that she would benefit from a pacemaker however she did better off of beta-blocker and this was deferred.  She was discharged with a 2-week event monitor which did not show recurrent bradycardia or atrial fibrillation.    Today patient reports she is doing well.  She still has exertional dyspnea which has been fairly stable but shortness of breath remains improved.  She has not had any dizziness or lightheadedness, lower extremity edema, PND orthopnea.  She has occasional palpitation after coughing spell but no sustained palpitations.    Review of Systems:  Review of Systems   Constitutional: Positive for malaise/fatigue.   Cardiovascular:  Positive for dyspnea on exertion. Negative for chest pain, leg swelling, orthopnea and palpitations.   Respiratory:  Negative for shortness of breath.        Current Outpatient Medications on File Prior to Visit   Medication Sig Dispense Refill    apixaban (Eliquis) 5 MG tablet tablet TAKE ONE TABLET BY MOUTH EVERY 12 HOURS 60 tablet 11    bumetanide (BUMEX) 2 MG tablet Take 1.5 tablets by mouth 2 (Two) Times a Day. 180 tablet 3    dapagliflozin Propanediol (Farxiga) 10 MG tablet Take 10 mg by mouth Every Morning. 90 tablet 3    FLUoxetine (PROzac) 20 MG capsule Take 2 capsules by mouth Every Morning.      LORazepam (ATIVAN) 1 MG tablet Take 1 tablet by mouth Every 8 (Eight) Hours As Needed for Anxiety.      nitroglycerin (NITROSTAT) 0.4 MG SL tablet Place 1 tablet under the tongue Every 5 (Five) Minutes As Needed for Chest Pain. Take no more than 3 doses in 15 minutes. 100 tablet 3    omeprazole (priLOSEC) 40 MG capsule Take 1 capsule by mouth Daily.      spironolactone (ALDACTONE) 25 MG tablet Take 0.5 tablets by mouth Daily.      Vitamin D, Ergocalciferol, 2000 units capsule  Take 2,000 Units by mouth Daily. TO HOLD 1 WEEK PRIOR TO OR       No current facility-administered medications on file prior to visit.       Allergies   Allergen Reactions    Codeine Other (See Comments)     Chest pain .  Patient states she is allergic to codeine and tylenol when together but not separate.    Penicillins Hives    Ambien [Zolpidem Tartrate] Confusion       Past Medical History:   Diagnosis Date    Acute diastolic (congestive) heart failure     Anxiety     Aortic valve stenosis     HX CABG, AVR, MVR 3/2022    Arthritis     CAD (coronary artery disease)     stents    Depression     Dizziness     Fatty liver     GERD (gastroesophageal reflux disease)     H/O blood clots     H/O Clostridium difficile infection     Heart murmur     History of atrial fibrillation     History of cervical cancer     HX HYSTERECTOMY    History of MI (myocardial infarction) 1999    History of transfusion     no reactions    [place] (hard of hearing)     Hyperlipidemia     Hypertension     IBS (irritable bowel syndrome)     Incontinence of urine     wears pads    Renal mass, right        Past Surgical History:   Procedure Laterality Date    APPENDECTOMY  1960    CARDIAC CATHETERIZATION  06/10/2014    Dr. Murphy Horner    CARDIAC CATHETERIZATION  11/13/2007    Dr. Murphy Horner    CARDIAC CATHETERIZATION N/A 10/19/2004    Dr. Murphy Horner    CARDIAC CATHETERIZATION N/A 07/15/2004    Dr. Gregorio Gonzales    CARDIAC CATHETERIZATION  10/2003    Dr. Murphy Horner    CARDIAC CATHETERIZATION N/A 03/21/2022    Procedure: Coronary angiography;  Surgeon: Murphy Horner MD;  Location:  [facility];  Service: Cardiology;  Laterality: N/A;    CARDIAC CATHETERIZATION N/A 03/21/2022    Procedure: Right Heart Cath;  Surgeon: Murphy Horner MD;  Location:  [facility];  Service: Cardiology;  Laterality: N/A;    CARDIAC CATHETERIZATION N/A 03/21/2022    Procedure: Left Heart Cath;  Surgeon: Murphy Horner  MD;  Location: [facility] CATH INVASIVE LOCATION;  Service: Cardiology;  Laterality: N/A;    CARDIAC CATHETERIZATION N/A 03/21/2022    Procedure: Left ventriculography;  Surgeon: Murphy Horner MD;  Location: [facility] CATH INVASIVE LOCATION;  Service: Cardiology;  Laterality: N/A;    CARDIAC CATHETERIZATION N/A 4/13/2023    Procedure: Right Heart Cath;  Surgeon: Murphy Horner MD;  Location: [facility] CATH INVASIVE LOCATION;  Service: Cardiology;  Laterality: N/A;    CHOLECYSTECTOMY  1998    COLONOSCOPY N/A 07/2001    negative    COLONOSCOPY N/A 02/28/2012    negative    CORONARY ANGIOPLASTY WITH STENT PLACEMENT N/A 07/15/2004    Dr. Murphy Horner    CORONARY ANGIOPLASTY WITH STENT PLACEMENT  03/2002    to RCA    CORONARY ARTERY BYPASS GRAFT WITH AORTIC AND MITRAL VALVE REPAIR/REPLACEMENT N/A 03/28/2022    Procedure: DALTON STERNOTOMY CORONARY ARTERY BYPASS GRAFTING TIMES 4 USING LEFT INTERNAL MAMMARY ARTERY AND LEFT GREATER SAPHENOUS VEIN GRAFT PER ENDOSCOPIC VEIN HARVESTING, RIGHT CORONARY ENDARTERECTOMY, AORTIC VALVE REPLACEMENT, MITRAL VALVE REPAIR, ATRICURE MAZE PROCEDURE, CLOSURE OF LEFT ATRIAL APPENDAGE AND PRP;  Surgeon: Jr Isaias Lynn MD;  Location: [facility];  Service: Cardiothoracic;    CORONARY ARTERY BYPASS GRAFT WITH AORTIC AND MITRAL VALVE REPAIR/REPLACEMENT  03/28/2022    Procedure: ;  Surgeon: Jr Isaias Lynn MD;  Location: [facility];  Service: Cardiothoracic;;    HYSTERECTOMY  1974    LUMBAR DISCECTOMY      NEPHRECTOMY PARTIAL Right 11/3/2022    Procedure: RIGHT ROBOTIC PARTIAL NEPHRECTOMY;  Surgeon: Davy Silva Jr., MD;  Location: [facility] OR;  Service: [facility];  Laterality: Right;    UPPER GASTROINTESTINAL ENDOSCOPY N/A 04/20/2015    Mild Schatzki ring, hiatus hernia, non-bleeding erosive gastropathy, erythematous mucosa in the antrum, normal duodenum-Dr. Alex Gill       Social History     Socioeconomic History    Marital status:    Tobacco Use     "Smoking status: Former     Packs/day: 1.00     Years: 20.00     Additional pack years: 0.00     Total pack years: 20.00     Types: Cigarettes     Quit date:      Years since quittin.9    Smokeless tobacco: Never    Tobacco comments:     Caffeine - half and half coffee   Vaping Use    Vaping Use: Never used   Substance and Sexual Activity    Alcohol use: Not Currently    Drug use: Never    Sexual activity: Defer       Family History   Problem Relation Age of Onset    Heart disease Mother     No Known Problems Father     Heart disease Sister     Heart disease Brother     No Known Problems Maternal Grandmother     No Known Problems Maternal Grandfather     No Known Problems Paternal Grandmother     No Known Problems Paternal Grandfather     Malig Hyperthermia Neg Hx     Drug abuse Neg Hx        The following portions of the patient's history were reviewed and updated as appropriate: allergies, current medications, past family history, past medical history, past social history, past surgical history and problem list.       Objective:       Vitals:    23 1205   BP: 136/74   Pulse: 73   Weight: 74.8 kg (165 lb)   Height: 175.3 cm (69\")         Physical Exam:  Constitutional: Alert and conversant, pleasant  HENT: Oropharynx clear and membrane moist  Eyes: Normal conjunctiva, no sclera icterus  Neck: Supple, no carotid bruit bilaterally  Cardiovascular: Regular rate and rhythm, + systolic murmur, No bilateral lower extremity edema  Pulmonary: Normal respiratory effort, normal lung sounds, no wheezing  Neurological: Alert and orient x 3  Skin: Warm, dry, no ecchymosis, no rash  Psych: Appropriate mood and affect. Normal judgment and insight         Lab Results   Component Value Date     11/10/2023     2023    K 4.2 11/10/2023    K 3.4 (L) 2023     11/10/2023     2023    CO2 27.3 11/10/2023    CO2 25.2 2023    BUN 22 11/10/2023    BUN 22 2023    CREATININE " 1.32 (H) 11/10/2023    CREATININE 1.45 (H) 11/09/2023    EGFRIFNONA 53 (L) 12/22/2021    EGFRIFNONA 66 10/06/2021    GLUCOSE 148 (H) 11/10/2023    GLUCOSE 111 (H) 11/09/2023    CALCIUM 8.8 11/10/2023    CALCIUM 8.7 11/09/2023    ALBUMIN 3.6 11/09/2023    ALBUMIN 4.0 11/08/2023    BILITOT 0.9 11/09/2023    BILITOT 0.9 11/08/2023    AST 47 (H) 11/09/2023    AST 52 (H) 11/08/2023    ALT 40 (H) 11/09/2023    ALT 48 (H) 11/08/2023     Lab Results   Component Value Date    WBC 5.72 11/09/2023    WBC 7.18 11/08/2023    HGB 11.3 (L) 11/09/2023    HGB 12.3 11/08/2023    HCT 36.2 11/09/2023    HCT 39.6 11/08/2023    MCV 85.0 11/09/2023    MCV 83.5 11/08/2023     11/09/2023     11/08/2023     Lab Results   Component Value Date    CHOL 129 03/25/2022    CHOL 105 07/20/2021    TRIG 161 (H) 03/25/2022    TRIG 134 07/20/2021    HDL 31 (L) 03/25/2022    HDL 31 (L) 07/20/2021    LDL 70 03/25/2022    LDL 50 07/20/2021     Lab Results   Component Value Date    PROBNP 6,015.0 (H) 11/08/2023    PROBNP 1,278.0 10/19/2023    BNP 1,070.0 (H) 06/28/2019     Lab Results   Component Value Date    CKTOTAL 69 12/14/2015    TROPONINI 0.029 06/29/2019    TROPONINT 33 (H) 11/09/2023     Lab Results   Component Value Date    TSH 5.550 (H) 10/19/2023    TSH 2.720 02/22/2023           ECG 12 Lead    Date/Time: 12/13/2023 12:25 PM  Performed by: Jeanette Fields APRN    Authorized by: Jeanette Fields APRN  Comparison: compared with previous ECG from 11/10/2023  Similar to previous ECG  Comparison to previous ECG: Sinus v acc junctional  Rhythm: sinus rhythm  Rate: normal  BPM: 73  ST Segments: ST segments normal             Assessment:          Diagnosis Plan   1. Paroxysmal atrial fibrillation  ECG 12 Lead    Comprehensive Metabolic Panel      2. S/P CABG (coronary artery bypass graft)        3. S/P AVR        4. HFrEF (heart failure with reduced ejection fraction)                   Plan:         Chronic diastolic heart failure -  appears compensated. Continue current diuretics, she was added on spironolactone during admission and will repeat labs  Paroxysmal atrial fibrillation - cardioverted 2/2023.  Previously on amiodarone which it was held for elevated liver enzymes -- downtrending and will repeattoday.  Previously on beta-blocker which was held for bradycardia.  With recurrent atrial fibrillation she would likely benefit from seeing EP with consideration of AV triny ablation and pacemaker  CAD - history of CABG 2022.  Denies chest pain. 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  Will continue to monitor off medication  Hx of aortic valve replacement - gradients were higher on most recent echo. Monitor clinically, probably needs repeat in about a year  Transaminitis - amiodarone and statin held since October. Downtrend to 40s on hospital labs. Repeat today    Patient is seen today for follow-up. She is stable from a cardiac perspective. Will check labs. Keep scheduled follow-up with Dr Horner in February    Orders Placed This Encounter   Procedures    Comprehensive Metabolic Panel     Standing Status:   Future     Number of Occurrences:   1     Standing Expiration Date:   12/13/2024     Order Specific Question:   Release to patient     Answer:   Routine Release [[id number]]    ECG 12 Lead     This order was created via procedure documentation     Order Specific Question:   Release to patient     Answer:   Routine Release [[id number]]            JOHNNIE Coyle  [facility]  12/13/23  12:45 EST

## 2023-12-20 PROBLEM — I46.9 CARDIAC ARREST: Status: ACTIVE | Noted: 2023-01-01

## 2023-12-20 NOTE — CODE DOCUMENTATION
Brought to ER by  in a vehicle. Pt has been sick with cough and malaise for 1 week. On the way to ER pt became unresponsive. No CPR initiated by family. Triage staff got pt out of the car and began CPR.

## 2023-12-20 NOTE — ED PROVIDER NOTES
EMERGENCY DEPARTMENT ENCOUNTER    Room Number:  3002/1  PCP: Tho Mar MD  Historian:       HPI:  Chief Complaint: Cardiac arrest  A complete HPI/ROS/PMH/PSH/SH/FH are unobtainable due to: Patient condition    Context: Alice Mabry is a 83 y.o. female who presents to the ED via private vehicle,  was bring her to the hospital given increased cough for the last several weeks to the point where she decided to get checked out today.  He states that while in traffic she went unresponsive.  He is unsure how long it took from that point on to arrive to the ER.  On arrival was found to be pulseless and CPR was initiated immediately on arrival.   states that she had been complaining of hurting all over from her cough and feeling short of breath.      MEDICAL RECORD REVIEW    External (non-ED) record review: Adult transthoracic echo November 9, 2023 showed ejection fraction of 51 to 55% with mild concentric hypertrophy, grade 2 diastolic dysfunction, mildly dilated right ventricular cavity, normal right ventricular systolic function, severe biatrial enlargement present, bioprosthetic aortic valve present, moderate tricuspid valve regurgitation    Chart review from recent hospitalization in November there was some discussion on placing a pacemaker but ultimately decided it was not necessary at that time     PAST MEDICAL HISTORY  Active Ambulatory Problems     Diagnosis Date Noted    No Active Ambulatory Problems     Resolved Ambulatory Problems     Diagnosis Date Noted    No Resolved Ambulatory Problems     No Additional Past Medical History         PAST SURGICAL HISTORY  No past surgical history on file.      FAMILY HISTORY  No family history on file.      SOCIAL HISTORY  Social History     Socioeconomic History    Marital status:          ALLERGIES  Patient has no known allergies.        REVIEW OF SYSTEMS  Review of Systems     All systems reviewed and negative except for those  discussed in HPI.       PHYSICAL EXAM    I have reviewed the triage vital signs and nursing notes.    ED Triage Vitals   Temp Heart Rate Resp BP SpO2   12/20/23 1733 12/20/23 1650 12/20/23 1656 12/20/23 1656 12/20/23 1731   99.5 °F (37.5 °C) (!) 40 18 92/55 90 %      Temp src Heart Rate Source Patient Position BP Location FiO2 (%)   12/20/23 1733 -- -- -- 12/20/23 1706   Tympanic    100 %       Physical Exam  General: Active chest compressions in progress, toxic  HEENT: Mucous membranes moist, atraumatic, pupils 5mm and sluggish  Neck: Full ROM, trachea midline  Pulm: BVM ventilations in place with symmetric chest rise and equal breath sounds bilaterally with scattered rhonchi  Cardiovascular: Pulseless  GI: Soft, nondistended  MSK: Full ROM, no deformity  Skin: Warm, dry  Neuro: GCS 3        LAB RESULTS  Recent Results (from the past 24 hour(s))   ECG 12 Lead Other; ROSC after PEA cardiac arrest    Collection Time: 12/20/23  4:53 PM   Result Value Ref Range    QT Interval 494 ms    QTC Interval 595 ms   Comprehensive Metabolic Panel    Collection Time: 12/20/23  5:04 PM    Specimen: Blood   Result Value Ref Range    Glucose 237 (H) 65 - 99 mg/dL    BUN 13 8 - 23 mg/dL    Creatinine 1.44 (H) 0.57 - 1.00 mg/dL    Sodium 144 136 - 145 mmol/L    Potassium 3.1 (L) 3.5 - 5.2 mmol/L    Chloride 100 98 - 107 mmol/L    CO2 18.4 (L) 22.0 - 29.0 mmol/L    Calcium 8.1 (L) 8.6 - 10.5 mg/dL    Total Protein 5.4 (L) 6.0 - 8.5 g/dL    Albumin 2.8 (L) 3.5 - 5.2 g/dL    ALT (SGPT) 77 (H) 1 - 33 U/L    AST (SGOT) 198 (H) 1 - 32 U/L    Alkaline Phosphatase 95 39 - 117 U/L    Total Bilirubin 0.4 0.0 - 1.2 mg/dL    Globulin 2.6 gm/dL    A/G Ratio 1.1 g/dL    BUN/Creatinine Ratio 9.0 7.0 - 25.0    Anion Gap 25.6 (H) 5.0 - 15.0 mmol/L    eGFR 35.9 (L) >60.0 mL/min/1.73   Protime-INR    Collection Time: 12/20/23  5:04 PM    Specimen: Blood   Result Value Ref Range    Protime 18.6 (H) 11.7 - 14.2 Seconds    INR 1.52 (H) 0.90 - 1.10   aPTT     Collection Time: 12/20/23  5:04 PM    Specimen: Blood   Result Value Ref Range    PTT 35.6 (H) 22.7 - 35.4 seconds   BNP    Collection Time: 12/20/23  5:04 PM    Specimen: Blood   Result Value Ref Range    proBNP 1,080.0 0.0 - 1,800.0 pg/mL   High Sensitivity Troponin T    Collection Time: 12/20/23  5:04 PM    Specimen: Blood   Result Value Ref Range    HS Troponin T 92 (C) <14 ng/L   Lactic Acid, Plasma    Collection Time: 12/20/23  5:04 PM    Specimen: Blood   Result Value Ref Range    Lactate 12.3 (C) 0.5 - 2.0 mmol/L   Procalcitonin    Collection Time: 12/20/23  5:04 PM    Specimen: Blood   Result Value Ref Range    Procalcitonin 0.23 0.00 - 0.25 ng/mL   Magnesium    Collection Time: 12/20/23  5:04 PM    Specimen: Blood   Result Value Ref Range    Magnesium 2.3 1.6 - 2.4 mg/dL   CBC Auto Differential    Collection Time: 12/20/23  5:04 PM    Specimen: Blood   Result Value Ref Range    WBC 12.16 (H) 3.40 - 10.80 10*3/mm3    RBC 3.67 (L) 3.77 - 5.28 10*6/mm3    Hemoglobin 10.0 (L) 12.0 - 15.9 g/dL    Hematocrit 32.6 (L) 34.0 - 46.6 %    MCV 88.8 79.0 - 97.0 fL    MCH 27.2 26.6 - 33.0 pg    MCHC 30.7 (L) 31.5 - 35.7 g/dL    RDW 16.3 (H) 12.3 - 15.4 %    RDW-SD 53.2 37.0 - 54.0 fl    MPV 10.3 6.0 - 12.0 fL    Platelets 154 140 - 450 10*3/mm3    Neutrophil % 46.9 42.7 - 76.0 %    Lymphocyte % 46.6 (H) 19.6 - 45.3 %    Monocyte % 4.3 (L) 5.0 - 12.0 %    Eosinophil % 0.5 0.3 - 6.2 %    Basophil % 0.2 0.0 - 1.5 %    Immature Grans % 1.5 (H) 0.0 - 0.5 %    Neutrophils, Absolute 5.70 1.70 - 7.00 10*3/mm3    Lymphocytes, Absolute 5.67 (H) 0.70 - 3.10 10*3/mm3    Monocytes, Absolute 0.52 0.10 - 0.90 10*3/mm3    Eosinophils, Absolute 0.06 0.00 - 0.40 10*3/mm3    Basophils, Absolute 0.03 0.00 - 0.20 10*3/mm3    Immature Grans, Absolute 0.18 (H) 0.00 - 0.05 10*3/mm3    nRBC 0.1 0.0 - 0.2 /100 WBC   POC Lactate    Collection Time: 12/20/23  5:11 PM    Specimen: Arterial Blood   Result Value Ref Range    Lactate 12.9 (C) 0.5 -  2.0 mmol/L    Notified Time      Notified Who dr reyez     Read Back Yes    POC Electrolyte Panel    Collection Time: 12/20/23  5:11 PM    Specimen: Arterial Blood   Result Value Ref Range    Sodium 142 136 - 145 mmol/L    POC Potassium 3.2 (L) 3.5 - 5.2 mmol/L    Chloride 106 98 - 107 mmol/L    Ionized Calcium 1.02 (L) 2.20 - 2.55 mmol/L   Blood Gas, Arterial -    Collection Time: 12/20/23  5:11 PM    Specimen: Arterial Blood   Result Value Ref Range    Site Right Brachial     Darryn's Test N/A     pH, Arterial 7.128 (C) 7.350 - 7.450 pH units    pCO2, Arterial 53.6 (H) 35.0 - 45.0 mm Hg    pO2, Arterial 128.4 (H) 80.0 - 100.0 mm Hg    HCO3, Arterial 17.7 (L) 22.0 - 28.0 mmol/L    Base Excess, Arterial -11.6 (L) 0.0 - 2.0 mmol/L    O2 Saturation, Arterial 97.5 92.0 - 98.5 %    A-a DO2 0.2 mmHg    Barometric Pressure for Blood Gas 759.2000 mmHg    Modality Adult Vent     FIO2 100 %    Ventilator Mode AC     Set Tidal Volume 450     Set Mech Resp Rate 24     Rate 24 Breaths/minute    PEEP 5     Notified Who dr reyez     Read Back Yes     Notified Time      Hemodilution No    POC Glucose Once    Collection Time: 12/20/23  6:46 PM    Specimen: Blood   Result Value Ref Range    Glucose 236 (H) 70 - 130 mg/dL       Ordered the above labs and independently interpreted results. My findings will be discussed in the medical decision making section below        RADIOLOGY  XR Chest 1 View    Result Date: 12/20/2023  Portable chest radiograph  HISTORY: Intubation  TECHNIQUE: Single AP portable radiograph of the chest  COMPARISON: None      FINDINGS AND IMPRESSION: The endotracheal tube terminates approximately 3 cm above the mark.  There is moderate to extensive pulmonary pacification within the bilateral lungs no suggestive of multifocal pneumonia, pulmonary edema and/or acute respiratory distress syndrome in the appropriate clinical context and correlation with patient history is recommended with follow-up chest CT if  clinically indicated. No pneumothorax is seen. Cardiac silhouette is moderately enlarged. There are right upper quadrant surgical clips. Median sternotomy wires and prosthetic heart valves are present.  This report was finalized on 12/20/2023 6:12 PM by Dr. José Luis Aldridge M.D on Workstation: [employer]       Ordered the above noted radiological studies.  Independently interpreted by me and my independent review of findings can be found in the ED Course.  See dictation for official radiology interpretation.      PROCEDURES    Intubation    Date/Time: 12/20/2023 4:36 PM    Performed by: Luis Stack MD  Authorized by: Luis Stack MD    Consent:     Consent obtained:  Emergent situation  Universal protocol:     Patient identity confirmed:  Anonymous protocol, patient vented/unresponsive  Pre-procedure details:     Indications: cardio/pulmonary arrest      Patient status:  Unresponsive    Neck mobility: normal      Pharmacologic strategy: none      Induction agents:  None    Paralytics:  None  Procedure details:     Preoxygenation:  Bag valve mask    CPR in progress: yes      Number of attempts:  1  Successful intubation attempt details:     Intubation method:  Oral    Intubation technique: video assisted      Laryngoscope blade:  Mac 3    Bougie used: no      Grade view: I      Tube size (mm):  7.5    Tube type:  Cuffed    Tube visualized through cords: yes    Placement assessment:     ETT at teeth/gumline (cm):  24    Tube secured with:  ETT faulkner    Breath sounds:  Equal and absent over the epigastrium    Placement verification: chest rise, colorimetric ETCO2, CXR verification, direct visualization, equal breath sounds, numeric ETCO2 and tube exhalation      CXR findings:  Appropriate position  Post-procedure details:     Procedure completion:  Tolerated well, no immediate complications    Complications: emesis    Critical Care    Performed by: Luis Stack MD  Authorized by: Luis Stack MD    Critical  care provider statement:     Critical care time (minutes):  45    Critical care time was exclusive of:  Separately billable procedures and treating other patients    Critical care was necessary to treat or prevent imminent or life-threatening deterioration of the following conditions:  CNS failure or compromise, circulatory failure, cardiac failure and respiratory failure    Critical care was time spent personally by me on the following activities:  Development of treatment plan with patient or surrogate, discussions with consultants, evaluation of patient's response to treatment, examination of patient, obtaining history from patient or surrogate, ordering and performing treatments and interventions, ordering and review of laboratory studies, ordering and review of radiographic studies, pulse oximetry, re-evaluation of patient's condition and review of old charts    Care discussed with: admitting provider      Care discussed with comment:  Dr. Shipley, ICU, admitting; Dr. Horner, Cardiology; Respiratory therapy    EKG - Per my independent interpretation at 1653:    1653  Junctional rhythm with a rate of 87  Nonspecific axis  Wide-complex pattern with QRS of 165  No STEMI      This represent a new changes compared to December 13, 2023 and November 10, 2023      MEDICATIONS GIVEN IN ER    Medications   EPINEPHrine 5 mg in 250 mL NS infusion (0.05 mcg/kg/min × 95 kg Intravenous Rate/Dose Change 12/20/23 1909)   norepinephrine (LEVOPHED) 8 mg in 250 mL NS infusion (premix) (0.04 mcg/kg/min × 95 kg Intravenous Rate/Dose Change 12/20/23 1736)   chlorhexidine (PERIDEX) 0.12 % solution 15 mL (has no administration in time range)   pantoprazole (PROTONIX) injection 40 mg (has no administration in time range)   albuterol (PROVENTIL) nebulizer solution 0.083% 2.5 mg/3mL (has no administration in time range)   propofol (DIPRIVAN) infusion 10 mg/mL 100 mL (has no administration in time range)   fentaNYL citrate (PF) (SUBLIMAZE)  injection 50 mcg (has no administration in time range)   nitroglycerin (NITROSTAT) SL tablet 0.4 mg (has no administration in time range)   sodium chloride 0.9 % flush 10 mL (has no administration in time range)   sodium chloride 0.9 % flush 10 mL (has no administration in time range)   sodium chloride 0.9 % infusion 40 mL (has no administration in time range)   sennosides-docusate (PERICOLACE) 8.6-50 MG per tablet 2 tablet (has no administration in time range)     And   polyethylene glycol (MIRALAX) packet 17 g (has no administration in time range)     And   bisacodyl (DULCOLAX) EC tablet 5 mg (has no administration in time range)     And   bisacodyl (DULCOLAX) suppository 10 mg (has no administration in time range)   acetaminophen (TYLENOL) tablet 650 mg (has no administration in time range)   Pharmacy to Dose Zosyn (has no administration in time range)   heparin 00690 units/250 mL (100 units/mL) in 0.45 % NaCl infusion (has no administration in time range)   heparin (porcine) 5000 UNIT/ML injection 2,900-5,700 Units (has no administration in time range)   dextrose (GLUTOSE) oral gel 15 g (has no administration in time range)   dextrose (D50W) (25 g/50 mL) IV injection 25 g (has no administration in time range)   glucagon (GLUCAGEN) injection 1 mg (has no administration in time range)   insulin regular (humuLIN R,novoLIN R) injection 2-9 Units (has no administration in time range)   EPINEPHrine (ADRENALIN) injection (1 mg Intravenous Given 12/20/23 1646)   sodium bicarbonate injection 8.4% (50 mEq Intravenous Given 12/20/23 1649)   sodium chloride 0.9 % bolus 1,000 mL (0 mL Intravenous Stopped 12/20/23 1742)         PROGRESS, DATA ANALYSIS, CONSULTS, AND MEDICAL DECISION MAKING    Please note that this section constitutes my independent interpretation of clinical data including lab results, radiology, EKG's.  This constitutes my independent professional opinion regarding differential diagnosis and management of  this patient.  It may include any factors such as history from outside sources, review of external records, social determinants of health, management of medications, response to those treatments, and discussions with other providers.    Differential Diagnosis and Plan: Patient arrives in PEA cardiac arrest, suspect potentially arrhythmia, cardiac failure, respiratory arrest from recent respiratory illness, electrolyte abnormality, arrhythmia, among others, lower concern for PE given her anticoagulated status.  ACS a concern as well.    Additional sources:  - Discussed/ obtained information from independent historians:  provides history for the patient given her critical status     - Chronic or social conditions impacting care:      - Shared decision making:  Patient's  and daughter at bedside fully updated on and in agreement with the course and plan moving forward    ED Course as of 12/20/23 1929   Wed Dec 20, 2023   1720 pH, Arterial(!!): 7.128 [DC]   1720 Lactate(!!): 12.9 [DC]   1720 Sodium: 142 [DC]   1720 Potassium(!): 3.2 [DC]   1720 WBC(!): 12.16 [DC]   1720 Hemoglobin(!): 10.0 [DC]   1720 Platelets: 154 [DC]   1721 XR Chest 1 View  Per my independent interpretation of the chest x-ray, no overt pneumothorax, fluffy opacities in the lung fields right greater than left, ET tube above the mark [DC]   1741 Discussed with Dr. Horner, Interventional Cardiology, he is currently at bedside evaluating and discussing with  [DC]   1742 Discussed with Dr. Andrés Shipley, ICU, discussed patient's clinical course and findings today, treatment modalities, and need for hospitalization to the ICU. [DC]   1742 Glucose(!): 237 [DC]   1742 BUN: 13 [DC]   1742 Creatinine(!): 1.44 [DC]   1742 Potassium(!): 3.1 [DC]   1742 ALT (SGPT)(!): 77 [DC]   1742 AST (SGOT)(!): 198 [DC]   1742 Alkaline Phosphatase: 95 [DC]   1742 Total Bilirubin: 0.4 [DC]   1830 Family updated prior to going up to the ICU, was somewhat  stabilized on multiple pressors with hypotension improved.  Ventilated with oxygen saturations in the upper 80s and low 90s.  On discussion with  and family at bedside, they state that she does have a documented DNR, and that if she were to go into cardiopulmonary arrest again that they would allow her to pass peacefully with no further aggressive resuscitation.  Currently they are comfortable with aggressive care to help improve her situation. [DC]      ED Course User Index  [DC] Luis Stack MD       Hospitalization Considered?: yes    Orders Placed During This Visit:  Orders Placed This Encounter   Procedures    Intubation    Critical Care    Respiratory Panel PCR w/COVID-19(SARS-CoV-2) MARKEL/ROSSY/ARTURO/PAD/COR/LATOYA In-House, NP Swab in UTM/VTM, 2 HR TAT - Swab, Nasopharynx    Respiratory Culture - Aspirate, ET Suction    MRSA Screen, PCR (Inpatient) - Swab, Nares    XR Chest 1 View    CT Head Without Contrast    XR Chest 1 View    Comprehensive Metabolic Panel    Protime-INR    aPTT    Blood Gas, Arterial -    BNP    High Sensitivity Troponin T    Lactic Acid, Plasma    Procalcitonin    Magnesium    CBC Auto Differential    Blood Gas, Arterial -    STAT Lactic Acid, Reflex    High Sensitivity Troponin T 2Hr    High Sensitivity Troponin T    Blood Gas, Arterial -Obtain on: Current Settings    Comprehensive Metabolic Panel    aPTT    aPTT    NPO Diet NPO Type: Strict NPO    Elevate HOB    Oral Care & Teeth Brushing - Intubated Patient    Subglottic Suctioning Must Be Done Every 6 Hours    Spontaneous Awakening Trial    RN May Place Order For ABG As Needed for Respiratory Distress    Vital Signs Per hospital policy    Telemetry - Place Orders & Notify Provider of Results When Patient Experiences Acute Chest Pain, Dysrhythmia or Respiratory Distress    Continuous Pulse Oximetry    Height and weight    Daily Weights    Intake and Output    Oral Care - Patient Not on NPPV & Not Intubated    Target Arousal Level  RASS 0 to -1    If Patient has BG of < 80 and is symptomatic but not on an IV insulin protocol then use the Adult Hypoglycemia Treatment Orders.    Tobacco cessation education    ICU / CCU - Place Order Consult Intensivist For Critical Care Management (If Patient Not Admitted to Cardiology for Primary Cardiology Condition)    ICU / CCU - Notify All Physicians When Patient is Transferred    Use Mobility Guidelines for Advancement of Activity    Saline Lock & Maintain IV Access    Insert Indwelling Urinary Catheter    Assess Need for Indwelling Urinary Catheter - Follow Removal Protocol    Urinary Catheter Care    Feeding Tube Insertion    Adjust Heparin Rate Based on aPTT Using Nomogram    Verify All Anticoagulant Orders Are Discontinued Upon Initiation of Heparin Protocol (eg Enoxaparin, Fondaparinux, Apixaban, Dabigatran, Edoxaban, or Rivaroxaban)    RN To Release aPTT Order 6 Hours After Heparin Bolus & 6 Hours After Any Heparin Rate Change    Code Status and Medical Interventions:    Pulmonology (on-call MD unless specified)    Interventional Cardiology (on-call MD unless specified)    Inpatient Palliative Care Team Consult    Spontaneous Breathing Trial    Ventilator - Vent Mode: AC/VC; Rate: Other; Rate: 28; FiO2: Titrate Per SpO2; Titrate Oxygen for SpO2: 90 - 95%; PEEP: 5; Tidal Volume: mL/kg PBW; mL/k    POC Electrolyte Panel    POC Lactate    POC Lactate    POC Electrolyte Panel    POC Glucose Once    POC Glucose Q4H    POC Glucose Once    ECG 12 Lead Other; ROSC after PEA cardiac arrest    ECG 12 Lead Other; ROSC    ECG 12 Lead Other; [facility]    Adult Transthoracic Echo Complete W/ Cont if Necessary Per Protocol    Insert Peripheral IV    Inpatient Admission    CBC & Differential    CBC & Differential    CBC & Differential       Additional orders considered but not placed:      Independent interpretation of labs, radiology studies, and discussions with consultants: See ED Course        AS OF 19:29 EST  VITALS:    BP - (!) 138/115  HR - (!) 128  TEMP - 99.5 °F (37.5 °C) (Tympanic)  02 SATS - (!) 85%        DIAGNOSIS  Final diagnoses:   Cardiac arrest   PEA (Pulseless electrical activity)   History of sick sinus syndrome   History of coronary artery disease   History of atrial fibrillation   History of hypertension         DISPOSITION  HOSPITALIZATION    Discussed treatment plan and reason for hospitalization with pt/family and hospitalizing physician.  Pt/family voiced understanding of the plan for hospitalization for further testing/treatment as needed.                 --    Please note that portions of this were completed with a voice recognition program.       Note Disclaimer: At [facility], we believe that sharing information builds trust and better relationships. You are receiving this note because you are receiving care at [facility] or recently visited. It is possible you will see health information before a provider has talked with you about it. This kind of information can be easy to misunderstand. To help you fully understand what it means for your health, we urge you to discuss this note with your provider.           Luis Stack MD  12/20/23 1929

## 2023-12-20 NOTE — H&P
"[facility]    CC: UTO    HPI:  Mrs. Mabry is a 82yo female with extensive cardiac history she was discharged 11/8 at that time she was having some rhthym problems, adjustements made to her medications and she was discharged with monitor and did follow up just about a week ago and monitor download looked good and she reported she was overall feeling better other than some continue machuca that was actually improved from before.  She started having some cough a day or so ago and was generally feeling a bit unwell, they were driving to the hospital when she went into cardiac arrest in the car.  She had CPR For an additional 10-15 mins in the ER and had PEA rhythm.  She now has wide complex tachycardia.  She is intubated and on two pressors.  She is minimally responsive.   is at bedside.   Dr. Horner with cardiology has already evaluated    PMH  CAD s/p CABG  Afib s/p maze procedure and left atrial appendage procedure  Chronic diastolic chf  Pulmonary htn (wedge 20)  Valvular heart disease s/p avr and mvr  HLD  HTN  H/o \"blood clots\"  Depression  Fatty liver  C. Diff  Right sided renal mass    Social History     Socioeconomic History    Marital status:      Former smoker quit 99    No family history on file.  ROS: UTO  ALL: PCN, codiene, ambien  Meds: reviewed as per chart    Vital Sign Min/Max for last 24 hours  Temp  Min: 99.5 °F (37.5 °C)  Max: 99.5 °F (37.5 °C)   BP  Min: 62/32  Max: 131/73   Pulse  Min: 40  Max: 128   Resp  Min: 18  Max: 18   SpO2  Min: 88 %  Max: 90 %   No data recorded   Weight  Min: 95 kg (209 lb 7 oz)  Max: 95 kg (209 lb 7 oz)     GEN:   appears ill, obese, on vent, not sedated, doesn't follow commands, reacts some to pain, has cough.   HEENT: PERRL, normal sclera mmm, no JVD, trachea midline, neck supple  CHEST: coarse ae bilat, no wheezes, + crackles, no use of accessory muscles  CV: tachy, irrg,   ABD: soft, nt, nd +bs, no hepatosplenomegaly  EXT: no c/c/e  SKIN: " no rashes, no xanthomas, nl turgor, warm, dry  LYMPH: no palpable cervical or supraclavicular lymphadenopathy  NEURO: CN 2-12 intact and symmetric bilaterally --limited eval possible given intubation and no cooperation  PSYCH: deferred  MSK: no significant kyphosis, normal muscular develpoement    Labs: 12/20: reviewed:  7.12/53/128  Glucose 237  Bun 13  Cr 1.44  Na 144  K 3.1  Bicarb 18.4  Alt 77  Ast 198  Trop 92  Lactate 12.3  Procal 0.23  Wbc 12  Hgb 10  Plts 154    12/20: CXR: reviewed: bilateral infiltrates, worse on the right    A/P:  S/p Cardiac arrest -- unclear etiology, cardiology has seen and will continue to follow, place pacer pads and externally pace if needed and notify cardiology of need for transvenous temporary pacer if needed  Acute hypoxemic and hypercapnic respiratory failure -- adjust vent as needed, repeat gas shortly,  Aspiration pneumonia -- cover with antibiotics, obtain sputum culture  Acute pulmonary edema -- fairly expected post arrest, suspect will be difficult to get any diuresis with hypotension and like ensuing renal failure  CKD --suspect she will have significant ATN from this event and urine output will not be good  H/o afib and valvular repair -- will start heparin for now  Anoxic brain injury -- continue supportive care  Lactic acidosis -- hopefully this will clear some now with ROSC; this is from cardiac arrest, patient not sepitc  Shock -- suspect largely cardiogenic/post arrest -- continue vasopressors, cautious with iv fluids given pre-exisiting cardiac dysfunction and likely worsening here.  Elevated liver enzymes -- monitor  Anemia  Obesity  Hyperglycemia    Prognosis is poor, discussed code status with  at bedside, he would like to discuss with family.   For now patient is full code. D/w Dr. Stack, Dr. Horner and with  at bedside.     CC 72 mins. Excluding any future billable procedures and preformed indepedently of other providers.

## 2023-12-20 NOTE — CONSULTS
"Date of Hospital Visit: 23  Encounter Provider: Murphy Horner MD  Place of Service: [facility] CARDIOLOGY  Patient Name: Alice Mabry  :1940  [id number]  Referral Provider: Antonella    Chief complaint: Cardiac arrest    History of Present Illness: 83-year-old well-known to me prior CABG history of COPD recently we have had some slow rhythm some junctional rhythm been evaluating her with a monitor and the arrhythmia team is been following her.  Evidently according to her  she has been coughing a lot was short of breath they were driving to the hospital and she may have stopped breathing in the car when they arrived here she was in a full cardiac arrest.  There was no evidence evidently of chest pain or fever no leg pains.  They worked on her for about somewhere from 10 to 15 minutes in the emergency room initial rhythm was PEA and then they got return of spontaneous circulation.      No past medical history on file.    No past surgical history on file.    (Not in a hospital admission)      Current Meds  Scheduled Meds:   Continuous Infusions:EPINEPHrine, 0.02-0.3 mcg/kg/min, Last Rate: 0.2 mcg/kg/min (23 1702)  norepinephrine, 0.02-0.3 mcg/kg/min, Last Rate: 0.04 mcg/kg/min (23 1736)      PRN Meds:.    Allergies as of 2023    (No Known Allergies)       Social History     Socioeconomic History    Marital status:        No family history on file.    REVIEW OF SYSTEMS:   ROS was performed and is negative except as outlined in HPI     REVIEW OF SYSTEMS:   Not obtainable      Objective:   Temp:  [99.5 °F (37.5 °C)] 99.5 °F (37.5 °C)  Heart Rate:  [40-95] 88  Resp:  [18] 18  BP: ()/(32-74) 118/67  FiO2 (%):  [100 %] 100 %  Body mass index is 35.95 kg/m².  Flowsheet Rows      Flowsheet Row First Filed Value   Admission Height 162.6 cm (64\") Documented at 2023 1655   Admission Weight 95 kg (209 lb 7 oz) Documented at 2023 1655 "          Vitals:    12/20/23 1733   BP:    Pulse:    Resp:    Temp: 99.5 °F (37.5 °C)   SpO2:        Head:    Normocephalic, without obvious abnormality, atraumatic   Eyes:            Lids and lashes normal, conjunctivae and sclerae normal, no   icterus, no pallor, pupils are actually constricted but not really reactive   Ears:    Ears appear intact with no abnormalities noted   Throat:   No oral lesions, dentition good   Neck:   No adenopathy, supple, trachea midline, no thyromegaly, no   carotid bruit, no JVD   Lungs:     Breath sounds are equal and clear to auscultation, breath sounds are coarse    Heart:    Normal S1 and S2, RRR, No M/G/R   Abdomen:     Normal bowel sounds, no masses, no organomegaly, soft        non-tender, non-distended, no guarding   Extremities:   Moves all extremities well, no edema, no cyanosis, no redness   Pulses:   Pulses palpable and equal bilaterally.    Skin:  Psychiatric:   No bleeding, bruising or rash  Intubated not responsive             I personally viewed and interpreted the patient's EKG/Telemetry data    Assessment:  There are no hospital problems to display for this patient.      Plan: Elderly 83-year-old female who had an out of hospital cardiac arrest initial rhythm was PEA may have been down to somewhere from 10 to 15 minutes out of the hospital and then another 10 to 15 minutes here.  pH was 7.12 lactate was 12 and ECG shows probably A-fib with ventricular escape rhythm that stable.  I would imagine either she had a respiratory arrest that then led to a cardiac arrest or she could have had a slow arrhythmia may be the cause this is hard to say.  We will watch her carefully.  The bigger issue is going to be whether she had an anoxic brain injury I am afraid she probably did.  We will continue to follow along    Murphy Horner MD  12/20/23  17:46 EST.

## 2023-12-21 ENCOUNTER — APPOINTMENT (OUTPATIENT)
Dept: GENERAL RADIOLOGY | Facility: HOSPITAL | Age: 83
DRG: 296 | End: 2023-12-21
Payer: MEDICARE

## 2023-12-21 NOTE — PROGRESS NOTES
[facility] Clinical Pharmacy Services: Piperacillin-Tazobactam Consult    Pt Name: Alice Mabry   : 1940    Indication: Pneumonia    Relevant clinical data and objective history reviewed:    No past medical history on file.  Creatinine   Date Value Ref Range Status   2023 1.44 (H) 0.57 - 1.00 mg/dL Final     BUN   Date Value Ref Range Status   2023 13 8 - 23 mg/dL Final     Estimated Creatinine Clearance: 32.5 mL/min (A) (by C-G formula based on SCr of 1.44 mg/dL (H)).    Lab Results   Component Value Date    WBC 12.16 (H) 2023     Temp Readings from Last 3 Encounters:   23 99.5 °F (37.5 °C) (Tympanic)      Assessment/Plan  Estimated CrCl >20 mL/min at this time; BMI 35.95 kg/m2  Will start piperacillin-tazobactam 3.375 g IV every 8 hours for 5 days    Pharmacy will continue to follow daily while on piperacillin-tazobactam and adjust as needed. Thank you for this consult.    Kat Nieto, [facility]  Clinical Pharmacist

## 2023-12-21 NOTE — NURSING NOTE
Discussed with Dr. Hilario at bedside about stat head CT. Okay to hold off until pt is more hemodynamically stable.

## 2023-12-21 NOTE — NURSING NOTE
Pt HR switched to a junctional rhythm rate 20s-30s. Family and NOK at bedside; reiterated wishes for no ACLS measures or further aggressive measures to be utilized at this time. Dr. Hilario with pulmonology updated.

## 2023-12-22 LAB
QT INTERVAL: 428 MS
QT INTERVAL: 494 MS
QTC INTERVAL: 595 MS
QTC INTERVAL: 628 MS

## 2023-12-22 NOTE — DISCHARGE SUMMARY
[facility]    Admit date: 12/20/23  Discharge dtae: 12/20/23    HPI: Mrs. Mabry is a 82yo female with extensive cardiac history she was discharged 11/8 at that time she was having some rhthym problems, adjustements made to her medications and she was discharged with monitor and did follow up just about a week ago and monitor download looked good and she reported she was overall feeling better other than some continue machuca that was actually improved from before.  She started having some cough a day or so ago and was generally feeling a bit unwell, they were driving to the hospital when she went into cardiac arrest in the car.  She had CPR For an additional 10-15 mins in the ER and had PEA rhythm.  She now has wide complex tachycardia.  She is intubated and on two pressors.  She is minimally responsive.   is at bedside.   Dr. Horner with cardiology has already evaluated    Hospital course:  Family requested DNR shortly after admission and patient had further rhythm issues and death ensued.    Admission/discharge diagnosis;    S/p Cardiac arrest -- unclear etiology, cardiology has seen and will continue to follow, place pacer pads and externally pace if needed and notify cardiology of need for transvenous temporary pacer if needed  Acute hypoxemic and hypercapnic respiratory failure --   Aspiration pneumonia --   Acute pulmonary edema --   CKD --  H/o afib and valvular repair --   Anoxic brain injury --   Lactic acidosis --  Shock -- suspect largely cardiogenic/post arrest --   Elevated liver enzymes --  Anemia  Obesity  Hyperglycemia    PATIENT NOT SEPTIC

## 2024-01-01 RX ORDER — AMIODARONE HYDROCHLORIDE 200 MG/1
200 TABLET ORAL DAILY
Qty: 90 TABLET | Refills: 1 | OUTPATIENT
Start: 2024-01-01

## 2024-01-01 RX ORDER — DAPAGLIFLOZIN 10 MG/1
1 TABLET, FILM COATED ORAL EVERY MORNING
Qty: 90 TABLET | Refills: 3 | OUTPATIENT
Start: 2024-01-01

## (undated) DEVICE — UNDYED BRAIDED (POLYGLACTIN 910), SYNTHETIC ABSORBABLE SUTURE: Brand: COATED VICRYL

## (undated) DEVICE — CONN STR 1/2INX3/8IN

## (undated) DEVICE — APPL HEMOS FOR DELIVERY FLOSEAL

## (undated) DEVICE — KT MANIFLD CARDIAC

## (undated) DEVICE — ARTERIAL CATHETER MINI-KIT 18GA X 6": Brand: ARTERIAL CATHETER MINI-KIT

## (undated) DEVICE — CANN ART SOFTFLOW EXT W/SUT/RNG 7MM

## (undated) DEVICE — LOU GENERAL ROBOT: Brand: MEDLINE INDUSTRIES, INC.

## (undated) DEVICE — VIOLET POLYDIOXANONE POLYMER, SYNTHETIC ABSORBABLE SUTURE CLIPS: Brand: LAPRA-TY

## (undated) DEVICE — SUT PROLN MO.5 7/0 DBLARM BV175 6 2X30 BX/12

## (undated) DEVICE — PENCL ES MEGADINE EZ/CLEAN BUTN W/HOLSTR 10FT

## (undated) DEVICE — ADHS SKIN SURG TISS VISC PREMIERPRO EXOFIN HI/VISC FAST/DRY

## (undated) DEVICE — SOL ANTISTICK CAUTRY ELECTROLUBE LF

## (undated) DEVICE — HARMONIC SYNERGY DISSECTING HOOK WITH TORQUE WRENCH. FOR USE WITH BLUE HAND PIECE ONLY: Brand: HARMONIC SYNERGY

## (undated) DEVICE — IRRIGATOR BULB ASEPTO 60CC STRL

## (undated) DEVICE — SUT PROLN 2/0 V5 48IN D9694

## (undated) DEVICE — VLV REL VAC VRV100 STRL

## (undated) DEVICE — HEMOCONCENTRATOR PERFUS LPS06

## (undated) DEVICE — CATH DIAG CARD PERFORMA JL3.5 BT 4F100CM

## (undated) DEVICE — CANN VESL CORNRY 12FR

## (undated) DEVICE — ENDOPOUCH RETRIEVER SPECIMEN RETRIEVAL BAGS: Brand: ENDOPOUCH RETRIEVER

## (undated) DEVICE — TBG INSUFFLATION LUER LOCK: Brand: MEDLINE INDUSTRIES, INC.

## (undated) DEVICE — PK PERFUS CUST W/CARDIOPLEGIA

## (undated) DEVICE — CATHETER,URETHRAL,REDRUBBER,STERILE,20FR: Brand: MEDLINE

## (undated) DEVICE — Device

## (undated) DEVICE — GLV SURG SENSICARE PI LF PF 7.5 GRN STRL

## (undated) DEVICE — PATIENT RETURN ELECTRODE, SINGLE-USE, CONTACT QUALITY MONITORING, ADULT, WITH 9FT CORD, FOR PATIENTS WEIGING OVER 33LBS. (15KG): Brand: MEGADYNE

## (undated) DEVICE — BLADELESS OBTURATOR: Brand: WECK VISTA

## (undated) DEVICE — Device: Brand: LEVEL 1

## (undated) DEVICE — SYS PERFUS SEP PLATLT W TIPS CUST

## (undated) DEVICE — PK SUT OPN HEART POLLOCK CUST

## (undated) DEVICE — DRP SLUSH WARMR MACH CIR 44X44IN

## (undated) DEVICE — CATH4F INF PIG 145Â° MOD 110CM: Brand: INFINITI

## (undated) DEVICE — MEDICINE CUP, GRADUATED, STER: Brand: MEDLINE

## (undated) DEVICE — PK CATH CARD 40

## (undated) DEVICE — PK HEART OPN 40

## (undated) DEVICE — OASIS DRAIN, DUAL, IN-LINE, ATS COMPATIBLE: Brand: OASIS

## (undated) DEVICE — BG TRANSF W/COUPLER SPK 600ML

## (undated) DEVICE — BALN PRESS WEDGE 5F 110CM

## (undated) DEVICE — GLV SURG BIOGEL LTX PF 6 1/2

## (undated) DEVICE — GLIDESHEATH BASIC HYDROPHILIC COATED INTRODUCER SHEATH: Brand: GLIDESHEATH

## (undated) DEVICE — DRSNG SURESITE WNDW 2.38X2.75

## (undated) DEVICE — SYS VASOVIEW HEMOPRO ENDOSCOPIC HARVST VESL

## (undated) DEVICE — ANTIBACTERIAL UNDYED BRAIDED (POLYGLACTIN 910), SYNTHETIC ABSORBABLE SUTURE: Brand: COATED VICRYL

## (undated) DEVICE — ENDOPATH XCEL BLADELESS TROCARS WITH STABILITY SLEEVES: Brand: ENDOPATH XCEL

## (undated) DEVICE — COLUMN DRAPE

## (undated) DEVICE — CONTAINER,SPECIMEN,OR STERILE,4OZ: Brand: MEDLINE

## (undated) DEVICE — STPLR SKIN VISISTAT WD 35CT

## (undated) DEVICE — SCANLAN® SUTURE BOOT™ INSTRUMENT JAW COVERS - ORIGINAL YELLOW, STANDARD PKG (5 PAIR/CARTRIDGE, 1 CARTRIDGE/PKG): Brand: SCANLAN® SUTURE BOOT™ INSTRUMENT JAW COVERS

## (undated) DEVICE — CONN REDUCING CANN/PUMP 1/2X3/8X3/8

## (undated) DEVICE — SUT SILK 2/0 FS BLK 18IN 685G

## (undated) DEVICE — LAPAROSCOPIC SMOKE FILTRATION SYSTEM: Brand: PALL LAPAROSHIELD® PLUS LAPAROSCOPIC SMOKE FILTRATION SYSTEM

## (undated) DEVICE — CATH VENT DLP W/CONN MALL NOVNT SILICON 16FR 16IN

## (undated) DEVICE — 3M™ STERI-DRAPE™ INSTRUMENT POUCH 1018L: Brand: STERI-DRAPE™

## (undated) DEVICE — AVID DUAL STAGE VENOUS DRAINAGE CANNULA: Brand: AVID DUAL STAGE VENOUS DRAINAGE CANNULA

## (undated) DEVICE — BLOWER/MISTER AXIOUS OPCAB W/TBG

## (undated) DEVICE — BLAKE CARDIO CONNECTOR 1:1: Brand: J-VAC

## (undated) DEVICE — JACKSON-PRATT 100CC BULB RESERVOIR: Brand: CARDINAL HEALTH

## (undated) DEVICE — CVR PROB 96IN LF STRL

## (undated) DEVICE — SOL NACL 0.9PCT 1000ML

## (undated) DEVICE — PROB CRYO ABL ICE MALL LSS BEND 10CM

## (undated) DEVICE — PK ATS CUST W CARDIOTOMY RESEVOIR

## (undated) DEVICE — SUT PROLN 4/0 BB D/A 36IN 8581H

## (undated) DEVICE — ST. SORBAVIEW ULTIMATE IJ SYSTEM A,C: Brand: CENTURION

## (undated) DEVICE — SENSR CERBRL O2 PK/2

## (undated) DEVICE — RADIFOCUS GLIDEWIRE: Brand: GLIDEWIRE

## (undated) DEVICE — GLV SURG BIOGEL M LTX PF 7 1/2

## (undated) DEVICE — SUT VIC 0 CT1 CR8 27IN JJ41G

## (undated) DEVICE — BIOPATCH™ ANTIMICROBIAL DRESSING WITH CHLORHEXIDINE GLUCONATE IS A HYDROPHILLIC POLYURETHANE ABSORPTIVE FOAM WITH CHLORHEXIDINE GLUCONATE (CHG) WHICH INHIBITS BACTERIAL GROWTH UNDER THE DRESSING. THE DRESSING IS INTENDED TO BE USED TO ABSORB EXUDATE, COVER A WOUND CAUSED BY VASCULAR AND NONVASCULAR PERCUTANEOUS MEDICAL DEVICES DURING SURGERY, AS WELL AS REDUCE LOCAL INFECTION AND COLONIZATION OF MICROORGANISMS.: Brand: BIOPATCH

## (undated) DEVICE — LAPAROVUE VISIBILITY SYSTEM LAPAROSCOPIC SOLUTIONS: Brand: LAPAROVUE

## (undated) DEVICE — PICO 7 10CM X 30CM: Brand: PICO™ 7

## (undated) DEVICE — GLV SURG BIOGEL LTX PF 7

## (undated) DEVICE — LOU OPEN HEART DR POLLOCK: Brand: MEDLINE INDUSTRIES, INC.

## (undated) DEVICE — CORONARY ARTERY BYPASS GRAFT MARKERS, STAINLESS STEEL, DISTAL, WITHOUT HOLDER: Brand: ANASTOMARK CORONARY ARTERY BYPASS GRAFT MARKERS, STAINLESS STEEL, DISTAL

## (undated) DEVICE — BLAKE SILICONE DRAINS CARDIO CONNECTOR 2:1: Brand: BLAKE

## (undated) DEVICE — DRN WND CH RND FUL/FLUT NO/TROC 3/8IN 28F

## (undated) DEVICE — TIP COVER ACCESSORY

## (undated) DEVICE — GW EMR FIX EXCHG J STD .035 3MM 260CM

## (undated) DEVICE — SUT SILK 0 CT1 CR8 18IN C021D

## (undated) DEVICE — SUT PROLN 3/0 SH D/A 36IN 8522H

## (undated) DEVICE — COUNT NDL MAG XLG

## (undated) DEVICE — CLAMP INSERT: Brand: STEALTH® CLAMP INSERT

## (undated) DEVICE — CATH DIAG CARD PERFORM JR4.0 BT 4F100CM

## (undated) DEVICE — Device: Brand: MEDEX

## (undated) DEVICE — CANNULA SEAL

## (undated) DEVICE — DRSNG WND GEL FIBR OPTICELL AG PLS W/SLV LF 4X5IN  STRL

## (undated) DEVICE — SOL ISO/ALC RUB 70PCT 4OZ

## (undated) DEVICE — ARM DRAPE

## (undated) DEVICE — SYR LUERLOK 20CC BX/50

## (undated) DEVICE — ROTATING SURGICAL PUNCHES, 1 PER POUCH: Brand: A&E MEDICAL / ROTATING SURGICAL PUNCHES